# Patient Record
Sex: FEMALE | Race: WHITE | NOT HISPANIC OR LATINO | Employment: OTHER | URBAN - METROPOLITAN AREA
[De-identification: names, ages, dates, MRNs, and addresses within clinical notes are randomized per-mention and may not be internally consistent; named-entity substitution may affect disease eponyms.]

---

## 2017-01-31 ENCOUNTER — OFFICE VISIT (OUTPATIENT)
Dept: PSYCHIATRY | Facility: CLINIC | Age: 62
End: 2017-01-31
Payer: MEDICARE

## 2017-01-31 VITALS
SYSTOLIC BLOOD PRESSURE: 151 MMHG | DIASTOLIC BLOOD PRESSURE: 74 MMHG | BODY MASS INDEX: 22.71 KG/M2 | HEIGHT: 62 IN | WEIGHT: 123.44 LBS | HEART RATE: 80 BPM

## 2017-01-31 DIAGNOSIS — Z79.899 HIGH RISK MEDICATIONS (NOT ANTICOAGULANTS) LONG-TERM USE: ICD-10-CM

## 2017-01-31 DIAGNOSIS — F31.9 BIPOLAR I DISORDER: Primary | ICD-10-CM

## 2017-01-31 PROCEDURE — 99999 PR PBB SHADOW E&M-EST. PATIENT-LVL II: CPT | Mod: PBBFAC,,, | Performed by: PSYCHIATRY & NEUROLOGY

## 2017-01-31 PROCEDURE — 90792 PSYCH DIAG EVAL W/MED SRVCS: CPT | Mod: S$GLB,,, | Performed by: PSYCHIATRY & NEUROLOGY
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
## 2017-04-25 ENCOUNTER — PATIENT OUTREACH (OUTPATIENT)
Dept: ADMINISTRATIVE | Facility: HOSPITAL | Age: 62
End: 2017-04-25

## 2017-04-25 NOTE — PROGRESS NOTES
Overdue pap report, due for an office visit with him, a pap smear, mammogram, colon cancer screening, hepatitis C screening, and possibly some immunizations (flu, shingles, and pneumonia)    last ov Chris 2/2016, Landry 9/2015    2nd attempt

## 2017-04-25 NOTE — LETTER
April 27, 2017    Viridiana Suresh  [address]  [place] [address]             [facility]  [address]  [facility] [address]  Phone: [phone] Dear Mrs. Suresh:    [facility] is committed to your overall health.  To help you get the most out of each of your visits, we will review your information to make sure you are up to date on all of your recommended tests and/or procedures.  We have Dr. Ilan Alatorre listed as your primary care provider.     If Dr. Alatorre is no longer your primary care provider, please contact me so that we may update our records accordingly.      He has found that you may be due for an office visit with him, a pap smear, mammogram, colon cancer screening, hepatitis C screening, and possibly some immunizations (flu, shingles, and pneumonia).     If you have had any of the above done at an outside facility, please let us know so I can update your record.  If you have a copy of these records, please provide a copy for us to scan into your chart.  If not, please provide that provider/facilities contact information so that we may obtain copies from that facility.     Otherwise, please schedule these appointments at your earliest convenience.     If you have any questions or concerns, please don't hesitate to call.    Thank you for letting us care for you,  Malka Boyd LPN Clinical Care Coordinator  [facility] [place]  [phone]

## 2017-05-26 ENCOUNTER — TELEPHONE (OUTPATIENT)
Dept: FAMILY MEDICINE | Facility: CLINIC | Age: 62
End: 2017-05-26

## 2017-05-26 ENCOUNTER — TELEPHONE (OUTPATIENT)
Dept: NEUROSURGERY | Facility: CLINIC | Age: 62
End: 2017-05-26

## 2017-05-26 NOTE — TELEPHONE ENCOUNTER
Pt returned call. Pt states she has had Carpal tunnel release surgery 4 yrs ago with Dr. Garvin. Pt has not had a recent EMG, informed pt she will be scheduled to see DARYL Mcmahon with soonest appt being June 19th. Advised pt to contact her pcp to see if they will order an EMG and then pt can be scheduled with one of the physicians. Pt would like to schedule appt with Aisha Berry and will contact pcp. Appt date, time, and location given. Pt verbalized understanding.

## 2017-05-26 NOTE — TELEPHONE ENCOUNTER
----- Message from Swetha Beyer sent at 5/26/2017 12:49 PM CDT -----  Contact: self  Patient [phone] has an appt for her carpal tunnel with Aisha Berry on 06 19 17 but patient is asking if Dr Alatorre or his nurse would call Ms Berry to check about getting patient in on an earlier date with Ms Jamal as patient is having a lot of carpal tunnel pain/please advise

## 2017-05-26 NOTE — TELEPHONE ENCOUNTER
Called pt to schedule appt. Left voicemail with return number.       ----- Message from Betsy Blue sent at 5/26/2017  7:28 AM CDT -----  Contact: self [phone]  She is requesting an appt with you for severe carpal tunnel pain.  She would like to be seen as soon as possible.  Please call her.  Thank you!

## 2017-05-29 ENCOUNTER — TELEPHONE (OUTPATIENT)
Dept: FAMILY MEDICINE | Facility: CLINIC | Age: 62
End: 2017-05-29

## 2017-05-29 NOTE — TELEPHONE ENCOUNTER
Aisha Berry wants pt to have EMG test prior to her appt on 6/19/17 for her hand pain.  Can you please enter the order so that we can schedule it.

## 2017-05-29 NOTE — TELEPHONE ENCOUNTER
----- Message from Lashawn Ortiz sent at 5/29/2017  1:06 PM CDT -----  Patient spoke with nurse this morning concerning the ordering of pain medication/stated that she decided she would like medication ordered/uses [facility]/please order and call patient back at [phone] to confirm.

## 2017-05-29 NOTE — TELEPHONE ENCOUNTER
Spoke with pt and informed pt that she would have to be seen for pain medication as she has not been seen since 2015. Pt appt r/s and verbalized understanding.

## 2017-06-26 DIAGNOSIS — F31.9 BIPOLAR I DISORDER: ICD-10-CM

## 2017-06-26 RX ORDER — DIVALPROEX SODIUM 250 MG/1
750 TABLET, DELAYED RELEASE ORAL NIGHTLY
Qty: 270 TABLET | Refills: 1 | Status: SHIPPED | OUTPATIENT
Start: 2017-06-26 | End: 2017-09-28 | Stop reason: SDUPTHER

## 2017-07-05 ENCOUNTER — TELEPHONE (OUTPATIENT)
Dept: FAMILY MEDICINE | Facility: CLINIC | Age: 62
End: 2017-07-05

## 2017-07-05 ENCOUNTER — HOSPITAL ENCOUNTER (OUTPATIENT)
Dept: RADIOLOGY | Facility: HOSPITAL | Age: 62
Discharge: HOME | End: 2017-07-05
Attending: FAMILY MEDICINE
Payer: MEDICARE

## 2017-07-05 ENCOUNTER — OFFICE VISIT (OUTPATIENT)
Dept: FAMILY MEDICINE | Facility: CLINIC | Age: 62
End: 2017-07-05
Payer: MEDICARE

## 2017-07-05 VITALS
BODY MASS INDEX: 22.88 KG/M2 | TEMPERATURE: 98 F | HEIGHT: 62 IN | SYSTOLIC BLOOD PRESSURE: 136 MMHG | DIASTOLIC BLOOD PRESSURE: 64 MMHG | WEIGHT: 124.31 LBS

## 2017-07-05 DIAGNOSIS — M19.032 ARTHRITIS OF LEFT WRIST: ICD-10-CM

## 2017-07-05 DIAGNOSIS — M20.10 HALLUX VALGUS, ACQUIRED, UNSPECIFIED LATERALITY: ICD-10-CM

## 2017-07-05 DIAGNOSIS — L84 PRE-ULCERATIVE CORN OR CALLOUS: ICD-10-CM

## 2017-07-05 DIAGNOSIS — M19.032 ARTHRITIS OF LEFT WRIST: Primary | ICD-10-CM

## 2017-07-05 PROCEDURE — 99999 PR PBB SHADOW E&M-EST. PATIENT-LVL III: CPT | Mod: PBBFAC,,, | Performed by: FAMILY MEDICINE

## 2017-07-05 PROCEDURE — 73110 X-RAY EXAM OF WRIST: CPT | Mod: 26,LT,, | Performed by: RADIOLOGY

## 2017-07-05 PROCEDURE — 11055 PARING/CUTG B9 HYPRKER LES 1: CPT | Mod: S$GLB,,, | Performed by: FAMILY MEDICINE

## 2017-07-05 PROCEDURE — 73110 X-RAY EXAM OF WRIST: CPT | Mod: TC,PO,LT

## 2017-07-05 PROCEDURE — 99213 OFFICE O/P EST LOW 20 MIN: CPT | Mod: 25,S$GLB,, | Performed by: FAMILY MEDICINE

## 2017-07-05 NOTE — TELEPHONE ENCOUNTER
She does not have [facility]. Does she want to  a copy of the report? There are signs of the previous fracture and also some arthritic changes.

## 2017-07-05 NOTE — TELEPHONE ENCOUNTER
----- Message from Manan Mcnamara sent at 7/5/2017  1:50 PM CDT -----  Contact: same  Patient called in and wanted to see if Dr. Alatorre had time to review her x-ray that she did this morning in [place] so she can come  a copy?    Patient call back number is [phone].  Patient stated a message can be left on her voice mail.

## 2017-07-05 NOTE — PROGRESS NOTES
"Subjective:       Patient ID: Viridiana Suresh is a 62 y.o. female.    Chief Complaint: Wrist Pain (Bilat wrist pain. L wrist worse.)    She has been having some left wrist pain for the past month and a half.  It is actually a little bit better the past 2 days as the result of prior.  She had a left wrist fracture 14 years ago.  She also has a history of right carpal tunnel syndrome and this feels a bit different.  Tino aCrreon, her chiropractor, referred her to physical therapy.  The physical therapist, Tawanna Griffith, would like to have an x-ray of her wrist.  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  There has been no specific recent injury.      Wrist Pain    Associated symptoms include numbness (she complains of some numbness ofthe toes of both feet.). Pertinent negatives include no fever.     Review of Systems   Constitutional: Negative for fever and unexpected weight change.   Musculoskeletal: Positive for arthralgias (she has bilateral bunions with some pain and she also has a painful callus.).   Neurological: Positive for numbness (she complains of some numbness ofthe toes of both feet.).       Objective:     Blood pressure 136/64, temperature 97.6 °F (36.4 °C), temperature source Oral, height 5' 2" (1.575 m), weight 56.4 kg (124 lb 5.4 oz).      Physical Exam   Musculoskeletal:   Her hands show signs of first [facility] arthritis bilaterally.  Her right wrist has full range of motion.  She has some enlargement of the second MCP and also some DIP nodes.  On the left hand there is a similar examination for osteoarthritis.  Her left wrist shows some increased bony prominence and reduced range of motion.  She does have some dorsal carpal tenderness.  Negative Finkelstein maneuver.  Full range of motion of both elbows.  Bilateral hallux valgus.  Mildly tender.  She has a tender callous on her right first MTP area       Assessment:       1. " Arthritis of left wrist    2. Hallux valgus, acquired, unspecified laterality    3. Pre-ulcerative corn or callous        Plan:       X-ray of the left wrist.  I agree with physical therapy.  Consider orthopedics if not improved.  ID predated the callus on her right foot and we discussed shoewear.   we will do further evaluation if her toe numbness is a worsening problem.

## 2017-07-05 NOTE — TELEPHONE ENCOUNTER
Spoke w/ pt and advised results are not yet in and that Dr Alatorre will let her know once they are in [facility].

## 2017-07-06 NOTE — TELEPHONE ENCOUNTER
Attempted to contact pt to inform her that we will have a copy of xray results up at the  for her to ; no answer; left message.

## 2017-08-18 DIAGNOSIS — Z12.11 COLON CANCER SCREENING: ICD-10-CM

## 2017-09-28 ENCOUNTER — OFFICE VISIT (OUTPATIENT)
Dept: PSYCHIATRY | Facility: CLINIC | Age: 62
End: 2017-09-28
Payer: MEDICARE

## 2017-09-28 VITALS
WEIGHT: 116.88 LBS | BODY MASS INDEX: 21.51 KG/M2 | HEART RATE: 80 BPM | SYSTOLIC BLOOD PRESSURE: 119 MMHG | DIASTOLIC BLOOD PRESSURE: 71 MMHG | HEIGHT: 62 IN

## 2017-09-28 DIAGNOSIS — F31.73 BIPOLAR I DISORDER, MILD, CURRENT OR MOST RECENT EPISODE MANIC, IN PARTIAL REMISSION: Primary | ICD-10-CM

## 2017-09-28 DIAGNOSIS — F31.9 BIPOLAR I DISORDER: ICD-10-CM

## 2017-09-28 DIAGNOSIS — Z79.899 HIGH RISK MEDICATIONS (NOT ANTICOAGULANTS) LONG-TERM USE: ICD-10-CM

## 2017-09-28 DIAGNOSIS — Z72.0 TOBACCO ABUSE: ICD-10-CM

## 2017-09-28 PROCEDURE — 99214 OFFICE O/P EST MOD 30 MIN: CPT | Mod: S$GLB,,, | Performed by: PSYCHIATRY & NEUROLOGY

## 2017-09-28 PROCEDURE — 99999 PR PBB SHADOW E&M-EST. PATIENT-LVL II: CPT | Mod: PBBFAC,,, | Performed by: PSYCHIATRY & NEUROLOGY

## 2017-09-28 PROCEDURE — 3008F BODY MASS INDEX DOCD: CPT | Mod: S$GLB,,, | Performed by: PSYCHIATRY & NEUROLOGY

## 2017-09-28 RX ORDER — DIVALPROEX SODIUM 250 MG/1
750 TABLET, DELAYED RELEASE ORAL NIGHTLY
Qty: 270 TABLET | Refills: 1 | Status: SHIPPED | OUTPATIENT
Start: 2017-09-28 | End: 2018-05-30 | Stop reason: SDUPTHER

## 2017-09-28 RX ORDER — LOXAPINE SUCCINATE 10 MG/1
10 TABLET ORAL NIGHTLY
Qty: 90 CAPSULE | Refills: 1 | Status: SHIPPED | OUTPATIENT
Start: 2017-09-28 | End: 2018-01-09

## 2017-10-24 ENCOUNTER — PATIENT OUTREACH (OUTPATIENT)
Dept: ADMINISTRATIVE | Facility: HOSPITAL | Age: 62
End: 2017-10-24

## 2017-10-24 NOTE — LETTER
October 24, 2017    Viridiana Suresh  [address]  [place] [address]             [facility]  [address]  [place] [address]  Phone: [phone] Dear Mrs. Suresh:    [facility] is committed to your overall health.  To help you get the most out of each of your visits, we will review your information to make sure you are up to date on all of your recommended tests and/or procedures.      Dr. Ilan Alatorre has found that your chart shows you may be due for hepatitis C screening, colon cancer screening, pap smear, mammogram, and possibly some immunizations (pneumonia, shingles, and flu).     Medicare does not cover all immunizations to be given in the clinic.  Check your benefits to ensure that you do not need to receive your immunizations at the pharmacy.    If you have had any of the above done at another facility, please bring the records or information with you so that your record at [facility] will be complete.  If you would like to schedule any of these, please contact me.    If you are currently taking medication, please bring it with you to your appointment for review.    If you have any questions or concerns, please don't hesitate to call.    Thank you for letting us care for you,  Malka Boyd LPN Clinical Care Coordinator  [facility] [place]  [phone]

## 2017-11-02 ENCOUNTER — OFFICE VISIT (OUTPATIENT)
Dept: FAMILY MEDICINE | Facility: CLINIC | Age: 62
End: 2017-11-02
Payer: MEDICARE

## 2017-11-02 VITALS
WEIGHT: 117.63 LBS | BODY MASS INDEX: 21.65 KG/M2 | DIASTOLIC BLOOD PRESSURE: 70 MMHG | SYSTOLIC BLOOD PRESSURE: 112 MMHG | TEMPERATURE: 98 F | HEIGHT: 62 IN

## 2017-11-02 DIAGNOSIS — G60.3 IDIOPATHIC PROGRESSIVE NEUROPATHY: ICD-10-CM

## 2017-11-02 DIAGNOSIS — G62.9 PERIPHERAL POLYNEUROPATHY: ICD-10-CM

## 2017-11-02 DIAGNOSIS — F31.9 BIPOLAR I DISORDER: Primary | ICD-10-CM

## 2017-11-02 DIAGNOSIS — M19.042 ARTHRITIS OF HAND, LEFT: ICD-10-CM

## 2017-11-02 PROCEDURE — 99999 PR PBB SHADOW E&M-EST. PATIENT-LVL II: CPT | Mod: PBBFAC,,, | Performed by: FAMILY MEDICINE

## 2017-11-02 PROCEDURE — 99214 OFFICE O/P EST MOD 30 MIN: CPT | Mod: S$GLB,,, | Performed by: FAMILY MEDICINE

## 2017-11-02 NOTE — PROGRESS NOTES
"Subjective:       Patient ID: Viridiana Suresh is a 62 y.o. female.    Chief Complaint: Numbness (Feet and toes w/ numbness off/on x 1 yr. Constant x couple mo. Seems to poss be due to new PT job.)    HPI     Numbness in toes: on and off for years, has been worsening over time, started around the time when she began working again-on her feet a lot. Mostly on sole of foot, pins and needles, no weakness. Soaks feet in Epson salts and using BenGay to help, minimal help. Had previous tardive dyskinesia, has been weaning off loxapine for this reason, will be off completely in January, foot/leg cramping and involuntary movements have improved. Numbness has not improved with weaning. Tends to wear shoes with minimal support. Family history of DM-grandmother. 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    Weakness in hands. Has been using DMSO on left hand with some improvement. Had x-ray, shows arthritis and osteopenia. Did some physical therapy-organized on her own, didn't help. Has started taking vitamin D. Uses wrist support at work. Wraps hand in heat pad at night to relieve pain.    Review of Systems   Constitutional: Negative for fatigue, fever and unexpected weight change.   HENT: Negative for congestion, hearing loss and rhinorrhea.    Eyes: Negative for photophobia and visual disturbance.   Respiratory: Negative for cough, shortness of breath and wheezing.    Cardiovascular: Negative for chest pain and palpitations.   Gastrointestinal: Negative for abdominal pain, blood in stool, constipation, diarrhea and nausea.   Genitourinary: Negative for difficulty urinating, dysuria, hematuria, urgency, vaginal bleeding and vaginal discharge.   Musculoskeletal: Positive for arthralgias. Negative for joint swelling.   Neurological: Positive for weakness. Negative for numbness and headaches.       Objective:     Blood pressure 112/70, temperature 98.3 °F (36.8 °C), temperature source Oral, height 5' 2" (1.575 m), " weight 53.4 kg (117 lb 10.2 oz).      Physical Exam   Constitutional: She is oriented to person, place, and time. She appears well-developed and well-nourished. No distress.   HENT:   Right Ear: External ear normal.   Left Ear: External ear normal.   Mouth/Throat: No oropharyngeal exudate.   Eyes: Conjunctivae and EOM are normal. Pupils are equal, round, and reactive to light. No scleral icterus.   Neck: Normal range of motion. No thyromegaly present.   Cardiovascular: Normal rate, regular rhythm and normal heart sounds.    No murmur heard.  Pulses:       Dorsalis pedis pulses are 1+ on the right side, and 1+ on the left side.        Posterior tibial pulses are 1+ on the right side, and 1+ on the left side.   Pulmonary/Chest: Effort normal. No respiratory distress. She has no wheezes. She has no rales. She exhibits no tenderness.   Abdominal: Soft. She exhibits no distension and no mass. There is no tenderness.   Musculoskeletal: She exhibits no edema.        Arms:       Right foot: There is no deformity.        Left foot: There is no deformity.        Feet:    Heberdens nodes. Also left 2-3rd MCP hypertrophy but nontender. Tender with crepitance left 1st senior care. Left wrist decreased ROM with some pain. Previous radial fx.    Feet:   Right Foot:   Protective Sensation: 4 sites tested. 4 sites sensed.   Skin Integrity: Negative for ulcer.   Left Foot:   Protective Sensation: 4 sites tested. 4 sites sensed.   Skin Integrity: Negative for ulcer.   Lymphadenopathy:     She has no cervical adenopathy.   Neurological: She is alert and oriented to person, place, and time.   Normal gait  Reduced vibratory sensation in feet   Skin: No rash noted.   Psychiatric: She has a normal mood and affect.       Assessment:       1. Bipolar I disorder    2. Arthritis of hand, left    3. Peripheral polyneuropathy        Plan:       1. Peripheral numbness  -Unlikely related to circulation  -Check Vit B12, folate    2. Arthritis-wrist  -Sapna  gel    Need to address health maintenance at next appointment

## 2018-01-08 ENCOUNTER — TELEPHONE (OUTPATIENT)
Dept: FAMILY MEDICINE | Facility: CLINIC | Age: 63
End: 2018-01-08

## 2018-01-08 NOTE — TELEPHONE ENCOUNTER
----- Message from Concha Miller sent at 1/6/2018  8:04 AM CST -----  Contact: Viridiana  Sick about a week, she has flu like symptoms. She has no appetite, diarrhea, coughing, fever. Please call her to be seen on Monday. Call [phone] (home)   Thanks!

## 2018-01-08 NOTE — TELEPHONE ENCOUNTER
Spoke w/ pt. Informed pt about no openings today with provider requested, pt states she will only see provider Dr. Alatorre in the [place] office only. Advised pt that there is an opening for 1-9-2018 at 10 am. pt verbalized understanding.

## 2018-01-09 ENCOUNTER — OFFICE VISIT (OUTPATIENT)
Dept: FAMILY MEDICINE | Facility: CLINIC | Age: 63
End: 2018-01-09
Payer: MEDICARE

## 2018-01-09 ENCOUNTER — HOSPITAL ENCOUNTER (OUTPATIENT)
Dept: RADIOLOGY | Facility: HOSPITAL | Age: 63
Discharge: HOME | End: 2018-01-09
Attending: FAMILY MEDICINE
Payer: MEDICARE

## 2018-01-09 VITALS
DIASTOLIC BLOOD PRESSURE: 78 MMHG | TEMPERATURE: 98 F | SYSTOLIC BLOOD PRESSURE: 130 MMHG | HEART RATE: 70 BPM | HEIGHT: 62 IN | WEIGHT: 113.13 LBS | BODY MASS INDEX: 20.82 KG/M2

## 2018-01-09 DIAGNOSIS — J06.9 UPPER RESPIRATORY TRACT INFECTION, UNSPECIFIED TYPE: ICD-10-CM

## 2018-01-09 DIAGNOSIS — Z72.0 TOBACCO ABUSE: ICD-10-CM

## 2018-01-09 DIAGNOSIS — J06.9 UPPER RESPIRATORY TRACT INFECTION, UNSPECIFIED TYPE: Primary | ICD-10-CM

## 2018-01-09 PROCEDURE — 99213 OFFICE O/P EST LOW 20 MIN: CPT | Mod: S$GLB,,, | Performed by: FAMILY MEDICINE

## 2018-01-09 PROCEDURE — 71046 X-RAY EXAM CHEST 2 VIEWS: CPT | Mod: 26,,, | Performed by: RADIOLOGY

## 2018-01-09 PROCEDURE — 71046 X-RAY EXAM CHEST 2 VIEWS: CPT | Mod: TC,PN

## 2018-01-09 PROCEDURE — 99999 PR PBB SHADOW E&M-EST. PATIENT-LVL III: CPT | Mod: PBBFAC,,, | Performed by: FAMILY MEDICINE

## 2018-01-09 RX ORDER — CODEINE PHOSPHATE AND GUAIFENESIN 10; 100 MG/5ML; MG/5ML
5 SOLUTION ORAL EVERY 8 HOURS PRN
Qty: 180 ML | Refills: 1 | Status: SHIPPED | OUTPATIENT
Start: 2018-01-09 | End: 2018-01-19

## 2018-01-09 NOTE — PROGRESS NOTES
"Subjective:       Patient ID: Viridiana Suresh is a 62 y.o. female.    Chief Complaint: Cough (cough, "in my chest") and Diarrhea (Diarrhea, achey, fatigue since last week)    She has been ill for approximately one week.  It started with 2 days of diarrhea and she felt feverish.  She has persistent tiredness and decreased appetite.  She has been coughing the past 5 days which is nonproductive.  There is no dyspnea.  She stopped smoking 5 weeks ago.  Her  had some upper respiratory symptoms that are improving.      Cough   Associated symptoms include a fever.   Diarrhea    Associated symptoms include coughing and a fever.     Review of Systems   Constitutional: Positive for appetite change and fever.   HENT: Positive for congestion.    Respiratory: Positive for cough.    Gastrointestinal: Positive for diarrhea.       Objective:     Blood pressure 130/78, pulse 70, temperature 97.8 °F (36.6 °C), temperature source Oral, height 5' 2" (1.575 m), weight 51.3 kg (113 lb 1.5 oz).      Physical Exam   Constitutional: She appears well-developed and well-nourished. No distress.   HENT:   Sinuses nontender.  Mild nasal mucosal edema.  Throat is normal.  TMs are clear.   Neck: No thyromegaly present.   Pulmonary/Chest: Effort normal. She has rales (I heard some crackles in the right posterior lung field).   Abdominal: Soft. Bowel sounds are normal. She exhibits no distension. There is no tenderness.   Musculoskeletal: She exhibits no edema.   Lymphadenopathy:     She has no cervical adenopathy.       Assessment:       1. Upper respiratory tract infection, unspecified type    2. Tobacco abuse        Plan:        symptomatic treatment discussed.  Chest x-ray today.    I reviewed the chest x-ray and it showed no infiltrate.  "

## 2018-01-10 ENCOUNTER — TELEPHONE (OUTPATIENT)
Dept: FAMILY MEDICINE | Facility: CLINIC | Age: 63
End: 2018-01-10

## 2018-01-10 NOTE — TELEPHONE ENCOUNTER
----- Message from Ilan Alatorre MD sent at 1/9/2018 12:31 PM CST -----  Please call her and tell her that the chest xray was clear

## 2018-01-10 NOTE — TELEPHONE ENCOUNTER
Spoke to pt and advised that chest xray was clear per Dr Alatorre and she is to follow up if not improving. Pt verbalized uinderstanding and stated she feels she is improving at this time.

## 2018-01-17 ENCOUNTER — TELEPHONE (OUTPATIENT)
Dept: FAMILY MEDICINE | Facility: CLINIC | Age: 63
End: 2018-01-17

## 2018-01-18 ENCOUNTER — OFFICE VISIT (OUTPATIENT)
Dept: FAMILY MEDICINE | Facility: CLINIC | Age: 63
End: 2018-01-18
Payer: MEDICARE

## 2018-01-18 VITALS
BODY MASS INDEX: 21.25 KG/M2 | HEART RATE: 71 BPM | HEIGHT: 62 IN | DIASTOLIC BLOOD PRESSURE: 80 MMHG | WEIGHT: 115.5 LBS | TEMPERATURE: 98 F | SYSTOLIC BLOOD PRESSURE: 124 MMHG | OXYGEN SATURATION: 95 %

## 2018-01-18 DIAGNOSIS — J32.9 SINUSITIS, UNSPECIFIED CHRONICITY, UNSPECIFIED LOCATION: ICD-10-CM

## 2018-01-18 DIAGNOSIS — H60.90 OTITIS EXTERNA, UNSPECIFIED CHRONICITY, UNSPECIFIED LATERALITY, UNSPECIFIED TYPE: ICD-10-CM

## 2018-01-18 DIAGNOSIS — J20.9 ACUTE BRONCHITIS, UNSPECIFIED ORGANISM: ICD-10-CM

## 2018-01-18 DIAGNOSIS — R19.7 DIARRHEA, UNSPECIFIED TYPE: ICD-10-CM

## 2018-01-18 DIAGNOSIS — J06.9 UPPER RESPIRATORY TRACT INFECTION, UNSPECIFIED TYPE: Primary | ICD-10-CM

## 2018-01-18 DIAGNOSIS — J02.9 PHARYNGITIS, UNSPECIFIED ETIOLOGY: ICD-10-CM

## 2018-01-18 PROCEDURE — 99999 PR PBB SHADOW E&M-EST. PATIENT-LVL III: CPT | Mod: PBBFAC,,, | Performed by: INTERNAL MEDICINE

## 2018-01-18 PROCEDURE — 99213 OFFICE O/P EST LOW 20 MIN: CPT | Mod: S$GLB,,, | Performed by: INTERNAL MEDICINE

## 2018-01-18 RX ORDER — NEOMYCIN SULFATE, POLYMYXIN B SULFATE AND HYDROCORTISONE 10; 3.5; 1 MG/ML; MG/ML; [USP'U]/ML
3 SUSPENSION/ DROPS AURICULAR (OTIC) 3 TIMES DAILY
Qty: 10 ML | Refills: 0 | Status: SHIPPED | OUTPATIENT
Start: 2018-01-18 | End: 2018-01-25

## 2018-01-18 RX ORDER — AZITHROMYCIN 250 MG/1
TABLET, FILM COATED ORAL
Qty: 6 TABLET | Refills: 0 | Status: SHIPPED | OUTPATIENT
Start: 2018-01-18 | End: 2018-01-23

## 2018-01-18 NOTE — PROGRESS NOTES
Subjective:       Patient ID: Viridiana Suresh is a 62 y.o. female.    Chief Complaint: left ear stopped up (x 7 days); Diarrhea (since this morning; no fever noted ); and Cough (x 7 days with clear mucus )    HPI  Seeing pt today for Dr Alaotrre, her PCP.  She was seen earlier by Dr. Alatorre on 1/9/18 for upper respiratory tract infection with cough, diarrhea and fever.  Symptoms at that time included 1 week history of diarrhea/fever w dry cough with flulike symptoms; sinus congestion but no sore throat; she was reportedly given a cough syrup for her cough but no Tamiflu; chest x-ray at that time was reportedly negative.  She reportedly had some fever within the week prior; 100.3, 2 days before the evaluation; she was 97.8 in the office at that time.     Today she presents with complaints of intermittent subjective fever for 1 week; but her  brings up the fact that she sleeps under an electric blanket at night and this may be causing it.  She has no complaints of posterior nasal drip, myalgias, or headaches.  She does give a history of mild sinus congestion for the last week but no sore throat; she is a past smoker of 47 years w averaging greater than equal to 1 pack per day; she has quit now for 5 months.  She does however use Q-tips and complains of the left ear being stopped up for the last week.  She is also had diarrhea this morning.  Temperature in the office today is 97.7°    Review of Systems   Constitutional: Positive for fever. Negative for chills, fatigue and unexpected weight change.   HENT: Positive for rhinorrhea. Negative for congestion, postnasal drip, sinus pressure, sore throat and voice change.    Eyes: Positive for itching. Negative for discharge.   Respiratory: Positive for cough. Negative for chest tightness and wheezing.         Dry.   Cardiovascular: Negative for palpitations and leg swelling.   Gastrointestinal: Positive for diarrhea. Negative for abdominal pain, blood in stool,  "constipation, nausea and vomiting.        5-6 soft BM's a day lately.   Genitourinary: Negative for dysuria and hematuria.   Musculoskeletal: Negative for arthralgias and myalgias.   Skin: Negative for rash.   Neurological: Negative for dizziness and headaches.       Objective:      Vitals:    01/18/18 1423   BP: 124/80   BP Location: Left arm   Patient Position: Sitting   BP Method: Medium (Manual)   Pulse: 71   Temp: 97.7 °F (36.5 °C)   TempSrc: Oral   SpO2: 95%   Weight: 52.4 kg (115 lb 8.3 oz)   Height: 5' 2" (1.575 m)     Body mass index is 21.13 kg/m².    Physical Exam   Constitutional: She is oriented to person, place, and time. She appears well-developed and well-nourished.   HENT:   Head: Normocephalic and atraumatic.   L TM swollen/tender/slight inflamed; min congestion. R TM pink; NM swollen/slightly inflamed; yellowish white mucus; throat and post pharynx inflamed.   Eyes: EOM are normal.   Neck: Normal range of motion. Neck supple. No thyromegaly present.   Cardiovascular: Normal rate, regular rhythm and normal heart sounds.  Exam reveals no gallop.    No murmur heard.  Pulmonary/Chest: Effort normal and breath sounds normal. No respiratory distress. She has no wheezes. She has no rales.   Abdominal: Soft. Bowel sounds are normal. She exhibits no distension. There is no tenderness. There is no rebound and no guarding.   Musculoskeletal: Normal range of motion. She exhibits no edema.   Lymphadenopathy:     She has no cervical adenopathy.   Neurological: She is alert and oriented to person, place, and time.   Moves all 4 extremities fine.   Skin: No rash noted.   Psychiatric: She has a normal mood and affect. Her behavior is normal. Thought content normal.   Vitals reviewed.      Assessment:       1. Upper respiratory tract infection, unspecified type    2. Acute bronchitis, unspecified organism    3. Sinusitis, unspecified chronicity, unspecified location    4. Pharyngitis, unspecified etiology    5. " Otitis externa, unspecified chronicity, unspecified laterality, unspecified type    6. Diarrhea, unspecified type        Plan:       Upper respiratory tract infection, unspecified type  -     azithromycin (Z-MEET) 250 MG tablet; Take 2 tablets by mouth on day 1; Take 1 tablet by mouth on days 2-5  Dispense: 6 tablet; Refill: 0    Acute bronchitis, unspecified organism; use mucinex DM otc for cough.   -     azithromycin (Z-MEET) 250 MG tablet; Take 2 tablets by mouth on day 1; Take 1 tablet by mouth on days 2-5  Dispense: 6 tablet; Refill: 0    Sinusitis, unspecified chronicity, unspecified location; simply saline 1 spray 1-3x a day for congestion. Flonase 1 spray 1-2x a day as needed.  -     azithromycin (Z-MEET) 250 MG tablet; Take 2 tablets by mouth on day 1; Take 1 tablet by mouth on days 2-5  Dispense: 6 tablet; Refill: 0    Pharyngitis, unspecified etiology; chloraseptic for sore throat. Warm salt watyer gargle as well prn. [place] drops prn.-     azithromycin (Z-MEET) 250 MG tablet; Take 2 tablets by mouth on day 1; Take 1 tablet by mouth on days 2-5  Dispense: 6 tablet; Refill: 0    Otitis externa, unspecified chronicity, unspecified laterality, unspecified type; she has been advised to stop using Q-tips in her ear canals.  -     azithromycin (Z-MEET) 250 MG tablet; Take 2 tablets by mouth on day 1; Take 1 tablet by mouth on days 2-5  Dispense: 6 tablet; Refill: 0  -     neomycin-polymyxin-hydrocortisone (CORTISPORIN) 3.5-10,000-1 mg/mL-unit/mL-% otic suspension; Place 3 drops into the left ear 3 (three) times daily.  Dispense: 10 mL; Refill: 0    Diarrhea; likely due to nasal drip; use imodium otc prn diarrhea.

## 2018-01-18 NOTE — PATIENT INSTRUCTIONS
Upper respiratory tract infection, unspecified type  -     azithromycin (Z-MEET) 250 MG tablet; Take 2 tablets by mouth on day 1; Take 1 tablet by mouth on days 2-5  Dispense: 6 tablet; Refill: 0    Acute bronchitis, unspecified organism; use mucinex DM otc for cough.   -     azithromycin (Z-MEET) 250 MG tablet; Take 2 tablets by mouth on day 1; Take 1 tablet by mouth on days 2-5  Dispense: 6 tablet; Refill: 0    Sinusitis, unspecified chronicity, unspecified location; simply saline 1 spray 1-3x a day for congestion. Flonase 1 spray 1-2x a day as needed.  -     azithromycin (Z-MEET) 250 MG tablet; Take 2 tablets by mouth on day 1; Take 1 tablet by mouth on days 2-5  Dispense: 6 tablet; Refill: 0    Pharyngitis, unspecified etiology; chloraseptic for sore throat. Warm salt watyer gargle as well prn.  -     azithromycin (Z-MEET) 250 MG tablet; Take 2 tablets by mouth on day 1; Take 1 tablet by mouth on days 2-5  Dispense: 6 tablet; Refill: 0    Otitis externa, unspecified chronicity, unspecified laterality, unspecified type; stop using Q-tips in her ear canals.  -     azithromycin (Z-MEET) 250 MG tablet; Take 2 tablets by mouth on day 1; Take 1 tablet by mouth on days 2-5  Dispense: 6 tablet; Refill: 0  -     neomycin-polymyxin-hydrocortisone (CORTISPORIN) 3.5-10,000-1 mg/mL-unit/mL-% otic suspension; Place 3 drops into the left ear 3 (three) times daily.  Dispense: 10 mL; Refill: 0

## 2018-02-14 ENCOUNTER — OFFICE VISIT (OUTPATIENT)
Dept: FAMILY MEDICINE | Facility: CLINIC | Age: 63
End: 2018-02-14
Payer: MEDICARE

## 2018-02-14 VITALS
HEIGHT: 62 IN | SYSTOLIC BLOOD PRESSURE: 136 MMHG | OXYGEN SATURATION: 97 % | WEIGHT: 118.63 LBS | TEMPERATURE: 98 F | HEART RATE: 71 BPM | DIASTOLIC BLOOD PRESSURE: 80 MMHG | BODY MASS INDEX: 21.83 KG/M2

## 2018-02-14 DIAGNOSIS — H60.92 OTITIS EXTERNA OF LEFT EAR, UNSPECIFIED CHRONICITY, UNSPECIFIED TYPE: ICD-10-CM

## 2018-02-14 DIAGNOSIS — J32.9 SINUSITIS, UNSPECIFIED CHRONICITY, UNSPECIFIED LOCATION: ICD-10-CM

## 2018-02-14 DIAGNOSIS — J98.01 BRONCHOSPASM: ICD-10-CM

## 2018-02-14 DIAGNOSIS — J40 BRONCHITIS: ICD-10-CM

## 2018-02-14 DIAGNOSIS — J02.9 PHARYNGITIS, UNSPECIFIED ETIOLOGY: ICD-10-CM

## 2018-02-14 DIAGNOSIS — J06.9 UPPER RESPIRATORY TRACT INFECTION, UNSPECIFIED TYPE: Primary | ICD-10-CM

## 2018-02-14 DIAGNOSIS — H69.92 DYSFUNCTION OF LEFT EUSTACHIAN TUBE: ICD-10-CM

## 2018-02-14 DIAGNOSIS — Z87.891 HISTORY OF SMOKING: ICD-10-CM

## 2018-02-14 PROCEDURE — 3008F BODY MASS INDEX DOCD: CPT | Mod: S$GLB,,, | Performed by: INTERNAL MEDICINE

## 2018-02-14 PROCEDURE — 99214 OFFICE O/P EST MOD 30 MIN: CPT | Mod: 25,S$GLB,, | Performed by: INTERNAL MEDICINE

## 2018-02-14 PROCEDURE — 99999 PR PBB SHADOW E&M-EST. PATIENT-LVL III: CPT | Mod: PBBFAC,,, | Performed by: INTERNAL MEDICINE

## 2018-02-14 PROCEDURE — 96372 THER/PROPH/DIAG INJ SC/IM: CPT | Mod: S$GLB,,, | Performed by: INTERNAL MEDICINE

## 2018-02-14 RX ORDER — AMOXICILLIN AND CLAVULANATE POTASSIUM 875; 125 MG/1; MG/1
1 TABLET, FILM COATED ORAL EVERY 12 HOURS
Qty: 20 TABLET | Refills: 0 | Status: SHIPPED | OUTPATIENT
Start: 2018-02-14 | End: 2018-02-24

## 2018-02-14 RX ORDER — BETAMETHASONE SODIUM PHOSPHATE AND BETAMETHASONE ACETATE 3; 3 MG/ML; MG/ML
6 INJECTION, SUSPENSION INTRA-ARTICULAR; INTRALESIONAL; INTRAMUSCULAR; SOFT TISSUE
Status: COMPLETED | OUTPATIENT
Start: 2018-02-14 | End: 2018-02-14

## 2018-02-14 RX ADMIN — BETAMETHASONE SODIUM PHOSPHATE AND BETAMETHASONE ACETATE 6 MG: 3; 3 INJECTION, SUSPENSION INTRA-ARTICULAR; INTRALESIONAL; INTRAMUSCULAR; SOFT TISSUE at 01:02

## 2018-02-14 NOTE — PROGRESS NOTES
Subjective:       Patient ID: Viridiana Suresh is a 62 y.o. female.    Chief Complaint: Sinusitis (clear white mucus x 1 month ) and Sore Throat    HPI   Pt. is being seen today for her PCP Dr. Alatorre..  Patient was recently seen by Dr. Alatorre on 1/9/18 for upper respiratory symptoms.  No antibiotic was felt needed then at that time.  She was off cigarettes for 9- 10 weeks  after 1 pack per day history for around 50 years.  Chest x-ray was ordered 1/9/18 showed no acute abnormalities.  She was subsequently seen by me in the office 1/18/18 for Dr. Alatorre for upper respiratory tract infection with bronchitis, sinusitis, and pharyngitis, and left otitis externa suspected due to Q-tip use.  She was treated with a Z-Brian; recommended warm salt water gargle, and Cortisporin otic HC suspension drops as well as an opiate cough syrup for prn cough spasms       Now, she presents to the office with persistent clear white mucus for one month with sinus drip.  She gives a history of subjective fever with chills for around 2 weeks but she sleeps under an electric blanket and is postmenopausal; she has not checked her temp.; also of note also complaints of some left-sided neck congestion. 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  She doesn't feel cleared up from her azithromycin and not any different after the Z-Brian; however she has a scratchy cough which is better, but it's producing white to green to clear phlegm.  Initially she had a clear to white nasal discharge and today has become green.  She gives no hx of sore throat, headaches, or myalgias, but does have some fatigue and lack of sleep.  Of note her 's on a Z-Brian and received steroids as well along w nasal spray for apparent upper respiratory tract infection.  She's also been under a lot of stress lately which may be contributing towards her fatigue. Influenza A and B nasal swab in the office was negative.  Total time: 4456-8784 hrs; >50% time spent in  "discussion, counseling, and review. Plan of care discussed at length.    Review of Systems   Constitutional: Positive for chills and fatigue. Negative for fever and unexpected weight change.   HENT: Positive for congestion, postnasal drip, rhinorrhea, sinus pressure and sore throat.         Seasonal allergies   Eyes: Positive for itching. Negative for discharge.   Respiratory: Positive for cough. Negative for chest tightness, shortness of breath and wheezing.    Cardiovascular: Negative for chest pain, palpitations and leg swelling.   Gastrointestinal: Negative for abdominal pain, blood in stool, constipation, diarrhea, nausea and vomiting.   Genitourinary: Negative for dysuria, hematuria and urgency.   Musculoskeletal: Negative for arthralgias and myalgias.   Skin: Negative for rash.   Allergic/Immunologic: Negative for environmental allergies and food allergies.       Objective:      Vitals:    02/14/18 1144   BP: 136/80   BP Location: Left arm   Patient Position: Sitting   BP Method: Medium (Manual)   Pulse: 71   Temp: 97.9 °F (36.6 °C)   TempSrc: Oral   SpO2: 97%   Weight: 53.8 kg (118 lb 9.7 oz)   Height: 5' 2" (1.575 m)     Body mass index is 21.69 kg/m².    Physical Exam   Constitutional: She is oriented to person, place, and time. She appears well-developed and well-nourished.   HENT:   Head: Normocephalic and atraumatic.   TM's pink; throat and post pharynx inflamed; NM swollen/ inflamed/clear to yel-green mucus. No sinus tenderness to palp.   Eyes: EOM are normal.   Neck: Normal range of motion. Neck supple. No thyromegaly present.   Cardiovascular: Normal rate, regular rhythm and normal heart sounds.  Exam reveals no gallop.    No murmur heard.  Pulmonary/Chest: Effort normal. No respiratory distress. She has no wheezes. She has no rales.   Min decreased loren BS's w slight post-tussive wheeze noted. Otherwise CTA.   Abdominal: Soft. Bowel sounds are normal. She exhibits no distension. There is no " tenderness. There is no rebound and no guarding.   Musculoskeletal: Normal range of motion. She exhibits no edema.   Lymphadenopathy:     She has no cervical adenopathy.   Neurological: She is alert and oriented to person, place, and time.   Moves all 4 extremities fine.   Skin: No rash noted.   Psychiatric: She has a normal mood and affect. Her behavior is normal. Thought content normal.   Vitals reviewed.      Assessment:       1. Upper respiratory tract infection, unspecified type    2. Sinusitis, unspecified chronicity, unspecified location    3. Bronchitis    4. Bronchospasm    5. Pharyngitis, unspecified etiology    6. Dysfunction of left eustachian tube    7. Otitis externa of left ear, unspecified chronicity, unspecified type    8. History of smoking        Plan:       Upper respiratory tract infection, unspecified type; previously prescribed Z-pack.  -     amoxicillin-clavulanate 875-125mg (AUGMENTIN) 875-125 mg per tablet; Take 1 tablet by mouth every 12 (twelve) hours. Take pc.  Dispense: 20 tablet; Refill: 0  -     betamethasone acetate-betamethasone sodium phosphate injection 6 mg; Inject 1 mL (6 mg total) into the muscle one time.    Sinusitis, unspecified chronicity, unspecified location; use claritin 10 mg a day for congestion. Warm salt water gargle 1-3x a day as needed.       Flonase nasal spray 1-2 sprays a day as needed for congestion.  -     amoxicillin-clavulanate 875-125mg (AUGMENTIN) 875-125 mg per tablet; Take 1 tablet by mouth every 12 (twelve) hours. Take pc.  Dispense: 20 tablet; Refill: 0  -     betamethasone acetate-betamethasone sodium phosphate injection 6 mg; Inject 1 mL (6 mg total) into the muscle one time.    Bronchitis; has cough syrup from Dr Alatorre; use mucinex DM for cough/congestion as well.  -     amoxicillin-clavulanate 875-125mg (AUGMENTIN) 875-125 mg per tablet; Take 1 tablet by mouth every 12 (twelve) hours. Take pc.  Dispense: 20 tablet; Refill: 0  -     betamethasone  acetate-betamethasone sodium phosphate injection 6 mg; Inject 1 mL (6 mg total) into the muscle one time.    Bronchospasm; wants to wait on albuterol inhaler prn use.    Pharyngitis, unspecified etiology; chloraseptic spray prn sore throat. Warm salt water gargle prn sore throat as well.  -     amoxicillin-clavulanate 875-125mg (AUGMENTIN) 875-125 mg per tablet; Take 1 tablet by mouth every 12 (twelve) hours. Take pc.  Dispense: 20 tablet; Refill: 0  -     betamethasone acetate-betamethasone sodium phosphate injection 6 mg; Inject 1 mL (6 mg total) into the muscle one time.    Dysfunction of left eustachian tube; use mucinex DM for congestion/cough.  -     amoxicillin-clavulanate 875-125mg (AUGMENTIN) 875-125 mg per tablet; Take 1 tablet by mouth every 12 (twelve) hours. Take pc.  Dispense: 20 tablet; Refill: 0  -     betamethasone acetate-betamethasone sodium phosphate injection 6 mg; Inject 1 mL (6 mg total) into the muscle one time.    Otitis externa of left ear, unspecified chronicity, unspecified type; can stop her ear drops, as has resolved w tx. Avoid further Q-tip use in her        ear canals.    History of smoking; continue to obstain from smoking.

## 2018-02-14 NOTE — PATIENT INSTRUCTIONS
Upper respiratory tract infection, unspecified type  -     amoxicillin-clavulanate 875-125mg (AUGMENTIN) 875-125 mg per tablet; Take 1 tablet by mouth every 12 (twelve) hours. Take pc.  Dispense: 20 tablet; Refill: 0  -     betamethasone acetate-betamethasone sodium phosphate injection 6 mg; Inject 1 mL (6 mg total) into the muscle one time.    Sinusitis, unspecified chronicity, unspecified location; use claritin 10 mg a day for congestion. Warm salt water gargle 1-3x a day as needed. Flonase nasal spray 1-2 sprays a day as needed for congestion.  -     amoxicillin-clavulanate 875-125mg (AUGMENTIN) 875-125 mg per tablet; Take 1 tablet by mouth every 12 (twelve) hours. Take pc.  Dispense: 20 tablet; Refill: 0  -     betamethasone acetate-betamethasone sodium phosphate injection 6 mg; Inject 1 mL (6 mg total) into the muscle one time.    Bronchitis; has cough syrup from Dr Alatorre; use mucinex DM for cough/congestion.  -     amoxicillin-clavulanate 875-125mg (AUGMENTIN) 875-125 mg per tablet; Take 1 tablet by mouth every 12 (twelve) hours. Take pc.  Dispense: 20 tablet; Refill: 0  -     betamethasone acetate-betamethasone sodium phosphate injection 6 mg; Inject 1 mL (6 mg total) into the muscle one time.    Bronchospasm; wants to wait on albuterol inhaler prn use.    Pharyngitis, unspecified etiology; chloraseptic spray prn sore throat.  -     amoxicillin-clavulanate 875-125mg (AUGMENTIN) 875-125 mg per tablet; Take 1 tablet by mouth every 12 (twelve) hours. Take pc.  Dispense: 20 tablet; Refill: 0  -     betamethasone acetate-betamethasone sodium phosphate injection 6 mg; Inject 1 mL (6 mg total) into the muscle one time.    Dysfunction of left eustachian tube; use mucinex DM for congestion/cough.  -     amoxicillin-clavulanate 875-125mg (AUGMENTIN) 875-125 mg per tablet; Take 1 tablet by mouth every 12 (twelve) hours. Take pc.  Dispense: 20 tablet; Refill: 0  -     betamethasone acetate-betamethasone sodium phosphate  injection 6 mg; Inject 1 mL (6 mg total) into the muscle one time.    Otitis externa of left ear, unspecified chronicity, unspecified type; can stop her ear drops.     History of smoking

## 2018-03-15 ENCOUNTER — OFFICE VISIT (OUTPATIENT)
Dept: FAMILY MEDICINE | Facility: CLINIC | Age: 63
End: 2018-03-15
Payer: MEDICARE

## 2018-03-15 VITALS
DIASTOLIC BLOOD PRESSURE: 68 MMHG | WEIGHT: 123.44 LBS | TEMPERATURE: 98 F | HEIGHT: 62 IN | OXYGEN SATURATION: 98 % | SYSTOLIC BLOOD PRESSURE: 120 MMHG | BODY MASS INDEX: 22.71 KG/M2 | HEART RATE: 78 BPM

## 2018-03-15 DIAGNOSIS — Z72.0 TOBACCO ABUSE: ICD-10-CM

## 2018-03-15 DIAGNOSIS — J31.0 CHRONIC RHINITIS, UNSPECIFIED TYPE: Primary | ICD-10-CM

## 2018-03-15 PROCEDURE — 99999 PR PBB SHADOW E&M-EST. PATIENT-LVL III: CPT | Mod: PBBFAC,,, | Performed by: FAMILY MEDICINE

## 2018-03-15 PROCEDURE — 99213 OFFICE O/P EST LOW 20 MIN: CPT | Mod: S$GLB,,, | Performed by: FAMILY MEDICINE

## 2018-03-15 NOTE — PROGRESS NOTES
"Subjective:       Patient ID: Viridiana Suresh is a 62 y.o. female.    Chief Complaint: Sinus Problem (left ear pain with fluid, possible sinus infection, clear mucus)    She has been having some upper respiratory complaints for 2 months.  I reviewed notes from the past 3 visits.  She had nasal congestion and coughing low-grade fever, ear symptoms.  She was treated first with a Z-Brian and then with Augmentin.  She also received a betamethasone injection.  Her current symptoms are runny nose with clear discharge alternating with crustiness and stuffiness.  Some sneezing.  Some itchy watery eyes.  Her ears feel like there is fluid.  Mild itching.  She says that her hearing is okay.  She stopped smoking on December 10.  She has no history of asthma.      Sinus Problem   Associated symptoms include congestion, ear pain and sinus pressure. Pertinent negatives include no coughing or shortness of breath.     Review of Systems   Constitutional: Negative for appetite change, fever and unexpected weight change.   HENT: Positive for congestion, ear pain, postnasal drip, rhinorrhea and sinus pressure. Negative for ear discharge.    Respiratory: Negative for cough and shortness of breath.        Objective:     Blood pressure 120/68, pulse 78, temperature 98.1 °F (36.7 °C), temperature source Oral, height 5' 2" (1.575 m), weight 56 kg (123 lb 7.3 oz), SpO2 98 %.      Physical Exam   Constitutional: She appears well-developed and well-nourished. No distress.   HENT:   Puffy infraorbital area.  TMs are clear and mobile to insufflation.  Ascencio goes to the left.  1+ nasal mucosal edema with clear to white mucus.  Her throat is normal.   Neck: Normal range of motion. No thyromegaly present.   Cardiovascular: Normal rate and regular rhythm.    Pulmonary/Chest: Effort normal and breath sounds normal.   Lymphadenopathy:     She has no cervical adenopathy.       Assessment:       1. Chronic rhinitis, unspecified type    2. Tobacco abuse     "    Plan:       I think she probably has ALLERGIC rhinitis.  She has also been bothered by a lot of financial stress.  Her  had lost his job and they were in arrears on their mortgage payment.  A [employer] helped them out.  She did not sleep well last night.  Trial of generic Allegra 180 mg daily.  She may want to try an Tessy pot that she has at home.  She could also try Flonase.  Call back if not improving.

## 2018-04-02 ENCOUNTER — TELEPHONE (OUTPATIENT)
Dept: PSYCHIATRY | Facility: CLINIC | Age: 63
End: 2018-04-02

## 2018-04-02 ENCOUNTER — OFFICE VISIT (OUTPATIENT)
Dept: PSYCHIATRY | Facility: CLINIC | Age: 63
End: 2018-04-02
Payer: MEDICARE

## 2018-04-02 VITALS
HEART RATE: 75 BPM | HEIGHT: 62 IN | SYSTOLIC BLOOD PRESSURE: 155 MMHG | WEIGHT: 125.81 LBS | BODY MASS INDEX: 23.15 KG/M2 | DIASTOLIC BLOOD PRESSURE: 86 MMHG

## 2018-04-02 DIAGNOSIS — Z79.899 HIGH RISK MEDICATIONS (NOT ANTICOAGULANTS) LONG-TERM USE: ICD-10-CM

## 2018-04-02 DIAGNOSIS — F31.9 BIPOLAR I DISORDER: Primary | ICD-10-CM

## 2018-04-02 DIAGNOSIS — Z72.0 TOBACCO ABUSE: ICD-10-CM

## 2018-04-02 PROCEDURE — 99214 OFFICE O/P EST MOD 30 MIN: CPT | Mod: S$GLB,,, | Performed by: PSYCHIATRY & NEUROLOGY

## 2018-04-02 PROCEDURE — 99999 PR PBB SHADOW E&M-EST. PATIENT-LVL II: CPT | Mod: PBBFAC,,, | Performed by: PSYCHIATRY & NEUROLOGY
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
## 2018-04-02 NOTE — TELEPHONE ENCOUNTER
Patient requesting labs to check depakote level. Offered follow up appointment today. Patient declines, states unable to come up with co/pay.   Last office visit 9/28/2017.  Debby

## 2018-04-03 ENCOUNTER — LAB VISIT (OUTPATIENT)
Dept: LAB | Facility: HOSPITAL | Age: 63
End: 2018-04-03
Attending: PSYCHIATRY & NEUROLOGY
Payer: MEDICARE

## 2018-04-03 DIAGNOSIS — Z79.899 HIGH RISK MEDICATIONS (NOT ANTICOAGULANTS) LONG-TERM USE: ICD-10-CM

## 2018-04-03 LAB — VALPROATE SERPL-MCNC: 69.5 UG/ML

## 2018-04-03 PROCEDURE — 36415 COLL VENOUS BLD VENIPUNCTURE: CPT | Mod: PN

## 2018-04-03 PROCEDURE — 80164 ASSAY DIPROPYLACETIC ACD TOT: CPT

## 2018-04-27 ENCOUNTER — TELEPHONE (OUTPATIENT)
Dept: FAMILY MEDICINE | Facility: CLINIC | Age: 63
End: 2018-04-27

## 2018-04-27 NOTE — TELEPHONE ENCOUNTER
Type: Diarrhea      When did this start:  4-  How many stools a day?  3  Any fever:  No  Pain? 2/10   Location: Location: in the lower abdomen without radiation  Has the patient started any new medication/food/exposure? No   If so, what? N/A  Any blood in stool? No  Any recent travel?  No   If so, where? N/A  Any medications taken:  no   Medication Allergies? KNDA  Preferred pharmacy?   [facility]  Additional information: pt had called the UC and they stated the co-pay would be 100. Pt states she wants something called in too diarrhea and chaffing on her butt.

## 2018-04-27 NOTE — TELEPHONE ENCOUNTER
Discussed with patient Viridiana Suresh and her .  Patient has been having diarrhea for roughly around last 2 weeks about 3 stools per day which is been soft.  No complaints of blood in the stool fever or dominant pain.  No recent antibiotics or recent travel.  She has been worrying a lot lately she's been having pain in her wrist and she got back to smoking again.  She's been stressed a lot as well.  She's also been drinking a lot of coffee with Diet Coke as well which can be contributing if not causing her diarrhea.  She was advised to eliminate Diet Coke and coffee as well as other forms of caffeine as these can cause diarrhea.  She also was offered Lomotil but wanted to rely on home remedies they feel they have Kaopectate and at home.  She was also recommended to use [employer]t Paste for her chaffing.  Due to the above history she was recommended a scheduled appointment to see Dr. Alatorre with calling on Monday to schedule.  She was advised to call back the on-call service if her condition should change or worsen.or go to the emergency room

## 2018-04-27 NOTE — TELEPHONE ENCOUNTER
Type: Diarrhea     When did this start:  4-  How many stools a day?  3  Any fever:  No  Pain? 2/10   Location: Location: in the lower abdomen without radiation  Has the patient started any new medication/food/exposure? No   If so, what? N/A  Any blood in stool? No  Any recent travel?  No   If so, where? N/A  Any medications taken:  no   Medication Allergies? KNDA  Preferred pharmacy?   [id number] [place] - [address]  [address]  [place] [address]  Phone: [phone] Fax: [phone]  Additional information:  pt advised to go to UC/ED. pt verbalized understanding. pt states that they will go to UC.

## 2018-04-27 NOTE — TELEPHONE ENCOUNTER
----- Message from Bharat Sutton sent at 4/27/2018 12:21 PM CDT -----  Contact: self- [phone]  Type: Needs Medical Advice  Who Called:  Patient   Symptoms (please be specific):  Diarrhea for a while  How long has patient had these symptoms:   Pharmacy name and phone #[facility].  Best Call Back Number: Additional Information: Patient called asking for a rx for diarrhea. Patient states she had the diarrhea for so long, the area is raw,patient can barley walk

## 2018-04-27 NOTE — TELEPHONE ENCOUNTER
----- Message from Viridiana Baird sent at 4/27/2018 12:10 PM CDT -----  Type:  Same Day Appointment Request    Caller is requesting a same day appointment.  Caller declined first available appointment listed below.      Name of Caller:  self  When is the first available appointment?  04/30  Symptoms:  Diarrhea 5-7 days  Best Call Back Number:  [phone] (home)     Additional Information:   Offered [place] location / declined / will see anyone in [facility]

## 2018-05-01 ENCOUNTER — TELEPHONE (OUTPATIENT)
Dept: FAMILY MEDICINE | Facility: CLINIC | Age: 63
End: 2018-05-01

## 2018-05-01 NOTE — TELEPHONE ENCOUNTER
----- Message from Jacqueline Giordano sent at 5/1/2018  8:48 AM CDT -----  Contact: patient   Type:  Sooner Apoointment Request    Caller is requesting a sooner appointment.  Caller declined first available appointment listed below.  Caller will not accept being placed on the waitlist and is requesting a message be sent to doctor.    Name of Caller:  Viridiana Suresh  When is the first available appointment? 5/3/18   Symptoms:  Severe diarrhea  Best Call Back Number:     Additional Information:  She is requesting Wednesday 5/2/18. No available appointments that day. She states that she can't come in today due to waiting for a call about another appointment. She also says she can't come Thursday because she would be unavailable that day. Please advise.

## 2018-05-02 ENCOUNTER — OFFICE VISIT (OUTPATIENT)
Dept: FAMILY MEDICINE | Facility: CLINIC | Age: 63
End: 2018-05-02
Payer: MEDICARE

## 2018-05-02 VITALS
BODY MASS INDEX: 22.41 KG/M2 | OXYGEN SATURATION: 96 % | DIASTOLIC BLOOD PRESSURE: 80 MMHG | HEART RATE: 80 BPM | SYSTOLIC BLOOD PRESSURE: 130 MMHG | WEIGHT: 121.81 LBS | TEMPERATURE: 99 F | HEIGHT: 62 IN

## 2018-05-02 DIAGNOSIS — F41.9 ANXIETY: ICD-10-CM

## 2018-05-02 DIAGNOSIS — R19.7 DIARRHEA, UNSPECIFIED TYPE: Primary | ICD-10-CM

## 2018-05-02 DIAGNOSIS — T88.7XXA SIDE EFFECT OF DRUG: ICD-10-CM

## 2018-05-02 DIAGNOSIS — Z72.0 TOBACCO ABUSE: ICD-10-CM

## 2018-05-02 PROCEDURE — 99999 PR PBB SHADOW E&M-EST. PATIENT-LVL III: CPT | Mod: PBBFAC,,, | Performed by: INTERNAL MEDICINE

## 2018-05-02 PROCEDURE — 99213 OFFICE O/P EST LOW 20 MIN: CPT | Mod: S$GLB,,, | Performed by: INTERNAL MEDICINE

## 2018-05-02 NOTE — PROGRESS NOTES
"Subjective:       Patient ID: Viridiana Suresh is a 62 y.o. female.    Chief Complaint: Diarrhea (x 2 weeks with loose stools )    HPI  Diarrhea for 2 weeks; 1 big diarrhea episode in morning; 2 episodes a day. 2nd episode a lot smaller; feels like she has to go then nothing occurs or flatulence. No melena or BRBPR. Some lower back pain but no abd pain. No n/v, fever or chills. Subjective fever 3-4 nights ago. Some sweats then as well; also hot cold feeling. No myalgias, but HA's yesterday twice; took ibuprofen. Some heartburn needing antacid. Steroid shot last week for L wrist pain/arthritis. Recent depakote levels were fine; off cigarettes for 5 mos; for 4 days smoked 1 PPD off for 5 days now; and off diet sodas/heavy drinker of diet soda, off last 5 days, usually w caffeine. No recent ab's except.Jan tx w z-pack; feb w augmentin for URI..   A lot of stress w her  and at work w new management. Drinks a lot of caffeine as well. No depression.     Review of Systems   Constitutional: Negative for appetite change, chills, fatigue and unexpected weight change.   HENT: Negative for congestion, postnasal drip and sinus pressure.    Respiratory: Negative for cough, shortness of breath and wheezing.    Cardiovascular: Negative for chest pain, palpitations and leg swelling.   Gastrointestinal: Positive for diarrhea. Negative for abdominal pain, blood in stool, nausea and vomiting.   Genitourinary: Negative for dysuria, hematuria and urgency.   Musculoskeletal: Positive for arthralgias.        L wrist w recent steroid injection.       Objective:      Vitals:    05/02/18 1349   BP: 130/80   BP Location: Left arm   Patient Position: Sitting   BP Method: Medium (Manual)   Pulse: 80   Temp: 98.6 °F (37 °C)   TempSrc: Oral   SpO2: 96%   Weight: 55.3 kg (121 lb 12.9 oz)   Height: 5' 2" (1.575 m)     Body mass index is 22.28 kg/m².    Physical Exam   Constitutional: She is oriented to person, place, and time. She appears " well-developed and well-nourished.   HENT:   Head: Normocephalic and atraumatic.   Tongue pink and moist.   Eyes: EOM are normal.   Neck: Normal range of motion. Neck supple. No thyromegaly present.   Cardiovascular: Normal rate, regular rhythm and normal heart sounds.  Exam reveals no gallop.    No murmur heard.  Pulmonary/Chest: Effort normal and breath sounds normal. No respiratory distress. She has no wheezes. She has no rales.   Abdominal: Soft. Bowel sounds are normal. She exhibits no distension. There is no tenderness. There is no rebound and no guarding.   Musculoskeletal: Normal range of motion. She exhibits no edema.   Lymphadenopathy:     She has no cervical adenopathy.   Neurological: She is alert and oriented to person, place, and time.   Moves all 4 extremities fine.   Skin: No rash noted.   Psychiatric: She has a normal mood and affect. Her behavior is normal. Thought content normal.   Vitals reviewed.      Assessment:       1. Diarrhea, unspecified type    2. Anxiety    3. Tobacco abuse    4. Side effect of drug        Plan:       Diarrhea, unspecified type; kaopectate or equivalent for diarrhea; hold all caffeine supplements. Push fluids as well. Sports drinks. Probiotic otc align for support bowels .  -     Clostridium difficile EIA; Future; Expected date: 05/02/2018  -     Stool Exam-Ova,Cysts,Parasites; Future; Expected date: 05/02/2018  -     Stool culture; Future; Expected date: 05/02/2018  -     Giardia / Cryptosporidum, EIA; Future; Expected date: 05/02/2018  -     Basic metabolic panel; Future; Expected date: 05/02/2018  -     CBC auto differential; Future; Expected date: 05/02/2018    Anxiety; seems to be flaring up; exercise w stress reduction; doesn't want medication. Some marital strife as well; lining up a counselor    Tobacco abuse; remain off nicotine; use nicorette gum; using TBX free smoking aid; helping her.     Side effect of drug; nicotine/caffeine; refrain from using both of  these

## 2018-05-02 NOTE — PATIENT INSTRUCTIONS
Diarrhea, unspecified type; kaopectate or equivalent for diarrhea; hold all caffeine supplements. Push fluids as well. Sports drinks. Probiotic otc align for support bowels .  -     Clostridium difficile EIA; Future; Expected date: 05/02/2018  -     Stool Exam-Ova,Cysts,Parasites; Future; Expected date: 05/02/2018  -     Stool culture; Future; Expected date: 05/02/2018  -     Giardia / Cryptosporidum, EIA; Future; Expected date: 05/02/2018  -     Basic metabolic panel; Future; Expected date: 05/02/2018  -     CBC auto differential; Future; Expected date: 05/02/2018    Anxiety; seems to be flaring up; exercise w stress reduction; doesn't want medication.     Tobacco abuse; remain off nicotine; use nicorette gum; using TBX free smoking aid; helping her.     Side effect of drug; nicotine/caffeine; refrain from using both of these

## 2018-05-03 ENCOUNTER — LAB VISIT (OUTPATIENT)
Dept: LAB | Facility: HOSPITAL | Age: 63
End: 2018-05-03
Attending: INTERNAL MEDICINE
Payer: MEDICARE

## 2018-05-03 DIAGNOSIS — R19.7 DIARRHEA, UNSPECIFIED TYPE: ICD-10-CM

## 2018-05-03 LAB
ANION GAP SERPL CALC-SCNC: 7 MMOL/L
BASOPHILS # BLD AUTO: 0.07 K/UL
BASOPHILS NFR BLD: 1.1 %
BUN SERPL-MCNC: 14 MG/DL
CALCIUM SERPL-MCNC: 9.6 MG/DL
CHLORIDE SERPL-SCNC: 108 MMOL/L
CO2 SERPL-SCNC: 28 MMOL/L
CREAT SERPL-MCNC: 1.1 MG/DL
DIFFERENTIAL METHOD: ABNORMAL
EOSINOPHIL # BLD AUTO: 0.2 K/UL
EOSINOPHIL NFR BLD: 3.5 %
ERYTHROCYTE [DISTWIDTH] IN BLOOD BY AUTOMATED COUNT: 11.8 %
EST. GFR  (AFRICAN AMERICAN): >60 ML/MIN/1.73 M^2
EST. GFR  (NON AFRICAN AMERICAN): 54.3 ML/MIN/1.73 M^2
GLUCOSE SERPL-MCNC: 83 MG/DL
HCT VFR BLD AUTO: 43.5 %
HGB BLD-MCNC: 13.6 G/DL
IMM GRANULOCYTES # BLD AUTO: 0.02 K/UL
IMM GRANULOCYTES NFR BLD AUTO: 0.3 %
LYMPHOCYTES # BLD AUTO: 2.1 K/UL
LYMPHOCYTES NFR BLD: 31.7 %
MAGNESIUM SERPL-MCNC: 2.2 MG/DL
MCH RBC QN AUTO: 30.1 PG
MCHC RBC AUTO-ENTMCNC: 31.3 G/DL
MCV RBC AUTO: 96 FL
MONOCYTES # BLD AUTO: 0.6 K/UL
MONOCYTES NFR BLD: 8.4 %
NEUTROPHILS # BLD AUTO: 3.7 K/UL
NEUTROPHILS NFR BLD: 55 %
NRBC BLD-RTO: 0 /100 WBC
PLATELET # BLD AUTO: 234 K/UL
PMV BLD AUTO: 10.7 FL
POTASSIUM SERPL-SCNC: 4.5 MMOL/L
RBC # BLD AUTO: 4.52 M/UL
SODIUM SERPL-SCNC: 143 MMOL/L
WBC # BLD AUTO: 6.65 K/UL

## 2018-05-03 PROCEDURE — 80048 BASIC METABOLIC PNL TOTAL CA: CPT

## 2018-05-03 PROCEDURE — 85025 COMPLETE CBC W/AUTO DIFF WBC: CPT

## 2018-05-03 PROCEDURE — 83735 ASSAY OF MAGNESIUM: CPT

## 2018-05-03 PROCEDURE — 36415 COLL VENOUS BLD VENIPUNCTURE: CPT | Mod: PN

## 2018-05-04 ENCOUNTER — TELEPHONE (OUTPATIENT)
Dept: FAMILY MEDICINE | Facility: CLINIC | Age: 63
End: 2018-05-04

## 2018-05-04 DIAGNOSIS — N18.30 CKD (CHRONIC KIDNEY DISEASE) STAGE 3, GFR 30-59 ML/MIN: Primary | ICD-10-CM

## 2018-05-04 NOTE — TELEPHONE ENCOUNTER
Notify patient that she still has some mild component of renal compromise.  She is to push fluids regularly throughout the day.  Would also recommend that she obtain an ultrasound retroperitoneal to further evaluate her kidneys.  Would also drop a urine specimen as clean catch midstream at the lab for urine analysis.  Would not take any NSAID agents like Aleve/Naprosyn/ibuprofen/Motrin due to potential worsening of her renal function.  Keep her follow-up appointment coming up with Dr. Alatorre in a couple weeks

## 2018-05-07 ENCOUNTER — TELEPHONE (OUTPATIENT)
Dept: FAMILY MEDICINE | Facility: CLINIC | Age: 63
End: 2018-05-07

## 2018-05-07 ENCOUNTER — LAB VISIT (OUTPATIENT)
Dept: LAB | Facility: HOSPITAL | Age: 63
End: 2018-05-07
Attending: INTERNAL MEDICINE
Payer: MEDICARE

## 2018-05-07 DIAGNOSIS — N18.30 CKD (CHRONIC KIDNEY DISEASE) STAGE 3, GFR 30-59 ML/MIN: ICD-10-CM

## 2018-05-07 DIAGNOSIS — N17.9 AKI (ACUTE KIDNEY INJURY): Primary | ICD-10-CM

## 2018-05-07 DIAGNOSIS — K92.1 MELENA: ICD-10-CM

## 2018-05-07 LAB
BACTERIA #/AREA URNS AUTO: NORMAL /HPF
BILIRUB UR QL STRIP: NEGATIVE
CLARITY UR REFRACT.AUTO: CLEAR
COLOR UR AUTO: NORMAL
GLUCOSE UR QL STRIP: NEGATIVE
HGB UR QL STRIP: NEGATIVE
KETONES UR QL STRIP: NEGATIVE
LEUKOCYTE ESTERASE UR QL STRIP: NEGATIVE
MICROSCOPIC COMMENT: NORMAL
NITRITE UR QL STRIP: NEGATIVE
PH UR STRIP: 6 [PH] (ref 5–8)
PROT UR QL STRIP: NEGATIVE
RBC #/AREA URNS AUTO: 1 /HPF (ref 0–4)
SP GR UR STRIP: 1 (ref 1–1.03)
SQUAMOUS #/AREA URNS AUTO: 0 /HPF
URN SPEC COLLECT METH UR: NORMAL
UROBILINOGEN UR STRIP-ACNC: NEGATIVE EU/DL
WBC #/AREA URNS AUTO: 0 /HPF (ref 0–5)

## 2018-05-07 PROCEDURE — 81001 URINALYSIS AUTO W/SCOPE: CPT

## 2018-05-07 NOTE — TELEPHONE ENCOUNTER
Advised pt of results and recommendations. Pt would like to personally speak with Dr. Guy in regards to results.

## 2018-05-07 NOTE — TELEPHONE ENCOUNTER
----- Message from RT Jose G sent at 5/7/2018 12:51 PM CDT -----  Contact: pt    pt , requesting lab test results, thanks.

## 2018-05-08 NOTE — TELEPHONE ENCOUNTER
Case results discussed with the patient and her  on phone w  mild worsening of her renal function appeared to occur with a GFR around 54 slightly worse than her baseline of 57 to 58 noted 5 and 7 years ago.  Advised not to take any NSAID agents and push fluids and sports drinks.  We'll repeat a BMP just prior to her follow-up in 2 weeks.  She will also obtain a retroperitoneal ultrasound to rule out any evidence of obstruction; suspect likely due to her recent diarrhea though which appears to have resolved.

## 2018-05-08 NOTE — TELEPHONE ENCOUNTER
----- Message from Sydnie Jovel sent at 5/8/2018 10:39 AM CDT -----  Contact: Patient  Patient is calling to let the doctor know that there is no Iron in any of the vitamins that she is currently taking or anything that she is drinking.  Call Back#[phone]  Thanks

## 2018-05-09 ENCOUNTER — HOSPITAL ENCOUNTER (OUTPATIENT)
Dept: RADIOLOGY | Facility: HOSPITAL | Age: 63
Discharge: HOME | End: 2018-05-09
Attending: INTERNAL MEDICINE
Payer: MEDICARE

## 2018-05-09 DIAGNOSIS — N18.30 CKD (CHRONIC KIDNEY DISEASE) STAGE 3, GFR 30-59 ML/MIN: ICD-10-CM

## 2018-05-09 PROCEDURE — 76770 US EXAM ABDO BACK WALL COMP: CPT | Mod: 26,,, | Performed by: RADIOLOGY

## 2018-05-09 PROCEDURE — 76770 US EXAM ABDO BACK WALL COMP: CPT | Mod: TC,PO

## 2018-05-09 NOTE — TELEPHONE ENCOUNTER
Pt has noted melanotic colored stool; please have come by the office to  a  iFOBT stool test to be performed; no red blood per rectum was noted. Please keep her follow up w Dr Alatorre. Please let us know if she sees anyred blood noted in her stool

## 2018-05-10 ENCOUNTER — TELEPHONE (OUTPATIENT)
Dept: FAMILY MEDICINE | Facility: CLINIC | Age: 63
End: 2018-05-10

## 2018-05-10 NOTE — TELEPHONE ENCOUNTER
Patient should push her fluids and no NSAID agents like Aleve ibuprofen Advil or Motrin.  As per prior email please have patient schedule appointment to see either me or  to go over her ultrasound abnormalities and discuss plan of care.

## 2018-05-10 NOTE — TELEPHONE ENCOUNTER
Reviewed recommendations w/ pt. Pt was wondering what she should be doing diet and lifestyle wise. Please advise.

## 2018-05-10 NOTE — TELEPHONE ENCOUNTER
----- Message from Bharat Sutton sent at 5/10/2018  8:16 AM CDT -----  Contact: self  [id number]  Type:  Test Results    Who Called:  Patient   Name of Test (Lab/Mammo/Etc):  Ultrasound of kidney  Date of Test:  05/09/2018  Ordering Provider:  Dr Guy  Where the test was performed:  [facility] Call Back Number:  [phone]  Additional Information:  Patient called asking for ultrasound test results.

## 2018-05-18 ENCOUNTER — OFFICE VISIT (OUTPATIENT)
Dept: FAMILY MEDICINE | Facility: CLINIC | Age: 63
End: 2018-05-18
Payer: MEDICARE

## 2018-05-18 VITALS
TEMPERATURE: 97 F | DIASTOLIC BLOOD PRESSURE: 80 MMHG | OXYGEN SATURATION: 98 % | HEIGHT: 62 IN | BODY MASS INDEX: 22.63 KG/M2 | SYSTOLIC BLOOD PRESSURE: 128 MMHG | WEIGHT: 123 LBS | HEART RATE: 84 BPM

## 2018-05-18 DIAGNOSIS — R10.32 BILATERAL LOWER ABDOMINAL CRAMPING: ICD-10-CM

## 2018-05-18 DIAGNOSIS — R10.31 BILATERAL LOWER ABDOMINAL CRAMPING: ICD-10-CM

## 2018-05-18 DIAGNOSIS — R19.7 DIARRHEA, UNSPECIFIED TYPE: Primary | ICD-10-CM

## 2018-05-18 DIAGNOSIS — F41.9 ANXIETY: ICD-10-CM

## 2018-05-18 DIAGNOSIS — R93.429 ABNORMAL ULTRASOUND OF KIDNEY: ICD-10-CM

## 2018-05-18 DIAGNOSIS — Z79.899 HIGH RISK MEDICATIONS (NOT ANTICOAGULANTS) LONG-TERM USE: ICD-10-CM

## 2018-05-18 DIAGNOSIS — N18.30 CKD (CHRONIC KIDNEY DISEASE) STAGE 3, GFR 30-59 ML/MIN: ICD-10-CM

## 2018-05-18 DIAGNOSIS — N32.89 BLADDER WALL THICKENING: ICD-10-CM

## 2018-05-18 DIAGNOSIS — F31.9 BIPOLAR I DISORDER: ICD-10-CM

## 2018-05-18 DIAGNOSIS — N17.9 AKI (ACUTE KIDNEY INJURY): ICD-10-CM

## 2018-05-18 PROCEDURE — 99214 OFFICE O/P EST MOD 30 MIN: CPT | Mod: S$GLB,,, | Performed by: INTERNAL MEDICINE

## 2018-05-18 PROCEDURE — 3008F BODY MASS INDEX DOCD: CPT | Mod: CPTII,S$GLB,, | Performed by: INTERNAL MEDICINE

## 2018-05-18 PROCEDURE — 99999 PR PBB SHADOW E&M-EST. PATIENT-LVL V: CPT | Mod: PBBFAC,,, | Performed by: INTERNAL MEDICINE

## 2018-05-18 RX ORDER — BUSPIRONE HYDROCHLORIDE 15 MG/1
TABLET ORAL
Qty: 30 TABLET | Refills: 1 | Status: SHIPPED | OUTPATIENT
Start: 2018-05-18 | End: 2018-05-24

## 2018-05-18 NOTE — PROGRESS NOTES
Subjective:       Patient ID: Viridiana Suresh is a 63 y.o. female.    Chief Complaint: Results (US ) and wishes to discuss further evaluation of her diarrhea    HPI  retroperitoneal ultrasound was performed to further assess her renal insufficiency.  It showed normal-sized kidney but did show innumerable echogenic foci scattered through the renal cortices and medullary pyramids. 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  But not noted to have the classic medullary nephrocalcinosis appearance.  Noncontrast CT the kidneys was recommended to exclude renal calcifications and confirmed microcyst bilaterally.  Also noted on ultrasound retroperitoneal was bilateral renal cysts and irregular thickening of the left lateral wall.  She reportedly took lithium for greater than 25 years and was placed on Depakote roughly 5 years ago for bipolar disorder.    The diarrhea has been for 3 weeks; is dark green to black; patient is reportedly been under a lot of stress lately and is has been having marital difficulties with her ; they are in counseling. She does have a referral to see Dr Javier BREEN shortly for colonoscopy evaluation.  She reportedly recently stopped smoking in December. There is no family history known of Crohn's or ulcerative colitis.  She has been taking a lot of smoothies with spinach and greens, also a lot of water with organic lemon juice.  She also states that she used to drink 2 L of Coke a day and changed roughly 2 weeks ago; BMs are 6-8 per day and not formed also often strategy.  No bright red blood per rectum is noted or melena..  She has seen some improvement; she's had intermittent diarrhea for a couple of days w 1-2 BMs daily.  She has no fever or chills or cough, headache, myalgia, or fatigue noted.  They don't have a septic tank and she's had no recent travel.  She does get some mild lower abdominal pain averaging every 3 days over the last 2 weeks.  She has no  dysuria or foul odor to her urine, but chronic nocturia is noted.  She sees Dr Alatorre, her PCP, next Wednesday.  Time 3:15 to 4:30 PM case discussed at length with the patient and her  is present at time of evaluation.  Greater than 50% of the time spent on discussion, counseling, and review.  Asked medical history and surgical history were reviewed.  Social medical history and family medical history also noted.  Review systems obtained at length prior to physical exam been performed.  CT of the abdomen and pelvis was ordered for follow-up as well . Consultations were placed to GI, nephrology, and urology as well.  Medications were reviewed as well      Review of Systems   Constitutional: Negative for appetite change, fever and unexpected weight change.   HENT: Negative for congestion, postnasal drip, rhinorrhea and sinus pressure.    Eyes: Negative for discharge and itching.   Respiratory: Negative for cough, chest tightness, shortness of breath and wheezing.    Cardiovascular: Negative for chest pain, palpitations and leg swelling.   Gastrointestinal: Positive for abdominal pain and diarrhea. Negative for abdominal distention, blood in stool, constipation, nausea and vomiting.        Occ Lower abd pain.   Endocrine: Negative for polydipsia, polyphagia and polyuria.   Genitourinary: Negative for dysuria and hematuria.   Musculoskeletal: Negative for arthralgias and myalgias.        She has occasional lower back pain   Skin: Negative for rash.   Allergic/Immunologic: Negative for environmental allergies and food allergies.   Neurological: Negative for tremors, seizures and syncope.   Hematological: Negative for adenopathy. Does not bruise/bleed easily.   Psychiatric/Behavioral:        Some anxiety but no depression.       Objective:      Vitals:    05/18/18 1507   BP: 128/80   BP Location: Right arm   Patient Position: Sitting   BP Method: Medium (Manual)   Pulse: 84   Temp: 97.4 °F (36.3 °C)   TempSrc: Oral  "  SpO2: 98%   Weight: 55.8 kg (123 lb 0.3 oz)   Height: 5' 2" (1.575 m)     Body mass index is 22.5 kg/m².    Physical Exam   Constitutional: She is oriented to person, place, and time. She appears well-developed and well-nourished.   HENT:   Head: Normocephalic and atraumatic.   Suspected R lateral nasal mucosa polyp; pink mucosa; clear mucus. Throat and TM's are pink   Eyes: EOM are normal.   Neck: Normal range of motion. Neck supple. No thyromegaly present.   Cardiovascular: Normal rate, regular rhythm and normal heart sounds.  Exam reveals no gallop.    No murmur heard.  Pulmonary/Chest: Effort normal and breath sounds normal. No respiratory distress. She has no wheezes. She has no rales.   Abdominal: Soft. Bowel sounds are normal. She exhibits no distension. There is no tenderness. There is no rebound and no guarding.   Musculoskeletal: Normal range of motion. She exhibits no edema.   No L-S sp tenderness to palp.   Lymphadenopathy:     She has no cervical adenopathy.   Neurological: She is alert and oriented to person, place, and time.   Moves all 4 extremities fine.   Skin: No rash noted.   Psychiatric: She has a normal mood and affect. Her behavior is normal. Thought content normal.   Vitals reviewed.      Assessment:       1. Diarrhea, unspecified type    2. MIN (acute kidney injury)    3. CKD (chronic kidney disease) stage 3, GFR 30-59 ml/min    4. Bipolar I disorder    5. Anxiety    6. Bilateral lower abdominal cramping    7. High risk medications (not anticoagulants) long-term use    8. Abnormal CT scan, kidney    9. Bladder wall thickening        Plan:       Diarrhea, unspecified type; cont probiotic.;  She has been advised to eliminate all caffeine if at all possible; as well as a veggie smoothie drinks with spinach and greens.  The patient taken be used over-the-counter for diarrhea as needed.  -     Ambulatory consult to Gastroenterology Dr Crowell for TC; IBD vs IBS. Kaopectate as needed; no " caffeine; push fluids. Soft diet. Low roughage.    MIN (acute kidney injury); push fluids, no NSAID agents  -     CT Abdomen Pelvis  Without Contrast; Future; Expected date: 05/18/2018  -     Ambulatory consult to Nephrology Dr. Pinedo  -     Ambulatory consult to Gastroenterology Dr. Franklin    CKD (chronic kidney disease) stage 3, GFR 30-59 ml/min  -     CT Abdomen Pelvis  Without Contrast; Future; Expected date: 05/18/2018  -     Ambulatory consult to Nephrology Dr ALIZE Pinedo  -     Ambulatory consult to Gastroenterology Dr Franklin    Bipolar I disorder; on depkote; recent levels therapeutic.    Anxiety; will try buspar 15 mg 1/3-1/2 po TID for anxiety; going to counseling; stressful life; marital issues being addressed.  -     Ambulatory consult to Gastroenterology     Bilateral lower abdominal cramping; may be a component of IBS; wanted to wait on trying Levsin or Bentyl.  -     Ambulatory consult to Gastroenterology dr franklin.    High risk medications (not anticoagulants) long-term use; on depakote 5 yrs now; previously on lithium for 25+ yrs.    Abnormal CT scan, kidney  -     CT Abdomen Pelvis  Without Contrast; Future; Expected date: 05/18/2018  -     Ambulatory consult to Nephrology Dr. Pinedo    Bladder wall thickening  -     CT Abdomen Pelvis  Without Contrast; Future; Expected date: 05/18/2018  -     Ambulatory consult to Urology Dr Cha; eval for cysto need as well.

## 2018-05-18 NOTE — PATIENT INSTRUCTIONS
Diarrhea, unspecified type; cont probiotic.  -     Ambulatory consult to Gastroenterology Dr Franklin for TC; IBD vs IBS. Kaopectate as needed; no caffeine; push fluids. Soft diet. Low roughage.    MIN (acute kidney injury)  -     CT Abdomen Pelvis  Without Contrast; Future; Expected date: 05/18/2018  -     Ambulatory consult to Nephrology  -     Ambulatory consult to Gastroenterology    CKD (chronic kidney disease) stage 3, GFR 30-59 ml/min  -     CT Abdomen Pelvis  Without Contrast; Future; Expected date: 05/18/2018  -     Ambulatory consult to Nephrology Dr ALIZE Pinedo  -     Ambulatory consult to Gastroenterology    Bipolar I disorder; on depkote; recent levels therapeutic.    Anxiety; will try buspar 15 mg 1/3-1/2 po TID for anxiety; going to counseling; stressful life; marital issues being addressed.  -     Ambulatory consult to Gastroenterology    Bilateral lower abdominal cramping  -     Ambulatory consult to Gastroenterology dr franklin.    High risk medications (not anticoagulants) long-term use; on depakote 5 yrs now; previously on lithium for 25+ yrs.    Abnormal CT scan, kidney  -     CT Abdomen Pelvis  Without Contrast; Future; Expected date: 05/18/2018  -     Ambulatory consult to Nephrology    Bladder wall thickening  -     CT Abdomen Pelvis  Without Contrast; Future; Expected date: 05/18/2018  -     Ambulatory consult to Urology Dr Cha; eval for cysto need as well.

## 2018-05-21 ENCOUNTER — HOSPITAL ENCOUNTER (OUTPATIENT)
Dept: RADIOLOGY | Facility: HOSPITAL | Age: 63
Discharge: HOME | End: 2018-05-21
Attending: INTERNAL MEDICINE
Payer: MEDICARE

## 2018-05-21 DIAGNOSIS — N18.30 CKD (CHRONIC KIDNEY DISEASE) STAGE 3, GFR 30-59 ML/MIN: ICD-10-CM

## 2018-05-21 DIAGNOSIS — R93.429 ABNORMAL ULTRASOUND OF KIDNEY: ICD-10-CM

## 2018-05-21 DIAGNOSIS — N32.89 BLADDER WALL THICKENING: ICD-10-CM

## 2018-05-21 DIAGNOSIS — N17.9 AKI (ACUTE KIDNEY INJURY): ICD-10-CM

## 2018-05-21 PROCEDURE — 74176 CT ABD & PELVIS W/O CONTRAST: CPT | Mod: 26,,, | Performed by: RADIOLOGY

## 2018-05-21 PROCEDURE — 74176 CT ABD & PELVIS W/O CONTRAST: CPT | Mod: TC,PO

## 2018-05-22 ENCOUNTER — TELEPHONE (OUTPATIENT)
Dept: UROLOGY | Facility: CLINIC | Age: 63
End: 2018-05-22

## 2018-05-22 ENCOUNTER — TELEPHONE (OUTPATIENT)
Dept: FAMILY MEDICINE | Facility: CLINIC | Age: 63
End: 2018-05-22

## 2018-05-22 ENCOUNTER — TELEPHONE (OUTPATIENT)
Dept: GASTROENTEROLOGY | Facility: CLINIC | Age: 63
End: 2018-05-22

## 2018-05-22 ENCOUNTER — TELEPHONE (OUTPATIENT)
Dept: NEPHROLOGY | Facility: CLINIC | Age: 63
End: 2018-05-22

## 2018-05-22 NOTE — TELEPHONE ENCOUNTER
----- Message from Everette Boyd sent at 5/22/2018  9:43 AM CDT -----  Contact: patient  Type:  Sooner Apoointment Request    Caller is requesting a sooner appointment.  Caller declined first available appointment listed below.  Caller will not accept being placed on the waitlist and is requesting a message be sent to doctor.    Name of Caller:  Viridiana  When is the first available appointment?  6/28  Symptoms:  See referral  Best Call Back Number:  [phone]  Additional Information:  Needs new patient appointment. Referred from Dr. Guy

## 2018-05-22 NOTE — TELEPHONE ENCOUNTER
----- Message from Everette Boyd sent at 5/22/2018  9:45 AM CDT -----  Contact: patient  Type:  Sooner Apoointment Request    Caller is requesting a sooner appointment.  Caller declined first available appointment listed below.  Caller will not accept being placed on the waitlist and is requesting a message be sent to doctor.    Name of Caller:  Viridiana  When is the first available appointment?  6/28  Symptoms:  See referral  Best Call Back Number:  [phone]  Additional Information:  Needs new patient appointment. Referred from Dr. Guy

## 2018-05-22 NOTE — TELEPHONE ENCOUNTER
----- Message from Everette Boyd sent at 5/22/2018  9:45 AM CDT -----  Contact: Patient  Type:  Sooner Apoointment Request    Caller is requesting a sooner appointment.  Caller declined first available appointment listed below.  Caller will not accept being placed on the waitlist and is requesting a message be sent to doctor.    Name of Caller:  Viridiana  When is the first available appointment?  6/28  Symptoms:  See referral  Best Call Back Number:  [phone]  Additional Information:  Needs new patient appointment. Referred from Dr. Guy

## 2018-05-22 NOTE — TELEPHONE ENCOUNTER
----- Message from Everette Boyd sent at 5/22/2018  9:35 AM CDT -----  Contact: patient  Type:  Test Results    Who Called:  Viridiana  Name of Test (Lab/Mammo/Etc):  CT Scan  Date of Test:  5/21  Ordering Provider:  Dr. Guy  Where the test was performed:  [facility]  Best Call Back Number:  [phone]  Additional Information:  n/a

## 2018-05-23 ENCOUNTER — OFFICE VISIT (OUTPATIENT)
Dept: FAMILY MEDICINE | Facility: CLINIC | Age: 63
End: 2018-05-23
Payer: MEDICARE

## 2018-05-23 VITALS
SYSTOLIC BLOOD PRESSURE: 132 MMHG | WEIGHT: 123.44 LBS | HEART RATE: 74 BPM | BODY MASS INDEX: 22.71 KG/M2 | DIASTOLIC BLOOD PRESSURE: 80 MMHG | HEIGHT: 62 IN | TEMPERATURE: 98 F | OXYGEN SATURATION: 97 %

## 2018-05-23 DIAGNOSIS — R19.7 DIARRHEA, UNSPECIFIED TYPE: Primary | ICD-10-CM

## 2018-05-23 PROCEDURE — 99999 PR PBB SHADOW E&M-EST. PATIENT-LVL III: CPT | Mod: PBBFAC,,, | Performed by: FAMILY MEDICINE

## 2018-05-23 PROCEDURE — 3008F BODY MASS INDEX DOCD: CPT | Mod: CPTII,S$GLB,, | Performed by: FAMILY MEDICINE

## 2018-05-23 PROCEDURE — 99214 OFFICE O/P EST MOD 30 MIN: CPT | Mod: S$GLB,,, | Performed by: FAMILY MEDICINE

## 2018-05-23 NOTE — TELEPHONE ENCOUNTER
Please notify patient we have her results her CT of the abdomen and pelvis.  There is some abnormalities that need to be discussed; please have her keep her follow-up appointment to go over the results; a lot of stool was noted in the colon on the CT scan.  Her diarrhea episodes may her oozing past a partial fecal impaction.  Would recommend she take MiraLAX 17 g orally ×1 dose and one fleets enema rectally to try and achieve relief..  Please let us know the results

## 2018-05-23 NOTE — TELEPHONE ENCOUNTER
Pt has appt today with Dr. Alatorre  Pt instructed to keep appt with Dr. Alatorre to go over results.

## 2018-05-23 NOTE — PROGRESS NOTES
"Subjective:       Patient ID: Viridiana Suresh is a 63 y.o. female.    Chief Complaint: Follow-up    She has been having diarrhea for over 1 month.  6-8 stools per day.  Seems to start in the morning.  The color varies from green to black.  She has had a few episodes of formed stools.  Some abdominal cramping.  Rare bloating.  No weight loss or fever.  No foreign travel.  No exposure to infants.  She does not drink well water.  She does get some improvement with Kaopectate.  She is due for GI evaluation and colonoscopy next month.  Stool tests have been negative.  She is under a lot of stress with marital problems. She is bipolar and tapered off of loxapine in January.  She is not taking BuSpar.  She also has noticed a growth in her nose.  She also has a painful bump on her right 4th toe.  She has noticed a lump in the vaginal area.      Review of Systems   Constitutional: Positive for unexpected weight change (She stops smoking in December and has gained a little bit of weight). Negative for appetite change and fever.        She consumes lots of fruit juice and vegetables.   Respiratory: Negative for shortness of breath and wheezing.    Cardiovascular: Negative for chest pain and palpitations.   Gastrointestinal: Negative for blood in stool.       Objective:     Blood pressure 132/80, pulse 74, temperature 97.8 °F (36.6 °C), temperature source Oral, height 5' 2" (1.575 m), weight 56 kg (123 lb 7.3 oz), SpO2 97 %.      Physical Exam   Constitutional: She appears well-developed and well-nourished. No distress.   HENT:   I did not see any neoplasms in her nose.  She does have a prominence of the right alar cartilage.   Eyes: Conjunctivae are normal.   Neck: No thyromegaly present.   Cardiovascular: Normal rate, regular rhythm and normal heart sounds.    Pulmonary/Chest: Effort normal and breath sounds normal.   Abdominal: Soft. Bowel sounds are normal. She exhibits no distension and no mass. There is no tenderness. " There is no guarding.   Musculoskeletal: She exhibits no edema.   Lymphadenopathy:     She has no cervical adenopathy.   Neurological: She is alert.   Skin:   She has a corn on the lateral aspect of her right 4th toe.  I trimmed down with good relief.   Psychiatric:   She appears to be a bit anxious.       Assessment:       1. Diarrhea, unspecified type        Plan:       I offered her a trial of metronidazole.  She declines.  Imodium or Kaopectate for episodic temporary relief.  Follow up with me for further evaluation.

## 2018-05-24 ENCOUNTER — INITIAL CONSULT (OUTPATIENT)
Dept: UROLOGY | Facility: CLINIC | Age: 63
End: 2018-05-24
Payer: MEDICARE

## 2018-05-24 VITALS — WEIGHT: 124.31 LBS | BODY MASS INDEX: 22.88 KG/M2 | HEIGHT: 62 IN

## 2018-05-24 DIAGNOSIS — R39.15 URINARY URGENCY: ICD-10-CM

## 2018-05-24 DIAGNOSIS — R35.0 INCREASED URINARY FREQUENCY: ICD-10-CM

## 2018-05-24 DIAGNOSIS — N32.89 BLADDER WALL THICKENING: ICD-10-CM

## 2018-05-24 DIAGNOSIS — R31.29 MICROHEMATURIA: Primary | ICD-10-CM

## 2018-05-24 LAB
BILIRUB SERPL-MCNC: NORMAL MG/DL
BLOOD URINE, POC: NORMAL
COLOR, POC UA: YELLOW
GLUCOSE UR QL STRIP: NORMAL
KETONES UR QL STRIP: NORMAL
LEUKOCYTE ESTERASE URINE, POC: NORMAL
NITRITE, POC UA: NORMAL
PH, POC UA: 7.5
PROTEIN, POC: NORMAL
SPECIFIC GRAVITY, POC UA: 1.01
UROBILINOGEN, POC UA: NORMAL

## 2018-05-24 PROCEDURE — 81002 URINALYSIS NONAUTO W/O SCOPE: CPT | Mod: S$GLB,,, | Performed by: UROLOGY

## 2018-05-24 PROCEDURE — 99204 OFFICE O/P NEW MOD 45 MIN: CPT | Mod: 25,S$GLB,, | Performed by: UROLOGY

## 2018-05-24 PROCEDURE — 99999 PR PBB SHADOW E&M-EST. PATIENT-LVL II: CPT | Mod: PBBFAC,,, | Performed by: UROLOGY

## 2018-05-24 PROCEDURE — 3008F BODY MASS INDEX DOCD: CPT | Mod: CPTII,S$GLB,, | Performed by: UROLOGY

## 2018-05-24 NOTE — LETTER
May 24, 2018      Loco Guy MD  [address]  [place] [address]           [facility]  [address] [facility] [address] [place] [address]  Phone: [phone]          Patient: Viridiana Suresh   MR Number: [id number]   YOB: 1955   Date of Visit: 5/24/2018       Dear Dr. Loco Guy:    Thank you for referring Viridiana Suresh to me for evaluation. Attached you will find relevant portions of my assessment and plan of care.    If you have questions, please do not hesitate to call me. I look forward to following Viridiana Suresh along with you.    Sincerely,    ANTWON Cha MD    Enclosure  CC:  No Recipients    If you would like to receive this communication electronically, please contact [email or web address] or [phone] to request more information on [employer] Link access.    For providers and/or their staff who would like to refer a patient to [facility], please contact us through our one-stop-shop provider referral line, [facility] , at [phone].    If you feel you have received this communication in error or would no longer like to receive these types of communications, please e-mail [email or web address]

## 2018-05-24 NOTE — PROGRESS NOTES
Subjective:       Patient ID: Viridiana Suresh is a 63 y.o. female.    Chief Complaint: Advice Only    HPI     63 year old with bladder wall thickening notes on renal US.  She complains of urinary frequency and urgency which has been long term.  She denies gross hematuria and dysuria.  She rarely gets UTIs.  She has problems with intermittent diarrhea and constipation.  Thin caliber stool.  She is scheduled for colonoscopy next month.  No previous colonoscopy.  CT scan reviewed.  Tiny non-obstructing stones otherwise normal study.  Bladder is not distended.  She has a long history of smoking.    Urine dipstick shows negative for nitrites, red blood cells, glucose, protein, positive for 2+leukocytes.    Past Medical History:   Diagnosis Date    Bipolar disorder     CTS (carpal tunnel syndrome)     Presence of dental bridge     UPPER     Past Surgical History:   Procedure Laterality Date    APPENDECTOMY      BREAST SURGERY      Needle and surgical biopsy    NASAL SEPTUM SURGERY      RHINOPHYMA RESECTION      RHINOPLASTY TIP      SOFT TISSUE BIOPSY      throat biopsy    TUBAL LIGATION         Current Outpatient Prescriptions:     divalproex (DEPAKOTE) 250 MG EC tablet, Take 3 tablets (750 mg total) by mouth every evening., Disp: 270 tablet, Rfl: 1    Review of Systems   Constitutional: Negative for chills and fever.   Eyes: Negative for visual disturbance.   Respiratory: Negative for shortness of breath.    Cardiovascular: Negative for chest pain.   Gastrointestinal: Positive for constipation and diarrhea. Negative for nausea.   Genitourinary: Positive for frequency and urgency. Negative for dysuria, flank pain and hematuria.   Musculoskeletal: Negative for gait problem.   Skin: Negative for rash.   Neurological: Negative for seizures.   Psychiatric/Behavioral: Negative for confusion.       Objective:      Physical Exam   Constitutional: She is oriented to person, place, and time. She appears well-developed  and well-nourished.   HENT:   Head: Normocephalic.   Eyes: Conjunctivae and EOM are normal.   Neck: Normal range of motion.   Cardiovascular: Normal rate.    Pulmonary/Chest: Effort normal.   Abdominal: Soft. She exhibits no distension and no mass. There is no tenderness.   Genitourinary:   Genitourinary Comments: Bladder non-tender and nondistended  No CVA tenderness   Musculoskeletal: She exhibits no edema.   Neurological: She is alert and oriented to person, place, and time.   Skin: Skin is warm and dry. No rash noted. No erythema.   Psychiatric: She has a normal mood and affect. Her behavior is normal.   Vitals reviewed.      Assessment:       1. Microhematuria    2. Bladder wall thickening    3. Increased urinary frequency    4. Urinary urgency        Plan:       Microhematuria  -     POCT URINE DIPSTICK WITHOUT MICROSCOPE  -     Cystoscopy; Future    Bladder wall thickening    Increased urinary frequency    Urinary urgency      Keep f/u for colonoscopy.  RTC next month for cystoscopy.

## 2018-05-25 ENCOUNTER — TELEPHONE (OUTPATIENT)
Dept: GASTROENTEROLOGY | Facility: CLINIC | Age: 63
End: 2018-05-25

## 2018-05-25 ENCOUNTER — LAB VISIT (OUTPATIENT)
Dept: LAB | Facility: HOSPITAL | Age: 63
End: 2018-05-25
Attending: INTERNAL MEDICINE
Payer: MEDICARE

## 2018-05-25 ENCOUNTER — OFFICE VISIT (OUTPATIENT)
Dept: NEPHROLOGY | Facility: CLINIC | Age: 63
End: 2018-05-25
Payer: MEDICARE

## 2018-05-25 VITALS
HEART RATE: 69 BPM | BODY MASS INDEX: 22.8 KG/M2 | SYSTOLIC BLOOD PRESSURE: 124 MMHG | OXYGEN SATURATION: 96 % | DIASTOLIC BLOOD PRESSURE: 72 MMHG | WEIGHT: 123.88 LBS | HEIGHT: 62 IN

## 2018-05-25 DIAGNOSIS — E55.9 VITAMIN D DEFICIENCY: ICD-10-CM

## 2018-05-25 DIAGNOSIS — Z87.891 FORMER SMOKER: ICD-10-CM

## 2018-05-25 DIAGNOSIS — N18.30 STAGE 3 CHRONIC KIDNEY DISEASE: ICD-10-CM

## 2018-05-25 DIAGNOSIS — R73.9 HYPERGLYCEMIA: Primary | ICD-10-CM

## 2018-05-25 DIAGNOSIS — F31.9 BIPOLAR I DISORDER: ICD-10-CM

## 2018-05-25 DIAGNOSIS — R73.9 HYPERGLYCEMIA: ICD-10-CM

## 2018-05-25 LAB
ESTIMATED AVG GLUCOSE: 111 MG/DL
HBA1C MFR BLD HPLC: 5.5 %

## 2018-05-25 PROCEDURE — 36415 COLL VENOUS BLD VENIPUNCTURE: CPT | Mod: PO

## 2018-05-25 PROCEDURE — 99999 PR PBB SHADOW E&M-EST. PATIENT-LVL III: CPT | Mod: PBBFAC,,, | Performed by: INTERNAL MEDICINE

## 2018-05-25 PROCEDURE — 99204 OFFICE O/P NEW MOD 45 MIN: CPT | Mod: S$GLB,,, | Performed by: INTERNAL MEDICINE

## 2018-05-25 PROCEDURE — 83036 HEMOGLOBIN GLYCOSYLATED A1C: CPT

## 2018-05-25 PROCEDURE — 3008F BODY MASS INDEX DOCD: CPT | Mod: CPTII,S$GLB,, | Performed by: INTERNAL MEDICINE

## 2018-05-25 NOTE — LETTER
May 28, 2018      ANTWON Cha MD  [address] [facility] [address] [place] [address]           [facility]  [address] [facility] [address] [place] [address]  Phone: [phone]          Patient: Viridiana Suresh   MR Number: [id number]   YOB: 1955   Date of Visit: 5/25/2018       Dear Dr. ANTWON Cha:    Thank you for referring Viridiana Suresh to me for evaluation. Attached you will find relevant portions of my assessment and plan of care.    If you have questions, please do not hesitate to call me. I look forward to following Viridiana Suresh along with you.    Sincerely,        Enclosure  CC:  No Recipients    If you would like to receive this communication electronically, please contact [email or web address] or [phone] to request more information on [facility] Link access.    For providers and/or their staff who would like to refer a patient to [facility], please contact us through our one-stop-shop provider referral line, [facility], at [phone].    If you feel you have received this communication in error or would no longer like to receive these types of communications, please e-mail [email or web address]

## 2018-05-25 NOTE — TELEPHONE ENCOUNTER
----- Message from Edward Joe sent at 5/25/2018 10:11 AM CDT -----  Contact: patient  Type: Needs Medical Advice    Who Called:  patient  Symptoms (please be specific):    How long has patient had these symptoms:    Pharmacy name and phone #:    Best Call Back Number: [phone]  Additional Information: patient called with questions regarding the prep for the colonoscopy? Call back to advise

## 2018-05-26 ENCOUNTER — TELEPHONE (OUTPATIENT)
Dept: NEPHROLOGY | Facility: CLINIC | Age: 63
End: 2018-05-26

## 2018-05-26 NOTE — TELEPHONE ENCOUNTER
Please inform pt that her DM screen was normal, no pre DM either. Please mail her the results. Thank you

## 2018-05-28 NOTE — TELEPHONE ENCOUNTER
Pt informed and Gastro Nurse Bridget contacted to see about getting patient a consult visit prior to colonoscopy.

## 2018-05-30 DIAGNOSIS — F31.9 BIPOLAR I DISORDER: ICD-10-CM

## 2018-05-31 RX ORDER — DIVALPROEX SODIUM 250 MG/1
TABLET, DELAYED RELEASE ORAL
Qty: 270 TABLET | Refills: 1 | Status: SHIPPED | OUTPATIENT
Start: 2018-05-31 | End: 2018-10-17 | Stop reason: SDUPTHER

## 2018-06-03 PROBLEM — N18.9 CKD (CHRONIC KIDNEY DISEASE): Status: ACTIVE | Noted: 2018-06-03

## 2018-06-03 NOTE — PROGRESS NOTES
Subjective:       Patient ID: Viridiana Suresh is a 63 y.o. White female who presents for new evaluation of Chronic Kidney Disease    HPI     She's referred by her PCP for decreased GFR    She has a significant history of Lithium use for 27 years for treating Bipolar. She is currently stable on Depakote. No NSAID use nor herbal medications. She is currently experiencing profuse diarrhea controlled only with Imodium. She had stool tests which were all negative.     She hydrates with diet cola and water. She follows a low sodium diet and avoids all processed sugars. She denies LE edema and no SOB. She quit smoking in Dec 2017, returned to smoking for four days then stopped again. She and her  are in marriage counseling and have financial problems. She works part time at [employer] and is active at work but no routine exercise. No known kidney disease in her family    Review of Systems   Constitutional: Negative for activity change and appetite change.   HENT: Negative for facial swelling.    Eyes: Negative for visual disturbance.   Respiratory: Negative for shortness of breath.    Cardiovascular: Negative for chest pain and leg swelling.   Gastrointestinal: Negative for abdominal distention.   Genitourinary: Negative for difficulty urinating and dysuria.   Musculoskeletal: Negative for arthralgias.   Neurological: Negative for weakness.       Objective:      Physical Exam   Constitutional: She is oriented to person, place, and time. She appears well-nourished. No distress.   HENT:   Mouth/Throat: Oropharynx is clear and moist.   Neck: No JVD present.   Cardiovascular: S1 normal and S2 normal.  Exam reveals no friction rub.    Pulmonary/Chest: Breath sounds normal. She has no wheezes. She has no rales.   Abdominal: Soft.   Musculoskeletal: She exhibits no edema.   Neurological: She is alert and oriented to person, place, and time.   Skin: Skin is warm and dry.   Psychiatric: She has a normal mood and affect.    Vitals reviewed.      Assessment:       1. Hyperglycemia    2. Stage 3 chronic kidney disease    3. Bipolar I disorder    4. Former smoker        Plan:           CKD Stage 2/3 likely from history of Lithium use. She has no other risk factors except a history of smoking.     As DM runs in the family will screen for DM today.     She was advised to significantly decrease her diet cola consumption and increase water. She was advised to continue a low sodium diet, avoid NSAIDs and increase her activity      RTC 6 months        89

## 2018-06-08 ENCOUNTER — INITIAL CONSULT (OUTPATIENT)
Dept: GASTROENTEROLOGY | Facility: CLINIC | Age: 63
End: 2018-06-08
Payer: MEDICARE

## 2018-06-08 VITALS
BODY MASS INDEX: 22.8 KG/M2 | WEIGHT: 123.88 LBS | DIASTOLIC BLOOD PRESSURE: 78 MMHG | HEIGHT: 62 IN | HEART RATE: 89 BPM | SYSTOLIC BLOOD PRESSURE: 141 MMHG

## 2018-06-08 DIAGNOSIS — Z87.19 HISTORY OF DIVERTICULOSIS: ICD-10-CM

## 2018-06-08 DIAGNOSIS — R19.4 CHANGE IN BOWEL HABITS: ICD-10-CM

## 2018-06-08 DIAGNOSIS — R19.7 DIARRHEA, UNSPECIFIED TYPE: Primary | ICD-10-CM

## 2018-06-08 DIAGNOSIS — R10.30 LOWER ABDOMINAL PAIN: ICD-10-CM

## 2018-06-08 PROCEDURE — 99214 OFFICE O/P EST MOD 30 MIN: CPT | Mod: S$GLB,,, | Performed by: NURSE PRACTITIONER

## 2018-06-08 PROCEDURE — 3008F BODY MASS INDEX DOCD: CPT | Mod: CPTII,S$GLB,, | Performed by: NURSE PRACTITIONER

## 2018-06-08 PROCEDURE — 99999 PR PBB SHADOW E&M-EST. PATIENT-LVL III: CPT | Mod: PBBFAC,,, | Performed by: NURSE PRACTITIONER

## 2018-06-08 RX ORDER — MAGNESIUM 200 MG
400 TABLET ORAL ONCE
COMMUNITY
End: 2018-10-10

## 2018-06-08 NOTE — PROGRESS NOTES
Subjective:       Patient ID: Viridiana Suresh is a 63 y.o. female Body mass index is 22.66 kg/m².    Chief Complaint: Diarrhea    This patient is new to me.  Referring Provider:  Dr. Guy for diarrhea.    Diarrhea    This is a new problem. The current episode started more than 1 month ago (started about 4-5 weeks ago). The problem has been rapidly improving (over the past 3 days; bowel movements 4-5 times a day of soft pasty stool). Diarrhea characteristics: dark green to light yellow coloring. The patient states that diarrhea does not awaken her from sleep. Associated symptoms include abdominal pain (lower abdominal pain, denies currently; relieved with heating pad) and increased flatus. Pertinent negatives include no chills, coughing, fever, vomiting or weight loss. Exacerbated by: orange juice. Risk factors include recent antibiotic use (magnesium use since 5/25/18 as per Dr. Lala recommendations; recent antibiotics x2 in 1/2018 for URI; denies recent hospitalization, foreign travel, ill contacts, or suspect food intake). She has tried increased fluids, anti-motility drug and change of diet (probiotic daily; kaopectate prn helped) for the symptoms. The treatment provided significant relief.     Review of Systems   Constitutional: Negative for appetite change, chills, fatigue, fever, unexpected weight change and weight loss.   HENT: Negative for sore throat and trouble swallowing.    Respiratory: Negative for cough, choking and shortness of breath.    Cardiovascular: Negative for chest pain.   Gastrointestinal: Positive for abdominal pain (lower abdominal pain, denies currently; relieved with heating pad), diarrhea (straining with bowel movements) and flatus. Negative for anal bleeding, blood in stool, constipation, nausea, rectal pain and vomiting.   Genitourinary: Negative for difficulty urinating, dysuria and flank pain.   Musculoskeletal: Positive for back pain (history of this).   Neurological:  Negative for weakness.       Past Medical History:   Diagnosis Date    Bipolar disorder     CTS (carpal tunnel syndrome)     Diverticulosis     Presence of dental bridge     UPPER     Past Surgical History:   Procedure Laterality Date    APPENDECTOMY      BREAST SURGERY      Needle and surgical biopsy    NASAL SEPTUM SURGERY      RHINOPHYMA RESECTION      RHINOPLASTY TIP      SOFT TISSUE BIOPSY      throat biopsy    TUBAL LIGATION       Family History   Problem Relation Age of Onset    Cancer Mother 80        breast cancer    Diabetes Mother     Cancer Father 77        pancreatic cancer    Diabetes Maternal Grandmother     Colon cancer Neg Hx     Colon polyps Neg Hx     Crohn's disease Neg Hx     Ulcerative colitis Neg Hx     Celiac disease Neg Hx      Wt Readings from Last 10 Encounters:   06/08/18 56.2 kg (123 lb 14.4 oz)   05/25/18 56.2 kg (123 lb 14.4 oz)   05/24/18 56.4 kg (124 lb 5.4 oz)   05/23/18 56 kg (123 lb 7.3 oz)   05/18/18 55.8 kg (123 lb 0.3 oz)   05/02/18 55.3 kg (121 lb 12.9 oz)   04/23/18 56.7 kg (125 lb)   03/15/18 56 kg (123 lb 7.3 oz)   02/14/18 53.8 kg (118 lb 9.7 oz)   01/18/18 52.4 kg (115 lb 8.3 oz)     Lab Results   Component Value Date    TSH 3.980 03/24/2011     Lab Visit on 05/25/2018   Component Date Value Ref Range Status    Hemoglobin A1C 05/25/2018 5.5  4.0 - 5.6 % Final    Comment: According to ADA guidelines, hemoglobin A1c <7.0% represents  optimal control in non-pregnant diabetic patients. Different  metrics may apply to specific patient populations.   Standards of Medical Care in Diabetes-2016.  For the purpose of screening for the presence of diabetes:  <5.7%     Consistent with the absence of diabetes  5.7-6.4%  Consistent with increasing risk for diabetes   (prediabetes)  >or=6.5%  Consistent with diabetes  Currently, no consensus exists for use of hemoglobin A1c  for diagnosis of diabetes for children.  This Hemoglobin A1c assay has significant  interference with fetal   hemoglobin   (HbF). The results are invalid for patients with abnormal amounts of   HbF,   including those with known Hereditary Persistence   of Fetal Hemoglobin. Heterozygous hemoglobin variants (HbAS, HbAC,   HbAD, HbAE, HbA2) do not significantly interfere with this assay;   however, presence of multiple variants in a sample may impact the %   interference.      Estimated Avg Glucose 05/25/2018 111  68 - 131 mg/dL Final   Initial consult on 05/24/2018   Component Date Value Ref Range Status    Color, UA 05/24/2018 yellow   Final    Spec Grav UA 05/24/2018 1.010   Final    pH, UA 05/24/2018 7.5   Final    WBC, UA 05/24/2018 moderate   Final    Nitrite, UA 05/24/2018 neg   Final    Protein 05/24/2018 neg   Final    Glucose, UA 05/24/2018 neg   Final    Ketones, UA 05/24/2018 neg   Final    Urobilinogen, UA 05/24/2018 neg   Final    Bilirubin 05/24/2018 neg   Final    Blood, UA 05/24/2018 neg   Final   Lab Visit on 05/07/2018   Component Date Value Ref Range Status    Specimen UA 05/07/2018 Urine, Unspecified   Final    Color, UA 05/07/2018 Straw  Yellow, Straw, Sapna Final    Appearance, UA 05/07/2018 Clear  Clear Final    pH, UA 05/07/2018 6.0  5.0 - 8.0 Final    Specific Gravity, UA 05/07/2018 1.005  1.005 - 1.030 Final    Protein, UA 05/07/2018 Negative  Negative Final    Comment: Recommend a 24 hour urine protein or a urine   protein/creatinine ratio if globulin induced proteinuria is  clinically suspected.      Glucose, UA 05/07/2018 Negative  Negative Final    Ketones, UA 05/07/2018 Negative  Negative Final    Bilirubin (UA) 05/07/2018 Negative  Negative Final    Occult Blood UA 05/07/2018 Negative  Negative Final    Nitrite, UA 05/07/2018 Negative  Negative Final    Urobilinogen, UA 05/07/2018 Negative  <2.0 EU/dL Final    Leukocytes, UA 05/07/2018 Negative  Negative Final    RBC, UA 05/07/2018 1  0 - 4 /hpf Final    WBC, UA 05/07/2018 0  0 - 5 /hpf Final     Bacteria, UA 05/07/2018 None  None-Occ /hpf Final    Squam Epithel, UA 05/07/2018 0  /hpf Final    Microscopic Comment 05/07/2018 SEE COMMENT   Final    Comment: Other formed elements not mentioned in the report are not   present in the microscopic examination.      Lab Visit on 05/03/2018   Component Date Value Ref Range Status    C. diff Antigen 05/03/2018 Negative  Negative Final    C difficile Toxins A+B, EIA 05/03/2018 Negative  Negative Final    Testing not recommended for children <24 months old.    Stool Exam-Ova,Cysts,Parasites 05/03/2018 No ova or parasites seen   Final    Stool Culture 05/03/2018 No Salmonella,Shigella,Vibrio,Campylobacter,Yersinia isolated.   Final    Giardia Antigen - EIA 05/03/2018 Negative  Negative Final    Cryptosporidium Antigen 05/03/2018 Negative  Negative Final    Shiga Toxin 1 E.coli 05/03/2018 Negative   Final    Shiga Toxin 2 E.coli 05/03/2018 Negative   Final   Lab Visit on 05/03/2018   Component Date Value Ref Range Status    Sodium 05/03/2018 143  136 - 145 mmol/L Final    Potassium 05/03/2018 4.5  3.5 - 5.1 mmol/L Final    Chloride 05/03/2018 108  95 - 110 mmol/L Final    CO2 05/03/2018 28  23 - 29 mmol/L Final    Glucose 05/03/2018 83  70 - 110 mg/dL Final    BUN, Bld 05/03/2018 14  8 - 23 mg/dL Final    Creatinine 05/03/2018 1.1  0.5 - 1.4 mg/dL Final    Calcium 05/03/2018 9.6  8.7 - 10.5 mg/dL Final    Anion Gap 05/03/2018 7* 8 - 16 mmol/L Final    eGFR if African American 05/03/2018 >60.0  >60 mL/min/1.73 m^2 Final    eGFR if non African American 05/03/2018 54.3* >60 mL/min/1.73 m^2 Final    Comment: Calculation used to obtain the estimated glomerular filtration  rate (eGFR) is the CKD-EPI equation.       WBC 05/03/2018 6.65  3.90 - 12.70 K/uL Final    RBC 05/03/2018 4.52  4.00 - 5.40 M/uL Final    Hemoglobin 05/03/2018 13.6  12.0 - 16.0 g/dL Final    Hematocrit 05/03/2018 43.5  37.0 - 48.5 % Final    MCV 05/03/2018 96  82 - 98 fL Final     MCH 05/03/2018 30.1  27.0 - 31.0 pg Final    MCHC 05/03/2018 31.3* 32.0 - 36.0 g/dL Final    RDW 05/03/2018 11.8  11.5 - 14.5 % Final    Platelets 05/03/2018 234  150 - 350 K/uL Final    MPV 05/03/2018 10.7  9.2 - 12.9 fL Final    Immature Granulocytes 05/03/2018 0.3  0.0 - 0.5 % Final    Gran # (ANC) 05/03/2018 3.7  1.8 - 7.7 K/uL Final    Immature Grans (Abs) 05/03/2018 0.02  0.00 - 0.04 K/uL Final    Comment: Mild elevation in immature granulocytes is non specific and   can be seen in a variety of conditions including stress response,   acute inflammation, trauma and pregnancy. Correlation with other   laboratory and clinical findings is essential.      Lymph # 05/03/2018 2.1  1.0 - 4.8 K/uL Final    Mono # 05/03/2018 0.6  0.3 - 1.0 K/uL Final    Eos # 05/03/2018 0.2  0.0 - 0.5 K/uL Final    Baso # 05/03/2018 0.07  0.00 - 0.20 K/uL Final    nRBC 05/03/2018 0  0 /100 WBC Final    Gran% 05/03/2018 55.0  38.0 - 73.0 % Final    Lymph% 05/03/2018 31.7  18.0 - 48.0 % Final    Mono% 05/03/2018 8.4  4.0 - 15.0 % Final    Eosinophil% 05/03/2018 3.5  0.0 - 8.0 % Final    Basophil% 05/03/2018 1.1  0.0 - 1.9 % Final    Differential Method 05/03/2018 Automated   Final    Magnesium 05/03/2018 2.2  1.6 - 2.6 mg/dL Final     Reviewed prior medical records including radiology report of 5/21/18 ct abdomen pelvis.    Objective:      Physical Exam   Constitutional: She is oriented to person, place, and time. She appears well-developed and well-nourished. No distress.   HENT:   Mouth/Throat: Oropharynx is clear and moist and mucous membranes are normal. No oral lesions. No oropharyngeal exudate.   Eyes: Conjunctivae are normal. Pupils are equal, round, and reactive to light. No scleral icterus.   Cardiovascular: Normal rate.    Pulmonary/Chest: Effort normal and breath sounds normal. No respiratory distress. She has no wheezes.   Abdominal: Soft. Normal appearance and bowel sounds are normal. She exhibits no  distension, no abdominal bruit and no mass. There is no hepatosplenomegaly. There is no tenderness. There is no rigidity, no rebound, no guarding, no tenderness at McBurney's point and negative Rao's sign. No hernia.   Neurological: She is alert and oriented to person, place, and time.   Skin: Skin is warm and dry. No rash noted. She is not diaphoretic. No erythema. No pallor.   Non-jaundiced   Psychiatric: She has a normal mood and affect. Her behavior is normal. Judgment and thought content normal.   Nursing note and vitals reviewed.      Assessment:       1. Diarrhea, unspecified type    2. History of diverticulosis    3. Change in bowel habits    4. Lower abdominal pain        Plan:       Diarrhea, unspecified type, lower abdominal pain & Change in bowel habits  - continue with colonoscopy as scheduled for 6/12/18  - recommended OTC probiotic, such as Align or Culturelle, as directed  - avoid lactose  - recommended increase fiber in diet, especially soluble fiber since this can help bulk up the stool consistency and may help to slow down how fast the stool goes through the colon and can prevent diarrhea  - Possible trial of bentyl pending results of testing and if symptoms persist    History of diverticulosis  - discussed the diagnosis of diverticulosis and diverticulitis, & to prevent diverticulitis, high fiber diet is recommended.  -Recommended daily exercise, adequate water intake (six 8-oz glasses of water daily), and high fiber diet. OTC fiber supplements are recommended if diet does not reach daily fiber goal (25 grams daily), such as Metamucil, Citrucel, or FiberCon (take as directed, separate from other oral medications by >2 hours).  - Advised to avoid/minimize popcorn, corn, seeds, and nuts.    Follow-up in about 1 month (around 7/8/2018), or if symptoms worsen or fail to improve.      If no improvement in symptoms or symptoms worsen, call/follow-up at clinic or go to ER.

## 2018-06-08 NOTE — PATIENT INSTRUCTIONS

## 2018-06-08 NOTE — LETTER
June 14, 2018      Loco Guy MD  [address]  [place] [address]           [facility] [address] [facility] [address] [place] [address]  Phone: [phone]          Patient: Viridiana Suresh   MR Number: [id number]   YOB: 1955   Date of Visit: 6/8/2018       Dear Dr. Loco Guy:    Thank you for referring Viridiana Suresh to me for evaluation. Attached you will find relevant portions of my assessment and plan of care.    If you have questions, please do not hesitate to call me. I look forward to following Viridiana Suresh along with you.    Sincerely,    Penny Odonnell, [facility]    Enclosure  CC:  No Recipients    If you would like to receive this communication electronically, please contact [email or web address] or [phone] to request more information on [employer] access.    For providers and/or their staff who would like to refer a patient to [facility], please contact us through our one-stop-shop provider referral line, [facility], at [phone].    If you feel you have received this communication in error or would no longer like to receive these types of communications, please e-mail [email or web address]

## 2018-06-11 ENCOUNTER — TELEPHONE (OUTPATIENT)
Dept: GASTROENTEROLOGY | Facility: CLINIC | Age: 63
End: 2018-06-11

## 2018-06-11 NOTE — TELEPHONE ENCOUNTER
S/w pt she is aware it was the preop nurse who called. Pt verbalized understanding. She will give them a call back.

## 2018-06-11 NOTE — TELEPHONE ENCOUNTER
----- Message from Bharat Sutton sent at 6/11/2018  9:22 AM CDT -----  Type:  Patient Returning Call    Who Called:  Self Who Left Message for Patient:  Aparna the patient know what this is regarding?:  Patient returning a call   Best Call Back Number:  [phone]  Additional Information:

## 2018-06-12 ENCOUNTER — ANESTHESIA EVENT (OUTPATIENT)
Dept: ENDOSCOPY | Facility: HOSPITAL | Age: 63
End: 2018-06-12
Payer: MEDICARE

## 2018-06-12 ENCOUNTER — HOSPITAL ENCOUNTER (OUTPATIENT)
Facility: HOSPITAL | Age: 63
Discharge: HOME | End: 2018-06-12
Attending: INTERNAL MEDICINE | Admitting: INTERNAL MEDICINE
Payer: MEDICARE

## 2018-06-12 ENCOUNTER — SURGERY (OUTPATIENT)
Age: 63
End: 2018-06-12

## 2018-06-12 ENCOUNTER — ANESTHESIA (OUTPATIENT)
Dept: ENDOSCOPY | Facility: HOSPITAL | Age: 63
End: 2018-06-12
Payer: MEDICARE

## 2018-06-12 ENCOUNTER — TELEPHONE (OUTPATIENT)
Dept: FAMILY MEDICINE | Facility: CLINIC | Age: 63
End: 2018-06-12

## 2018-06-12 VITALS
TEMPERATURE: 98 F | OXYGEN SATURATION: 99 % | RESPIRATION RATE: 18 BRPM | BODY MASS INDEX: 22.82 KG/M2 | WEIGHT: 124 LBS | HEART RATE: 78 BPM | HEIGHT: 62 IN | SYSTOLIC BLOOD PRESSURE: 120 MMHG | DIASTOLIC BLOOD PRESSURE: 68 MMHG

## 2018-06-12 DIAGNOSIS — R19.4 CHANGE IN BOWEL HABITS: ICD-10-CM

## 2018-06-12 PROCEDURE — 88305 TISSUE EXAM BY PATHOLOGIST: CPT | Mod: 26,,, | Performed by: PATHOLOGY

## 2018-06-12 PROCEDURE — D9220A PRA ANESTHESIA: Mod: CRNA,,, | Performed by: NURSE ANESTHETIST, CERTIFIED REGISTERED

## 2018-06-12 PROCEDURE — 37000008 HC ANESTHESIA 1ST 15 MINUTES: Mod: PO | Performed by: INTERNAL MEDICINE

## 2018-06-12 PROCEDURE — 37000009 HC ANESTHESIA EA ADD 15 MINS: Mod: PO | Performed by: INTERNAL MEDICINE

## 2018-06-12 PROCEDURE — 63600175 PHARM REV CODE 636 W HCPCS: Mod: PO | Performed by: NURSE ANESTHETIST, CERTIFIED REGISTERED

## 2018-06-12 PROCEDURE — 45380 COLONOSCOPY AND BIOPSY: CPT | Mod: PO | Performed by: INTERNAL MEDICINE

## 2018-06-12 PROCEDURE — D9220A PRA ANESTHESIA: Mod: ANES,,, | Performed by: ANESTHESIOLOGY

## 2018-06-12 PROCEDURE — 88305 TISSUE EXAM BY PATHOLOGIST: CPT | Performed by: PATHOLOGY

## 2018-06-12 PROCEDURE — 25000003 PHARM REV CODE 250: Mod: PO | Performed by: INTERNAL MEDICINE

## 2018-06-12 PROCEDURE — 45380 COLONOSCOPY AND BIOPSY: CPT | Mod: ,,, | Performed by: INTERNAL MEDICINE

## 2018-06-12 PROCEDURE — 27201012 HC FORCEPS, HOT/COLD, DISP: Mod: PO | Performed by: INTERNAL MEDICINE

## 2018-06-12 RX ORDER — PROPOFOL 10 MG/ML
VIAL (ML) INTRAVENOUS
Status: DISCONTINUED | OUTPATIENT
Start: 2018-06-12 | End: 2018-06-12

## 2018-06-12 RX ORDER — LIDOCAINE HCL/PF 100 MG/5ML
SYRINGE (ML) INTRAVENOUS
Status: DISCONTINUED | OUTPATIENT
Start: 2018-06-12 | End: 2018-06-12

## 2018-06-12 RX ORDER — SODIUM CHLORIDE, SODIUM LACTATE, POTASSIUM CHLORIDE, CALCIUM CHLORIDE 600; 310; 30; 20 MG/100ML; MG/100ML; MG/100ML; MG/100ML
INJECTION, SOLUTION INTRAVENOUS CONTINUOUS
Status: DISCONTINUED | OUTPATIENT
Start: 2018-06-12 | End: 2018-06-12 | Stop reason: HOSPADM

## 2018-06-12 RX ADMIN — PROPOFOL 30 MG: 10 INJECTION, EMULSION INTRAVENOUS at 09:06

## 2018-06-12 RX ADMIN — SODIUM CHLORIDE, SODIUM LACTATE, POTASSIUM CHLORIDE, AND CALCIUM CHLORIDE: .6; .31; .03; .02 INJECTION, SOLUTION INTRAVENOUS at 08:06

## 2018-06-12 RX ADMIN — LIDOCAINE HYDROCHLORIDE 100 MG: 20 INJECTION, SOLUTION INTRAVENOUS at 09:06

## 2018-06-12 NOTE — DISCHARGE INSTRUCTIONS
Recovery After Procedural Sedation (Adult)  You have been given medicine by vein to make you sleep during your surgery. This may have included both a pain medicine and sleeping medicine. Most of the effects have worn off. But you may still have some drowsiness for the next 6 to 8 hours.  Home care  Follow these guidelines when you get home:  · For the next 8 hours, you should be watched by a responsible adult. This person should make sure your condition is not getting worse.  · Don't drink any alcohol for the next 24 hours.  · Don't drive, operate dangerous machinery, or make important business or personal decisions during the next 24 hours.  Note: Your healthcare provider may tell you not to take any medicine by mouth for pain or sleep in the next 4 hours. These medicines may react with the medicines you were given in the hospital. This could cause a much stronger response than usual.  Follow-up care  Follow up with your healthcare provider if you are not alert and back to your usual level of activity within 12 hours.  When to seek medical advice  Call your healthcare provider right away if any of these occur:  · Drowsiness gets worse  · Weakness or dizziness gets worse  · Repeated vomiting  · You can't be awakened   Date Last Reviewed: 10/18/2016  © 3491-3165 [employer]. [address] [place] [address], [place] [address]. All rights reserved. This information is not intended as a substitute for professional medical care. Always follow your healthcare professional's instructions.

## 2018-06-12 NOTE — H&P
History & Physical - Short Stay  Gastroenterology      SUBJECTIVE:     Procedure: Colonoscopy    Chief Complaint/Indication for Procedure: Change in Bowel Habits    PTA Medications   Medication Sig    Lactobac no.41/Bifidobact no.7 (PROBIOTIC-10 ORAL) Take by mouth once daily.    magnesium 200 mg Tab Take 400 mg by mouth once.    divalproex (DEPAKOTE) 250 MG EC tablet TAKE 3 TABLETS EVERY EVENING       Review of patient's allergies indicates:  No Known Allergies     Past Medical History:   Diagnosis Date    Bipolar disorder     CTS (carpal tunnel syndrome)     Diverticulosis     Presence of dental bridge     UPPER     Past Surgical History:   Procedure Laterality Date    APPENDECTOMY      BREAST SURGERY      Needle and surgical biopsy    NASAL SEPTUM SURGERY      RHINOPHYMA RESECTION      RHINOPLASTY TIP      SOFT TISSUE BIOPSY      throat biopsy    TUBAL LIGATION       Family History   Problem Relation Age of Onset    Cancer Mother 80        breast cancer    Diabetes Mother     Cancer Father 77        pancreatic cancer    Diabetes Maternal Grandmother     Colon cancer Neg Hx     Colon polyps Neg Hx     Crohn's disease Neg Hx     Ulcerative colitis Neg Hx     Celiac disease Neg Hx      Social History   Substance Use Topics    Smoking status: Former Smoker     Packs/day: 1.00     Years: 43.00     Types: Cigarettes     Quit date: 5/1/2018    Smokeless tobacco: Never Used    Alcohol use No         OBJECTIVE:     Vital Signs (Most Recent)  Temp: 97.5 °F (36.4 °C) (06/12/18 0819)  Pulse: 77 (06/12/18 0819)  Resp: 18 (06/12/18 0819)  BP: 121/76 (06/12/18 0819)  SpO2: 97 % (06/12/18 0819)    Physical Exam:                                                       GENERAL:  Comfortable, in no acute distress.                                 HEENT EXAM:  Nonicteric.  No adenopathy.  Oropharynx is clear.               NECK:  Supple.                                                               LUNGS:   Clear.                                                               CARDIAC:  Regular rate and rhythm.  S1, S2.  No murmur.                      ABDOMEN:  Soft, positive bowel sounds, nontender.  No hepatosplenomegaly or masses.  No rebound or guarding.                                             EXTREMITIES:  No edema.     MENTAL STATUS:  Normal, alert and oriented.      ASSESSMENT/PLAN:     Assessment: Change in Bowel Habits    Plan: Colonoscopy    Anesthesia Plan: General    ASA Grade: ASA 2 - Patient with mild systemic disease with no functional limitations    MALLAMPATI SCORE:  I (soft palate, uvula, fauces, and tonsillar pillars visible)

## 2018-06-12 NOTE — ANESTHESIA PREPROCEDURE EVALUATION
2018  Viridiana Suresh is a 63 y.o., female.    Anesthesia Evaluation    I have reviewed the Patient Summary Reports.    I have reviewed the Nursing Notes.   I have reviewed the Medications.     Review of Systems  Social:  Former Smoker Smoking Status: Former Smoker - 43 pack years  Quit Smokin18  Smokeless Tobacco Status: Never Used  Alcohol use: No  Drug use: No       Renal/:   Chronic Renal Disease    Neurological:   Neuromuscular Disease,    Psych:   Psychiatric History             Anesthesia Plan  Type of Anesthesia, risks & benefits discussed:  Anesthesia Type:  general  Patient's Preference:   Intra-op Monitoring Plan: standard ASA monitors  Intra-op Monitoring Plan Comments:   Post Op Pain Control Plan:   Post Op Pain Control Plan Comments:   Induction:   IV  Beta Blocker:  Patient is not currently on a Beta-Blocker (No further documentation required).       Informed Consent: Patient understands risks and agrees with Anesthesia plan.  Questions answered. Anesthesia consent signed with patient.  ASA Score: 2     Day of Surgery Review of History & Physical: I have interviewed and examined the patient. I have reviewed the patient's H&P dated:  There are no significant changes.          Ready For Surgery From Anesthesia Perspective.

## 2018-06-12 NOTE — LETTER
June 12, 2018    Viridiana Suresh  [address]  [place] [address]             [facility]  [address]  [place] [address]  Phone: [phone]  Fax: [phone] To Whom it may concern    Ms. Viridiana Suresh has an appointment scheduled at our office with Dr Alatorre on   6/20/18 at 8:00 am.      If you have any questions or concerns, please don't hesitate to call.    Sincerely,        Christine Park MA

## 2018-06-12 NOTE — TELEPHONE ENCOUNTER
----- Message from Sasha Natarajan sent at 6/12/2018  1:17 PM CDT -----  Contact: Patient  Viridiana, patient [phone] calling because she needs a letter stating that she will be seeing Dr. Alatorre on 6/20/18 at 8 am for work. Please advise patient when it is ready for . Please advise. Thanks!

## 2018-06-12 NOTE — DISCHARGE SUMMARY
Discharge Note  Short Stay      SUMMARY     Admit Date: 6/12/2018    Attending Physician: Uche Crowell MD     Discharge Physician: Uche Crowell MD    Discharge Date: 6/12/2018 9:28 AM    Final Diagnosis: Diarrhea, unspecified type [R19.7]  Change in bowel function [R19.8]    Disposition: HOME OR SELF CARE    Patient Instructions:   Current Discharge Medication List      CONTINUE these medications which have NOT CHANGED    Details   Lactobac no.41/Bifidobact no.7 (PROBIOTIC-10 ORAL) Take by mouth once daily.      magnesium 200 mg Tab Take 400 mg by mouth once.      divalproex (DEPAKOTE) 250 MG EC tablet TAKE 3 TABLETS EVERY EVENING  Qty: 270 tablet, Refills: 1    Associated Diagnoses: Bipolar I disorder             Discharge Procedure Orders (must include Diet, Follow-up, Activity)    Follow Up:  Follow up with PCP as previously scheduled  Resume routine diet.  Activity as tolerated.    No driving day of procedure.

## 2018-06-12 NOTE — ANESTHESIA POSTPROCEDURE EVALUATION
"Anesthesia Post Evaluation    Patient: Viridiana Suresh    Procedure(s) Performed: Procedure(s) (LRB):  COLONOSCOPY (N/A)    Final Anesthesia Type: general  Patient location during evaluation: PACU  Patient participation: Yes- Able to Participate  Level of consciousness: awake and alert and oriented  Post-procedure vital signs: reviewed and stable  Pain management: adequate  Airway patency: patent  PONV status at discharge: No PONV  Anesthetic complications: no      Cardiovascular status: blood pressure returned to baseline  Respiratory status: unassisted, spontaneous ventilation and room air  Hydration status: euvolemic  Follow-up not needed.        Visit Vitals  /68 (BP Location: Left arm)   Pulse 78   Temp 36.5 °C (97.7 °F) (Tympanic)   Resp 18   Ht 5' 2" (1.575 m)   Wt 56.2 kg (124 lb)   SpO2 99%   Breastfeeding? No   BMI 22.68 kg/m²       Pain/Gila Score: Pain Assessment Performed: Yes (6/12/2018 10:08 AM)  Presence of Pain: denies (6/12/2018 10:08 AM)  Gila Score: 10 (6/12/2018  9:48 AM)      "

## 2018-06-12 NOTE — PROGRESS NOTES
Patient  refused to let patient be wheeled out by wheIelchair he was going to take her to [employer] and wheel her to car himself. I explained that it was our policy because he she had sedation and for safety reasons to have the patient wheeled out. Patient and  refused, Tasneem WOODSON was a witness to this in pacu.

## 2018-06-12 NOTE — PROVATION PATIENT INSTRUCTIONS
Discharge Summary/Instructions after an Endoscopic Procedure  Patient Name: Viridiana Suresh  Patient MRN: [id number]  Patient YOB: 1955 Tuesday, June 12, 2018  Uche Crowell MD  RESTRICTIONS:  During your procedure today, you received medications for sedation.  These   medications may affect your judgment, balance and coordination.  Therefore,   for 24 hours, you have the following restrictions:   - DO NOT drive a car, operate machinery, make legal/financial decisions,   sign important papers or drink alcohol.    ACTIVITY:  Today: no heavy lifting, straining or running due to procedural   sedation/anesthesia.  The following day: return to full activity including work.  DIET:  Eat and drink normally unless instructed otherwise.     TREATMENT FOR COMMON SIDE EFFECTS:  - Mild abdominal pain, nausea, belching, bloating or excessive gas:  rest,   eat lightly and use a heating pad.  - Sore Throat: treat with throat lozenges and/or gargle with warm salt   water.  - Because air was used during the procedure, expelling large amounts of air   from your rectum or belching is normal.  - If a bowel prep was taken, you may not have a bowel movement for 1-3 days.    This is normal.  SYMPTOMS TO WATCH FOR AND REPORT TO YOUR PHYSICIAN:  1. Abdominal pain or bloating, other than gas cramps.  2. Chest pain.  3. Back pain.  4. Signs of infection such as: chills or fever occurring within 24 hours   after the procedure.  5. Rectal bleeding, which would show as bright red, maroon, or black stools.   (A tablespoon of blood from the rectum is not serious, especially if   hemorrhoids are present.)  6. Vomiting.  7. Weakness or dizziness.  GO DIRECTLY TO THE NEAREST EMERGENCY ROOM IF YOU HAVE ANY OF THE FOLLOWING:      Difficulty breathing              Chills and/or fever over 101 F   Persistent vomiting and/or vomiting blood   Severe abdominal pain   Severe chest pain   Black, tarry stools   Bleeding- more than one  tablespoon   Any other symptom or condition that you feel may need urgent attention  Your doctor recommends these additional instructions:  If any biopsies were taken, your doctors clinic will contact you in 1 to 2   weeks with any results.  - Await pathology results.   - Repeat colonoscopy in 10 years for screening purposes.   - Discharge patient to home (ambulatory).  For questions, problems or results please call your physician - Uche Crowell MD at Work: [phone].  EMERGENCY PHONE NUMBER: [phone], LAB RESULTS: [phone]  IF A COMPLICATION OR EMERGENCY SITUATION ARISES AND YOU ARE UNABLE TO REACH   YOUR PHYSICIAN - GO DIRECTLY TO THE EMERGENCY ROOM.  ___________________________________________  Nurse Signature  ___________________________________________  Patient/Designated Responsible Party Signature  Uche Crowell MD  6/12/2018 9:28:15 AM  This report has been verified and signed electronically.  [employer]

## 2018-06-13 ENCOUNTER — TELEPHONE (OUTPATIENT)
Dept: FAMILY MEDICINE | Facility: CLINIC | Age: 63
End: 2018-06-13

## 2018-06-13 ENCOUNTER — TELEPHONE (OUTPATIENT)
Dept: UROLOGY | Facility: CLINIC | Age: 63
End: 2018-06-13

## 2018-06-13 NOTE — TELEPHONE ENCOUNTER
Spoke to pt and wanted to cancel her cystoscopy apt. PT would not give a reason for cancellation  when asked.

## 2018-06-13 NOTE — TELEPHONE ENCOUNTER
----- Message from RT Jose G sent at 6/13/2018  1:46 PM CDT -----  Contact: Pt    Pt , requesting to Cx her procedure appt of 06/29/2018 does not want to reschedule, thanks.

## 2018-06-13 NOTE — TELEPHONE ENCOUNTER
Pt informed letter can not be emailed but can be mailed or faxed. She stated she will  at the  today before 4:00

## 2018-06-20 ENCOUNTER — OFFICE VISIT (OUTPATIENT)
Dept: FAMILY MEDICINE | Facility: CLINIC | Age: 63
End: 2018-06-20
Payer: MEDICARE

## 2018-06-20 VITALS
SYSTOLIC BLOOD PRESSURE: 132 MMHG | DIASTOLIC BLOOD PRESSURE: 78 MMHG | BODY MASS INDEX: 22.98 KG/M2 | WEIGHT: 125.69 LBS | HEART RATE: 74 BPM | TEMPERATURE: 98 F

## 2018-06-20 DIAGNOSIS — K57.30 DIVERTICULOSIS OF LARGE INTESTINE WITHOUT HEMORRHAGE: ICD-10-CM

## 2018-06-20 DIAGNOSIS — N18.30 STAGE 3 CHRONIC KIDNEY DISEASE: ICD-10-CM

## 2018-06-20 DIAGNOSIS — Z87.891 FORMER SMOKER: ICD-10-CM

## 2018-06-20 DIAGNOSIS — F31.9 BIPOLAR I DISORDER: Primary | ICD-10-CM

## 2018-06-20 PROBLEM — R19.4 CHANGE IN BOWEL HABITS: Status: RESOLVED | Noted: 2018-06-12 | Resolved: 2018-06-20

## 2018-06-20 PROBLEM — K57.90 DIVERTICULOSIS: Status: ACTIVE | Noted: 2018-06-20

## 2018-06-20 PROCEDURE — 99999 PR PBB SHADOW E&M-EST. PATIENT-LVL II: CPT | Mod: PBBFAC,,, | Performed by: FAMILY MEDICINE

## 2018-06-20 PROCEDURE — 99214 OFFICE O/P EST MOD 30 MIN: CPT | Mod: S$GLB,,, | Performed by: FAMILY MEDICINE

## 2018-06-20 PROCEDURE — 3008F BODY MASS INDEX DOCD: CPT | Mod: CPTII,S$GLB,, | Performed by: FAMILY MEDICINE

## 2018-06-20 NOTE — PROGRESS NOTES
Subjective:       Patient ID: Viridiana Suresh is a 63 y.o. female.    Chief Complaint: Fu diarrhea  She had a history of diarrhea.  Her evaluation was unremarkable.  She had a colonoscopy that only showed diverticulosis.  There are some biopsies that are still pending.  After her colonoscopy her bowel movements are little bit more regular.  She will have 3 formed stools per day.  Sometimes a little loose but not the urgency that she had before.  She does not consume many dairy products.  She does have some cheese.  Her mental health is fairly stable.  She had a past history of psychiatric hospitalization.  She is currently maintained on Depakote.  She never took the buspirone that Dr. Guy had prescribed.  She had an ultrasound that showed some nephropathy possibly related to previous lithium.  Also some cysts.  CT scan confirmed.  She has seen Nephrology for mild renal insufficiency.      Review of Systems   Constitutional: Negative for fatigue, fever and unexpected weight change.   Respiratory: Negative for cough and shortness of breath.    Cardiovascular: Negative for chest pain.   Genitourinary:        She has noted some vaginal lumps over the past 2-3 months.  No pain. No period.   Not sexually active.  No vaginal bleeding.       Objective:     Blood pressure 132/78, pulse 74, temperature 97.6 °F (36.4 °C), weight 57 kg (125 lb 10.6 oz).      Physical Exam   Constitutional: She appears well-developed and well-nourished. No distress.   Cardiovascular: Normal rate and regular rhythm.    Pulmonary/Chest: Effort normal and breath sounds normal.   Genitourinary:   Genitourinary Comments: External genitalia normal but she does have some inclusion cysts.  The largest of these is nearly 1 cm and the right upper skin lateral to the vaginal opening.  One small blackhead was evacuated.  Speculum exam was normal.  Uterus is small.  No adnexal masses.   Musculoskeletal: She exhibits no edema.   Neurological: She is  alert.   Psychiatric: She has a normal mood and affect.       Assessment:       1. Bipolar I disorder    2. Diverticulosis of large intestine without hemorrhage    3. Former smoker    4. Stage 3 chronic kidney disease        Plan:       Reassured about the inclusion cyst.  Call us next week and we will check on her colon biopsy.

## 2018-06-27 DIAGNOSIS — Z12.39 BREAST CANCER SCREENING: ICD-10-CM

## 2018-06-28 ENCOUNTER — TELEPHONE (OUTPATIENT)
Dept: GASTROENTEROLOGY | Facility: CLINIC | Age: 63
End: 2018-06-28

## 2018-06-28 NOTE — TELEPHONE ENCOUNTER
----- Message from RT Jose G sent at 6/28/2018  2:25 PM CDT -----  Contact: pt     pt , requesting biopsy test results, thanks.

## 2018-08-15 ENCOUNTER — TELEPHONE (OUTPATIENT)
Dept: FAMILY MEDICINE | Facility: CLINIC | Age: 63
End: 2018-08-15

## 2018-08-15 NOTE — TELEPHONE ENCOUNTER
Last tdap was on 3/24/11. Advised pt of this. She states she stepped on a staple sticking out of some wood but is not sure if it penetrated the skin. She states it is sore and a little red. Advised pt that she may want to go to our UC clinic to have it evaluated and see if they recommend an early tdap booster. Pt would like to just use peroxide and antibiotic ointment for now and just keep an eye on it.

## 2018-08-15 NOTE — TELEPHONE ENCOUNTER
----- Message from Con Webster sent at 8/15/2018 11:53 AM CDT -----  Contact: Pt  Name of Who is Calling: Viridiana      What is the request in detail: Patient is calling states she stepped on a nail, nd is wanting to know when she received her lat tetna shot      Can the clinic reply by [facility]: no      What Number to Call Back if not in [facility]: .[phone] (home)

## 2018-09-10 ENCOUNTER — TELEPHONE (OUTPATIENT)
Dept: PSYCHIATRY | Facility: CLINIC | Age: 63
End: 2018-09-10
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
## 2018-09-10 NOTE — TELEPHONE ENCOUNTER
Spoke with patient who states she has been seeing a Gnosticist counselor- Oumou for the last two months     Started off as marriage couselor and then moved to her personaly couselor since she was having so many issues with self hate and suicidal thoughts- none currently    Not able to see Oumou this week because she has no openings    Having a hard time with self hate lately. Not suicidal. No plan but has thought about it.     We discussed going in-patient but patient states that she has a long history of psych hospitalizations. A lot of trauma and PTSD associated with them and REFUSES to seek help from that. Will fight that tooth and nail    Feels like God and the Adventist has helped heal her but feels like she may need help from medications because she feels depressed again.     Discussed appointment today with Dr. Rust at 330pm.  Patient declined states she has work    Stated we need to weigh options and take out health seriously. See if we can get off work ealry to come in.    Patient states I made her uncomfortable and she feels like I am gonna hospitalize her. Patient immediatly ended call

## 2018-09-10 NOTE — TELEPHONE ENCOUNTER
Called the pt back and got v/m prompt- left a v/m to please call me back at [phone].     Will try again throughout the day although per nursing note the pt has scheduled work-     Of note: the fact that scheduled work kept her from taking an appt shows future orientation.

## 2018-09-11 ENCOUNTER — TELEPHONE (OUTPATIENT)
Dept: PSYCHIATRY | Facility: CLINIC | Age: 63
End: 2018-09-11

## 2018-09-11 NOTE — TELEPHONE ENCOUNTER
"Called pt last night after rearranging some items on schedule and offered her an 8:30am appt today based on her report that she had today off from work.     Pt declines. "i'm fine, but I appreciate your concern. Since we spoke I have been looking at the medications you suggested on the computer and they all have so many side effects which is what i've wanted to avoid. I remember after reading that I had a problem with tardive dyskinesia in my feet on loxapine and that's why I stopped and my feet do feel better. I don't want to make any fast decisions and I also can't afford to come see you before my appointment on Oct 1st. I have to really save for that copay. Thank you but i'll be fine and i'll see you then."     Pt encouraged to call if she has further concerns or sxs worsen. Also instructed to call 911 or go to ER with safety concerns. She expresses understanding. "well, that's what I was telling your nurse. i'm very strong and I won't do that. i've been through and gotten through a lot in my life."     All conversation very future oriented with planning and work and saving for future appt which she plans to attend with cousin.   "

## 2018-09-17 ENCOUNTER — TELEPHONE (OUTPATIENT)
Dept: PSYCHIATRY | Facility: CLINIC | Age: 63
End: 2018-09-17

## 2018-09-17 ENCOUNTER — OFFICE VISIT (OUTPATIENT)
Dept: PSYCHIATRY | Facility: CLINIC | Age: 63
End: 2018-09-17
Payer: MEDICARE

## 2018-09-17 VITALS
SYSTOLIC BLOOD PRESSURE: 135 MMHG | HEART RATE: 86 BPM | DIASTOLIC BLOOD PRESSURE: 95 MMHG | WEIGHT: 120.31 LBS | BODY MASS INDEX: 22.14 KG/M2 | HEIGHT: 62 IN

## 2018-09-17 DIAGNOSIS — Z87.891 FORMER SMOKER: ICD-10-CM

## 2018-09-17 DIAGNOSIS — Z79.899 HIGH RISK MEDICATIONS (NOT ANTICOAGULANTS) LONG-TERM USE: ICD-10-CM

## 2018-09-17 DIAGNOSIS — F31.9 BIPOLAR I DISORDER: Primary | ICD-10-CM

## 2018-09-17 PROCEDURE — 90833 PSYTX W PT W E/M 30 MIN: CPT | Mod: S$PBB,,, | Performed by: PSYCHIATRY & NEUROLOGY

## 2018-09-17 PROCEDURE — 99999 PR PBB SHADOW E&M-EST. PATIENT-LVL III: CPT | Mod: PBBFAC,,, | Performed by: PSYCHIATRY & NEUROLOGY

## 2018-09-17 PROCEDURE — 99214 OFFICE O/P EST MOD 30 MIN: CPT | Mod: S$PBB,,, | Performed by: PSYCHIATRY & NEUROLOGY

## 2018-09-17 PROCEDURE — 3008F BODY MASS INDEX DOCD: CPT | Mod: CPTII,,, | Performed by: PSYCHIATRY & NEUROLOGY

## 2018-09-17 PROCEDURE — 99213 OFFICE O/P EST LOW 20 MIN: CPT | Mod: PBBFAC,PO | Performed by: PSYCHIATRY & NEUROLOGY

## 2018-09-17 PROCEDURE — 90833 PSYTX W PT W E/M 30 MIN: CPT | Mod: PBBFAC,PO | Performed by: PSYCHIATRY & NEUROLOGY

## 2018-09-17 RX ORDER — QUETIAPINE FUMARATE 25 MG/1
25 TABLET, FILM COATED ORAL NIGHTLY
Qty: 30 TABLET | Refills: 0 | Status: SHIPPED | OUTPATIENT
Start: 2018-09-17 | End: 2018-09-27 | Stop reason: SINTOL
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
## 2018-09-17 NOTE — TELEPHONE ENCOUNTER
Spoke with patient who forgot to tell Dr. Rust during her appointment. She stopped smoking cigarrettes for 2 weeks now.    Wanted to add that to her appointment notes  Did not need to speak with provider just mitra DÍAZ

## 2018-09-18 ENCOUNTER — LAB VISIT (OUTPATIENT)
Dept: LAB | Facility: HOSPITAL | Age: 63
End: 2018-09-18
Attending: PSYCHIATRY & NEUROLOGY
Payer: MEDICARE

## 2018-09-18 DIAGNOSIS — Z79.899 HIGH RISK MEDICATIONS (NOT ANTICOAGULANTS) LONG-TERM USE: ICD-10-CM

## 2018-09-18 LAB
ALBUMIN SERPL BCP-MCNC: 3.7 G/DL
ALP SERPL-CCNC: 66 U/L
ALT SERPL W/O P-5'-P-CCNC: 10 U/L
ANION GAP SERPL CALC-SCNC: 6 MMOL/L
AST SERPL-CCNC: 15 U/L
BILIRUB SERPL-MCNC: 0.6 MG/DL
BUN SERPL-MCNC: 17 MG/DL
CALCIUM SERPL-MCNC: 9.8 MG/DL
CHLORIDE SERPL-SCNC: 111 MMOL/L
CHOLEST SERPL-MCNC: 237 MG/DL
CHOLEST/HDLC SERPL: 4.5 {RATIO}
CO2 SERPL-SCNC: 28 MMOL/L
CREAT SERPL-MCNC: 1 MG/DL
EST. GFR  (AFRICAN AMERICAN): >60 ML/MIN/1.73 M^2
EST. GFR  (NON AFRICAN AMERICAN): >60 ML/MIN/1.73 M^2
GLUCOSE SERPL-MCNC: 78 MG/DL
HDLC SERPL-MCNC: 53 MG/DL
HDLC SERPL: 22.4 %
LDLC SERPL CALC-MCNC: 167.6 MG/DL
NONHDLC SERPL-MCNC: 184 MG/DL
POTASSIUM SERPL-SCNC: 4.5 MMOL/L
PROT SERPL-MCNC: 6.6 G/DL
SODIUM SERPL-SCNC: 145 MMOL/L
TRIGL SERPL-MCNC: 82 MG/DL

## 2018-09-18 PROCEDURE — 80053 COMPREHEN METABOLIC PANEL: CPT

## 2018-09-18 PROCEDURE — 80061 LIPID PANEL: CPT

## 2018-09-18 PROCEDURE — 36415 COLL VENOUS BLD VENIPUNCTURE: CPT | Mod: PN

## 2018-09-18 PROCEDURE — 83036 HEMOGLOBIN GLYCOSYLATED A1C: CPT

## 2018-09-19 LAB
ESTIMATED AVG GLUCOSE: 105 MG/DL
HBA1C MFR BLD HPLC: 5.3 %

## 2018-09-21 ENCOUNTER — TELEPHONE (OUTPATIENT)
Dept: PSYCHIATRY | Facility: CLINIC | Age: 63
End: 2018-09-21

## 2018-09-21 NOTE — TELEPHONE ENCOUNTER
"Called the pt to check in regarding new addition of Seroquel 25mg po qhs-    "well, i'm still having a lot of stress related to work and some arthritic pain in my hands and back, but I am sleeping better and I guess I feel a little less anxious."    Denies over sedation the following day. Denies other s/e's.     Denies imminent safety concerns. Will f/u as scheduled.   "

## 2018-09-27 ENCOUNTER — TELEPHONE (OUTPATIENT)
Dept: PSYCHIATRY | Facility: CLINIC | Age: 63
End: 2018-09-27

## 2018-09-27 NOTE — TELEPHONE ENCOUNTER
"Late entry- called the pt back at 11:30 and spoke with her regarding her call.     Pt angry and irritable on the phone, "this is why I didn't want to start any medications. They cause more problems than the mental health issue itself. I could only work 20 minutes of a 6 hour shift! I just want to stop it. I don't have jodie, I don't enjoy anything, i've given up on my [employer] and on my sherry."    Pt denies imminent safety concerns stating, "i've told you and someone else in your office before too, i'm not going to do anything to myself but I am ready and I hope the Good Lord takes me soon, but no, I would never do it to myself."     Pt declines IOP, declines appt for Monday, as well, "no it all costs money and [facility] already charged me like $7000 for a bunch of medical stuff that I didn't I guess need and they never even told me what was wrong. I'm so tired of all this medical stuff."     Again states that she wants to stop the seroquel due to concerns that it is causing stomach upset and we will wait a few days and then reassess sxs and need for psychotropic.     Will call pt on mon, 9/31  "

## 2018-09-27 NOTE — TELEPHONE ENCOUNTER
Patient called to let Dr. Rust know that she has been taking seroquel 25 mg nightly approx 6-7 nights now and is experiencing severe stomach cramping and diarrhea. Voices that this has made her leave work twice this week. It is not tolerable to her. States she would prefer to go back to the loxapine  Please advise

## 2018-10-01 ENCOUNTER — TELEPHONE (OUTPATIENT)
Dept: PSYCHIATRY | Facility: CLINIC | Age: 63
End: 2018-10-01

## 2018-10-01 NOTE — TELEPHONE ENCOUNTER
"Called the pt regarding how she was tolerating the d/c of Seroquel last week-     "the stomach cramps went away all together, so that was a good thing."     Pt continues with multiple physical complaints which she shares- leaky gut sxs along with arthritic pain. Continues with some gi sxs but she clarifies that transitions b/t constipation v diarrhea have gone on for years. "that was part of those tests through [facility] that I got a huge bill for but I got no answers. i'm frustrated and tired of not having some help with how to take better care of myself."     Pt reports she feels diet and stress have affected not only gi but also mood/anxiety.      Stress has mainly been work, "new management is not respecting that I canNOT work more than an exact amount due to disability and last month I earned $200 more than i'm allowed so I will be penalized so I don't know what to do but I cannot work tomorrow because they have me down for 8 hours and i'm still just not able to do that." inquires about a mental health work excuse for tomorrow alone.     Will provide but discuss with her that this will not be something I will do in the future and validate her that she is correct that work needs to respect federally mandated minimums/maximums. She expresses understanding.       "

## 2018-10-01 NOTE — TELEPHONE ENCOUNTER
Patient called because she wanted to ask Dr. Rust to add to her employers' letter that the work load and hours they give her at [employer]  Are causing mental and physical stress. She can not handle this and needs less hours.    Please advise

## 2018-10-02 NOTE — TELEPHONE ENCOUNTER
Called pt regarding her phone call.     I will revise her letter which I wrote yesterday due to a error on the letter, but I will also include a sentence regarding mindfulness of pt's established disability and work hour restrictions.     Pt grateful- PVU.

## 2018-10-03 ENCOUNTER — TELEPHONE (OUTPATIENT)
Dept: GASTROENTEROLOGY | Facility: CLINIC | Age: 63
End: 2018-10-03

## 2018-10-03 NOTE — TELEPHONE ENCOUNTER
----- Message from Viridiana Baird sent at 10/2/2018  8:47 AM CDT -----  Call [phone] / had a colonoscopy / diagnosed with diverticulitis / asking to discuss results

## 2018-10-03 NOTE — TELEPHONE ENCOUNTER
S/w pt she is now having constipation issues since her colon. She goes back in forth with constipation and diarrhea. She expresses that she is bipolar and she has been having issues with that. She is convinced she has diverticulitis. I recommended pt come in for an appt to further discuss with the NP.

## 2018-10-10 ENCOUNTER — LAB VISIT (OUTPATIENT)
Dept: LAB | Facility: HOSPITAL | Age: 63
End: 2018-10-10
Attending: NURSE PRACTITIONER
Payer: MEDICARE

## 2018-10-10 ENCOUNTER — TELEPHONE (OUTPATIENT)
Dept: FAMILY MEDICINE | Facility: CLINIC | Age: 63
End: 2018-10-10

## 2018-10-10 ENCOUNTER — OFFICE VISIT (OUTPATIENT)
Dept: GASTROENTEROLOGY | Facility: CLINIC | Age: 63
End: 2018-10-10
Payer: MEDICARE

## 2018-10-10 VITALS
WEIGHT: 122.13 LBS | BODY MASS INDEX: 22.48 KG/M2 | DIASTOLIC BLOOD PRESSURE: 87 MMHG | HEIGHT: 62 IN | HEART RATE: 74 BPM | SYSTOLIC BLOOD PRESSURE: 126 MMHG | RESPIRATION RATE: 19 BRPM

## 2018-10-10 DIAGNOSIS — R10.9 ABDOMINAL CRAMPING: ICD-10-CM

## 2018-10-10 DIAGNOSIS — K58.0 IRRITABLE BOWEL SYNDROME WITH DIARRHEA: ICD-10-CM

## 2018-10-10 DIAGNOSIS — Z87.19 HISTORY OF DIVERTICULOSIS: ICD-10-CM

## 2018-10-10 DIAGNOSIS — K58.0 IRRITABLE BOWEL SYNDROME WITH DIARRHEA: Primary | ICD-10-CM

## 2018-10-10 LAB — IGA SERPL-MCNC: 109 MG/DL

## 2018-10-10 PROCEDURE — 99999 PR PBB SHADOW E&M-EST. PATIENT-LVL IV: CPT | Mod: PBBFAC,,, | Performed by: NURSE PRACTITIONER

## 2018-10-10 PROCEDURE — 3008F BODY MASS INDEX DOCD: CPT | Mod: CPTII,,, | Performed by: NURSE PRACTITIONER

## 2018-10-10 PROCEDURE — 99214 OFFICE O/P EST MOD 30 MIN: CPT | Mod: PBBFAC,PO | Performed by: NURSE PRACTITIONER

## 2018-10-10 PROCEDURE — 99214 OFFICE O/P EST MOD 30 MIN: CPT | Mod: S$PBB,,, | Performed by: NURSE PRACTITIONER

## 2018-10-10 PROCEDURE — 83516 IMMUNOASSAY NONANTIBODY: CPT

## 2018-10-10 PROCEDURE — 82784 ASSAY IGA/IGD/IGG/IGM EACH: CPT

## 2018-10-10 RX ORDER — DICYCLOMINE HYDROCHLORIDE 10 MG/1
10 CAPSULE ORAL
Qty: 120 CAPSULE | Refills: 1 | Status: SHIPPED | OUTPATIENT
Start: 2018-10-10 | End: 2018-10-12 | Stop reason: SDUPTHER

## 2018-10-10 RX ORDER — CHOLECALCIFEROL (VITAMIN D3) 25 MCG
1000 TABLET ORAL DAILY
COMMUNITY
End: 2019-10-21

## 2018-10-10 RX ORDER — IBUPROFEN 100 MG/5ML
1000 SUSPENSION, ORAL (FINAL DOSE FORM) ORAL 2 TIMES DAILY
COMMUNITY
End: 2019-10-21

## 2018-10-10 NOTE — TELEPHONE ENCOUNTER
----- Message from Diogo Muñoz sent at 10/10/2018  2:16 PM CDT -----  Contact: pt  Type: Needs Medical Advice    Who Called:  pt  Symptoms (please be specific):  Severe arthritic pain in wrist  How long has patient had these symptoms:  Over a week  Pharmacy name and phone #:      Best Call Back Number:    Additional Information: pt is requesting a call back to discuss the options for pain relief

## 2018-10-10 NOTE — TELEPHONE ENCOUNTER
Patient is having arthritis pain in her wrist and would like some recommendations that she can choose from she will like to know the side effects of the recommendations and the cost please advise doesn't want anything called in just asking for recommendations on what she can do?

## 2018-10-10 NOTE — PROGRESS NOTES
Subjective:       Patient ID: Viridiana Suresh is a 63 y.o. female Body mass index is 22.34 kg/m².    Chief Complaint: Diarrhea    This patient is established with Dr. Crowell and myself.    Diarrhea    This is a recurrent (follow-up) problem. The current episode started more than 1 month ago (started ~5/2018, had improved since our last visit). Episode frequency: bowel movements 4-5 times a day currently of soft stool; urgency. The problem has been unchanged. Diarrhea characteristics: dark green to light yellow coloring. The patient states that diarrhea does not awaken her from sleep. Associated symptoms include abdominal pain (lower abdominal pain, denies currently; relieved with heating pad) and increased flatus. Pertinent negatives include no chills, coughing, fever, vomiting or weight loss. Associated symptoms comments: Fecal incontinence of small amount a few weeks ago. The symptoms are aggravated by stress (orange juice). Risk factors: magnesium use stopped; denies recent hospitalization/antibiotics, foreign travel, ill contacts, or suspect food intake. She has tried increased fluids, anti-motility drug and change of diet (probiotic daily; PAST: kaopectate prn helped, magnesium supplement) for the symptoms. There is no history of bowel resection or inflammatory bowel disease.     Review of Systems   Constitutional: Negative for appetite change, chills, fatigue, fever, unexpected weight change and weight loss.   HENT: Negative for sore throat and trouble swallowing.    Respiratory: Negative for cough, choking and shortness of breath.    Cardiovascular: Negative for chest pain.   Gastrointestinal: Positive for abdominal pain (lower abdominal pain, denies currently; relieved with heating pad), diarrhea and flatus. Negative for anal bleeding, blood in stool, constipation, nausea, rectal pain and vomiting.   Genitourinary: Negative for difficulty urinating, dysuria and flank pain.   Musculoskeletal: Positive for  back pain (history of this).   Neurological: Negative for weakness.   Psychiatric/Behavioral:        Reports due to irregular bowels reports having depression; seeing psych for this       Past Medical History:   Diagnosis Date    Bipolar disorder     CTS (carpal tunnel syndrome)     Diverticulosis     Hepatomegaly     Presence of dental bridge     UPPER     Past Surgical History:   Procedure Laterality Date    APPENDECTOMY      BIOPSY, BREAST Right 7/11/2012    [facility]    BREAST SURGERY      Needle and surgical biopsy    COLONOSCOPY N/A 6/12/2018    Procedure: COLONOSCOPY;  Surgeon: Uche Crowell MD;  Location: [facility] ENDO;  Service: Endoscopy;  Laterality: N/A; repeat in 10 years for screening    COLONOSCOPY N/A 6/12/2018    [facility]ell [facility] at [facility]    NASAL SEPTUM SURGERY      NEEDLE LOCALIZATION Right 7/11/2012    [facility] at [facility] OR    RELEASE, CARPAL TUNNEL Right 1/22/2013    [facility] Jr[facility] at [facility]    RHINOPHYMA RESECTION      RHINOPLASTY TIP      SOFT TISSUE BIOPSY      throat biopsy    TUBAL LIGATION       Family History   Problem Relation Age of Onset    Cancer Mother 80        breast cancer    Diabetes Mother     Cancer Father 77        pancreatic cancer    Diabetes Maternal Grandmother     Colon cancer Neg Hx     Colon polyps Neg Hx     Crohn's disease Neg Hx     Ulcerative colitis Neg Hx     Celiac disease Neg Hx      Wt Readings from Last 10 Encounters:   10/10/18 55.4 kg (122 lb 2.2 oz)   06/20/18 57 kg (125 lb 10.6 oz)   06/12/18 56.2 kg (124 lb)   06/08/18 56.2 kg (123 lb 14.4 oz)   05/25/18 56.2 kg (123 lb 14.4 oz)   05/24/18 56.4 kg (124 lb 5.4 oz)   05/23/18 56 kg (123 lb 7.3 oz)   05/18/18 55.8 kg (123 lb 0.3 oz)   05/02/18 55.3 kg (121 lb 12.9 oz)   04/23/18 56.7 kg (125 lb)     Lab Results   Component Value Date    TSH 3.980 03/24/2011     Lab Visit on  09/18/2018   Component Date Value Ref Range Status    Sodium 09/18/2018 145  136 - 145 mmol/L Final    Potassium 09/18/2018 4.5  3.5 - 5.1 mmol/L Final    Chloride 09/18/2018 111* 95 - 110 mmol/L Final    CO2 09/18/2018 28  23 - 29 mmol/L Final    Glucose 09/18/2018 78  70 - 110 mg/dL Final    BUN, Bld 09/18/2018 17  8 - 23 mg/dL Final    Creatinine 09/18/2018 1.0  0.5 - 1.4 mg/dL Final    Calcium 09/18/2018 9.8  8.7 - 10.5 mg/dL Final    Total Protein 09/18/2018 6.6  6.0 - 8.4 g/dL Final    Albumin 09/18/2018 3.7  3.5 - 5.2 g/dL Final    Total Bilirubin 09/18/2018 0.6  0.1 - 1.0 mg/dL Final    Comment: For infants and newborns, interpretation of results should be based  on gestational age, weight and in agreement with clinical  observations.  Premature Infant recommended reference ranges:  Up to 24 hours.............<8.0 mg/dL  Up to 48 hours............<12.0 mg/dL  3-5 days..................<15.0 mg/dL  6-29 days.................<15.0 mg/dL      Alkaline Phosphatase 09/18/2018 66  55 - 135 U/L Final    AST 09/18/2018 15  10 - 40 U/L Final    ALT 09/18/2018 10  10 - 44 U/L Final    Anion Gap 09/18/2018 6* 8 - 16 mmol/L Final    eGFR if African American 09/18/2018 >60.0  >60 mL/min/1.73 m^2 Final    eGFR if non African American 09/18/2018 >60.0  >60 mL/min/1.73 m^2 Final    Comment: Calculation used to obtain the estimated glomerular filtration  rate (eGFR) is the CKD-EPI equation.       Hemoglobin A1C 09/18/2018 5.3  4.0 - 5.6 % Final    Comment: ADA Screening Guidelines:  5.7-6.4%  Consistent with prediabetes  >or=6.5%  Consistent with diabetes  High levels of fetal hemoglobin interfere with the HbA1C  assay. Heterozygous hemoglobin variants (HbS, HgC, etc)do  not significantly interfere with this assay.   However, presence of multiple variants may affect accuracy.      Estimated Avg Glucose 09/18/2018 105  68 - 131 mg/dL Final    Cholesterol 09/18/2018 237* 120 - 199 mg/dL Final    Comment:  "The National Cholesterol Education Program (NCEP) has set the  following guidelines (reference ranges) for Cholesterol:  Optimal.....................<200 mg/dL  Borderline High.............200-239 mg/dL  High........................> or = 240 mg/dL      Triglycerides 09/18/2018 82  30 - 150 mg/dL Final    Comment: The National Cholesterol Education Program (NCEP) has set the  following guidelines (reference values) for triglycerides:  Normal......................<150 mg/dL  Borderline High.............150-199 mg/dL  High........................200-499 mg/dL      HDL 09/18/2018 53  40 - 75 mg/dL Final    Comment: The National Cholesterol Education Program (NCEP) has set the  following guidelines (reference values) for HDL Cholesterol:  Low...............<40 mg/dL  Optimal...........>60 mg/dL      LDL Cholesterol 09/18/2018 167.6* 63.0 - 159.0 mg/dL Final    Comment: The National Cholesterol Education Program (NCEP) has set the  following guidelines (reference values) for LDL Cholesterol:  Optimal.......................<130 mg/dL  Borderline High...............130-159 mg/dL  High..........................160-189 mg/dL  Very High.....................>190 mg/dL      HDL/Chol Ratio 09/18/2018 22.4  20.0 - 50.0 % Final    Total Cholesterol/HDL Ratio 09/18/2018 4.5  2.0 - 5.0 Final    Non-HDL Cholesterol 09/18/2018 184  mg/dL Final    Comment: Risk category and Non-HDL cholesterol goals:  Coronary heart disease (CHD)or equivalent (10-year risk of CHD >20%):  Non-HDL cholesterol goal     <130 mg/dL  Two or more CHD risk factors and 10-year risk of CHD <= 20%:  Non-HDL cholesterol goal     <160 mg/dL  0 to 1 CHD risk factor:  Non-HDL cholesterol goal     <190 mg/dL       Reviewed prior medical records including radiology report of 5/21/18 ct abdomen pelvis; 5/3/18 stool studies (negative) and magnesium level WNL.    6/12/18 Colonoscopy was reviewed and procedure report states:   " Findings:       Multiple small-mouthed " "diverticula were found in the sigmoid colon        and descending colon.       The remainder of colon appeared normal to cecum, including terminal        ileum. Random colon biopsies were taken with a cold forceps for        histology.  Impression:          - Diverticulosis in the sigmoid colon and in the                        descending colon.                       - The entire examined colon is normal. Biopsied.  Recommendation:      - Await pathology results.                       - Repeat colonoscopy in 10 years for screening                        purposes.                       - Discharge patient to home (ambulatory). ".  Biopsy results:   "FINAL PATHOLOGIC DIAGNOSIS  Colon, random, biopsy:  - Colonic mucosa with no significant histologic abnormality."  Objective:      Physical Exam   Constitutional: She is oriented to person, place, and time. She appears well-developed and well-nourished. No distress.   HENT:   Mouth/Throat: Oropharynx is clear and moist and mucous membranes are normal. No oral lesions. No oropharyngeal exudate.   Eyes: Conjunctivae are normal. Pupils are equal, round, and reactive to light. No scleral icterus.   Cardiovascular: Normal rate.   Pulmonary/Chest: Effort normal and breath sounds normal. No respiratory distress. She has no wheezes.   Abdominal: Soft. Normal appearance and bowel sounds are normal. She exhibits no distension, no abdominal bruit and no mass. There is no hepatosplenomegaly. There is no tenderness. There is no rigidity, no rebound, no guarding, no tenderness at McBurney's point and negative Rao's sign. No hernia.   Neurological: She is alert and oriented to person, place, and time.   Skin: Skin is warm and dry. No rash noted. She is not diaphoretic. No erythema. No pallor.   Non-jaundiced   Psychiatric: She has a normal mood and affect. Her behavior is normal. Judgment and thought content normal.   Nursing note and vitals reviewed.      Assessment:       1. " Irritable bowel syndrome with diarrhea    2. History of diverticulosis    3. Abdominal cramping        Plan:       Irritable bowel syndrome with diarrhea  -     IgA; Future; Expected date: 10/10/2018  -     Tissue transglutaminase, IgA; Future; Expected date: 10/10/2018  -     Stool Exam-Ova,Cysts,Parasites; Future; Expected date: 10/10/2018  -     Fecal fat, qualitative; Future; Expected date: 10/10/2018  -     Giardia / Cryptosporidum, EIA; Future; Expected date: 10/10/2018  -     pH, stool; Future; Expected date: 10/10/2018  -     Rotavirus antigen, stool; Future; Expected date: 10/10/2018  -     WBC, Stool; Future; Expected date: 10/10/2018  -     Stool culture; Future; Expected date: 10/10/2018  -     Clostridium difficile EIA; Future; Expected date: 10/10/2018  -     Adenovirus Molecular Detection, PCR, Non-Blood Stool; Future; Expected date: 10/10/2018  -  START   dicyclomine (BENTYL) 10 MG capsule; Take 1 capsule (10 mg total) by mouth 4 (four) times daily before meals and nightly.  Dispense: 120 capsule; Refill: 1  Recommend follow-up with PSYCH for continued evaluation and management.  -CONTINUE OTC probiotic, such as Align or Culturelle, as directed  - avoid lactose  - recommended increase fiber in diet, especially soluble fiber since this can help bulk up the stool consistency and may help to slow down how fast the stool goes through the colon and can prevent diarrhea  - discussed with patient that certain medications, such as magnesium, may be contributing to symptoms. I recommend follow-up with provider who manages medication to discuss about possible alternative therapy, patient verbalized understanding    Abdominal cramping  -  START   dicyclomine (BENTYL) 10 MG capsule; Take 1 capsule (10 mg total) by mouth 4 (four) times daily before meals and nightly.  Dispense: 120 capsule; Refill: 1    History of diverticulosis  - discussed the diagnosis of diverticulosis and diverticulitis, & to prevent  diverticulitis, high fiber diet is recommended.  -Recommended daily exercise, adequate water intake (six 8-oz glasses of water daily), and high fiber diet. OTC fiber supplements are recommended if diet does not reach daily fiber goal (25 grams daily), such as Metamucil, Citrucel, or FiberCon (take as directed, separate from other oral medications by >2 hours).  - Advised to avoid/minimize popcorn, corn, seeds, and nuts.    Follow-up in about 1 month (around 11/10/2018), or if symptoms worsen or fail to improve.      If no improvement in symptoms or symptoms worsen, call/follow-up at clinic or go to ER.

## 2018-10-10 NOTE — PATIENT INSTRUCTIONS
Irritable Bowel Syndrome    Irritable bowel syndrome (IBS) is a disorder of the intestines. It is not a disease, but a group of symptoms caused by changes in the way the intestines work. It is fairly common, but the cause is not well understood.  Symptoms of IBS include:  · Abdominal pain, discomfort, and cramping  · Diarrhea  · Constipation or dry, hard stools  · Mucous stool  · Bloating  · Feeling of incomplete bowel movements  It usually results in one of 3 patterns of symptoms:  · Chronic abdominal pain and constipation  · Recurring episodes of diarrhea, with or without pain  · Alternating diarrhea and constipation  Home care  The goal of treatment is to control and relieve your symptoms, so you can lead a full and active life. There is no cure for IBS. But it can be managed.  Diet  Your diet did not cause your IBS, but it can affect it. No one diet works for everyone. Finding the best foods for you may take trial and error. Keep a food log to help find what foods made your symptoms worse. Below are some tips that may help you.  · Eat more slowly. Eat smaller amounts at a time, but more often. Remember, you can always eat more, but cannot eat less once youve eaten too much.  · High-fiber foods are complicated. While they may help relieve constipation, they can make your bloating, cramping, gas, and diarrhea worse.  · Eat less sugar.  · Try cutting out dairy products. Sometimes this helps.  · Try cutting out foods that are high in fat and fatty meats.  · You can control bloating or passing excess gas. Be careful with gassy vegetables and fruits like beans, cabbage, broccoli, and cauliflower.  · Be careful with carbonated beverages and fruit juices. They can make your bloating and diarrhea worse.  · Caffeine, alcohol, and stimulants can make symptoms worse. These include coffee, tea, sodas, energy drinks, and chocolate.  Lifestyle  · Look for factors that seem to worsen your symptoms. These include stress and  emotions.   · Although stress does not cause IBS, it may trigger flare-ups. Counseling can help you learn to handle stress. So can self-help measures like exercise, yoga, and meditation.  · Depression can occur along with IBS. Your healthcare provider may prescribe antidepressant medicine. This may help with diarrhea, constipation, and cramping, as well as with symptoms of depression.  · Smoking doesn't cause IBS, but can make the symptoms worse.  Medicines  Your healthcare provider may prescribe medicines. Take them as directed. For acute flare-ups of your illness, your provider may give you prescription medicines.  · Check with your healthcare provider before taking any medicines for diarrhea.  · Avoid anti-inflammatory medicines like ibuprofen or naproxen.  · Consider nutritional supplements. This is especially true if your diarrhea is prolonged, or you aren't eating or are losing weight  Follow-up care  Follow up with your healthcare provider, or as advised. If a stool sample was taken or cultures were done, you will be told if your treatment needs to change. You can call as directed for the results.  When to seek medical advice  Call your healthcare provider right away if any of these occur:  · Abdominal pain gets worse  · Constant abdominal pain moves to the right-lower abdomen  · You can't keep liquids down because of vomiting  · You have severe diarrhea  · You have blood (red or black color) or mucus in your stool  · You feel very weak or dizzy, faint, or have extreme thirst  · You have a fever of 100.4ºF (38.0ºC) or higher, or as directed by your healthcare provider  Date Last Reviewed: 8/31/2015  © 3926-0783 [employer]. [address] [place] [address], [place] [address]. All rights reserved. This information is not intended as a substitute for professional medical care. Always follow your healthcare professional's instructions.

## 2018-10-12 ENCOUNTER — TELEPHONE (OUTPATIENT)
Dept: GASTROENTEROLOGY | Facility: CLINIC | Age: 63
End: 2018-10-12

## 2018-10-12 DIAGNOSIS — K58.0 IRRITABLE BOWEL SYNDROME WITH DIARRHEA: ICD-10-CM

## 2018-10-12 DIAGNOSIS — R10.9 ABDOMINAL CRAMPING: ICD-10-CM

## 2018-10-12 LAB — TTG IGA SER IA-ACNC: 3 UNITS

## 2018-10-12 RX ORDER — DICYCLOMINE HYDROCHLORIDE 10 MG/1
10 CAPSULE ORAL
Qty: 120 CAPSULE | Refills: 1 | Status: SHIPPED | OUTPATIENT
Start: 2018-10-12 | End: 2018-12-21 | Stop reason: SDUPTHER

## 2018-10-12 NOTE — TELEPHONE ENCOUNTER
Pt asking for rx for bentyl to be sent to [facility] mail order, local pharmacy says it's on back order.

## 2018-10-12 NOTE — TELEPHONE ENCOUNTER
----- Message from Viridiana Baird sent at 10/12/2018  2:10 PM CDT -----  Call [phone] ... Leave a message when the prescription has been sent to mail order pharmacy

## 2018-10-12 NOTE — TELEPHONE ENCOUNTER
----- Message from Christine Kelley sent at 10/12/2018 10:06 AM CDT -----  Contact: Self  Patient states medication  dicyclomine (BENTYL) 10 MG capsule is on back order at her pharmacy and needs to know if nurse can send this to the       [facility] - [place], [place] - [address]  [address]  [facility] [address]  Phone: [phone] Fax: [phone]    Please call if any questions [phone] (home)

## 2018-10-15 RX ORDER — QUETIAPINE FUMARATE 25 MG/1
TABLET, FILM COATED ORAL
Qty: 30 TABLET | Refills: 0 | OUTPATIENT
Start: 2018-10-15

## 2018-10-17 ENCOUNTER — OFFICE VISIT (OUTPATIENT)
Dept: PSYCHIATRY | Facility: CLINIC | Age: 63
End: 2018-10-17
Payer: MEDICARE

## 2018-10-17 ENCOUNTER — TELEPHONE (OUTPATIENT)
Dept: RHEUMATOLOGY | Facility: CLINIC | Age: 63
End: 2018-10-17

## 2018-10-17 ENCOUNTER — TELEPHONE (OUTPATIENT)
Dept: FAMILY MEDICINE | Facility: CLINIC | Age: 63
End: 2018-10-17

## 2018-10-17 VITALS
BODY MASS INDEX: 21.68 KG/M2 | HEIGHT: 62 IN | WEIGHT: 117.81 LBS | DIASTOLIC BLOOD PRESSURE: 65 MMHG | HEART RATE: 59 BPM | SYSTOLIC BLOOD PRESSURE: 136 MMHG

## 2018-10-17 DIAGNOSIS — M19.032 ARTHRITIS OF WRIST, LEFT: ICD-10-CM

## 2018-10-17 DIAGNOSIS — Z79.899 HIGH RISK MEDICATIONS (NOT ANTICOAGULANTS) LONG-TERM USE: ICD-10-CM

## 2018-10-17 DIAGNOSIS — M19.039 WRIST ARTHRITIS: Primary | ICD-10-CM

## 2018-10-17 DIAGNOSIS — Z87.891 FORMER SMOKER: ICD-10-CM

## 2018-10-17 DIAGNOSIS — F31.9 BIPOLAR I DISORDER: Primary | ICD-10-CM

## 2018-10-17 PROCEDURE — 99214 OFFICE O/P EST MOD 30 MIN: CPT | Mod: S$PBB,,, | Performed by: PSYCHIATRY & NEUROLOGY

## 2018-10-17 PROCEDURE — 99212 OFFICE O/P EST SF 10 MIN: CPT | Mod: PBBFAC,PO | Performed by: PSYCHIATRY & NEUROLOGY

## 2018-10-17 PROCEDURE — 3008F BODY MASS INDEX DOCD: CPT | Mod: CPTII,,, | Performed by: PSYCHIATRY & NEUROLOGY

## 2018-10-17 PROCEDURE — 99999 PR PBB SHADOW E&M-EST. PATIENT-LVL II: CPT | Mod: PBBFAC,,, | Performed by: PSYCHIATRY & NEUROLOGY

## 2018-10-17 RX ORDER — TRAZODONE HYDROCHLORIDE 50 MG/1
50 TABLET ORAL NIGHTLY
Qty: 30 TABLET | Refills: 0 | Status: SHIPPED | OUTPATIENT
Start: 2018-10-17 | End: 2018-11-08 | Stop reason: SDUPTHER

## 2018-10-17 RX ORDER — DIVALPROEX SODIUM 250 MG/1
750 TABLET, DELAYED RELEASE ORAL NIGHTLY
Qty: 270 TABLET | Refills: 1 | Status: SHIPPED | OUTPATIENT
Start: 2018-10-17 | End: 2019-03-09 | Stop reason: SDUPTHER
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
## 2018-10-17 NOTE — TELEPHONE ENCOUNTER
----- Message from Manan Mcnamara sent at 10/17/2018 11:17 AM CDT -----  Contact: same  Patient called in and stated she was referred by Dr. Alatorre to see Dr. Perdue for severe arthritis in her hands and wrist.  Patient call back number is [phone]

## 2018-10-17 NOTE — TELEPHONE ENCOUNTER
----- Message from Shell Davis sent at 10/17/2018  8:17 AM CDT -----  Pt  Is  Calling to  Get a  Ref  For   Dr puentes   , pt  Has  Arthritis// please call [phone]  Please  Leave  message

## 2018-10-17 NOTE — TELEPHONE ENCOUNTER
Spoke with pt, she states she needs to see Dr. 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 Steroid shot in her wrist in May helped but pain has now returned and she has lost  function in her hands. Referral pended.

## 2018-10-17 NOTE — TELEPHONE ENCOUNTER
Tried to reach pt. No answer, left msg to call back.    Contacting to advise that referral has been placed.

## 2018-10-18 NOTE — TELEPHONE ENCOUNTER
----- Message from Deirdre Weir sent at 10/18/2018  8:43 AM CDT -----  Contact: patient  Returning missed call. Please call back [phone]    [facility] she  is booked 4-18-19. Call back number is provided. Patient is on wait list. CG

## 2018-10-25 ENCOUNTER — TELEPHONE (OUTPATIENT)
Dept: RHEUMATOLOGY | Facility: CLINIC | Age: 63
End: 2018-10-25

## 2018-10-25 ENCOUNTER — TELEPHONE (OUTPATIENT)
Dept: FAMILY MEDICINE | Facility: CLINIC | Age: 63
End: 2018-10-25

## 2018-10-25 DIAGNOSIS — R20.2 NUMBNESS AND TINGLING OF FOOT: Primary | ICD-10-CM

## 2018-10-25 DIAGNOSIS — R20.0 NUMBNESS AND TINGLING OF FOOT: Primary | ICD-10-CM

## 2018-10-25 NOTE — TELEPHONE ENCOUNTER
----- Message from Christine Kelley sent at 10/25/2018  8:39 AM CDT -----  Contact: Self   Patient is needing the nurse to recommend a podiatrist  Please call back [phone] (eywz)

## 2018-10-25 NOTE — TELEPHONE ENCOUNTER
----- Message from Christine Kelley sent at 10/25/2018  8:35 AM CDT -----  Contact: Self  Patient is requesting to speak Divine about a Dr she went to see yesterday       Please call back [phone] (home)     M attempting to call patient back. Call back number provided. CG

## 2018-10-25 NOTE — TELEPHONE ENCOUNTER
Patient states that she is having numbness in both feet. Asking to be seen by podiatry. Pending. Advised that Dr Alatorre is out of clinic and will be back next week to address. Patient verbalized understanding.

## 2018-11-08 RX ORDER — TRAZODONE HYDROCHLORIDE 50 MG/1
50 TABLET ORAL NIGHTLY
Qty: 90 TABLET | Refills: 0 | Status: SHIPPED | OUTPATIENT
Start: 2018-11-08 | End: 2018-12-10

## 2018-12-10 ENCOUNTER — OFFICE VISIT (OUTPATIENT)
Dept: FAMILY MEDICINE | Facility: CLINIC | Age: 63
End: 2018-12-10
Payer: MEDICARE

## 2018-12-10 VITALS
OXYGEN SATURATION: 96 % | DIASTOLIC BLOOD PRESSURE: 72 MMHG | HEART RATE: 92 BPM | TEMPERATURE: 98 F | WEIGHT: 125.13 LBS | SYSTOLIC BLOOD PRESSURE: 140 MMHG | BODY MASS INDEX: 23.03 KG/M2 | HEIGHT: 62 IN

## 2018-12-10 DIAGNOSIS — J06.9 UPPER RESPIRATORY TRACT INFECTION, UNSPECIFIED TYPE: Primary | ICD-10-CM

## 2018-12-10 DIAGNOSIS — H60.551 ACUTE REACTIVE OTITIS EXTERNA OF RIGHT EAR: ICD-10-CM

## 2018-12-10 PROCEDURE — 99213 OFFICE O/P EST LOW 20 MIN: CPT | Mod: HCWC,S$GLB,, | Performed by: FAMILY MEDICINE

## 2018-12-10 PROCEDURE — 3008F BODY MASS INDEX DOCD: CPT | Mod: CPTII,HCWC,S$GLB, | Performed by: FAMILY MEDICINE

## 2018-12-10 PROCEDURE — 99999 PR PBB SHADOW E&M-EST. PATIENT-LVL III: CPT | Mod: PBBFAC,HCWC,, | Performed by: FAMILY MEDICINE

## 2018-12-10 RX ORDER — NEOMYCIN SULFATE, POLYMYXIN B SULFATE AND HYDROCORTISONE 10; 3.5; 1 MG/ML; MG/ML; [USP'U]/ML
3 SUSPENSION/ DROPS AURICULAR (OTIC) 4 TIMES DAILY
Qty: 10 ML | Refills: 0 | Status: SHIPPED | OUTPATIENT
Start: 2018-12-10 | End: 2018-12-13

## 2018-12-10 NOTE — PROGRESS NOTES
"Subjective:       Patient ID: Viridiana Suresh is a 63 y.o. female.    Chief Complaint: Sore Throat (right ear ache)    Runny and stuffy nose for few days.  Sore throat started 2 days ago.  Earache started yesterday.  No cough or fever.  She did feel hot and sweaty last night.  She put a Q-tip with peroxide in her right ear last night and it blocked upper hearing.  She does complain of postnasal drip and clears her throat.  No severe coughing.      Review of Systems   Constitutional: Positive for diaphoresis. Negative for chills and fever.   HENT: Positive for congestion, ear pain, postnasal drip and rhinorrhea.    Respiratory: Negative for cough.        Objective:     Blood pressure (!) 140/72, pulse 92, temperature 98.2 °F (36.8 °C), temperature source Oral, height 5' 2" (1.575 m), weight 56.8 kg (125 lb 1.8 oz), SpO2 96 %.      Physical Exam   Constitutional: She appears well-developed and well-nourished. No distress.   HENT:   Sinus is nontender. Mild nasal mucosal edema.  Throat is normal.  Left TM and canal are normal.  Right canal with some edema and exudate.  The TM appears to be normal.   Neck: No thyromegaly present.   Pulmonary/Chest: Effort normal and breath sounds normal. No respiratory distress.   Occasional productive cough   Lymphadenopathy:     She has no cervical adenopathy.       Assessment:       1. Upper respiratory tract infection, unspecified type    2. Acute reactive otitis externa of right ear        Plan:       Allegra or Zyrtec for postnasal drip.  Cortisporin otic suspension for otitis externa.  Call back if not improved.      "

## 2018-12-10 NOTE — PATIENT INSTRUCTIONS
Allegra 180 mg or zyrtec 10 mg for post nasal drip.  3 drops of cortisporin otic suspension right ear three times a day.   Call if you have fever more than 100.4 degrees

## 2018-12-21 DIAGNOSIS — R10.9 ABDOMINAL CRAMPING: ICD-10-CM

## 2018-12-21 DIAGNOSIS — K58.0 IRRITABLE BOWEL SYNDROME WITH DIARRHEA: ICD-10-CM

## 2018-12-26 RX ORDER — DICYCLOMINE HYDROCHLORIDE 10 MG/1
CAPSULE ORAL
Qty: 240 CAPSULE | Refills: 1 | Status: SHIPPED | OUTPATIENT
Start: 2018-12-26 | End: 2019-01-09

## 2019-01-03 ENCOUNTER — TELEPHONE (OUTPATIENT)
Dept: PSYCHIATRY | Facility: CLINIC | Age: 64
End: 2019-01-03

## 2019-01-03 NOTE — TELEPHONE ENCOUNTER
"Per pt's , "She's been organizing the house and it went from putting the pj decorations away to every single  the house."     She said she started taking the lithium again but I don't know anything about that and i'm not even sure that's the right med. she has this diarrhea issue and she's on medicine for this then she had a sinus infection and she took medicine for that."     Pt gets on the phone,  "i'm doing very well. I'm taking melatonin for sleep. The seroquel did not help. About a week ago I started taking loxapine 10mg every night again and I think that's really helping me again and i'm just back to feeling more able to do things in my life."     Pt reports that Gio's concerns are due to marital dynamic rather than her mental health. They argue back and forth on the phone with me listening. Difficult to ascertain what is truly occurring. Pt is c/l/gd and speech is reg rate and vol for this pt- verbose, but this is typical when she feels she is not being understood/heard.     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 Offered mult time and she declines each time due to finances. Gio offers to pay and she continues to decline stating, "dr. Rust this is an issue for my marriage therapist and gio needs to got there. It does not pertain to my medications."     Encouraged both to call 911 or go to ER for eval should sxs worsen, fail to improve or if either of them have safety concerns for the pt or gio. They " express understanding.

## 2019-01-04 NOTE — TELEPHONE ENCOUNTER
"Mr. Olivares requesting a return call from Dr. Rust.    Patient spouse called out of concern.     States "rodger is having a manic episode, "none harmful" she is re-organizing the whole house." it's been going on for two day and just seem to be getting worse."    States don't know if patient has been taking her medication.    Mr. Olivares stated took the day off to be home with patient.  No SI no HI "none hostile manic episode"  Please advice [phone]  Debby         " · S/p NGOZI to [facility] 7/2018  · Continue cardiac meds

## 2019-01-08 ENCOUNTER — TELEPHONE (OUTPATIENT)
Dept: PSYCHIATRY | Facility: CLINIC | Age: 64
End: 2019-01-08

## 2019-01-08 NOTE — TELEPHONE ENCOUNTER
"Called  back.     Explained options in this case. He is frustrated by situation, but expresses understanding.     I will call pt today and offer her another appt as she currently has nothing scheduled and has self adjusted her med by re-starting Loxapine per our phone conversation last week. I express to him that I have to evaluate her in order to be able to be of more help, othewise his options are to call 911, coroners office or take her to a nearby ER for eval. He reports hesitation to involving police "she's been through that before the handcuffing and all that stuff it's just unnecessary. She's just cleaning and not sleeping and rearranging everything. i'm her . I know this is manic. Other people say she seems fine so maybe she's just in a mood to clean, but i've seen this before and I know she's not ok."     Will call the pt to try and get an appt tomorrow am.   He expresses understanding that this will require her to be voluntary and if she declines the appt again (as she did last week) his options are again to call 911, call  or go to ER.   "

## 2019-01-08 NOTE — TELEPHONE ENCOUNTER
"Mr. Shelton called to inform Dr. Rust patient is still in a manic state. Up most of the night cleaning and organizing the house.    Stated he is unable to stay home with her can't miss anymore work.   " if I stay home with her we just get on each others nerves"    "I know to take her to the ER if things get worse, but she's not going to go" and I don't want to call 911 she's had enough of that humbug"     No SI HI per spouse.  Please advice [phone]- Mr. Suresh  Debby  "

## 2019-01-09 ENCOUNTER — OFFICE VISIT (OUTPATIENT)
Dept: PSYCHIATRY | Facility: CLINIC | Age: 64
End: 2019-01-09
Payer: MEDICARE

## 2019-01-09 VITALS
SYSTOLIC BLOOD PRESSURE: 199 MMHG | BODY MASS INDEX: 22.48 KG/M2 | HEIGHT: 62 IN | DIASTOLIC BLOOD PRESSURE: 108 MMHG | HEART RATE: 101 BPM | WEIGHT: 122.13 LBS

## 2019-01-09 DIAGNOSIS — Z79.899 HIGH RISK MEDICATIONS (NOT ANTICOAGULANTS) LONG-TERM USE: ICD-10-CM

## 2019-01-09 DIAGNOSIS — F17.210 CIGARETTE SMOKER: ICD-10-CM

## 2019-01-09 DIAGNOSIS — M19.032 ARTHRITIS OF WRIST, LEFT: ICD-10-CM

## 2019-01-09 DIAGNOSIS — F31.9 BIPOLAR I DISORDER: Primary | ICD-10-CM

## 2019-01-09 PROCEDURE — 99214 OFFICE O/P EST MOD 30 MIN: CPT | Mod: S$GLB,,, | Performed by: PSYCHIATRY & NEUROLOGY

## 2019-01-09 PROCEDURE — 99999 PR PBB SHADOW E&M-EST. PATIENT-LVL III: CPT | Mod: PBBFAC,,, | Performed by: PSYCHIATRY & NEUROLOGY

## 2019-01-09 PROCEDURE — 99999 PR PBB SHADOW E&M-EST. PATIENT-LVL III: ICD-10-PCS | Mod: PBBFAC,,, | Performed by: PSYCHIATRY & NEUROLOGY

## 2019-01-09 PROCEDURE — 99499 UNLISTED E&M SERVICE: CPT | Mod: HCNC,S$GLB,, | Performed by: PSYCHIATRY & NEUROLOGY

## 2019-01-09 PROCEDURE — 3008F BODY MASS INDEX DOCD: CPT | Mod: CPTII,S$GLB,, | Performed by: PSYCHIATRY & NEUROLOGY

## 2019-01-09 PROCEDURE — 99214 PR OFFICE/OUTPT VISIT, EST, LEVL IV, 30-39 MIN: ICD-10-PCS | Mod: S$GLB,,, | Performed by: PSYCHIATRY & NEUROLOGY

## 2019-01-09 PROCEDURE — 99499 RISK ADDL DX/OHS AUDIT: ICD-10-PCS | Mod: HCNC,S$GLB,, | Performed by: PSYCHIATRY & NEUROLOGY

## 2019-01-09 PROCEDURE — 3008F PR BODY MASS INDEX (BMI) DOCUMENTED: ICD-10-PCS | Mod: CPTII,S$GLB,, | Performed by: PSYCHIATRY & NEUROLOGY

## 2019-01-09 RX ORDER — LOXAPINE SUCCINATE 10 MG/1
10 TABLET ORAL DAILY
Qty: 90 CAPSULE | Refills: 0 | Status: SHIPPED | OUTPATIENT
Start: 2019-01-09 | End: 2019-03-09 | Stop reason: SDUPTHER
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
## 2019-01-10 ENCOUNTER — LAB VISIT (OUTPATIENT)
Dept: LAB | Facility: HOSPITAL | Age: 64
End: 2019-01-10
Attending: PSYCHIATRY & NEUROLOGY
Payer: MEDICARE

## 2019-01-10 DIAGNOSIS — Z79.899 HIGH RISK MEDICATIONS (NOT ANTICOAGULANTS) LONG-TERM USE: ICD-10-CM

## 2019-01-10 LAB
ALBUMIN SERPL BCP-MCNC: 3.8 G/DL
ALP SERPL-CCNC: 74 U/L
ALT SERPL W/O P-5'-P-CCNC: 20 U/L
ANION GAP SERPL CALC-SCNC: 11 MMOL/L
AST SERPL-CCNC: 27 U/L
BILIRUB SERPL-MCNC: 0.5 MG/DL
BUN SERPL-MCNC: 15 MG/DL
CALCIUM SERPL-MCNC: 10.1 MG/DL
CHLORIDE SERPL-SCNC: 109 MMOL/L
CHOLEST SERPL-MCNC: 186 MG/DL
CHOLEST/HDLC SERPL: 3 {RATIO}
CO2 SERPL-SCNC: 24 MMOL/L
CREAT SERPL-MCNC: 1.1 MG/DL
EST. GFR  (AFRICAN AMERICAN): >60 ML/MIN/1.73 M^2
EST. GFR  (NON AFRICAN AMERICAN): 53.9 ML/MIN/1.73 M^2
GLUCOSE SERPL-MCNC: 88 MG/DL
HDLC SERPL-MCNC: 62 MG/DL
HDLC SERPL: 33.3 %
LDLC SERPL CALC-MCNC: 112.2 MG/DL
NONHDLC SERPL-MCNC: 124 MG/DL
POTASSIUM SERPL-SCNC: 4.4 MMOL/L
PROT SERPL-MCNC: 7.3 G/DL
SODIUM SERPL-SCNC: 144 MMOL/L
TRIGL SERPL-MCNC: 59 MG/DL
VALPROATE SERPL-MCNC: 37.3 UG/ML

## 2019-01-10 PROCEDURE — 80053 COMPREHEN METABOLIC PANEL: CPT

## 2019-01-10 PROCEDURE — 80164 ASSAY DIPROPYLACETIC ACD TOT: CPT

## 2019-01-10 PROCEDURE — 83036 HEMOGLOBIN GLYCOSYLATED A1C: CPT

## 2019-01-10 PROCEDURE — 80061 LIPID PANEL: CPT

## 2019-01-10 PROCEDURE — 36415 COLL VENOUS BLD VENIPUNCTURE: CPT | Mod: PN

## 2019-01-11 ENCOUNTER — TELEPHONE (OUTPATIENT)
Dept: PSYCHIATRY | Facility: CLINIC | Age: 64
End: 2019-01-11

## 2019-01-11 DIAGNOSIS — F31.9 BIPOLAR I DISORDER: ICD-10-CM

## 2019-01-11 LAB
ESTIMATED AVG GLUCOSE: 103 MG/DL
HBA1C MFR BLD HPLC: 5.2 %

## 2019-01-11 NOTE — TELEPHONE ENCOUNTER
"Called the pt to discuss lab results.     VPA below therapeutic range. 37.3    She agrees to inc from 750mg to 1000mg. Expresses understanding of my concern and the importance of this change.    She is c/l/gd on phone. Verbose but easily interruptable. No Advent preoccupation. Discusses that she believes the marriage may need to now end. "it's just been years and years of this and i'm not sure I can put up with it anymore." pt encouraged to call her counselor, Oumou Carpio, to discuss further. PVU.      has also called today to express concerns and I will call him back, but I did make the pt aware of this interaction on the phone today. "I will not have him make me crazy! You don't understand this, but he literally makes me crazy!"     Pt denies danger to self or others. Agrees to adjust med and f/u as scheduled      "

## 2019-01-11 NOTE — TELEPHONE ENCOUNTER
Patient spouse called stated patient is still in a manic state, cleaning and organizing the house.    Mr. Cuadraton is aware Dr. Rust is out of the clinic today, but will be checking her messages throughout the day.    Informed Halie Kerwin should patient  behavior worsen go to the nearest ER.  Debby

## 2019-01-11 NOTE — TELEPHONE ENCOUNTER
"Called pt's  back.     "Well, she's in this process and I can't stop her from this process. She also isn't getting any sleep so she's better when she sleeps. She's so on edge."    I shared with him my conversation with her earlier. And that she will increase the depakote level.     Also reiterated that if he believes she is a danger to self, others, or becomes gravely disabled by mental health he should take her to er, call 911, or coroners office. He expresses understanding, "I just don't want to do that and I don't want to do that on a Friday because she'll just sit there all weekend before anyone can help her."     reiterate to him that at time on [facility] on 1/9, based on my eval, she did not meet PEC criteria and is not voluntary for any additional treatment.     Understood my concerns at the [facility] and went from [facility] to another facility for labwork and answered her phone today once she heard it was me with results so all of this speaks to her current ability for reasonable decision making as it applies to her medical needs. Regarding homelife, I am less certain, but try to direct  and patient to marriage therapist regarding those concerns.  expresses understanding.       "

## 2019-01-17 ENCOUNTER — TELEPHONE (OUTPATIENT)
Dept: PSYCHIATRY | Facility: CLINIC | Age: 64
End: 2019-01-17

## 2019-01-18 ENCOUNTER — TELEPHONE (OUTPATIENT)
Dept: FAMILY MEDICINE | Facility: CLINIC | Age: 64
End: 2019-01-18

## 2019-01-18 NOTE — TELEPHONE ENCOUNTER
Spoke with patient and she stubbed her left pinky toe and now her top of the foot is swollen. Asking what she can do. States that it is getting worse. Please advise as Dr Alatorre is out. States that she cannot afford to go to .

## 2019-01-18 NOTE — TELEPHONE ENCOUNTER
----- Message from Que Mcclelland sent at 1/18/2019 10:38 AM CST -----  Type: Needs Medical Advice    Who Called:  Patient  Best Call Back Number: [phone] (home)   Additional Information: Patient would like an order for an xray - last Tuesday patient got her baby toe and next toe caught on something and they are causing her a lot of pain. Please call to advise today as she is insisting she is in a lot of pain.

## 2019-01-18 NOTE — TELEPHONE ENCOUNTER
----- Message from Karen Hartmann sent at 1/18/2019 12:22 PM CST -----  Contact: Patient  Type:  Patient Returning Call    Who Called:  Patient  Who Left Message for Patient:  Akash  Does the patient know what this is regarding?:  Appointment for xray on foot  Best Call Back Number:  [phone]  Additional Information:  Please call as soon as possible    Thank you

## 2019-01-18 NOTE — TELEPHONE ENCOUNTER
Spoke with patient and advise per Dr Gallo to RICE her foot. Patient verbalized understanding and states that she will contact Dr Alatorre next week. Advised to go to  if not any better.

## 2019-01-23 ENCOUNTER — PATIENT OUTREACH (OUTPATIENT)
Dept: ADMINISTRATIVE | Facility: HOSPITAL | Age: 64
End: 2019-01-23

## 2019-01-30 ENCOUNTER — OFFICE VISIT (OUTPATIENT)
Dept: FAMILY MEDICINE | Facility: CLINIC | Age: 64
End: 2019-01-30
Payer: MEDICARE

## 2019-01-30 ENCOUNTER — HOSPITAL ENCOUNTER (OUTPATIENT)
Dept: RADIOLOGY | Facility: HOSPITAL | Age: 64
Discharge: HOME | End: 2019-01-30
Attending: NURSE PRACTITIONER
Payer: MEDICARE

## 2019-01-30 VITALS
OXYGEN SATURATION: 98 % | HEIGHT: 62 IN | HEART RATE: 91 BPM | BODY MASS INDEX: 22.66 KG/M2 | TEMPERATURE: 97 F | DIASTOLIC BLOOD PRESSURE: 72 MMHG | SYSTOLIC BLOOD PRESSURE: 126 MMHG | WEIGHT: 123.13 LBS

## 2019-01-30 DIAGNOSIS — N18.30 STAGE 3 CHRONIC KIDNEY DISEASE: ICD-10-CM

## 2019-01-30 DIAGNOSIS — T14.90XA TRAUMA: Primary | ICD-10-CM

## 2019-01-30 DIAGNOSIS — S92.525A CLOSED NONDISPLACED FRACTURE OF MIDDLE PHALANX OF LESSER TOE OF LEFT FOOT, INITIAL ENCOUNTER: ICD-10-CM

## 2019-01-30 DIAGNOSIS — T14.90XA TRAUMA: ICD-10-CM

## 2019-01-30 PROCEDURE — 99999 PR PBB SHADOW E&M-EST. PATIENT-LVL IV: CPT | Mod: PBBFAC,,, | Performed by: NURSE PRACTITIONER

## 2019-01-30 PROCEDURE — 3008F BODY MASS INDEX DOCD: CPT | Mod: CPTII,S$GLB,, | Performed by: NURSE PRACTITIONER

## 2019-01-30 PROCEDURE — 99999 PR PBB SHADOW E&M-EST. PATIENT-LVL IV: ICD-10-PCS | Mod: PBBFAC,,, | Performed by: NURSE PRACTITIONER

## 2019-01-30 PROCEDURE — 3008F PR BODY MASS INDEX (BMI) DOCUMENTED: ICD-10-PCS | Mod: CPTII,S$GLB,, | Performed by: NURSE PRACTITIONER

## 2019-01-30 PROCEDURE — 99214 OFFICE O/P EST MOD 30 MIN: CPT | Mod: S$GLB,,, | Performed by: NURSE PRACTITIONER

## 2019-01-30 PROCEDURE — 73630 XR FOOT COMPLETE 3 VIEW LEFT: ICD-10-PCS | Mod: 26,LT,, | Performed by: RADIOLOGY

## 2019-01-30 PROCEDURE — 73630 X-RAY EXAM OF FOOT: CPT | Mod: TC,PN,LT

## 2019-01-30 PROCEDURE — 73630 X-RAY EXAM OF FOOT: CPT | Mod: 26,LT,, | Performed by: RADIOLOGY

## 2019-01-30 PROCEDURE — 99214 PR OFFICE/OUTPT VISIT, EST, LEVL IV, 30-39 MIN: ICD-10-PCS | Mod: S$GLB,,, | Performed by: NURSE PRACTITIONER

## 2019-01-30 RX ORDER — PREDNISONE 5 MG/1
5 TABLET ORAL DAILY
Qty: 7 TABLET | Refills: 0 | Status: SHIPPED | OUTPATIENT
Start: 2019-01-30 | End: 2019-02-06

## 2019-01-30 NOTE — PROGRESS NOTES
This dictation has been generated using Modal Fluency Dictation some phonetic errors may occur. Please contact author for clarification if needed.     Problem List Items Addressed This Visit     None      Visit Diagnoses     Trauma    -  Primary    Relevant Orders    X-Ray Foot Complete Left          Orders Placed This Encounter    X-Ray Foot Complete Left     Check xray as above. Rule out fracture.  I will review images with the pt today at the visit.   Minimal nondisplaced fracture of the 5th toe.   Patient Instructions   Tylenol 325 mg every 6 hours for pain.   Ice for the toe/foot.    refused tylenol. With psych history jazmyn not sure how much time she will sit still, ice it, and elevate it.   NO DVT    No Follow-up on file.    ________________________________________________________________  ________________________________________________________________      Chief Complaint   Patient presents with    Ankle Injury     left ankle and toe and swollen     History of present illness  This 63 y.o. presents today for complaint of toe denis and trauma.  She is unclear on the date but reviewing notes to primary I gather that the injury happened either 1/15/19 or possibly the Tuesday before.  Regardless it has been beyond 2 weeks at this point and she notes worsening and pain when weight bearing.  Not meds tried. She tried ice for a while.  She is not staying off of it.  This is the first visit. She is very wordy.  No flight of ideas or hyper Mosque statements.  Follows with psych Bipolar I.   Ros   no fever chills   noted left wrist pain and arthralgia.    no numbness    Reviewed histories.     Past Medical History:   Diagnosis Date    Bipolar disorder     CTS (carpal tunnel syndrome)     Diverticulosis     Hepatomegaly     Presence of dental bridge     UPPER       Past Surgical History:   Procedure Laterality Date    APPENDECTOMY      BIOPSY, BREAST Right 7/11/2012    [facility] at  [facility]    BREAST SURGERY      Needle and surgical biopsy    COLONOSCOPY N/A 2018    [facility]ell [facility] at [facility] ENDO    NASAL SEPTUM SURGERY      NEEDLE LOCALIZATION Right 2012    [facility] at [facility] OR    RELEASE, CARPAL TUNNEL Right 2013    [facility]    RHINOPHYMA RESECTION      RHINOPLASTY TIP      SOFT TISSUE BIOPSY      throat biopsy    TUBAL LIGATION         Family History   Problem Relation Age of Onset    Cancer Mother 80        breast cancer    Diabetes Mother     Cancer Father 77        pancreatic cancer    Diabetes Maternal Grandmother     Colon cancer Neg Hx     Colon polyps Neg Hx     Crohn's disease Neg Hx     Ulcerative colitis Neg Hx     Celiac disease Neg Hx        Social History     Socioeconomic History    Marital status:      Spouse name: None    Number of children: None    Years of education: None    Highest education level: None   Social Needs    Financial resource strain: None    Food insecurity - worry: None    Food insecurity - inability: None    Transportation needs - medical: None    Transportation needs - non-medical: None   Occupational History    None   Tobacco Use    Smoking status: Former Smoker     Packs/day: 1.00     Years: 43.00     Pack years: 43.00     Types: Cigarettes     Last attempt to quit: 2018     Years since quittin.7    Smokeless tobacco: Never Used   Substance and Sexual Activity    Alcohol use: No    Drug use: No    Sexual activity: Not Currently   Other Topics Concern    Caffeine Use Not Asked    Financial Status: Employed Not Asked    Home situation: homeless Not Asked    Caffeine Use: Frequent Not Asked    Financial Status: Unemployed Not Asked    Legal: Arrest history Not Asked    Caffeine Use: Moderate Not Asked    Financial Status: Disabled Not Asked    Legal: Involved in civil litigation Not Asked    Caffeine Use:  Substantial Not Asked    Financial Status: Other Not Asked    Legal: Involved in criminal litigation Not Asked    Patient feels they ought to cut down on drinking/drug use Not Asked    Firearms: Does patient have access to a firearm? Not Asked    Legal: Other Not Asked    Patient annoyed by others criticizing their drinking/drug use Not Asked    Home situation: lives with family Not Asked    Leisure: Time with family Not Asked    Patient has felt bad or guilty about drinking/drug use Not Asked    Home situation: lives alone Not Asked    Leisure: Exercise Not Asked    Patient has had a drink/used drugs as an eye opener in the AM Not Asked    Home situation: lives with friends Not Asked    Leisure: Sports Not Asked    Childhood History: Raised by parents Not Asked    Home situation: lives with significant other Not Asked    Leisure: Fishing Not Asked    Childhood History: Uneventful Not Asked    Home situation: lives with spouse Not Asked    Leisure: Hunting Not Asked    Childhood History: Adopted Not Asked    Home situation: lives in group home Not Asked    Leisure: Movie Watching Not Asked    Childhood History: Early trauma Not Asked    Home situation: lives in shelter Not Asked    Leisure: Shopping Not Asked    Childhood History: Other Not Asked    Home situation: lives in nursing home Not Asked     Service Not Asked    Legal consequences of chemical use Not Asked   Social History Narrative    None       Current Outpatient Medications   Medication Sig Dispense Refill    ascorbic acid, vitamin C, (VITAMIN C WITH SUMI HIPS) 1000 MG tablet Take 1,000 mg by mouth 2 (two) times daily.      divalproex (DEPAKOTE) 250 MG EC tablet Take 3 tablets (750 mg total) by mouth every evening. 270 tablet 1    Lactobac no.41/Bifidobact no.7 (PROBIOTIC-10 ORAL) Take by mouth once daily.      loxapine (LOXITANE) 10 MG Cap Take 1 capsule (10 mg total) by mouth once daily. 90 capsule 0     multivitamin with minerals tablet Take 1 tablet by mouth once daily.      turmeric (CURCUMIN MISC) 500 mg by Misc.(Non-Drug; Combo Route) route once daily.      UNABLE TO FIND medication name: Omega XL - contains 30 free fatty acids  Takes 2 daily      vitamin D (VITAMIN D3) 1000 units Tab Take 1,000 Units by mouth once daily.       No current facility-administered medications for this visit.        Review of patient's allergies indicates:  No Known Allergies    Physical examination  Vitals Reviewed  Gen. Well-dressed well-nourished   Skin warm dry and intact.  No rashes noted.  Chest.  Respirations are even unlabored.    Neuro. Awake alert oriented x4.  Normal judgment and cognition noted.  Extremities no clubbing cyanosis or edema noted. Swollen 5th toe left foot.  Redness noted without warmth.  Mild swelling of foot.  No bruising. Rapid cap refill.  Sensation intact. No DVT.     Call or return to clinic prn if these symptoms worsen or fail to improve as anticipated.

## 2019-03-09 DIAGNOSIS — Z79.899 HIGH RISK MEDICATIONS (NOT ANTICOAGULANTS) LONG-TERM USE: ICD-10-CM

## 2019-03-09 DIAGNOSIS — F31.9 BIPOLAR I DISORDER: ICD-10-CM

## 2019-03-11 RX ORDER — DIVALPROEX SODIUM 250 MG/1
750 TABLET, DELAYED RELEASE ORAL NIGHTLY
Qty: 270 TABLET | Refills: 1 | Status: SHIPPED | OUTPATIENT
Start: 2019-03-11 | End: 2019-11-14 | Stop reason: SDUPTHER

## 2019-03-11 RX ORDER — LOXAPINE SUCCINATE 10 MG/1
TABLET ORAL
Qty: 90 CAPSULE | Refills: 0 | Status: SHIPPED | OUTPATIENT
Start: 2019-03-11 | End: 2019-06-13 | Stop reason: SDUPTHER

## 2019-04-01 ENCOUNTER — OFFICE VISIT (OUTPATIENT)
Dept: FAMILY MEDICINE | Facility: CLINIC | Age: 64
End: 2019-04-01
Payer: MEDICARE

## 2019-04-01 ENCOUNTER — HOSPITAL ENCOUNTER (OUTPATIENT)
Dept: RADIOLOGY | Facility: HOSPITAL | Age: 64
Discharge: HOME | End: 2019-04-01
Attending: NURSE PRACTITIONER
Payer: MEDICARE

## 2019-04-01 VITALS
WEIGHT: 121.13 LBS | DIASTOLIC BLOOD PRESSURE: 88 MMHG | HEIGHT: 62 IN | HEART RATE: 78 BPM | SYSTOLIC BLOOD PRESSURE: 128 MMHG | BODY MASS INDEX: 22.29 KG/M2

## 2019-04-01 DIAGNOSIS — M79.675 PAIN OF TOE OF LEFT FOOT: ICD-10-CM

## 2019-04-01 DIAGNOSIS — S99.922D INJURY OF TOE ON LEFT FOOT, SUBSEQUENT ENCOUNTER: ICD-10-CM

## 2019-04-01 DIAGNOSIS — S99.922D INJURY OF TOE ON LEFT FOOT, SUBSEQUENT ENCOUNTER: Primary | ICD-10-CM

## 2019-04-01 PROCEDURE — 3008F BODY MASS INDEX DOCD: CPT | Mod: HCNC,CPTII,S$GLB, | Performed by: NURSE PRACTITIONER

## 2019-04-01 PROCEDURE — 73660 X-RAY EXAM OF TOE(S): CPT | Mod: 26,HCNC,LT, | Performed by: RADIOLOGY

## 2019-04-01 PROCEDURE — 99213 OFFICE O/P EST LOW 20 MIN: CPT | Mod: HCNC,S$GLB,, | Performed by: NURSE PRACTITIONER

## 2019-04-01 PROCEDURE — 73660 X-RAY EXAM OF TOE(S): CPT | Mod: TC,HCNC,PN,LT

## 2019-04-01 PROCEDURE — 99213 PR OFFICE/OUTPT VISIT, EST, LEVL III, 20-29 MIN: ICD-10-PCS | Mod: HCNC,S$GLB,, | Performed by: NURSE PRACTITIONER

## 2019-04-01 PROCEDURE — 99999 PR PBB SHADOW E&M-EST. PATIENT-LVL IV: ICD-10-PCS | Mod: PBBFAC,HCNC,, | Performed by: NURSE PRACTITIONER

## 2019-04-01 PROCEDURE — 99999 PR PBB SHADOW E&M-EST. PATIENT-LVL IV: CPT | Mod: PBBFAC,HCNC,, | Performed by: NURSE PRACTITIONER

## 2019-04-01 PROCEDURE — 3008F PR BODY MASS INDEX (BMI) DOCUMENTED: ICD-10-PCS | Mod: HCNC,CPTII,S$GLB, | Performed by: NURSE PRACTITIONER

## 2019-04-01 PROCEDURE — 73660 XR TOE 2 OR MORE VIEWS LEFT: ICD-10-PCS | Mod: 26,HCNC,LT, | Performed by: RADIOLOGY

## 2019-04-01 RX ORDER — MAGNESIUM 30 MG
30 TABLET ORAL ONCE
COMMUNITY
End: 2019-10-21

## 2019-04-01 RX ORDER — VITAMIN B COMPLEX
1 CAPSULE ORAL DAILY
COMMUNITY
End: 2019-10-21

## 2019-04-01 NOTE — PROGRESS NOTES
This dictation has been generated using Modal Fluency Dictation some phonetic errors may occur. Please contact author for clarification if needed.     Problem List Items Addressed This Visit     None      Visit Diagnoses     Injury of toe on left foot, subsequent encounter    -  Primary    Relevant Orders    X-Ray Toe 2 or More Views Left    Pain of toe of left foot        left 5th toe    Relevant Orders    X-Ray Toe 2 or More Views Left          Orders Placed This Encounter    X-Ray Toe 2 or More Views Left       Left 5th toe pain.  Patient does note another injury.  Update images as above.  Explained to patient that symptoms may persist especially with re-injury.  Needs to be applying heat and ice.  Further advice after x-ray images reviewed.    No follow-ups on file.    ________________________________________________________________  ________________________________________________________________      Chief Complaint   Patient presents with    Follow-up     fractured toe     History of present illness  This 63 y.o. presents today for complaint of left toe pain.  I saw this patient in January for similar problem.  X-ray had noted questionable fracture of the toe.  She notes that when she wears certain shoes symptoms are exacerbated.  She does note some recent minor trauma exacerbating the pain. Patient notes some redness and swelling. She is unsure if it had any localized warmth.  Review of systems  No fever or chills  Denies numbness distal to the injury    We discussed the prior x-ray results.      Past Medical History:   Diagnosis Date    Bipolar disorder     CTS (carpal tunnel syndrome)     Diverticulosis     Hepatomegaly     Presence of dental bridge     UPPER       Past Surgical History:   Procedure Laterality Date    APPENDECTOMY      BIOPSY, BREAST Right 7/11/2012    [facility]    BREAST SURGERY      Needle and surgical biopsy    COLONOSCOPY N/A 6/12/2018     [facility] ENDO    NASAL SEPTUM SURGERY      NEEDLE LOCALIZATION Right 2012    [facility] at [facility] OR    RELEASE, CARPAL TUNNEL Right 2013    [facility] OR    RHINOPHYMA RESECTION      RHINOPLASTY TIP      SOFT TISSUE BIOPSY      throat biopsy    TUBAL LIGATION         Family History   Problem Relation Age of Onset    Cancer Mother 80        breast cancer    Diabetes Mother     Cancer Father 77        pancreatic cancer    Diabetes Maternal Grandmother     Colon cancer Neg Hx     Colon polyps Neg Hx     Crohn's disease Neg Hx     Ulcerative colitis Neg Hx     Celiac disease Neg Hx        Social History     Socioeconomic History    Marital status:      Spouse name: None    Number of children: None    Years of education: None    Highest education level: None   Occupational History    None   Social Needs    Financial resource strain: None    Food insecurity:     Worry: None     Inability: None    Transportation needs:     Medical: None     Non-medical: None   Tobacco Use    Smoking status: Former Smoker     Packs/day: 1.00     Years: 43.00     Pack years: 43.00     Types: Cigarettes     Last attempt to quit: 2018     Years since quittin.9    Smokeless tobacco: Never Used   Substance and Sexual Activity    Alcohol use: No    Drug use: No    Sexual activity: Not Currently   Lifestyle    Physical activity:     Days per week: None     Minutes per session: None    Stress: None   Relationships    Social connections:     Talks on phone: None     Gets together: None     Attends [employer] service: None     Active member of club or organization: None     Attends meetings of clubs or organizations: None     Relationship status: None    Intimate partner violence:     Fear of current or ex partner: None     Emotionally abused: None     Physically abused: None     Forced sexual activity:  None   Other Topics Concern    Caffeine Use Not Asked    Financial Status: Employed Not Asked    Home situation: homeless Not Asked    Caffeine Use: Frequent Not Asked    Financial Status: Unemployed Not Asked    Legal: Arrest history Not Asked    Caffeine Use: Moderate Not Asked    Financial Status: Disabled Not Asked    Legal: Involved in civil litigation Not Asked    Caffeine Use: Substantial Not Asked    Financial Status: Other Not Asked    Legal: Involved in criminal litigation Not Asked    Patient feels they ought to cut down on drinking/drug use Not Asked    Firearms: Does patient have access to a firearm? Not Asked    Legal: Other Not Asked    Patient annoyed by others criticizing their drinking/drug use Not Asked    Home situation: lives with family Not Asked    Leisure: Time with family Not Asked    Patient has felt bad or guilty about drinking/drug use Not Asked    Home situation: lives alone Not Asked    Leisure: Exercise Not Asked    Patient has had a drink/used drugs as an eye opener in the AM Not Asked    Home situation: lives with friends Not Asked    Leisure: Sports Not Asked    Childhood History: Raised by parents Not Asked    Home situation: lives with significant other Not Asked    Leisure: Fishing Not Asked    Childhood History: Uneventful Not Asked    Home situation: lives with spouse Not Asked    Leisure: Hunting Not Asked    Childhood History: Adopted Not Asked    Home situation: lives in group home Not Asked    Leisure: Movie Watching Not Asked    Childhood History: Early trauma Not Asked    Home situation: lives in shelter Not Asked    Leisure: Shopping Not Asked    Childhood History: Other Not Asked    Home situation: lives in nursing home Not Asked     Service Not Asked    Legal consequences of chemical use Not Asked   Social History Narrative    None       Current Outpatient Medications   Medication Sig Dispense Refill    ascorbic acid,  vitamin C, (VITAMIN C WITH SUMI HIPS) 1000 MG tablet Take 1,000 mg by mouth 2 (two) times daily.      b complex vitamins capsule Take 1 capsule by mouth once daily.      Boswellia suha extract (BOSWELLIA SUHA XT, BULK, MISC) 1,000 mg by Misc.(Non-Drug; Combo Route) route.      divalproex (DEPAKOTE) 250 MG EC tablet TAKE 3 TABLETS (750 MG TOTAL) BY MOUTH EVERY EVENING. 270 tablet 1    loxapine (LOXITANE) 10 MG Cap TAKE 1 CAPSULE EVERY DAY 90 capsule 0    magnesium 30 mg Tab Take by mouth once.      multivitamin with minerals tablet Take 1 tablet by mouth once daily.      turmeric (CURCUMIN MISC) 500 mg by Misc.(Non-Drug; Combo Route) route once daily.      UNABLE TO FIND medication name: Omega XL - contains 30 free fatty acids  Takes 2 daily      vitamin D (VITAMIN D3) 1000 units Tab Take 1,000 Units by mouth once daily.       No current facility-administered medications for this visit.        Review of patient's allergies indicates:  No Known Allergies    Physical examination  Vitals Reviewed  Gen. Well-dressed well-nourished   Skin warm dry and intact.  No rashes noted.  Neuro. Awake alert oriented x4.  Normal judgment and cognition noted.  Extremities no clubbing cyanosis or edema noted. Swelling noted of the left 5th toe.  No localized warmth palpable.  There are some erythematous changes.  No bruising noted. Under side of the foot unremarkable.    Call or return to clinic prn if these symptoms worsen or fail to improve as anticipated.

## 2019-04-02 ENCOUNTER — TELEPHONE (OUTPATIENT)
Dept: FAMILY MEDICINE | Facility: CLINIC | Age: 64
End: 2019-04-02

## 2019-04-02 DIAGNOSIS — S92.516A CLOSED NONDISPLACED FRACTURE OF PROXIMAL PHALANX OF LESSER TOE, UNSPECIFIED LATERALITY, INITIAL ENCOUNTER: Primary | ICD-10-CM

## 2019-04-02 NOTE — TELEPHONE ENCOUNTER
----- Message from Sue De Souza sent at 4/2/2019  3:18 PM CDT -----  Contact: self [phone]  Type:  Test Results    Who Called: Viridiana Suresh   Name of Test (Lab/Mammo/Etc): xray  Date of Test: 04/01/19  Ordering Provider: Flaco  Where the test was performed: [facility]  Would the patient rather a call back or a response via [facility]? Call back  Best Call Back Number: [phone]  Additional Information:

## 2019-04-02 NOTE — TELEPHONE ENCOUNTER
It is more like a shoe vs a boot. She said at the visit she didn't want a boot, I remember. The shoe is just a hard sole shoe that straps across the foot.

## 2019-04-02 NOTE — TELEPHONE ENCOUNTER
Patient notified of results. States that she does not want the boot. States that is something that she cannot afford. Advised that she can find out from her insurance what is covered. States that she does not want it and hung up the phone.

## 2019-04-03 ENCOUNTER — TELEPHONE (OUTPATIENT)
Dept: ORTHOPEDICS | Facility: CLINIC | Age: 64
End: 2019-04-03

## 2019-04-03 NOTE — TELEPHONE ENCOUNTER
----- Message from Thalia Muñoz sent at 4/2/2019  4:57 PM CDT -----  Type: Needs Medical Advice    Who Called:  Patient  Symptoms (please be specific):  Fractured small left toe    Best Call Back Number: [phone]    Additional Information: Patient is requesting a call back to discuss her foot issue and see if she need boot for her foot. Please call and advise. Patient is  A new patient.

## 2019-04-03 NOTE — TELEPHONE ENCOUNTER
Spoke with pt, she states she needs order for ortho boot. She spoke with Dr. Medina's office and they stated order needs to be sent to [facility] in ortho fitting.

## 2019-04-03 NOTE — TELEPHONE ENCOUNTER
"Contacted pt. Offered pt an appointment with Dr Medina. Pt denies an appointment at this time. Pt states she was seen by primary care for this injury and was told to follow up with orthopedics due to "poor healing of fracture". Pt states she does not understand why she should have to come to orthopedics for an appointment just to receive a boot for her foot injury. Pt states pain is increased by wearing closed shoes and she has been walking bare foot when possible due to pain. Advised pt contact her primary care provider as they should be able to order a boot for pt since she does not wish to be seen by an orthopedic at this time. Pt verbalized understanding.   "

## 2019-04-03 NOTE — TELEPHONE ENCOUNTER
----- Message from Cris Rosales sent at 4/3/2019  2:28 PM CDT -----  Contact: pt  ..Type: Needs Medical Advice    Who Called:  pt  Best Call Back Number: [phone]  Additional Information: Pt is requesting to speak with S[facility] regarding an appt to get fitted for a boot for her  left toe fracture  Please call to advise  Thanks

## 2019-04-03 NOTE — TELEPHONE ENCOUNTER
----- Message from Cleo Hicks sent at 4/3/2019  8:52 AM CDT -----  Contact: Patient  Type: Needs Medical Advice    Who Called:  Patient  Symptoms (please be specific):  Fractured toe  How long has patient had these symptoms:  tom  Pharmacy name and phone #:  tom  Best Call Back Number: [phone] (home)    Additional Information: Patient states that she has spoken with the orthopedics department and was told that she can have a boot ordered through family medicine that her insurance will cover. Patient requesting a call back to discuss ordering a boot. Thank you!

## 2019-04-03 NOTE — TELEPHONE ENCOUNTER
----- Message from Roxie Lechuga sent at 4/3/2019 10:27 AM CDT -----  Contact: Patient  Type:  Patient Returning Call    Who Called: Patient  Who Left Message for Patient:  Dhara  Does the patient know what this is regarding?: a boot for her toe  Best Call Back Number:   Additional Information:  Call to pod. No answer

## 2019-04-18 ENCOUNTER — INITIAL CONSULT (OUTPATIENT)
Dept: RHEUMATOLOGY | Facility: CLINIC | Age: 64
End: 2019-04-18
Payer: MEDICARE

## 2019-04-18 VITALS
DIASTOLIC BLOOD PRESSURE: 91 MMHG | HEART RATE: 82 BPM | HEIGHT: 62 IN | SYSTOLIC BLOOD PRESSURE: 138 MMHG | WEIGHT: 119.25 LBS | BODY MASS INDEX: 21.94 KG/M2

## 2019-04-18 DIAGNOSIS — M19.90 OSTEOARTHRITIS, UNSPECIFIED OSTEOARTHRITIS TYPE, UNSPECIFIED SITE: ICD-10-CM

## 2019-04-18 DIAGNOSIS — M19.032 ARTHRITIS OF WRIST, LEFT: Primary | ICD-10-CM

## 2019-04-18 PROCEDURE — 99205 OFFICE O/P NEW HI 60 MIN: CPT | Mod: S$GLB,,, | Performed by: INTERNAL MEDICINE

## 2019-04-18 PROCEDURE — 3008F PR BODY MASS INDEX (BMI) DOCUMENTED: ICD-10-PCS | Mod: CPTII,S$GLB,, | Performed by: INTERNAL MEDICINE

## 2019-04-18 PROCEDURE — 3008F BODY MASS INDEX DOCD: CPT | Mod: CPTII,S$GLB,, | Performed by: INTERNAL MEDICINE

## 2019-04-18 PROCEDURE — 99999 PR PBB SHADOW E&M-EST. PATIENT-LVL III: CPT | Mod: PBBFAC,HCNC,, | Performed by: INTERNAL MEDICINE

## 2019-04-18 PROCEDURE — 99999 PR PBB SHADOW E&M-EST. PATIENT-LVL III: ICD-10-PCS | Mod: PBBFAC,HCNC,, | Performed by: INTERNAL MEDICINE

## 2019-04-18 PROCEDURE — 99205 PR OFFICE/OUTPT VISIT, NEW, LEVL V, 60-74 MIN: ICD-10-PCS | Mod: S$GLB,,, | Performed by: INTERNAL MEDICINE

## 2019-04-18 NOTE — PROGRESS NOTES
Subjective:          Chief Complaint: Viridiana Suresh is a 63 y.o. female who had concerns including Disease Management.    HPI:    Patient with hx of traumatic left wrist with deformity. She refuses topical anti-inflammatories. She has seen Dr. Huston. She notes pain with work, pain with painting (artist).   Was given injection which helped some. Has seen Dr. Huston for surgical options and she would prefer not to have the wrist fused.   Left hand dominant      She seems to be doing well with regard to her supplement list:   Typically:    Heat   Topical lidocaine-  Braces -  Omega 3 -  Curcumin -  Vitamin D-    Declines any APAP or NSAID    REVIEW OF SYSTEMS:    Review of Systems   Constitutional: Negative for fever, malaise/fatigue and weight loss.   HENT: Negative for sore throat.    Eyes: Negative for double vision, photophobia and redness.   Respiratory: Negative for cough, shortness of breath and wheezing.    Cardiovascular: Negative for chest pain, palpitations and orthopnea.   Gastrointestinal: Negative for abdominal pain, constipation and diarrhea.   Genitourinary: Negative for dysuria, hematuria and urgency.   Musculoskeletal: Positive for back pain and joint pain. Negative for myalgias.   Skin: Negative for rash.   Neurological: Negative for dizziness, tingling, focal weakness and headaches.   Endo/Heme/Allergies: Does not bruise/bleed easily.   Psychiatric/Behavioral: Negative for depression, hallucinations and suicidal ideas.               Objective:            Past Medical History:   Diagnosis Date    Bipolar disorder     CTS (carpal tunnel syndrome)     Diverticulosis     Hepatomegaly     Presence of dental bridge     UPPER     Family History   Problem Relation Age of Onset    Cancer Mother 80        breast cancer    Diabetes Mother     Cancer Father 77        pancreatic cancer    Diabetes Maternal Grandmother     Colon cancer Neg Hx     Colon polyps Neg Hx     Crohn's disease Neg Hx      Ulcerative colitis Neg Hx     Celiac disease Neg Hx      Social History     Tobacco Use    Smoking status: Former Smoker     Packs/day: 1.00     Years: 43.00     Pack years: 43.00     Types: Cigarettes     Last attempt to quit: 2018     Years since quittin.9    Smokeless tobacco: Never Used   Substance Use Topics    Alcohol use: No    Drug use: No         Current Outpatient Medications on File Prior to Visit   Medication Sig Dispense Refill    ascorbic acid, vitamin C, (VITAMIN C WITH SUMI HIPS) 1000 MG tablet Take 1,000 mg by mouth 2 (two) times daily.      b complex vitamins capsule Take 1 capsule by mouth once daily.      Boswellia suha extract (BOSWELLIA SUHA XT, BULK, MISC) 1,000 mg by Misc.(Non-Drug; Combo Route) route.      divalproex (DEPAKOTE) 250 MG EC tablet TAKE 3 TABLETS (750 MG TOTAL) BY MOUTH EVERY EVENING. 270 tablet 1    loxapine (LOXITANE) 10 MG Cap TAKE 1 CAPSULE EVERY DAY 90 capsule 0    magnesium 30 mg Tab Take by mouth once.      multivitamin with minerals tablet Take 1 tablet by mouth once daily.      turmeric (CURCUMIN MISC) 500 mg by Misc.(Non-Drug; Combo Route) route once daily.      UNABLE TO FIND medication name: Omega XL - contains 30 free fatty acids  Takes 2 daily      vitamin D (VITAMIN D3) 1000 units Tab Take 1,000 Units by mouth once daily.       No current facility-administered medications on file prior to visit.        Vitals:    19 0932   BP: (!) 138/91   Pulse: 82       Physical Exam:    Physical Exam   Constitutional: She is oriented to person, place, and time. She appears well-developed and well-nourished.   HENT:   Head: Normocephalic and atraumatic.   Mouth/Throat: Oropharynx is clear and moist.   Eyes: Pupils are equal, round, and reactive to light. EOM are normal.   Neck: Normal range of motion.   Cardiovascular: Normal rate, regular rhythm and normal heart sounds.   Pulmonary/Chest: Effort normal and breath sounds normal.    Musculoskeletal:        Right shoulder: She exhibits normal range of motion, no tenderness and no swelling.        Left shoulder: She exhibits normal range of motion, no tenderness and no swelling.        Right elbow: She exhibits normal range of motion and no swelling. No tenderness found.        Left elbow: She exhibits normal range of motion and no swelling. No tenderness found.        Right wrist: She exhibits normal range of motion, no tenderness and no swelling.        Left wrist: She exhibits decreased range of motion, tenderness and deformity. She exhibits no swelling, no effusion and no crepitus.        Right knee: She exhibits normal range of motion and no swelling. No tenderness found.        Left knee: She exhibits normal range of motion and no swelling. No tenderness found.        Right hand: She exhibits normal range of motion, no tenderness and no swelling.        Left hand: She exhibits normal range of motion, no tenderness and no swelling.        Right foot: There is normal range of motion, no tenderness and no swelling.        Left foot: There is normal range of motion, no tenderness and no swelling.   Squaring of the CMC bilaterally.   No joint swelling or tenderness of PIP, MCP, wrist, elbow, shoulder, or knee joints     Neurological: She is alert and oriented to person, place, and time.   Skin: Skin is warm and dry.   Psychiatric: She has a normal mood and affect. Her behavior is normal.             Assessment:       Encounter Diagnoses   Name Primary?    Arthritis of wrist, left Yes    Osteoarthritis, unspecified osteoarthritis type, unspecified site           Plan:        Arthritis of wrist, left    Osteoarthritis, unspecified osteoarthritis type, unspecified site    Pateint here to review non surgical options for post traumatic left wrist and early OA various joints  She declined NSAIDS but did write down for her that of severe days she would be reasonably safe trying 200-400mg Ibuprofen  BID-TID in addition to various supplements  She is using Curcumin, Boswelia and Omega-3 FA all of which can be helpful  OK for topical analgesics    She was taking too much OTC vitamin D rec not to exceed 3,000IU  She was likely taking too much Mg. rec that on her supplement at 30mg one tab is adequate for Mg      Follow up if symptoms worsen or fail to improve.      60min consultation with greater than 50% spent in counseling, chart review and coordination of care. All questions answered.    Thank you for allowing me to participate in the care of this very pleasant patient.

## 2019-04-18 NOTE — LETTER
April 18, 2019      Ilan Alatorre MD  [address]  [place] [address]           [place] [facility] [address] [facility] [address] [place] [address]  Phone: [phone]  Fax: [phone]          Patient: Viridiana Suresh   MR Number: [id number]   YOB: 1955   Date of Visit: 4/18/2019       Dear Dr. Ilan Alatorre:    Thank you for referring Viridiana Suresh to me for evaluation. Attached you will find relevant portions of my assessment and plan of care.    If you have questions, please do not hesitate to call me. I look forward to following Viridiana Suresh along with you.    Sincerely,    Ariane Perdue, DO    Enclosure  CC:  No Recipients    If you would like to receive this communication electronically, please contact [email or web address] or [phone] to request more information on [employer] Link access.    For providers and/or their staff who would like to refer a patient to [facility], please contact us through our one-stop-shop provider referral line, [facility], at [phone].    If you feel you have received this communication in error or would no longer like to receive these types of communications, please e-mail [email or web address]

## 2019-05-13 ENCOUNTER — TELEPHONE (OUTPATIENT)
Dept: FAMILY MEDICINE | Facility: CLINIC | Age: 64
End: 2019-05-13

## 2019-05-13 NOTE — TELEPHONE ENCOUNTER
Spoke to patient and scheduled for 5/15 at 10 am per patient request. I advised that [facility]

## 2019-05-13 NOTE — TELEPHONE ENCOUNTER
----- Message from Tung Gaviria sent at 5/13/2019 11:22 AM CDT -----  Type:  Patient Call Back    Who Called:  pt  Does the patient know what this is regarding?:needs toe re xrayed; needs appointment on 05/15/19  Best Call Back Number: [phone]  Additional Information:  Please call pt and leave a detailed message if there is no answer.

## 2019-05-15 ENCOUNTER — OFFICE VISIT (OUTPATIENT)
Dept: FAMILY MEDICINE | Facility: CLINIC | Age: 64
End: 2019-05-15
Payer: MEDICARE

## 2019-05-15 ENCOUNTER — HOSPITAL ENCOUNTER (OUTPATIENT)
Dept: RADIOLOGY | Facility: HOSPITAL | Age: 64
Discharge: HOME | End: 2019-05-15
Attending: NURSE PRACTITIONER
Payer: MEDICARE

## 2019-05-15 VITALS
HEART RATE: 76 BPM | HEIGHT: 62 IN | DIASTOLIC BLOOD PRESSURE: 90 MMHG | WEIGHT: 128.75 LBS | OXYGEN SATURATION: 97 % | BODY MASS INDEX: 23.69 KG/M2 | SYSTOLIC BLOOD PRESSURE: 156 MMHG

## 2019-05-15 DIAGNOSIS — M79.676 PAIN OF TOE, UNSPECIFIED LATERALITY: ICD-10-CM

## 2019-05-15 DIAGNOSIS — M79.676 PAIN OF TOE, UNSPECIFIED LATERALITY: Primary | ICD-10-CM

## 2019-05-15 DIAGNOSIS — S92.535A CLOSED NONDISPLACED FRACTURE OF DISTAL PHALANX OF LESSER TOE OF LEFT FOOT, INITIAL ENCOUNTER: ICD-10-CM

## 2019-05-15 PROCEDURE — 99214 OFFICE O/P EST MOD 30 MIN: CPT | Mod: HCNC,S$GLB,, | Performed by: NURSE PRACTITIONER

## 2019-05-15 PROCEDURE — 99999 PR PBB SHADOW E&M-EST. PATIENT-LVL V: ICD-10-PCS | Mod: PBBFAC,HCNC,, | Performed by: NURSE PRACTITIONER

## 2019-05-15 PROCEDURE — 3008F PR BODY MASS INDEX (BMI) DOCUMENTED: ICD-10-PCS | Mod: HCNC,CPTII,S$GLB, | Performed by: NURSE PRACTITIONER

## 2019-05-15 PROCEDURE — 73660 X-RAY EXAM OF TOE(S): CPT | Mod: TC,HCNC,PN,LT

## 2019-05-15 PROCEDURE — 99999 PR PBB SHADOW E&M-EST. PATIENT-LVL V: CPT | Mod: PBBFAC,HCNC,, | Performed by: NURSE PRACTITIONER

## 2019-05-15 PROCEDURE — 73660 XR TOE 2 VIEW: ICD-10-PCS | Mod: 26,HCNC,LT, | Performed by: RADIOLOGY

## 2019-05-15 PROCEDURE — 73660 X-RAY EXAM OF TOE(S): CPT | Mod: 26,HCNC,LT, | Performed by: RADIOLOGY

## 2019-05-15 PROCEDURE — 99214 PR OFFICE/OUTPT VISIT, EST, LEVL IV, 30-39 MIN: ICD-10-PCS | Mod: HCNC,S$GLB,, | Performed by: NURSE PRACTITIONER

## 2019-05-15 PROCEDURE — 3008F BODY MASS INDEX DOCD: CPT | Mod: HCNC,CPTII,S$GLB, | Performed by: NURSE PRACTITIONER

## 2019-05-15 NOTE — PROGRESS NOTES
This dictation has been generated using Modal Fluency Dictation some phonetic errors may occur. Please contact author for clarification if needed.     Problem List Items Addressed This Visit     None      Visit Diagnoses     Pain of toe, unspecified laterality    -  Primary    Relevant Orders    X-Ray Toe 2 View (Completed)    Closed nondisplaced fracture of distal phalanx of lesser toe of left foot, initial encounter        Relevant Orders    X-Ray Toe 2 View (Completed)        Orders Placed This Encounter    X-Ray Toe 2 View     Pain and toe.  She has been wearing a boot as directed.  X-ray notes widening, continue wearing boot, and refer to Podiatry. DW PCP Dr. Alatorre.   Elevated blood pressure reading at visit.  Reviewed record prior blood pressure readings normal.  Patient notes increased stress due to conflict with her .  She also notes increased caffeine and diet coke consumption.  We discussed moderate restriction of caffeine.  Further restrictions might be necessary.     No follow-ups on file.  ________________________________________________________________  ________________________________________________________________    Chief Complaint   Patient presents with    Follow-up     broken toe slight improvement   History of present illness  This 64 y.o. presents today for complaint of following up toe pain.  I saw this patient twice previously for the toe pain. She had another injury at last visit in x-ray was repeated.  Checking again today and note there is some widening in the gap.  She notes pain and swelling.  Overall has improved while wearing the boot however still with pain and swelling. She wants to know about a bone stimulator that someone had told her about.  Elevated blood pressure reading at visit.  Reviewed record prior blood pressure readings normal.  Patient notes increased stress due to conflict with her .  She also notes increased caffeine and diet coke consumption.  We  discussed moderate restriction of caffeine.  Further restrictions might be necessary.  LOV 4/1/19 left toe pain.  I saw this patient in January for similar problem.  X-ray had noted questionable fracture of the toe.  She notes that when she wears certain shoes symptoms are exacerbated.  She does note some recent minor trauma exacerbating the pain. Patient notes some redness and swelling. She is unsure if it had any localized warmth.  Review of systems  No fever or chills  Denies numbness distal to the injury    We discussed the prior x-ray results.      Past Medical History:   Diagnosis Date    Bipolar disorder     CTS (carpal tunnel syndrome)     Diverticulosis     Hepatomegaly     Presence of dental bridge     UPPER       Past Surgical History:   Procedure Laterality Date    APPENDECTOMY      BIOPSY, BREAST Right 7/11/2012    [facility]    BREAST SURGERY      Needle and surgical biopsy    COLONOSCOPY N/A 6/12/2018    [facility]ell [facility] at [facility] ENDO    NASAL SEPTUM SURGERY      NEEDLE LOCALIZATION Right 7/11/2012    [facility] OR    RELEASE, CARPAL TUNNEL Right 1/22/2013    [facility]    RHINOPHYMA RESECTION      RHINOPLASTY TIP      SOFT TISSUE BIOPSY      throat biopsy    TUBAL LIGATION         Family History   Problem Relation Age of Onset    Cancer Mother 80        breast cancer    Diabetes Mother     Cancer Father 77        pancreatic cancer    Diabetes Maternal Grandmother     Colon cancer Neg Hx     Colon polyps Neg Hx     Crohn's disease Neg Hx     Ulcerative colitis Neg Hx     Celiac disease Neg Hx        Social History     Socioeconomic History    Marital status:      Spouse name: Not on file    Number of children: Not on file    Years of education: Not on file    Highest education level: Not on file   Occupational History    Not on file   Social Needs     Financial resource strain: Not on file    Food insecurity:     Worry: Not on file     Inability: Not on file    Transportation needs:     Medical: Not on file     Non-medical: Not on file   Tobacco Use    Smoking status: Current Every Day Smoker     Packs/day: 1.00     Years: 43.00     Pack years: 43.00     Types: Cigarettes     Last attempt to quit: 2018     Years since quittin.0    Smokeless tobacco: Never Used   Substance and Sexual Activity    Alcohol use: No    Drug use: No    Sexual activity: Not Currently   Lifestyle    Physical activity:     Days per week: Not on file     Minutes per session: Not on file    Stress: Not on file   Relationships    Social connections:     Talks on phone: Not on file     Gets together: Not on file     Attends Gnosticist service: Not on file     Active member of club or organization: Not on file     Attends meetings of clubs or organizations: Not on file     Relationship status: Not on file   Other Topics Concern    Caffeine Use Not Asked    Financial Status: Employed Not Asked    Home situation: homeless Not Asked    Caffeine Use: Frequent Not Asked    Financial Status: Unemployed Not Asked    Legal: Arrest history Not Asked    Caffeine Use: Moderate Not Asked    Financial Status: Disabled Not Asked    Legal: Involved in civil litigation Not Asked    Caffeine Use: Substantial Not Asked    Financial Status: Other Not Asked    Legal: Involved in criminal litigation Not Asked    Patient feels they ought to cut down on drinking/drug use Not Asked    Firearms: Does patient have access to a firearm? Not Asked    Legal: Other Not Asked    Patient annoyed by others criticizing their drinking/drug use Not Asked    Home situation: lives with family Not Asked    Leisure: Time with family Not Asked    Patient has felt bad or guilty about drinking/drug use Not Asked    Home situation: lives alone Not Asked    Leisure: Exercise Not Asked    Patient  has had a drink/used drugs as an eye opener in the AM Not Asked    Home situation: lives with friends Not Asked    Leisure: Sports Not Asked    Childhood History: Raised by parents Not Asked    Home situation: lives with significant other Not Asked    Leisure: Fishing Not Asked    Childhood History: Uneventful Not Asked    Home situation: lives with spouse Not Asked    Leisure: Hunting Not Asked    Childhood History: Adopted Not Asked    Home situation: lives in group home Not Asked    Leisure: Movie Watching Not Asked    Childhood History: Early trauma Not Asked    Home situation: lives in shelter Not Asked    Leisure: Shopping Not Asked    Childhood History: Other Not Asked    Home situation: lives in nursing home Not Asked     Service Not Asked    Legal consequences of chemical use Not Asked   Social History Narrative    Not on file       Current Outpatient Medications   Medication Sig Dispense Refill    ascorbic acid, vitamin C, (VITAMIN C WITH SUMI HIPS) 1000 MG tablet Take 1,000 mg by mouth 2 (two) times daily.      b complex vitamins capsule Take 1 capsule by mouth once daily.      Boswellia suha extract (BOSWELLIA SUHA XT, BULK, MISC) 1,000 mg by Misc.(Non-Drug; Combo Route) route.      divalproex (DEPAKOTE) 250 MG EC tablet TAKE 3 TABLETS (750 MG TOTAL) BY MOUTH EVERY EVENING. 270 tablet 1    loxapine (LOXITANE) 10 MG Cap TAKE 1 CAPSULE EVERY DAY 90 capsule 0    magnesium 30 mg Tab Take 30 mg by mouth once.       multivitamin with minerals tablet Take 1 tablet by mouth once daily.      turmeric (CURCUMIN MISC) 500 mg by Misc.(Non-Drug; Combo Route) route once daily.      UNABLE TO FIND medication name: Omega XL - contains 30 free fatty acids  Takes 2 daily      vitamin D (VITAMIN D3) 1000 units Tab Take 1,000 Units by mouth once daily.       No current facility-administered medications for this visit.        Review of patient's allergies indicates:  No Known  Allergies    Physical examination  Vitals Reviewed  Gen. Well-dressed well-nourished   Skin warm dry and intact.  No rashes noted.  Neuro. Awake alert oriented x4.  Normal judgment and cognition noted.  Extremities no clubbing cyanosis or edema noted.     Call or return to clinic prn if these symptoms worsen or fail to improve as anticipated.

## 2019-05-16 ENCOUNTER — TELEPHONE (OUTPATIENT)
Dept: FAMILY MEDICINE | Facility: CLINIC | Age: 64
End: 2019-05-16

## 2019-05-16 NOTE — TELEPHONE ENCOUNTER
It hasn't healed for months so she needs to see the specialists.thanks   What was the other issue she needs help with?

## 2019-05-16 NOTE — TELEPHONE ENCOUNTER
Tried to reach pt. No answer, will try again later and I left a message on answering machine.    Contacting to follow up w/ pt.

## 2019-05-16 NOTE — TELEPHONE ENCOUNTER
Patient states she did not want to spend the money on her copay for podiatry, and wants to know if it will heal on its own. Patient also requests to speak directly to  Flaco directly in regards to something that she discussed with him before. Patient states she will be available until around 4 pm. Please advise

## 2019-05-16 NOTE — TELEPHONE ENCOUNTER
----- Message from Tung Gaviria sent at 5/16/2019 12:06 PM CDT -----  Type:  Patient Call Back    Who Called:  pt  Who Left Message for Patient: nurse  Does the patient know what this is regarding?: returning the phone call  Best Call Back Number:  [phone]  Additional Information:  Please call pt and leave a detailed message if there is no answer.

## 2019-05-17 NOTE — TELEPHONE ENCOUNTER
Pt wished to only speak w/ CANDIS Sam. Pt does not wish to speak w/ staff and was upset that a staff member was calling her when she has request to speak w/ only Mr. Flaco NP.

## 2019-05-17 NOTE — TELEPHONE ENCOUNTER
Spoke with pt and reviewed with her that there is limited time to call during the day and discuss nonemergency results. She needs to see podiatry due to delayed healing of the toe.

## 2019-05-31 ENCOUNTER — OFFICE VISIT (OUTPATIENT)
Dept: PODIATRY | Facility: CLINIC | Age: 64
End: 2019-05-31
Payer: MEDICARE

## 2019-05-31 VITALS — BODY MASS INDEX: 22.5 KG/M2 | WEIGHT: 122.25 LBS | HEIGHT: 62 IN | RESPIRATION RATE: 20 BRPM

## 2019-05-31 DIAGNOSIS — G60.9 IDIOPATHIC PERIPHERAL NEUROPATHY: ICD-10-CM

## 2019-05-31 DIAGNOSIS — S92.502G: Primary | ICD-10-CM

## 2019-05-31 DIAGNOSIS — F17.210 CIGARETTE SMOKER: ICD-10-CM

## 2019-05-31 PROCEDURE — 3008F PR BODY MASS INDEX (BMI) DOCUMENTED: ICD-10-PCS | Mod: HCNC,CPTII,S$GLB, | Performed by: PODIATRIST

## 2019-05-31 PROCEDURE — 99203 OFFICE O/P NEW LOW 30 MIN: CPT | Mod: HCNC,S$GLB,, | Performed by: PODIATRIST

## 2019-05-31 PROCEDURE — 99203 PR OFFICE/OUTPT VISIT, NEW, LEVL III, 30-44 MIN: ICD-10-PCS | Mod: HCNC,S$GLB,, | Performed by: PODIATRIST

## 2019-05-31 PROCEDURE — 3008F BODY MASS INDEX DOCD: CPT | Mod: HCNC,CPTII,S$GLB, | Performed by: PODIATRIST

## 2019-05-31 PROCEDURE — 99999 PR PBB SHADOW E&M-EST. PATIENT-LVL III: CPT | Mod: PBBFAC,HCNC,, | Performed by: PODIATRIST

## 2019-05-31 PROCEDURE — 99999 PR PBB SHADOW E&M-EST. PATIENT-LVL III: ICD-10-PCS | Mod: PBBFAC,HCNC,, | Performed by: PODIATRIST

## 2019-05-31 NOTE — PROGRESS NOTES
Subjective:      Patient ID: Viridiana Suresh is a 64 y.o. female.    Chief Complaint: Toe Injury (left foot patient thinks fx in Jan 2019 (several xray done)) and Other Misc (PCP: Landry/// seen CANDIS Sam on 5/15/19 with xray)    Viridiana is a 64 y.o. female who presents to the podiatry clinic  with complaint of  left foot pain although states minor pain she is more concerned about the swelling to the toe. Onset of the symptoms was in January. Precipitating event: trauma to the toe in January with an x-ray confirming middle phalanx fracture. Current symptoms include: stiffness and swelling. Aggravating factors: any weight bearing. Symptoms have gradually improved.  Evaluation to date: plain films: most recent is questionable for a continued fracture with non union at the DIPJ. Treatment to date: offloading for several weeks in a darco shoe. Patients rates pain 5/10 on pain scale at its worst.    CC#2: patient also relates numbness and tingling to the feet that is fairly constant and has been going on for several months, no known trauma or inciting event. No treatment for this to date.    Review of Systems   Constitution: Negative for chills and fever.   Cardiovascular: Negative for claudication and leg swelling.   Respiratory: Negative for shortness of breath.    Skin: Negative for itching, nail changes and rash.   Musculoskeletal: Negative for muscle cramps, muscle weakness and myalgias.        Toe pain   Gastrointestinal: Negative for nausea and vomiting.   Neurological: Positive for numbness and paresthesias. Negative for focal weakness and loss of balance.           Objective:      Physical Exam   Constitutional: She is oriented to person, place, and time. She appears well-developed and well-nourished. No distress.   Cardiovascular:   Pulses:       Dorsalis pedis pulses are 2+ on the right side, and 2+ on the left side.        Posterior tibial pulses are 2+ on the right side, and 2+ on the left side.   < 3 sec  capillary refill time to toes 1-5 bilateral. Toes and feet are warm to touch proximally with normal distal cooling b/l. There is some hair growth on the feet and toes b/l. There is no edema b/l. No spider veins or varicosities present b/l.      Musculoskeletal:   Left fifth toe localized edema, no pain with palpation or joint ROM.     Equinus noted b/l ankles with < 10 deg DF noted. MMT 5/5 in DF/PF/Inv/Ev resistance with no reproduction of pain in any direction. Passive range of motion of ankle and pedal joints is painless b/l.     Neurological: She is alert and oriented to person, place, and time. She has normal strength. She displays no atrophy and no tremor. A sensory deficit is present. She exhibits normal muscle tone.   Negative tinel sign bilateral. Diminished vibratory and protective sensation bilateral.   Skin: Skin is warm, dry and intact. No abrasion, no bruising, no burn, no ecchymosis, no laceration, no lesion, no petechiae and no rash noted. She is not diaphoretic. No cyanosis or erythema. No pallor. Nails show no clubbing.   Skin temperature, texture and turgor within normal limits.   Psychiatric: She has a normal mood and affect. Her behavior is normal.             Assessment:       Encounter Diagnoses   Name Primary?    Closed fracture of phalanx of left fifth toe with delayed healing, subsequent encounter Yes    Idiopathic peripheral neuropathy     Cigarette smoker          Plan:       Viridiana was seen today for toe injury and other misc.    Diagnoses and all orders for this visit:    Closed fracture of phalanx of left fifth toe with delayed healing, subsequent encounter    Idiopathic peripheral neuropathy    Cigarette smoker      I counseled the patient on her conditions, their implications and medical management.    Discussed in depth the fracture possible non union. She has been in the shoe for 7 weeks without much improvement. She relates the pain is minimal. I related she can return to the  shoe and weight bear to toleration as offloading is not seeming to be beneficial, with time the edema may go down. Discussed surgical options of removing the affected portion of bone from the toe as an outpatient procedure, she related that she does not want surgery, will consider it if it gets worse.    As to the neuropathy, discussed possible causes, possible treatments with gabapentin if bad enough, for now I recommend starting on 600 mg alphal lipoic acid with a b complex vitamin daily    Smoking is detrimental to bone healing in the foot, discussed that cessation would be recommended.     Return PRN    Cali Elaine DPM

## 2019-05-31 NOTE — LETTER
May 31, 2019      Adolfo Sam, NP  [address]  [facility] [address]           [facility] [address] [facility] [address] [place] [address]  Phone: [phone]          Patient: Viridiana Suresh   MR Number: [id number]   YOB: 1955   Date of Visit: 5/31/2019       Dear Adolfo Sam:    Thank you for referring Viridiana Suresh to me for evaluation. Attached you will find relevant portions of my assessment and plan of care.    If you have questions, please do not hesitate to call me. I look forward to following Viridiana Suresh along with you.    Sincerely,    Cali Elaine, DPNAGI    Enclosure  CC:  No Recipients    If you would like to receive this communication electronically, please contact [email or web address] or [phone] to request more information on [employer] Link access.    For providers and/or their staff who would like to refer a patient to [facility], please contact us through our one-stop-shop provider referral line, [facility], at [phone].    If you feel you have received this communication in error or would no longer like to receive these types of communications, please e-mail [email or web address]

## 2019-06-05 DIAGNOSIS — Z12.31 BREAST CANCER SCREENING BY MAMMOGRAM: Primary | ICD-10-CM

## 2019-06-13 DIAGNOSIS — Z79.899 HIGH RISK MEDICATIONS (NOT ANTICOAGULANTS) LONG-TERM USE: ICD-10-CM

## 2019-06-13 RX ORDER — LOXAPINE SUCCINATE 10 MG/1
TABLET ORAL
Qty: 30 CAPSULE | Refills: 0 | Status: SHIPPED | OUTPATIENT
Start: 2019-06-13 | End: 2019-11-14 | Stop reason: SDUPTHER

## 2019-08-02 ENCOUNTER — HOSPITAL ENCOUNTER (OUTPATIENT)
Dept: RADIOLOGY | Facility: HOSPITAL | Age: 64
Discharge: HOME | End: 2019-08-02
Attending: NURSE PRACTITIONER
Payer: MEDICARE

## 2019-08-02 ENCOUNTER — TELEPHONE (OUTPATIENT)
Dept: FAMILY MEDICINE | Facility: CLINIC | Age: 64
End: 2019-08-02

## 2019-08-02 ENCOUNTER — OFFICE VISIT (OUTPATIENT)
Dept: FAMILY MEDICINE | Facility: CLINIC | Age: 64
End: 2019-08-02
Payer: MEDICARE

## 2019-08-02 VITALS
BODY MASS INDEX: 22.31 KG/M2 | HEART RATE: 79 BPM | WEIGHT: 121.25 LBS | TEMPERATURE: 98 F | HEIGHT: 62 IN | SYSTOLIC BLOOD PRESSURE: 130 MMHG | DIASTOLIC BLOOD PRESSURE: 86 MMHG | OXYGEN SATURATION: 96 %

## 2019-08-02 DIAGNOSIS — M54.2 NECK PAIN: ICD-10-CM

## 2019-08-02 DIAGNOSIS — M19.90 ARTHRITIS: ICD-10-CM

## 2019-08-02 DIAGNOSIS — M19.90 ARTHRITIS: Primary | ICD-10-CM

## 2019-08-02 DIAGNOSIS — M54.50 ACUTE RIGHT-SIDED LOW BACK PAIN WITHOUT SCIATICA: ICD-10-CM

## 2019-08-02 PROCEDURE — 99214 OFFICE O/P EST MOD 30 MIN: CPT | Mod: HCNC,S$GLB,, | Performed by: NURSE PRACTITIONER

## 2019-08-02 PROCEDURE — 3008F PR BODY MASS INDEX (BMI) DOCUMENTED: ICD-10-PCS | Mod: HCNC,CPTII,S$GLB, | Performed by: NURSE PRACTITIONER

## 2019-08-02 PROCEDURE — 72040 X-RAY EXAM NECK SPINE 2-3 VW: CPT | Mod: 26,HCNC,, | Performed by: RADIOLOGY

## 2019-08-02 PROCEDURE — 99999 PR PBB SHADOW E&M-EST. PATIENT-LVL V: CPT | Mod: PBBFAC,HCNC,, | Performed by: NURSE PRACTITIONER

## 2019-08-02 PROCEDURE — 3008F BODY MASS INDEX DOCD: CPT | Mod: HCNC,CPTII,S$GLB, | Performed by: NURSE PRACTITIONER

## 2019-08-02 PROCEDURE — 99214 PR OFFICE/OUTPT VISIT, EST, LEVL IV, 30-39 MIN: ICD-10-PCS | Mod: HCNC,S$GLB,, | Performed by: NURSE PRACTITIONER

## 2019-08-02 PROCEDURE — 72040 XR CERVICAL SPINE AP LATERAL: ICD-10-PCS | Mod: 26,HCNC,, | Performed by: RADIOLOGY

## 2019-08-02 PROCEDURE — 72040 X-RAY EXAM NECK SPINE 2-3 VW: CPT | Mod: TC,HCNC,PN

## 2019-08-02 PROCEDURE — 99999 PR PBB SHADOW E&M-EST. PATIENT-LVL V: ICD-10-PCS | Mod: PBBFAC,HCNC,, | Performed by: NURSE PRACTITIONER

## 2019-08-02 RX ORDER — CELECOXIB 100 MG/1
100 CAPSULE ORAL 2 TIMES DAILY
Qty: 60 CAPSULE | Refills: 0 | Status: SHIPPED | OUTPATIENT
Start: 2019-08-02 | End: 2019-08-16 | Stop reason: SDUPTHER

## 2019-08-02 NOTE — TELEPHONE ENCOUNTER
----- Message from Sanam Joshua sent at 8/2/2019  4:14 PM CDT -----  Contact: self   Patient want to speak with a nurse regarding test results from this morning please call back at [phone] (home)

## 2019-08-02 NOTE — PATIENT INSTRUCTIONS
Back Exercises: Abdominal Lift Brace with Marching    The abdominal lift brace with march strengthens your lower abdominal muscles, helping you keep your pelvis and back stable:  · Lie on the floor with both knees bent. Put your feet flat on the floor and your arms by your sides. Tighten your abdominal muscles. Be sure to continue to breathe.  · Lift one bent knee about 2 inches then return it to the floor and lift the other about 2 inches. Keep your abdominal muscles tight and continue to breathe. These motions should be slow and controlled without your pelvis rocking side to side.  · Repeat 10 times.  Date Last Reviewed: 8/16/2015  © 7586-9840 [employer]. [address] [place] [address]. All rights reserved. This information is not intended as a substitute for professional medical care. Always follow your healthcare professional's instructions.        Back Exercises: Back Extension with Elbow Press    To start, lie face down on your stomach, feet slightly apart, forehead on the floor. Breathe deeply. You should feel comfortable and relaxed in this position.  · Press up on your forearms. Keep your stomach and hips on the floor. Stay within your painfree range.  · Hold for 20 seconds. Lower slowly.  · Repeat 2 times.  · Return to starting position.  Date Last Reviewed: 10/13/2015  © [id number] [employer]. [address] [place] [address]. All rights reserved. This information is not intended as a substitute for professional medical care. Always follow your healthcare professional's instructions.        Back Exercises: Hip Lift    To start, lie on your back with your knees bent and feet flat on the floor. Dont press your neck or lower back to the floor. Breathe deeply. You should feel comfortable and relaxed in this position:  · Tighten your abdomen and buttocks.  · Slowly raise your hips upward. Be careful not to arch your back.  · Hold for 5 seconds. Lower your  hips to the floor.  · Repeat 10 times.  For your safety, check with your healthcare provider before starting an exercise program.   Date Last Reviewed: 8/16/2015  © 2248-9468 [employer]. [address] [place] [address]. All rights reserved. This information is not intended as a substitute for professional medical care. Always follow your healthcare professional's instructions.        Back Exercises: Leg Pull    To start, lie on your back with your knees bent and feet flat on the floor. Dont press your neck or lower back to the floor. Breathe deeply. You should feel comfortable and relaxed in this position.  · Pull one knee to your chest.  · Hold for 30 to 60 seconds. Return to starting position.  · Repeat 2 times.  · Switch legs.  · For a double leg pull, pull both legs to your chest at the same time. Repeat 2 times.  For your safety, check with your healthcare provider before starting an exercise program.   Date Last Reviewed: 8/16/2015  © 5090-0143 [employer]. [address] [place] [address]. All rights reserved. This information is not intended as a substitute for professional medical care. Always follow your healthcare professional's instructions.        Back Exercises: Pelvic Tilt    To start, lie on your back with your knees bent and feet flat on the floor. Dont press your neck or lower back to the floor. Breathe deeply. You should feel comfortable and relaxed in this position:  · Tighten your stomach and buttocks, and press your lower back toward the floor. This should be a small, subtle movement. This should not increase your pain.  · Hold for 5 to 15 seconds. Release.  · Repeat 2 to 5 times.  Date Last Reviewed: 10/11/2015  © 9315-9819 [employer]. [address] [place] [address]. All rights reserved. This information is not intended as a substitute for professional medical care. Always follow your healthcare professional's  instructions.        Lumbar Rotation    1. Lie on your back on the floor, with your knees bent and your feet flat on the floor. Dont press your neck or lower back to the floor.  2. Lean both of your knees to one side. Turn your head in the opposite direction. Keep your shoulders flat on the floor. Be gentle and dont push through pain.  3. Hold for 20 seconds. Then slowly move your knees and head in the other direction.  4. Repeat 2 to 5 times, or as instructed.   Date Last Reviewed: 3/10/2016  © 1626-3475 [employer]. [address] [place] [address]. All rights reserved. This information is not intended as a substitute for professional medical care. Always follow your healthcare professional's instructions.

## 2019-08-02 NOTE — PROGRESS NOTES
This dictation has been generated using [employer] Dictation some phonetic errors may occur. Please contact author for clarification if needed.     Problem List Items Addressed This Visit     None      Visit Diagnoses     Arthritis    -  Primary    Relevant Orders    X-Ray Cervical Spine AP And Lateral    Neck pain        Relevant Orders    X-Ray Cervical Spine AP And Lateral    Acute right-sided low back pain without sciatica            Orders Placed This Encounter    X-Ray Cervical Spine AP And Lateral    celecoxib (CELEBREX) 100 MG capsule     Arthritis check neck x-ray as above.  Recommended Celebrex use.  Previously offered anti-inflammatory medicine for wrist by Orthopedics however patient refused due to perceived GI issue.  Discussed with patient 2-6% chance of side effects with celebrex.  Left wrist pain.  Interferes with hobby/job of painting.  Trial of Celebrex.  Recommended heat and ice.  Patient asked about medical marijuana however in the absence of research I do not endorse that.   Low back pain palpable spasms paraspinal muscles right lumbar spine recommended exercises for 2 weeks and follow up for evaluation.    Follow up in about 2 weeks (around 8/16/2019).    ________________________________________________________________  ________________________________________________________________      Chief Complaint   Patient presents with    Neck Pain     pain upon turning neck     History of present illness  This 64 y.o. presents today for complaint of neck pain. Also wants to discuss left wrist pain and loss of function.  Also wants to discuss her low back pain.  Patient notes neck pain. She has remote history of injuries as a kid.  No new injuries.  No motor vehicle accident recently.  She does note popping in her neck.  Range of motion exacerbates the pain.  Patient notes that when she drives and turns her head she experiences sharp pains.  She also notes popping in the neck at that time.  She  had been doing some chiropractics but does not find that helpful.  She is concerned about a tumor or mass in the neck.  Patient notes left wrist pain.  This has been an ongoing issue.  She has an old fracture and arthritic changes.  She did see Orthopedics who had offered her some anti-inflammatory medicines however she was concerned about taking ibuprofen due to prior complaints of GI issues.  Colonoscopy is up-to-date.  Patient states she improved her GI issues by drinking celery juice.  She tried extensive vitamins including turmeric without improvement of her symptoms.  She notes cost to 50 a month and decided to discontinue after 3+ months.   Patient notes low back pain. Denies rash.  No history of injury.  Pain is worse in the morning.  She notes that when turning over.  She indicates the right side.  No radiculopathy.  Review of systems  No fever or chills  No urinary urgency frequency dysuria reported  Patient denies rash  No change in bowel or bladder habits    Past medical and social history reviewed.    Past Medical History:   Diagnosis Date    Bipolar disorder     CTS (carpal tunnel syndrome)     Diverticulosis     Hepatomegaly     Presence of dental bridge     UPPER       Past Surgical History:   Procedure Laterality Date    APPENDECTOMY      BIOPSY, BREAST Right 7/11/2012    [facility]    BREAST SURGERY      Needle and surgical biopsy    COLONOSCOPY N/A 6/12/2018    [facility]ell [facility] at [facility] ENDO    NASAL SEPTUM SURGERY      NEEDLE LOCALIZATION Right 7/11/2012    [facility]    RELEASE, CARPAL TUNNEL Right 1/22/2013    [facility]    RHINOPHYMA RESECTION      RHINOPLASTY TIP      SOFT TISSUE BIOPSY      throat biopsy    TUBAL LIGATION         Family History   Problem Relation Age of Onset    Cancer Mother 80        breast cancer    Diabetes Mother     Cancer Father 77         pancreatic cancer    Diabetes Maternal Grandmother     Colon cancer Neg Hx     Colon polyps Neg Hx     Crohn's disease Neg Hx     Ulcerative colitis Neg Hx     Celiac disease Neg Hx        Social History     Socioeconomic History    Marital status:      Spouse name: Not on file    Number of children: Not on file    Years of education: Not on file    Highest education level: Not on file   Occupational History    Not on file   Social Needs    Financial resource strain: Not on file    Food insecurity:     Worry: Not on file     Inability: Not on file    Transportation needs:     Medical: Not on file     Non-medical: Not on file   Tobacco Use    Smoking status: Former Smoker     Packs/day: 1.00     Years: 43.00     Pack years: 43.00     Types: Cigarettes     Last attempt to quit: 2018     Years since quittin.2    Smokeless tobacco: Never Used    Tobacco comment: quit May 2019   Substance and Sexual Activity    Alcohol use: No    Drug use: No    Sexual activity: Not Currently   Lifestyle    Physical activity:     Days per week: Not on file     Minutes per session: Not on file    Stress: Not on file   Relationships    Social connections:     Talks on phone: Not on file     Gets together: Not on file     Attends [employer] service: Not on file     Active member of club or organization: Not on file     Attends meetings of clubs or organizations: Not on file     Relationship status: Not on file   Other Topics Concern    Caffeine Use Not Asked    Financial Status: Employed Not Asked    Home situation: homeless Not Asked    Caffeine Use: Frequent Not Asked    Financial Status: Unemployed Not Asked    Legal: Arrest history Not Asked    Caffeine Use: Moderate Not Asked    Financial Status: Disabled Not Asked    Legal: Involved in civil litigation Not Asked    Caffeine Use: Substantial Not Asked    Financial Status: Other Not Asked    Legal: Involved in criminal litigation  Not Asked    Patient feels they ought to cut down on drinking/drug use Not Asked    Firearms: Does patient have access to a firearm? Not Asked    Legal: Other Not Asked    Patient annoyed by others criticizing their drinking/drug use Not Asked    Home situation: lives with family Not Asked    Leisure: Time with family Not Asked    Patient has felt bad or guilty about drinking/drug use Not Asked    Home situation: lives alone Not Asked    Leisure: Exercise Not Asked    Patient has had a drink/used drugs as an eye opener in the AM Not Asked    Home situation: lives with friends Not Asked    Leisure: Sports Not Asked    Childhood History: Raised by parents Not Asked    Home situation: lives with significant other Not Asked    Leisure: Fishing Not Asked    Childhood History: Uneventful Not Asked    Home situation: lives with spouse Not Asked    Leisure: Hunting Not Asked    Childhood History: Adopted Not Asked    Home situation: lives in group home Not Asked    Leisure: Movie Watching Not Asked    Childhood History: Early trauma Not Asked    Home situation: lives in shelter Not Asked    Leisure: Shopping Not Asked    Childhood History: Other Not Asked    Home situation: lives in nursing home Not Asked     Service Not Asked    Legal consequences of chemical use Not Asked   Social History Narrative    Not on file       Current Outpatient Medications   Medication Sig Dispense Refill    divalproex (DEPAKOTE) 250 MG EC tablet TAKE 3 TABLETS (750 MG TOTAL) BY MOUTH EVERY EVENING. 270 tablet 1    loxapine (LOXITANE) 10 MG Cap TAKE 1 CAPSULE EVERY DAY 30 capsule 0    ascorbic acid, vitamin C, (VITAMIN C WITH SUMI HIPS) 1000 MG tablet Take 1,000 mg by mouth 2 (two) times daily.      b complex vitamins capsule Take 1 capsule by mouth once daily.      Boswellia suha extract (BOSWELLIA SUHA XT, BULK, MISC) 1,000 mg by Misc.(Non-Drug; Combo Route) route.      celecoxib (CELEBREX) 100  MG capsule Take 1 capsule (100 mg total) by mouth 2 (two) times daily. 60 capsule 0    magnesium 30 mg Tab Take 30 mg by mouth once.       multivitamin with minerals tablet Take 1 tablet by mouth once daily.      turmeric (CURCUMIN MISC) 500 mg by Misc.(Non-Drug; Combo Route) route once daily.      UNABLE TO FIND medication name: Omega XL - contains 30 free fatty acids  Takes 2 daily      vitamin D (VITAMIN D3) 1000 units Tab Take 1,000 Units by mouth once daily.       No current facility-administered medications for this visit.        Review of patient's allergies indicates:  No Known Allergies    Physical examination  Vitals Reviewed  Gen. Well-dressed well-nourished   Skin warm dry and intact.  No rashes noted.  Neck is supple without adenopathy  Chest.  Respirations are even unlabored.    Neuro. Awake alert oriented x4.  Normal judgment and cognition noted.  Extremities no clubbing cyanosis or edema noted. Arthritic changes noted to the left wrist.  Dirkuns negative.  Decreased range of motion rotation extension and flexion of the neck due to stated pain.  Crepitus noted.  Palpable spasms of the paraspinal muscles noted bilateral.  Some straightening of lower cervical spine lordosis.  No impingement elicited.    Call or return to clinic prn if these symptoms worsen or fail to improve as anticipated.

## 2019-08-16 ENCOUNTER — OFFICE VISIT (OUTPATIENT)
Dept: FAMILY MEDICINE | Facility: CLINIC | Age: 64
End: 2019-08-16
Payer: MEDICARE

## 2019-08-16 VITALS
WEIGHT: 121.5 LBS | SYSTOLIC BLOOD PRESSURE: 116 MMHG | HEIGHT: 62 IN | OXYGEN SATURATION: 99 % | DIASTOLIC BLOOD PRESSURE: 72 MMHG | HEART RATE: 66 BPM | BODY MASS INDEX: 22.36 KG/M2

## 2019-08-16 DIAGNOSIS — M54.2 NECK PAIN: ICD-10-CM

## 2019-08-16 DIAGNOSIS — M19.032 ARTHRITIS OF WRIST, LEFT: Primary | ICD-10-CM

## 2019-08-16 PROCEDURE — 3008F PR BODY MASS INDEX (BMI) DOCUMENTED: ICD-10-PCS | Mod: HCNC,CPTII,S$GLB, | Performed by: NURSE PRACTITIONER

## 2019-08-16 PROCEDURE — 99999 PR PBB SHADOW E&M-EST. PATIENT-LVL IV: CPT | Mod: PBBFAC,HCNC,, | Performed by: NURSE PRACTITIONER

## 2019-08-16 PROCEDURE — 99999 PR PBB SHADOW E&M-EST. PATIENT-LVL IV: ICD-10-PCS | Mod: PBBFAC,HCNC,, | Performed by: NURSE PRACTITIONER

## 2019-08-16 PROCEDURE — 99213 PR OFFICE/OUTPT VISIT, EST, LEVL III, 20-29 MIN: ICD-10-PCS | Mod: HCNC,S$GLB,, | Performed by: NURSE PRACTITIONER

## 2019-08-16 PROCEDURE — 99213 OFFICE O/P EST LOW 20 MIN: CPT | Mod: HCNC,S$GLB,, | Performed by: NURSE PRACTITIONER

## 2019-08-16 PROCEDURE — 3008F BODY MASS INDEX DOCD: CPT | Mod: HCNC,CPTII,S$GLB, | Performed by: NURSE PRACTITIONER

## 2019-08-16 RX ORDER — CELECOXIB 100 MG/1
100 CAPSULE ORAL 2 TIMES DAILY
Qty: 60 CAPSULE | Refills: 3 | Status: SHIPPED | OUTPATIENT
Start: 2019-08-16 | End: 2019-11-06 | Stop reason: SDUPTHER

## 2019-08-16 NOTE — PATIENT INSTRUCTIONS
continue celebrex. alternate heat and ice.   Chin Tuck (Posture and Strength)    1. Sit in a chair with your feet flat on the floor, or stand up. Relax your shoulders.  2. Look straight ahead. Gently glide your chin straight back. Its a small movement. Dont tilt your head up or down, or bend your neck forward.  3. Hold for 5 seconds. Then relax.  4. Repeat 5 times, or as instructed.     Tip: Dont arch your back or hunch your shoulders.   Date Last Reviewed: 5/1/2016  © 7619-5844 [employer]. [address] [place] [address]. All rights reserved. This information is not intended as a substitute for professional medical care. Always follow your healthcare professional's instructions.        Head Tilt / Upper Trapezius Stretch (Flexibility)    5. Sit up straight in a chair with your head and neck in a neutral position, ears in line with shoulders. Hold the edge of your chair seat with your right hand. Tuck your chin in slightly.  6. Tilt your head to the left, while looking straight ahead.  7. Put your left hand on the right side of your head. Gently pull your head to the left. Hold for 30 to 60 seconds. Use gentle pressure to increase the stretch. Dont force your head into position.  8. Return your head and neck to the neutral position.  9. Repeat this exercise 2 times, or as instructed.  10. Switch sides and repeat 2 times, or as instructed.  Challenge yourself  Tuck one end of a towel under your left arm. Then bring the other end over your right shoulder. Pull the towel down on your right shoulder with both hands as you side-bend your head to the left. Repeat with the other side.   Date Last Reviewed: 3/10/2016  © 8844-9151 [employer]. [address] [place] [address]. All rights reserved. This information is not intended as a substitute for professional medical care. Always follow your healthcare professional's instructions.

## 2019-08-16 NOTE — PROGRESS NOTES
This dictation has been generated using Modal Fluency Dictation some phonetic errors may occur. Please contact author for clarification if needed.     Problem List Items Addressed This Visit     Arthritis of wrist, left - Primary      Other Visit Diagnoses     Neck pain            Orders Placed This Encounter    celecoxib (CELEBREX) 100 MG capsule     Arthritis check neck x-ray as above.  Recommended Celebrex use.  Previously offered anti-inflammatory medicine for wrist by Orthopedics however patient refused due to perceived GI issue.  Discussed with patient 2-6% chance of side effects with celebrex.  Left wrist pain.  Interferes with hobby/job of painting.  Trial of Celebrex.  Recommended heat and ice.  Patient asked about medical marijuana however in the absence of research I do not endorse that.   Low back pain palpable spasms paraspinal muscles right lumbar spine recommended exercises for 2 weeks and follow up for evaluation.    Follow up in about 3 months (around 11/16/2019), or if symptoms worsen or fail to improve.    ________________________________________________________________  ________________________________________________________________      Chief Complaint   Patient presents with    Wrist Pain     left wrist and rt ear pain     History of present illness  This 64 y.o. presents today for complaint of follow up neck pain. Some improvement in the wrist overall however recent flare due to increase in activity.  She would like to continue the Celebrex twice a day for now.  We discussed decreasing after acute flare.  She is also interested in the CBD oil, coconut oil, DMSO.  I again discussed with her that I cannot advised her on those therapies due to lack of research.  Neck pain is improved.  Celebrex has been helpful.  Denies any radiculopathy.  Lower back pain improved.  She is doing exercises.  LOV 8/2/2019 neck pain and Also wants to discuss left wrist pain and loss of function.  Also wants to  discuss her low back pain.  Patient notes neck pain. She has remote history of injuries as a kid.  No new injuries.  No motor vehicle accident recently.  She does note popping in her neck.  Range of motion exacerbates the pain.  Patient notes that when she drives and turns her head she experiences sharp pains.  She also notes popping in the neck at that time.  She had been doing some chiropractics but does not find that helpful.  She is concerned about a tumor or mass in the neck.  Patient notes left wrist pain.  This has been an ongoing issue.  She has an old fracture and arthritic changes.  She did see Orthopedics who had offered her some anti-inflammatory medicines however she was concerned about taking ibuprofen due to prior complaints of GI issues.  Colonoscopy is up-to-date.  Patient states she improved her GI issues by drinking celery juice.  She tried extensive vitamins including turmeric without improvement of her symptoms.  She notes cost to 50 a month and decided to discontinue after 3+ months.   Patient notes low back pain. Denies rash.  No history of injury.  Pain is worse in the morning.  She notes that when turning over.  She indicates the right side.  No radiculopathy.  Review of systems  No fever or chills  No urinary urgency frequency dysuria reported  Patient denies rash  No change in bowel or bladder habits    Past medical and social history reviewed.    Past Medical History:   Diagnosis Date    Bipolar disorder     CTS (carpal tunnel syndrome)     Diverticulosis     Hepatomegaly     Presence of dental bridge     UPPER       Past Surgical History:   Procedure Laterality Date    APPENDECTOMY      BIOPSY, BREAST Right 7/11/2012    [facility]    BREAST SURGERY      Needle and surgical biopsy    COLONOSCOPY N/A 6/12/2018    [facility] ENDO    NASAL SEPTUM SURGERY      NEEDLE LOCALIZATION Right 7/11/2012    [facility]    RELEASE, CARPAL TUNNEL Right 2013    [facility]    RHINOPHYMA RESECTION      RHINOPLASTY TIP      SOFT TISSUE BIOPSY      throat biopsy    TUBAL LIGATION         Family History   Problem Relation Age of Onset    Cancer Mother 80        breast cancer    Diabetes Mother     Cancer Father 77        pancreatic cancer    Diabetes Maternal Grandmother     Colon cancer Neg Hx     Colon polyps Neg Hx     Crohn's disease Neg Hx     Ulcerative colitis Neg Hx     Celiac disease Neg Hx        Social History     Socioeconomic History    Marital status:      Spouse name: Not on file    Number of children: Not on file    Years of education: Not on file    Highest education level: Not on file   Occupational History    Not on file   Social Needs    Financial resource strain: Not on file    Food insecurity:     Worry: Not on file     Inability: Not on file    Transportation needs:     Medical: Not on file     Non-medical: Not on file   Tobacco Use    Smoking status: Former Smoker     Packs/day: 1.00     Years: 43.00     Pack years: 43.00     Types: Cigarettes     Last attempt to quit: 2018     Years since quittin.2    Smokeless tobacco: Never Used    Tobacco comment: quit May 2019   Substance and Sexual Activity    Alcohol use: No    Drug use: No    Sexual activity: Not Currently   Lifestyle    Physical activity:     Days per week: Not on file     Minutes per session: Not on file    Stress: Not on file   Relationships    Social connections:     Talks on phone: Not on file     Gets together: Not on file     Attends Zoroastrian service: Not on file     Active member of club or organization: Not on file     Attends meetings of clubs or organizations: Not on file     Relationship status: Not on file   Other Topics Concern    Caffeine Use Not Asked    Financial Status: Employed Not Asked    Home situation: homeless Not  Asked    Caffeine Use: Frequent Not Asked    Financial Status: Unemployed Not Asked    Legal: Arrest history Not Asked    Caffeine Use: Moderate Not Asked    Financial Status: Disabled Not Asked    Legal: Involved in civil litigation Not Asked    Caffeine Use: Substantial Not Asked    Financial Status: Other Not Asked    Legal: Involved in criminal litigation Not Asked    Patient feels they ought to cut down on drinking/drug use Not Asked    Firearms: Does patient have access to a firearm? Not Asked    Legal: Other Not Asked    Patient annoyed by others criticizing their drinking/drug use Not Asked    Home situation: lives with family Not Asked    Leisure: Time with family Not Asked    Patient has felt bad or guilty about drinking/drug use Not Asked    Home situation: lives alone Not Asked    Leisure: Exercise Not Asked    Patient has had a drink/used drugs as an eye opener in the AM Not Asked    Home situation: lives with friends Not Asked    Leisure: Sports Not Asked    Childhood History: Raised by parents Not Asked    Home situation: lives with significant other Not Asked    Leisure: Fishing Not Asked    Childhood History: Uneventful Not Asked    Home situation: lives with spouse Not Asked    Leisure: Hunting Not Asked    Childhood History: Adopted Not Asked    Home situation: lives in group home Not Asked    Leisure: Movie Watching Not Asked    Childhood History: Early trauma Not Asked    Home situation: lives in shelter Not Asked    Leisure: Shopping Not Asked    Childhood History: Other Not Asked    Home situation: lives in nursing home Not Asked     Service Not Asked    Legal consequences of chemical use Not Asked   Social History Narrative    Not on file       Current Outpatient Medications   Medication Sig Dispense Refill    celecoxib (CELEBREX) 100 MG capsule Take 1 capsule (100 mg total) by mouth 2 (two) times daily. 60 capsule 3    divalproex (DEPAKOTE) 250  MG EC tablet TAKE 3 TABLETS (750 MG TOTAL) BY MOUTH EVERY EVENING. 270 tablet 1    loxapine (LOXITANE) 10 MG Cap TAKE 1 CAPSULE EVERY DAY 30 capsule 0    ascorbic acid, vitamin C, (VITAMIN C WITH SUMI HIPS) 1000 MG tablet Take 1,000 mg by mouth 2 (two) times daily.      b complex vitamins capsule Take 1 capsule by mouth once daily.      Boswellia suha extract (BOSWELLIA SUHA XT, BULK, MISC) 1,000 mg by Misc.(Non-Drug; Combo Route) route.      magnesium 30 mg Tab Take 30 mg by mouth once.       multivitamin with minerals tablet Take 1 tablet by mouth once daily.      turmeric (CURCUMIN MISC) 500 mg by Misc.(Non-Drug; Combo Route) route once daily.      UNABLE TO FIND medication name: Omega XL - contains 30 free fatty acids  Takes 2 daily      vitamin D (VITAMIN D3) 1000 units Tab Take 1,000 Units by mouth once daily.       No current facility-administered medications for this visit.        Review of patient's allergies indicates:  No Known Allergies    Physical examination  Vitals Reviewed  Gen. Well-dressed well-nourished   Skin warm dry and intact.  No rashes noted.  Neck is supple without adenopathy  Chest.  Respirations are even unlabored.    Neuro. Awake alert oriented x4.  Normal judgment and cognition noted.  Extremities no clubbing cyanosis or edema noted. Arthritic changes noted to the left wrist.  Dirkuns negative.  Decreased range of motion rotation extension and flexion of the neck due to stated pain.  Crepitus noted.  Palpable spasms of the paraspinal muscles noted bilateral.  Some straightening of lower cervical spine lordosis.  No impingement elicited.    Call or return to clinic prn if these symptoms worsen or fail to improve as anticipated.

## 2019-10-17 ENCOUNTER — TELEPHONE (OUTPATIENT)
Dept: FAMILY MEDICINE | Facility: CLINIC | Age: 64
End: 2019-10-17

## 2019-10-17 NOTE — TELEPHONE ENCOUNTER
Pt refused to speak with me. I offered to see if Mr. Sam was still in the office and have him give her a call, she hung up.

## 2019-10-17 NOTE — TELEPHONE ENCOUNTER
----- Message from Mere Agustin sent at 10/17/2019  4:42 PM CDT -----  Contact: Viridiana curtis  Type: Needs Medical Advice    Who Called:  Viridiana Roy Call Back Number: [phone]  Additional Information: Pls call pt back regarding making an appt for him to see Wilver Sam. She does not want to speak to anyone but Wilver Sam. Refused an appt to be made

## 2019-10-17 NOTE — TELEPHONE ENCOUNTER
----- Message from Emilee Rogers sent at 10/17/2019  8:09 AM CDT -----  Contact: patient   Patient want to speak with Mr Wilver Sam she has some questions regarding her , please call back at [phone] (home) she is requesting a call back before 9.

## 2019-10-18 ENCOUNTER — TELEPHONE (OUTPATIENT)
Dept: FAMILY MEDICINE | Facility: CLINIC | Age: 64
End: 2019-10-18

## 2019-10-18 NOTE — TELEPHONE ENCOUNTER
----- Message from Alka Laws sent at 10/18/2019 11:18 AM CDT -----  Contact: Patient  Type:  Patient Returning Call    Who Called:  Patient  Who Left Message for Patient:  Dhara  Does the patient know what this is regarding?:  appt  Best Call Back Number: [phone]

## 2019-10-18 NOTE — TELEPHONE ENCOUNTER
Thanks  I would be glad to see her or address any questions that she is willing to pose to the staff.

## 2019-10-18 NOTE — TELEPHONE ENCOUNTER
Advised pt Mr. Sam would like her to schedule an appt to discuss her concerns. She will call back once shehas her work schedule.

## 2019-10-21 ENCOUNTER — OFFICE VISIT (OUTPATIENT)
Dept: FAMILY MEDICINE | Facility: CLINIC | Age: 64
End: 2019-10-21
Payer: MEDICARE

## 2019-10-21 VITALS
HEART RATE: 80 BPM | HEIGHT: 62 IN | DIASTOLIC BLOOD PRESSURE: 82 MMHG | TEMPERATURE: 98 F | BODY MASS INDEX: 22.37 KG/M2 | WEIGHT: 121.56 LBS | SYSTOLIC BLOOD PRESSURE: 118 MMHG

## 2019-10-21 DIAGNOSIS — M25.532 LEFT WRIST PAIN: ICD-10-CM

## 2019-10-21 DIAGNOSIS — M19.90 ARTHRITIS: Primary | ICD-10-CM

## 2019-10-21 DIAGNOSIS — K21.9 GASTROESOPHAGEAL REFLUX DISEASE, ESOPHAGITIS PRESENCE NOT SPECIFIED: ICD-10-CM

## 2019-10-21 PROCEDURE — 99999 PR PBB SHADOW E&M-EST. PATIENT-LVL III: CPT | Mod: PBBFAC,HCNC,, | Performed by: NURSE PRACTITIONER

## 2019-10-21 PROCEDURE — 99213 PR OFFICE/OUTPT VISIT, EST, LEVL III, 20-29 MIN: ICD-10-PCS | Mod: HCNC,S$GLB,, | Performed by: NURSE PRACTITIONER

## 2019-10-21 PROCEDURE — 99213 OFFICE O/P EST LOW 20 MIN: CPT | Mod: HCNC,S$GLB,, | Performed by: NURSE PRACTITIONER

## 2019-10-21 PROCEDURE — 99999 PR PBB SHADOW E&M-EST. PATIENT-LVL III: ICD-10-PCS | Mod: PBBFAC,HCNC,, | Performed by: NURSE PRACTITIONER

## 2019-10-21 PROCEDURE — 3008F BODY MASS INDEX DOCD: CPT | Mod: HCNC,CPTII,S$GLB, | Performed by: NURSE PRACTITIONER

## 2019-10-21 PROCEDURE — 3008F PR BODY MASS INDEX (BMI) DOCUMENTED: ICD-10-PCS | Mod: HCNC,CPTII,S$GLB, | Performed by: NURSE PRACTITIONER

## 2019-10-21 RX ORDER — OMEPRAZOLE 40 MG/1
40 CAPSULE, DELAYED RELEASE ORAL DAILY
Qty: 30 CAPSULE | Refills: 0 | Status: SHIPPED | OUTPATIENT
Start: 2019-10-21 | End: 2019-11-16 | Stop reason: SDUPTHER

## 2019-10-28 DIAGNOSIS — Z79.899 HIGH RISK MEDICATIONS (NOT ANTICOAGULANTS) LONG-TERM USE: ICD-10-CM

## 2019-10-28 DIAGNOSIS — F31.9 BIPOLAR I DISORDER: ICD-10-CM

## 2019-10-28 RX ORDER — LOXAPINE SUCCINATE 10 MG/1
TABLET ORAL
Qty: 30 CAPSULE | Refills: 0 | OUTPATIENT
Start: 2019-10-28

## 2019-10-28 RX ORDER — DIVALPROEX SODIUM 250 MG/1
750 TABLET, DELAYED RELEASE ORAL NIGHTLY
Qty: 270 TABLET | Refills: 1 | OUTPATIENT
Start: 2019-10-28

## 2019-11-06 ENCOUNTER — OFFICE VISIT (OUTPATIENT)
Dept: FAMILY MEDICINE | Facility: CLINIC | Age: 64
End: 2019-11-06
Payer: MEDICARE

## 2019-11-06 VITALS
SYSTOLIC BLOOD PRESSURE: 138 MMHG | HEART RATE: 85 BPM | HEIGHT: 62 IN | DIASTOLIC BLOOD PRESSURE: 72 MMHG | WEIGHT: 122.13 LBS | BODY MASS INDEX: 22.48 KG/M2 | TEMPERATURE: 97 F

## 2019-11-06 DIAGNOSIS — R09.81 SINUS CONGESTION: Primary | ICD-10-CM

## 2019-11-06 DIAGNOSIS — M19.032 ARTHRITIS OF WRIST, LEFT: ICD-10-CM

## 2019-11-06 DIAGNOSIS — K21.9 GASTROESOPHAGEAL REFLUX DISEASE, ESOPHAGITIS PRESENCE NOT SPECIFIED: ICD-10-CM

## 2019-11-06 DIAGNOSIS — R21 RASH: ICD-10-CM

## 2019-11-06 PROCEDURE — 3008F PR BODY MASS INDEX (BMI) DOCUMENTED: ICD-10-PCS | Mod: HCNC,CPTII,S$GLB, | Performed by: NURSE PRACTITIONER

## 2019-11-06 PROCEDURE — 99214 OFFICE O/P EST MOD 30 MIN: CPT | Mod: HCNC,S$GLB,, | Performed by: NURSE PRACTITIONER

## 2019-11-06 PROCEDURE — 99999 PR PBB SHADOW E&M-EST. PATIENT-LVL III: ICD-10-PCS | Mod: PBBFAC,HCNC,, | Performed by: NURSE PRACTITIONER

## 2019-11-06 PROCEDURE — 99999 PR PBB SHADOW E&M-EST. PATIENT-LVL III: CPT | Mod: PBBFAC,HCNC,, | Performed by: NURSE PRACTITIONER

## 2019-11-06 PROCEDURE — 3008F BODY MASS INDEX DOCD: CPT | Mod: HCNC,CPTII,S$GLB, | Performed by: NURSE PRACTITIONER

## 2019-11-06 PROCEDURE — 99214 PR OFFICE/OUTPT VISIT, EST, LEVL IV, 30-39 MIN: ICD-10-PCS | Mod: HCNC,S$GLB,, | Performed by: NURSE PRACTITIONER

## 2019-11-06 RX ORDER — LORATADINE 10 MG/1
10 TABLET ORAL DAILY
Qty: 14 TABLET | Refills: 0 | Status: SHIPPED | OUTPATIENT
Start: 2019-11-06 | End: 2020-01-17

## 2019-11-06 RX ORDER — HYDROCORTISONE 25 MG/G
CREAM TOPICAL 2 TIMES DAILY
Qty: 20 G | Refills: 0 | Status: SHIPPED | OUTPATIENT
Start: 2019-11-06 | End: 2020-06-29

## 2019-11-06 RX ORDER — CELECOXIB 100 MG/1
100 CAPSULE ORAL 2 TIMES DAILY
Qty: 60 CAPSULE | Refills: 3 | Status: SHIPPED | OUTPATIENT
Start: 2019-11-06 | End: 2020-06-29

## 2019-11-06 NOTE — PROGRESS NOTES
This dictation has been generated using Modal Fluency Dictation some phonetic errors may occur. Please contact author for clarification if needed.     Problem List Items Addressed This Visit     Arthritis of wrist, left      Other Visit Diagnoses     Sinus congestion    -  Primary    Rash        Gastroesophageal reflux disease, esophagitis presence not specified              Orders Placed This Encounter    hydrocortisone 2.5 % cream    loratadine (CLARITIN) 10 mg tablet    celecoxib (CELEBREX) 100 MG capsule     Patient Instructions   Stop all Vicks to nose and lip. OK chest.     No nasal spray for 3-5 days. Considered adding flonase.     Follow up if symptoms worsen or fail to improve.    ________________________________________________________________  ________________________________________________________________      Chief Complaint   Patient presents with    Sinus Problem     nasal congestion,runny nose     History of present illness  This 64 y.o. presents today for complaint of nasal congestion. Patient has had runny nose.  She notes symptoms started since the beginning of the month.  She did quit smoking 5 days ago.  Patient notes history of deviated septum.  She notes having a lot of runny nose symptoms.  She did use a Neti pot.  She also used a Vicks nasal spray and vapor rub.  No other med use.  Her  currently has a cold.  Wrist pain seems to be better controlled with use of Celebrex.  She does ask about a refill.  She takes 2 doses on days with increased activity.  She has started back on passive exercises of the wrist.  She has been holding off on using the DMSO.  She states her friend is going to bring her the book about DMSO.   GERD continues to take omeprazole.  Symptoms controlled  Review of systems  No fever chills  She notes sneezing and runny nose.  She has a cough which is nonproductive.  No sinus pain.    Past medical and social history reviewed    Past Medical History:   Diagnosis  Date    Bipolar disorder     CTS (carpal tunnel syndrome)     Diverticulosis     Hepatomegaly     Presence of dental bridge     UPPER       Past Surgical History:   Procedure Laterality Date    APPENDECTOMY      BREAST SURGERY      Needle and surgical biopsy    COLONOSCOPY N/A 2018    Procedure: COLONOSCOPY;  Surgeon: Uche Crowell MD;  [facility];  Service: Endoscopy;  Laterality: N/A; repeat in 10 years for screening    NASAL SEPTUM SURGERY      RHINOPHYMA RESECTION      RHINOPLASTY TIP      SOFT TISSUE BIOPSY      throat biopsy    TUBAL LIGATION         Family History   Problem Relation Age of Onset    Cancer Mother 80        breast cancer    Diabetes Mother     Cancer Father 77        pancreatic cancer    Diabetes Maternal Grandmother     Colon cancer Neg Hx     Colon polyps Neg Hx     Crohn's disease Neg Hx     Ulcerative colitis Neg Hx     Celiac disease Neg Hx        Social History     Socioeconomic History    Marital status:      Spouse name: Not on file    Number of children: Not on file    Years of education: Not on file    Highest education level: Not on file   Occupational History    Not on file   Social Needs    Financial resource strain: Not on file    Food insecurity:     Worry: Not on file     Inability: Not on file    Transportation needs:     Medical: Not on file     Non-medical: Not on file   Tobacco Use    Smoking status: Former Smoker     Packs/day: 1.00     Years: 43.00     Pack years: 43.00     Types: Cigarettes     Last attempt to quit: 2018     Years since quittin.5    Smokeless tobacco: Never Used    Tobacco comment: quit May 2019   Substance and Sexual Activity    Alcohol use: No    Drug use: No    Sexual activity: Not Currently   Lifestyle    Physical activity:     Days per week: Not on file     Minutes per session: Not on file    Stress: Not on file   Relationships    Social connections:     Talks on phone: Not on  file     Gets together: Not on file     Attends Baptism service: Not on file     Active member of club or organization: Not on file     Attends meetings of clubs or organizations: Not on file     Relationship status: Not on file   Other Topics Concern    Caffeine Use Not Asked    Financial Status: Employed Not Asked    Home situation: homeless Not Asked    Caffeine Use: Frequent Not Asked    Financial Status: Unemployed Not Asked    Legal: Arrest history Not Asked    Caffeine Use: Moderate Not Asked    Financial Status: Disabled Not Asked    Legal: Involved in civil litigation Not Asked    Caffeine Use: Substantial Not Asked    Financial Status: Other Not Asked    Legal: Involved in criminal litigation Not Asked    Patient feels they ought to cut down on drinking/drug use Not Asked    Firearms: Does patient have access to a firearm? Not Asked    Legal: Other Not Asked    Patient annoyed by others criticizing their drinking/drug use Not Asked    Home situation: lives with family Not Asked    Leisure: Time with family Not Asked    Patient has felt bad or guilty about drinking/drug use Not Asked    Home situation: lives alone Not Asked    Leisure: Exercise Not Asked    Patient has had a drink/used drugs as an eye opener in the AM Not Asked    Home situation: lives with friends Not Asked    Leisure: Sports Not Asked    Childhood History: Raised by parents Not Asked    Home situation: lives with significant other Not Asked    Leisure: Fishing Not Asked    Childhood History: Uneventful Not Asked    Home situation: lives with spouse Not Asked    Leisure: Hunting Not Asked    Childhood History: Adopted Not Asked    Home situation: lives in group home Not Asked    Leisure: Movie Watching Not Asked    Childhood History: Early trauma Not Asked    Home situation: lives in shelter Not Asked    Leisure: Shopping Not Asked    Childhood History: Other Not Asked    Home situation: lives in  nursing home Not Asked     Service Not Asked    Legal consequences of chemical use Not Asked   Social History Narrative    Not on file       Current Outpatient Medications   Medication Sig Dispense Refill    celecoxib (CELEBREX) 100 MG capsule Take 1 capsule (100 mg total) by mouth 2 (two) times daily. 60 capsule 3    divalproex (DEPAKOTE) 250 MG EC tablet TAKE 3 TABLETS (750 MG TOTAL) BY MOUTH EVERY EVENING. 270 tablet 1    loxapine (LOXITANE) 10 MG Cap TAKE 1 CAPSULE EVERY DAY 30 capsule 0    omeprazole (PRILOSEC) 40 MG capsule Take 1 capsule (40 mg total) by mouth once daily. 30 capsule 0    hydrocortisone 2.5 % cream Apply topically 2 (two) times daily. Apply sparingly to upper lip once a day for 5 days. 20 g 0    loratadine (CLARITIN) 10 mg tablet Take 1 tablet (10 mg total) by mouth once daily. Take in the pm. for 14 days 14 tablet 0     No current facility-administered medications for this visit.        Review of patient's allergies indicates:  No Known Allergies    Physical examination  Vitals Reviewed  Gen. Well-dressed well-nourished   Skin warm dry and intact.  No rashes noted.  Rash noted below the nose trace erythematous changes without blistering.  HEENT.  TM intact bilateral with normal light reflex.  No mastoid tenderness during percussion.  Nares patent bilateral.  Erythematous nasal mucosa.  Pharynx is unremarkable except postnasal drip.  No maxillary or frontal sinus tenderness when percussed.    Neck is supple without adenopathy  Chest.  Respirations are even unlabored.  Lungs are clear to auscultation.  Cardiac regular rate and rhythm.  No chest wall adenopathy noted.  Neuro. Awake alert oriented x4.  Normal judgment and cognition noted.  Extremities no clubbing cyanosis or edema noted.     Call or return to clinic prn if these symptoms worsen or fail to improve as anticipated.

## 2019-11-12 DIAGNOSIS — F31.9 BIPOLAR I DISORDER: ICD-10-CM

## 2019-11-12 RX ORDER — DIVALPROEX SODIUM 250 MG/1
750 TABLET, DELAYED RELEASE ORAL NIGHTLY
Qty: 270 TABLET | Refills: 1 | OUTPATIENT
Start: 2019-11-12

## 2019-11-14 ENCOUNTER — OFFICE VISIT (OUTPATIENT)
Dept: PSYCHIATRY | Facility: CLINIC | Age: 64
End: 2019-11-14
Payer: MEDICARE

## 2019-11-14 VITALS
BODY MASS INDEX: 22.52 KG/M2 | DIASTOLIC BLOOD PRESSURE: 73 MMHG | WEIGHT: 122.38 LBS | HEIGHT: 62 IN | HEART RATE: 79 BPM | SYSTOLIC BLOOD PRESSURE: 115 MMHG

## 2019-11-14 DIAGNOSIS — F31.9 BIPOLAR I DISORDER: Primary | ICD-10-CM

## 2019-11-14 DIAGNOSIS — Z79.899 HIGH RISK MEDICATIONS (NOT ANTICOAGULANTS) LONG-TERM USE: ICD-10-CM

## 2019-11-14 DIAGNOSIS — M19.032 ARTHRITIS OF WRIST, LEFT: ICD-10-CM

## 2019-11-14 PROCEDURE — 99499 RISK ADDL DX/OHS AUDIT: ICD-10-PCS | Mod: HCNC,S$GLB,, | Performed by: PSYCHIATRY & NEUROLOGY

## 2019-11-14 PROCEDURE — 99214 PR OFFICE/OUTPT VISIT, EST, LEVL IV, 30-39 MIN: ICD-10-PCS | Mod: HCNC,S$GLB,, | Performed by: PSYCHIATRY & NEUROLOGY

## 2019-11-14 PROCEDURE — 99214 OFFICE O/P EST MOD 30 MIN: CPT | Mod: HCNC,S$GLB,, | Performed by: PSYCHIATRY & NEUROLOGY

## 2019-11-14 PROCEDURE — 3008F PR BODY MASS INDEX (BMI) DOCUMENTED: ICD-10-PCS | Mod: HCNC,CPTII,S$GLB, | Performed by: PSYCHIATRY & NEUROLOGY

## 2019-11-14 PROCEDURE — 99999 PR PBB SHADOW E&M-EST. PATIENT-LVL III: ICD-10-PCS | Mod: PBBFAC,HCNC,, | Performed by: PSYCHIATRY & NEUROLOGY

## 2019-11-14 PROCEDURE — 3008F BODY MASS INDEX DOCD: CPT | Mod: HCNC,CPTII,S$GLB, | Performed by: PSYCHIATRY & NEUROLOGY

## 2019-11-14 PROCEDURE — 99999 PR PBB SHADOW E&M-EST. PATIENT-LVL III: CPT | Mod: PBBFAC,HCNC,, | Performed by: PSYCHIATRY & NEUROLOGY

## 2019-11-14 PROCEDURE — 99499 UNLISTED E&M SERVICE: CPT | Mod: HCNC,S$GLB,, | Performed by: PSYCHIATRY & NEUROLOGY

## 2019-11-14 RX ORDER — DIVALPROEX SODIUM 250 MG/1
750 TABLET, DELAYED RELEASE ORAL NIGHTLY
Qty: 270 TABLET | Refills: 0 | Status: SHIPPED | OUTPATIENT
Start: 2019-11-14 | End: 2019-11-19

## 2019-11-14 RX ORDER — LOXAPINE SUCCINATE 10 MG/1
10 TABLET ORAL DAILY
Qty: 90 CAPSULE | Refills: 0 | Status: SHIPPED | OUTPATIENT
Start: 2019-11-14 | End: 2020-01-16
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
## 2019-11-15 ENCOUNTER — LAB VISIT (OUTPATIENT)
Dept: LAB | Facility: HOSPITAL | Age: 64
End: 2019-11-15
Attending: PSYCHIATRY & NEUROLOGY
Payer: MEDICARE

## 2019-11-15 DIAGNOSIS — Z79.899 HIGH RISK MEDICATIONS (NOT ANTICOAGULANTS) LONG-TERM USE: ICD-10-CM

## 2019-11-15 PROCEDURE — 80053 COMPREHEN METABOLIC PANEL: CPT | Mod: HCNC

## 2019-11-15 PROCEDURE — 80061 LIPID PANEL: CPT | Mod: HCNC

## 2019-11-15 PROCEDURE — 83036 HEMOGLOBIN GLYCOSYLATED A1C: CPT | Mod: HCNC

## 2019-11-15 PROCEDURE — 80164 ASSAY DIPROPYLACETIC ACD TOT: CPT | Mod: HCNC

## 2019-11-15 PROCEDURE — 36415 COLL VENOUS BLD VENIPUNCTURE: CPT | Mod: HCNC,PN

## 2019-11-16 LAB
ALBUMIN SERPL BCP-MCNC: 3.8 G/DL (ref 3.5–5.2)
ALP SERPL-CCNC: 77 U/L (ref 55–135)
ALT SERPL W/O P-5'-P-CCNC: 10 U/L (ref 10–44)
ANION GAP SERPL CALC-SCNC: 13 MMOL/L (ref 8–16)
AST SERPL-CCNC: 18 U/L (ref 10–40)
BILIRUB SERPL-MCNC: 0.4 MG/DL (ref 0.1–1)
BUN SERPL-MCNC: 19 MG/DL (ref 8–23)
CALCIUM SERPL-MCNC: 9.6 MG/DL (ref 8.7–10.5)
CHLORIDE SERPL-SCNC: 110 MMOL/L (ref 95–110)
CHOLEST SERPL-MCNC: 225 MG/DL (ref 120–199)
CHOLEST/HDLC SERPL: 3.8 {RATIO} (ref 2–5)
CO2 SERPL-SCNC: 22 MMOL/L (ref 23–29)
CREAT SERPL-MCNC: 1.2 MG/DL (ref 0.5–1.4)
EST. GFR  (AFRICAN AMERICAN): 55.6 ML/MIN/1.73 M^2
EST. GFR  (NON AFRICAN AMERICAN): 48.2 ML/MIN/1.73 M^2
ESTIMATED AVG GLUCOSE: 108 MG/DL (ref 68–131)
GLUCOSE SERPL-MCNC: 65 MG/DL (ref 70–110)
HBA1C MFR BLD HPLC: 5.4 % (ref 4–5.6)
HDLC SERPL-MCNC: 59 MG/DL (ref 40–75)
HDLC SERPL: 26.2 % (ref 20–50)
LDLC SERPL CALC-MCNC: 155.4 MG/DL (ref 63–159)
NONHDLC SERPL-MCNC: 166 MG/DL
POTASSIUM SERPL-SCNC: 4.9 MMOL/L (ref 3.5–5.1)
PROT SERPL-MCNC: 7 G/DL (ref 6–8.4)
SODIUM SERPL-SCNC: 145 MMOL/L (ref 136–145)
TRIGL SERPL-MCNC: 53 MG/DL (ref 30–150)
VALPROATE SERPL-MCNC: 65.2 UG/ML (ref 50–100)

## 2019-11-16 RX ORDER — OMEPRAZOLE 40 MG/1
CAPSULE, DELAYED RELEASE ORAL
Qty: 30 CAPSULE | Refills: 0 | Status: SHIPPED | OUTPATIENT
Start: 2019-11-16 | End: 2019-11-27 | Stop reason: SDUPTHER

## 2019-11-19 DIAGNOSIS — F31.9 BIPOLAR I DISORDER: ICD-10-CM

## 2019-11-19 RX ORDER — DIVALPROEX SODIUM 250 MG/1
750 TABLET, DELAYED RELEASE ORAL NIGHTLY
Qty: 9 TABLET | Refills: 0 | Status: SHIPPED | OUTPATIENT
Start: 2019-11-19 | End: 2020-01-16

## 2019-11-19 NOTE — PROGRESS NOTES
Spoke to the pt. Has not yet received meds from [facility]- has been without depakote x 2days-   I ordered #9 tabs for the next 3 nights for her at a local pharmacy. Pt not certain she can afford but agrees to go ask so that she is not without meds for the remainder of this week. Likely [facility] meds will arrive end of week.     Review labwork with her.     Pt with some billing questions- encouraged her to call billing dept as I am unaware.

## 2019-11-27 RX ORDER — OMEPRAZOLE 40 MG/1
40 CAPSULE, DELAYED RELEASE ORAL DAILY
Qty: 90 CAPSULE | Refills: 1 | Status: CANCELLED | OUTPATIENT
Start: 2019-11-27

## 2019-11-28 RX ORDER — OMEPRAZOLE 40 MG/1
40 CAPSULE, DELAYED RELEASE ORAL DAILY
Qty: 90 CAPSULE | Refills: 0 | Status: SHIPPED | OUTPATIENT
Start: 2019-11-28 | End: 2020-02-26

## 2019-12-02 ENCOUNTER — TELEPHONE (OUTPATIENT)
Dept: FAMILY MEDICINE | Facility: CLINIC | Age: 64
End: 2019-12-02

## 2019-12-02 NOTE — TELEPHONE ENCOUNTER
----- Message from Lashawn Ramon sent at 12/2/2019  3:42 PM CST -----  Type:  Patient Returning Call    Who Called:  Patient   Who Left Message for Patient:  Dhara Gonsales  Does the patient know what this is regarding?:    Best Call Back Number: [phone]  Additional Information:  Patient upset she has been trying all day to reach nurse,

## 2019-12-02 NOTE — TELEPHONE ENCOUNTER
----- Message from Edward Joe sent at 12/2/2019 10:53 AM CST -----  Contact: patient  Type: Needs Medical Advice    Who Called:  patient  Symptoms (please be specific):    How long has patient had these symptoms:    Pharmacy name and phone #:    Best Call Back Number: [phone]  Additional Information: patient stated still having issues wanted to know if she should go  medication omeprazole (PRILOSEC) 40 MG capsule

## 2019-12-02 NOTE — TELEPHONE ENCOUNTER
Pt states she was given prilosec Rx and was told to only take for 30 days. Her symptoms subsided when she was taking it, they returned as soon as she stopped taking it. She wants to know if she should continue it. She knows a new Rx was sen tin but is confused bc Mr. Sam told her to not take it for more than 3 days. Please advise.

## 2019-12-02 NOTE — TELEPHONE ENCOUNTER
----- Message from Lashawn Ortiz sent at 12/2/2019  1:49 PM CST -----  Type:  Patient Returning Call    Who Called: Patient  Who Left Message for Patient:  Dhara  Does the patient know what this is regarding?:  Message left earlier  Best Call Back Number:  [phone]  Additional Information:

## 2019-12-09 ENCOUNTER — TELEPHONE (OUTPATIENT)
Dept: FAMILY MEDICINE | Facility: CLINIC | Age: 64
End: 2019-12-09

## 2019-12-09 DIAGNOSIS — M19.039 ARTHRITIS, WRIST: Primary | ICD-10-CM

## 2019-12-09 DIAGNOSIS — M25.639 DECREASED RANGE OF MOTION OF WRIST, UNSPECIFIED LATERALITY: ICD-10-CM

## 2019-12-09 NOTE — TELEPHONE ENCOUNTER
----- Message from Paula Pan sent at 12/9/2019 10:54 AM CST -----  Type: Needs Medical Advice    Who Called:  Viridiana - patient   Best Call Back Number: [phone] Land line please leave VM   Additional Information: pt needs an order to be sent to a specialist for Rx for wrist faxed to them fax # [phone] please contact her directly regarding this request [facility] Attn : Jana

## 2019-12-10 ENCOUNTER — TELEPHONE (OUTPATIENT)
Dept: FAMILY MEDICINE | Facility: CLINIC | Age: 64
End: 2019-12-10

## 2019-12-10 NOTE — TELEPHONE ENCOUNTER
----- Message from Manan Mcnamraa sent at 12/10/2019  8:30 AM CST -----  Contact: same  Patient called in and wanted to check the status of a referral that was supposed to be sent over to [facility] at fax number [phone].    Patient call back number is [phone]

## 2019-12-10 NOTE — TELEPHONE ENCOUNTER
Fax # [phone] please contact her directly regarding this request [facility] Attn : Jana

## 2019-12-10 NOTE — TELEPHONE ENCOUNTER
----- Message from Mara Jarquin sent at 12/10/2019  2:08 PM CST -----  Contact: self  Type:  Patient Returning Call    Who Called:  patient  Who Left Message for Patient:  Dhara   Does the patient know what this is regarding?:  Needs orders put in system for PT  Best Call Back Number:  [phone] (home)   Additional Information:  [facility] fax [phone] Attn: Jana  Please call the patient back today! She has requested this for 3 days now please help get this done.  Thanks

## 2020-01-07 ENCOUNTER — TELEPHONE (OUTPATIENT)
Dept: FAMILY MEDICINE | Facility: CLINIC | Age: 65
End: 2020-01-07

## 2020-01-07 NOTE — TELEPHONE ENCOUNTER
----- Message from Ronak Live sent at 1/7/2020  9:37 AM CST -----  Contact: Ptnt    Type: Needs Medical Advice    Who Called:  Ptnt     Symptoms (please be specific):  Bad cough, diarrhea, heavy mucus.    How long has patient had these symptoms: The past week.    Pharmacy name and phone #:      [id number] - [place] - [address]  [address]  [place] [address]  Phone: [phone] Fax: [phone]    Best Call Back Number: [phone]    Additional Information: Advised needs to speak with the nurse regarding her condition. Would like something called in to pharmacy as soon as possible for her condition. Please call.

## 2020-01-07 NOTE — TELEPHONE ENCOUNTER
----- Message from Pat Ha sent at 1/7/2020 12:31 PM CST -----  Contact: Pt  Type:  Patient Returning Call    Who Called: Pt  Who Left Message for Patient:  Dhara  Does the patient know what this is regarding?: Yes  Best Call Back Number:  [phone]  Additional Information: Pt states Nurse Dhara know what she's calling for. Please call pt and advise.

## 2020-01-07 NOTE — TELEPHONE ENCOUNTER
Pt states she is feeling better but still has a cough and is requesting a cough syrup be called in that she had been prescribed years ago. She doesn't remember the name.     guaifenesin-codeine 100-10 mg/5 ml  Was the last cough syrup in her chart

## 2020-01-07 NOTE — TELEPHONE ENCOUNTER
----- Message from Champ Gatica sent at 1/7/2020  2:54 PM CST -----  Contact: pt  Type:  Patient Returning Call    Who Called:  pt  Who Left Message for Patient:  Dhara LAZAR  Does the patient know what this is regarding?:  yes  Best Call Back Number:  [phone]  Additional Information:

## 2020-01-08 RX ORDER — CODEINE PHOSPHATE AND GUAIFENESIN 10; 100 MG/5ML; MG/5ML
5 SOLUTION ORAL EVERY 8 HOURS PRN
Qty: 180 ML | Refills: 1 | Status: SHIPPED | OUTPATIENT
Start: 2020-01-08 | End: 2020-01-18

## 2020-01-15 DIAGNOSIS — Z79.899 HIGH RISK MEDICATIONS (NOT ANTICOAGULANTS) LONG-TERM USE: ICD-10-CM

## 2020-01-15 DIAGNOSIS — F31.9 BIPOLAR I DISORDER: ICD-10-CM

## 2020-01-16 RX ORDER — DIVALPROEX SODIUM 250 MG/1
TABLET, DELAYED RELEASE ORAL
Qty: 270 TABLET | Refills: 0 | Status: SHIPPED | OUTPATIENT
Start: 2020-01-16 | End: 2020-03-26

## 2020-01-16 RX ORDER — LOXAPINE SUCCINATE 10 MG/1
TABLET ORAL
Qty: 90 CAPSULE | Refills: 0 | Status: SHIPPED | OUTPATIENT
Start: 2020-01-16 | End: 2020-03-26

## 2020-01-17 ENCOUNTER — HOSPITAL ENCOUNTER (OUTPATIENT)
Dept: RADIOLOGY | Facility: HOSPITAL | Age: 65
Discharge: HOME | End: 2020-01-17
Attending: NURSE PRACTITIONER
Payer: MEDICARE

## 2020-01-17 ENCOUNTER — OFFICE VISIT (OUTPATIENT)
Dept: FAMILY MEDICINE | Facility: CLINIC | Age: 65
End: 2020-01-17
Payer: MEDICARE

## 2020-01-17 VITALS
WEIGHT: 115.88 LBS | SYSTOLIC BLOOD PRESSURE: 132 MMHG | OXYGEN SATURATION: 98 % | BODY MASS INDEX: 21.32 KG/M2 | HEART RATE: 72 BPM | DIASTOLIC BLOOD PRESSURE: 82 MMHG | TEMPERATURE: 98 F | HEIGHT: 62 IN

## 2020-01-17 DIAGNOSIS — R05.9 COUGH: Primary | ICD-10-CM

## 2020-01-17 DIAGNOSIS — R05.9 COUGH: ICD-10-CM

## 2020-01-17 PROCEDURE — 99213 OFFICE O/P EST LOW 20 MIN: CPT | Mod: HCNC,S$GLB,, | Performed by: NURSE PRACTITIONER

## 2020-01-17 PROCEDURE — 3008F BODY MASS INDEX DOCD: CPT | Mod: HCNC,CPTII,S$GLB, | Performed by: NURSE PRACTITIONER

## 2020-01-17 PROCEDURE — 99213 PR OFFICE/OUTPT VISIT, EST, LEVL III, 20-29 MIN: ICD-10-PCS | Mod: HCNC,S$GLB,, | Performed by: NURSE PRACTITIONER

## 2020-01-17 PROCEDURE — 71046 X-RAY EXAM CHEST 2 VIEWS: CPT | Mod: 26,HCNC,, | Performed by: RADIOLOGY

## 2020-01-17 PROCEDURE — 99999 PR PBB SHADOW E&M-EST. PATIENT-LVL IV: CPT | Mod: PBBFAC,HCNC,, | Performed by: NURSE PRACTITIONER

## 2020-01-17 PROCEDURE — 3008F PR BODY MASS INDEX (BMI) DOCUMENTED: ICD-10-PCS | Mod: HCNC,CPTII,S$GLB, | Performed by: NURSE PRACTITIONER

## 2020-01-17 PROCEDURE — 99999 PR PBB SHADOW E&M-EST. PATIENT-LVL IV: ICD-10-PCS | Mod: PBBFAC,HCNC,, | Performed by: NURSE PRACTITIONER

## 2020-01-17 PROCEDURE — 71046 X-RAY EXAM CHEST 2 VIEWS: CPT | Mod: TC,HCNC,PN

## 2020-01-17 PROCEDURE — 71046 XR CHEST PA AND LATERAL: ICD-10-PCS | Mod: 26,HCNC,, | Performed by: RADIOLOGY

## 2020-01-17 NOTE — PROGRESS NOTES
This dictation has been generated using Modal Fluency Dictation some phonetic errors may occur. Please contact author for clarification if needed.     Problem List Items Addressed This Visit     None      Visit Diagnoses     Cough    -  Primary    3+ weeks.     Relevant Orders    X-Ray Chest PA And Lateral    BORDETELLA PERTUSSIS IGG ABS    CBC auto differential        Orders Placed This Encounter    X-Ray Chest PA And Lateral    BORDETELLA PERTUSSIS IGG ABS    CBC auto differential     Cough for 3+ weeks approaching a month.  Check chest x-ray Pretus a CBC.  I will review images and address accordingly.      Follow up if symptoms worsen or fail to improve.    ________________________________________________________________  ________________________________________________________________      Chief Complaint   Patient presents with    Cough    Nasal Congestion    Pain     lungs      History of present illness  This 64 y.o. presents today for complaint of cough and cold symptoms.  Cough symptoms started around Ellie almost a month ago.  She had called on the 7th for refill of cough medicine.  Notes some chest pain with coughing now.  Does note shortness of breath but is a smoker and shortness of breath is at baseline.  No nausea vomiting diarrhea reflux.  Patient denies rash.  Chest pain is central.  Hurts when she coughs or takes deep breath.  Review of systems  No fever chills malaise body aches    Reviewed history    Past Medical History:   Diagnosis Date    Bipolar disorder     CTS (carpal tunnel syndrome)     Diverticulosis     Hepatomegaly     Presence of dental bridge     UPPER       Past Surgical History:   Procedure Laterality Date    APPENDECTOMY      BREAST SURGERY      Needle and surgical biopsy    COLONOSCOPY N/A 6/12/2018    Procedure: COLONOSCOPY;  Surgeon: Uche Crowell MD;  [facility];  Service: Endoscopy;  Laterality: N/A; repeat in 10 years for screening    NASAL  SEPTUM SURGERY      RHINOPHYMA RESECTION      RHINOPLASTY TIP      SOFT TISSUE BIOPSY      throat biopsy    TUBAL LIGATION         Family History   Problem Relation Age of Onset    Cancer Mother 80        breast cancer    Diabetes Mother     Cancer Father 77        pancreatic cancer    Diabetes Maternal Grandmother     Colon cancer Neg Hx     Colon polyps Neg Hx     Crohn's disease Neg Hx     Ulcerative colitis Neg Hx     Celiac disease Neg Hx        Social History     Socioeconomic History    Marital status:      Spouse name: Not on file    Number of children: Not on file    Years of education: Not on file    Highest education level: Not on file   Occupational History    Not on file   Social Needs    Financial resource strain: Not on file    Food insecurity:     Worry: Not on file     Inability: Not on file    Transportation needs:     Medical: Not on file     Non-medical: Not on file   Tobacco Use    Smoking status: Current Every Day Smoker     Packs/day: 1.00     Years: 43.00     Pack years: 43.00     Types: Cigarettes     Last attempt to quit: 2018     Years since quittin.7    Smokeless tobacco: Never Used   Substance and Sexual Activity    Alcohol use: No    Drug use: No    Sexual activity: Not Currently   Lifestyle    Physical activity:     Days per week: Not on file     Minutes per session: Not on file    Stress: Not on file   Relationships    Social connections:     Talks on phone: Not on file     Gets together: Not on file     Attends Baptism service: Not on file     Active member of club or organization: Not on file     Attends meetings of clubs or organizations: Not on file     Relationship status: Not on file   Other Topics Concern    Caffeine Use Not Asked    Financial Status: Employed Not Asked    Home situation: homeless Not Asked    Caffeine Use: Frequent Not Asked    Financial Status: Unemployed Not Asked    Legal: Arrest history Not Asked     Caffeine Use: Moderate Not Asked    Financial Status: Disabled Not Asked    Legal: Involved in civil litigation Not Asked    Caffeine Use: Substantial Not Asked    Financial Status: Other Not Asked    Legal: Involved in criminal litigation Not Asked    Patient feels they ought to cut down on drinking/drug use Not Asked    Firearms: Does patient have access to a firearm? Not Asked    Legal: Other Not Asked    Patient annoyed by others criticizing their drinking/drug use Not Asked    Home situation: lives with family Not Asked    Leisure: Time with family Not Asked    Patient has felt bad or guilty about drinking/drug use Not Asked    Home situation: lives alone Not Asked    Leisure: Exercise Not Asked    Patient has had a drink/used drugs as an eye opener in the AM Not Asked    Home situation: lives with friends Not Asked    Leisure: Sports Not Asked    Childhood History: Raised by parents Not Asked    Home situation: lives with significant other Not Asked    Leisure: Fishing Not Asked    Childhood History: Uneventful Not Asked    Home situation: lives with spouse Not Asked    Leisure: Hunting Not Asked    Childhood History: Adopted Not Asked    Home situation: lives in group home Not Asked    Leisure: Movie Watching Not Asked    Childhood History: Early trauma Not Asked    Home situation: lives in shelter Not Asked    Leisure: Shopping Not Asked    Childhood History: Other Not Asked    Home situation: lives in nursing home Not Asked     Service Not Asked    Legal consequences of chemical use Not Asked   Social History Narrative    Not on file       Current Outpatient Medications   Medication Sig Dispense Refill    celecoxib (CELEBREX) 100 MG capsule Take 1 capsule (100 mg total) by mouth 2 (two) times daily. 60 capsule 3    divalproex (DEPAKOTE) 250 MG EC tablet TAKE 3 TABLETS EVERY EVENING 270 tablet 0    guaifenesin-codeine 100-10 mg/5 ml (TUSSI-ORGANIDIN NR)   mg/5 mL syrup Take 5 mLs by mouth every 8 (eight) hours as needed for Cough. 180 mL 1    hydrocortisone 2.5 % cream Apply topically 2 (two) times daily. Apply sparingly to upper lip once a day for 5 days. 20 g 0    loxapine (LOXITANE) 10 MG Cap TAKE 1 CAPSULE EVERY DAY 90 capsule 0    omeprazole (PRILOSEC) 40 MG capsule Take 1 capsule (40 mg total) by mouth once daily. 90 capsule 0     No current facility-administered medications for this visit.        Review of patient's allergies indicates:  No Known Allergies    Physical examination  Vitals Reviewed  Gen. Well-dressed well-nourished   Skin warm dry and intact.  No rashes noted.  HEENT.  TM intact bilateral with normal light reflex.  No mastoid tenderness during percussion.  Nares patent bilateral.  Pharynx is unremarkable.  No maxillary or frontal sinus tenderness when percussed.    Neck is supple without adenopathy  Chest.  Respirations are even unlabored.  Scattered rhonchi and wheeze.  Cardiac regular rate and rhythm.  No chest wall adenopathy noted.  Neuro. Awake alert oriented x4.  Normal judgment and cognition noted.  Extremities no clubbing cyanosis or edema noted.     Call or return to clinic prn if these symptoms worsen or fail to improve as anticipated.

## 2020-01-19 ENCOUNTER — TELEPHONE (OUTPATIENT)
Dept: FAMILY MEDICINE | Facility: CLINIC | Age: 65
End: 2020-01-19

## 2020-01-19 DIAGNOSIS — J18.9 PNEUMONIA OF RIGHT MIDDLE LOBE DUE TO INFECTIOUS ORGANISM: Primary | ICD-10-CM

## 2020-01-19 RX ORDER — AZITHROMYCIN 250 MG/1
TABLET, FILM COATED ORAL
Qty: 6 TABLET | Refills: 0 | Status: SHIPPED | OUTPATIENT
Start: 2020-01-19 | End: 2020-03-11

## 2020-01-19 RX ORDER — ALBUTEROL SULFATE 90 UG/1
2 AEROSOL, METERED RESPIRATORY (INHALATION) EVERY 6 HOURS PRN
Qty: 18 G | Refills: 0 | Status: SHIPPED | OUTPATIENT
Start: 2020-01-19 | End: 2020-03-11

## 2020-01-20 ENCOUNTER — TELEPHONE (OUTPATIENT)
Dept: FAMILY MEDICINE | Facility: CLINIC | Age: 65
End: 2020-01-20

## 2020-01-20 NOTE — TELEPHONE ENCOUNTER
"Reviewed with pt. She is upset that she told Mr. Sam she does not like antibiotics and he told her "too bad." she did not like his attitude, she told her pharmacy to cancel the antibiotic and the inhaler and she will not have pharmaceuticals with terrible side effects pushed on her and she will not be coming in for follow up. She then hung up.   "

## 2020-01-20 NOTE — TELEPHONE ENCOUNTER
Pt called back to apologize for being rude earlier. She does not want to take antibiotics but will increase her vitamin C and let us know if her symptoms do not improve.

## 2020-01-20 NOTE — TELEPHONE ENCOUNTER
Questionable airway disease vs pneumonia. abx and inhaler added. Sent to [facility].. Complete abx and follow up 2 weeks.

## 2020-01-20 NOTE — TELEPHONE ENCOUNTER
----- Message from Edward Joe sent at 1/20/2020 12:00 PM CST -----  Contact: patient  Type: Needs Medical Advice    Who Called:  patient  Symptoms (please be specific):    How long has patient had these symptoms:    Pharmacy name and phone #:    Best Call Back Number: [phone]  Additional Information: requesting a call back from Dahra,patient didn't want to disclose reason for the call

## 2020-01-20 NOTE — TELEPHONE ENCOUNTER
----- Message from Mara Jarquin sent at 1/20/2020  9:59 AM CST -----  Contact: self  Type:  Test Results    Who Called:  patient  Name of Test (Lab/Mammo/Etc):  Labs and xray  Date of Test:  1/17  Ordering Provider:  Adolfo Sam  Where the test was performed:  [facility]  Best Call Back Number:  [phone] (home)   Additional Information:  na

## 2020-01-21 NOTE — TELEPHONE ENCOUNTER
Thank you. I note her history of Bipolar and don't wish to offend. We will have her do a cooling off period.

## 2020-01-23 NOTE — TELEPHONE ENCOUNTER
Discussed need to speak to pt with male who answered the phone. He states she has a thing with antibiotics because it kills the good stuff in her stomach.   Call again tomorrow.

## 2020-01-24 ENCOUNTER — TELEPHONE (OUTPATIENT)
Dept: FAMILY MEDICINE | Facility: CLINIC | Age: 65
End: 2020-01-24

## 2020-01-24 NOTE — TELEPHONE ENCOUNTER
----- Message from Valencia Dyer sent at 1/24/2020  2:58 PM CST -----  Contact: pt  Pt states that NP Flaco was trying to reach her on 1/23 her  told her. Pt can be reached at.[phone] (home)

## 2020-02-03 NOTE — TELEPHONE ENCOUNTER
I tried reaching her Friday.   Reach out to her again and tell her the that cxr did show pneumonia.   How is she doing and feeling now?

## 2020-02-04 NOTE — TELEPHONE ENCOUNTER
Attempted to reach pt,  pts family answered, states pt was not home, will have pt call back once she returns

## 2020-02-04 NOTE — TELEPHONE ENCOUNTER
"Spoke with patient and she states that she does not want to have another chest xray. States that it was " 4 weeks" before she got results from the first xray she had. Advised that we have been trying to contact her. States that she is not taking abx and not coming back for xray.   "

## 2020-02-26 ENCOUNTER — HOSPITAL ENCOUNTER (OUTPATIENT)
Dept: RADIOLOGY | Facility: HOSPITAL | Age: 65
Discharge: HOME | End: 2020-02-26
Attending: NURSE PRACTITIONER
Payer: MEDICARE

## 2020-02-26 DIAGNOSIS — J18.9 PNEUMONIA OF RIGHT MIDDLE LOBE DUE TO INFECTIOUS ORGANISM: ICD-10-CM

## 2020-02-26 PROCEDURE — 71046 XR CHEST PA AND LATERAL: ICD-10-PCS | Mod: 26,HCNC,, | Performed by: RADIOLOGY

## 2020-02-26 PROCEDURE — 71046 X-RAY EXAM CHEST 2 VIEWS: CPT | Mod: 26,HCNC,, | Performed by: RADIOLOGY

## 2020-02-26 PROCEDURE — 71046 X-RAY EXAM CHEST 2 VIEWS: CPT | Mod: TC,HCNC,PN

## 2020-02-26 RX ORDER — OMEPRAZOLE 40 MG/1
CAPSULE, DELAYED RELEASE ORAL
Qty: 90 CAPSULE | Refills: 0 | Status: SHIPPED | OUTPATIENT
Start: 2020-02-26 | End: 2020-06-29

## 2020-02-27 ENCOUNTER — TELEPHONE (OUTPATIENT)
Dept: FAMILY MEDICINE | Facility: CLINIC | Age: 65
End: 2020-02-27

## 2020-02-27 NOTE — TELEPHONE ENCOUNTER
"The patient is requesting you (Mr. Sam) call her and discuss what she should do next and states she does not want another "third party call"  "

## 2020-02-27 NOTE — TELEPHONE ENCOUNTER
"Patient informed xray shows "slight interval improvement and probable resolving of the problem" patient requests as soon as Mr. Sam has reviewed and made a recommendation to give her a call.  "

## 2020-02-27 NOTE — TELEPHONE ENCOUNTER
----- Message from Naomi Rm sent at 2/27/2020 11:14 AM CST -----  Contact: Self  Viridiana Shelton calling regarding her X ray Results that she needs back immediately     Pt needs a call back from nurse please and can be contact at [phone]

## 2020-02-27 NOTE — TELEPHONE ENCOUNTER
I had previously recommended antibiotics however she has refused.  She can and update us on her request

## 2020-02-28 NOTE — TELEPHONE ENCOUNTER
Attempted to call Patient.  answered and advised that the patient is not available. further states that the patient is requesting to speak to the provider only.  I advised that Mr Sam is in clinic with patients and he has requested that his nursing staff speak to patient regarding her results. Requested patient return my call.  was advised that without a signed involvement in care I could not discuss her medical information.    stated he would have the patient call me back .

## 2020-02-28 NOTE — TELEPHONE ENCOUNTER
There is no times had a side during clinic hours for patient calls.  That is why I the nurses call

## 2020-03-11 ENCOUNTER — OFFICE VISIT (OUTPATIENT)
Dept: FAMILY MEDICINE | Facility: CLINIC | Age: 65
End: 2020-03-11
Payer: MEDICARE

## 2020-03-11 VITALS
WEIGHT: 119.06 LBS | TEMPERATURE: 97 F | DIASTOLIC BLOOD PRESSURE: 80 MMHG | OXYGEN SATURATION: 97 % | BODY MASS INDEX: 21.91 KG/M2 | SYSTOLIC BLOOD PRESSURE: 118 MMHG | HEIGHT: 62 IN | HEART RATE: 101 BPM

## 2020-03-11 DIAGNOSIS — S61.412D LACERATION OF LEFT HAND WITHOUT FOREIGN BODY, SUBSEQUENT ENCOUNTER: ICD-10-CM

## 2020-03-11 DIAGNOSIS — J06.9 UPPER RESPIRATORY TRACT INFECTION, UNSPECIFIED TYPE: ICD-10-CM

## 2020-03-11 DIAGNOSIS — M19.032 ARTHRITIS OF WRIST, LEFT: Primary | ICD-10-CM

## 2020-03-11 DIAGNOSIS — J98.4 PNEUMONITIS: ICD-10-CM

## 2020-03-11 DIAGNOSIS — F17.210 CIGARETTE SMOKER: ICD-10-CM

## 2020-03-11 PROCEDURE — 99999 PR PBB SHADOW E&M-EST. PATIENT-LVL III: CPT | Mod: PBBFAC,HCNC,, | Performed by: FAMILY MEDICINE

## 2020-03-11 PROCEDURE — 3008F PR BODY MASS INDEX (BMI) DOCUMENTED: ICD-10-PCS | Mod: HCNC,CPTII,S$GLB, | Performed by: FAMILY MEDICINE

## 2020-03-11 PROCEDURE — 99999 PR PBB SHADOW E&M-EST. PATIENT-LVL III: ICD-10-PCS | Mod: PBBFAC,HCNC,, | Performed by: FAMILY MEDICINE

## 2020-03-11 PROCEDURE — 99214 OFFICE O/P EST MOD 30 MIN: CPT | Mod: HCNC,S$GLB,, | Performed by: FAMILY MEDICINE

## 2020-03-11 PROCEDURE — 3008F BODY MASS INDEX DOCD: CPT | Mod: HCNC,CPTII,S$GLB, | Performed by: FAMILY MEDICINE

## 2020-03-11 PROCEDURE — 99214 PR OFFICE/OUTPT VISIT, EST, LEVL IV, 30-39 MIN: ICD-10-PCS | Mod: HCNC,S$GLB,, | Performed by: FAMILY MEDICINE

## 2020-03-11 NOTE — PATIENT INSTRUCTIONS
Reduce carbonated drinks. Try mylicon for gas.   Try allegra 180 mg daily to see if it helps with runny nose, itchy eyes and cough.

## 2020-03-11 NOTE — PROGRESS NOTES
Subjective:       Patient ID: Viridiana Suresh is a 64 y.o. female.    Chief Complaint: Follow-up (follow up from upper respiratory infection and pnuemonia) and Hand Injury (left hand (palm injury) )    Several issues.  She was seen in January and had a follow-up chest x-ray in February.  She presented with a cough and there was a suspicion of pneumonia.  There was the possibility of right middle lobe infiltrate on the chest x-ray and that seemed to have improved on a follow-up.  I reviewed the images and those findings were fairly subtle.  She did manage to stop smoking several weeks ago and her cough is improved.  She did have is relapse over the past week or 2.  No fever.  Some malaise.  Some of the malaise may be emotional.  She does have clear rhinorrhea.  Itchy watery eyes.  No past history of allergy.  She has been taken some homeopathic allergy medicine for the past 10 days.  The cough is not disrupting her sleep.  She does not complain of shortness of breath.  She had a hand laceration about 2 weeks ago.  The stitches were removed this week.  She has returned to work but was unable to do prolonged hand writing and not able to open boxes because of the palmar laceration on her dominant left hand.  She does have a history of left wrist arthritis.  She had been doing therapy for it and had regained some movement but the laceration of the hand caused her to immobilize it and it stiffened up.  She also complains of regurgitation over the past few months.  She admits to consuming at least 6 diet Cokes per day.  Large meals will also lead to regurgitation.  She does not complain of heartburn.  She was prescribed omeprazole but it has not helped very much.  No history of peptic ulcer disease.  No history of EGD.  She had a normal colonoscopy in 2018.    Review of Systems   Constitutional: Positive for fatigue. Negative for fever and unexpected weight change.        She has an aversion to prescription drugs,  "especially antibiotics.   HENT: Positive for congestion and rhinorrhea.    Respiratory: Positive for cough. Negative for shortness of breath.        Objective:     Blood pressure 118/80, pulse 101, temperature 97.4 °F (36.3 °C), temperature source Oral, height 5' 2" (1.575 m), weight 54 kg (119 lb 0.8 oz), SpO2 97 %.      Physical Exam   Constitutional: She appears well-developed and well-nourished. No distress.   HENT:   Sinuses nontender.  Nasal passages fairly clear.  The nasal mucosa is slightly pale.  No drainage seen today.  Throat is normal.  TMs are clear.   Eyes:   Puffy eyes.  Conjunctiva slightly injected.   Neck: No thyromegaly present.   Cardiovascular: Normal rate, regular rhythm and normal heart sounds.   No murmur heard.  Pulmonary/Chest: Effort normal and breath sounds normal.   Abdominal: Soft. Bowel sounds are normal. She exhibits no distension and no mass. There is no tenderness.   Musculoskeletal: She exhibits no edema.   Left wrist with reduced range of motion.  Left palm with healing laceration.  Mildly tender.  No sign of infection.   Lymphadenopathy:     She has no cervical adenopathy.   Neurological: She is alert.       Assessment:       1. Arthritis of wrist, left    2. Cigarette smoker    3. Upper respiratory tract infection, unspecified type    4. Pneumonitis    5. Laceration of left hand without foreign body, subsequent encounter        Plan:       I gave her a note for gradual return to work with restricted use of her left hand.  She only needs to wear wrist splint if she is doing heavy lifting later on.  Cut back on carbonated beverages.  Try Mylicon.  Try Allegra for allergic rhinitis and allergic conjunctivitis.  Follow-up with me in 4 weeks.  I do not think we need a follow-up chest CT at this point.  We will discuss that later.      "

## 2020-03-17 ENCOUNTER — TELEPHONE (OUTPATIENT)
Dept: FAMILY MEDICINE | Facility: CLINIC | Age: 65
End: 2020-03-17

## 2020-03-17 NOTE — TELEPHONE ENCOUNTER
----- Message from Kaylin S Yosvany sent at 3/16/2020  4:51 PM CDT -----  Contact: pATIENT  Type:  Needs Medical Advice    Symptoms: Coughing, low body temperature  How long has patient had these symptoms: Since January  Pharmacy name and phone #:  See Chart  Would the patient rather a call back or a response via [facility]? Call Back  Best Call Back Number: [phone]  Additional Information: Pt is requesting a call back from this provider to discuss current respiratory issues and to see if testing for coronavirus is recommended. . Pt is already established. Last seen: a week ago. Next appt is 4/8/2020. Please contact pt to advise.

## 2020-03-23 ENCOUNTER — HOSPITAL ENCOUNTER (OUTPATIENT)
Dept: RADIOLOGY | Facility: HOSPITAL | Age: 65
Discharge: HOME | End: 2020-03-23
Attending: FAMILY MEDICINE
Payer: MEDICARE

## 2020-03-23 ENCOUNTER — OFFICE VISIT (OUTPATIENT)
Dept: FAMILY MEDICINE | Facility: CLINIC | Age: 65
End: 2020-03-23
Payer: MEDICARE

## 2020-03-23 VITALS
WEIGHT: 115.06 LBS | OXYGEN SATURATION: 97 % | TEMPERATURE: 98 F | HEART RATE: 77 BPM | BODY MASS INDEX: 21.17 KG/M2 | SYSTOLIC BLOOD PRESSURE: 110 MMHG | DIASTOLIC BLOOD PRESSURE: 62 MMHG | HEIGHT: 62 IN

## 2020-03-23 DIAGNOSIS — J98.4 PNEUMONITIS: ICD-10-CM

## 2020-03-23 DIAGNOSIS — J98.4 PNEUMONITIS: Primary | ICD-10-CM

## 2020-03-23 PROCEDURE — 71046 X-RAY EXAM CHEST 2 VIEWS: CPT | Mod: 26,HCNC,, | Performed by: RADIOLOGY

## 2020-03-23 PROCEDURE — 99214 OFFICE O/P EST MOD 30 MIN: CPT | Mod: HCNC,S$GLB,, | Performed by: FAMILY MEDICINE

## 2020-03-23 PROCEDURE — 71046 X-RAY EXAM CHEST 2 VIEWS: CPT | Mod: TC,HCNC,PN

## 2020-03-23 PROCEDURE — 99999 PR PBB SHADOW E&M-EST. PATIENT-LVL IV: ICD-10-PCS | Mod: PBBFAC,HCNC,, | Performed by: FAMILY MEDICINE

## 2020-03-23 PROCEDURE — 3008F BODY MASS INDEX DOCD: CPT | Mod: HCNC,CPTII,S$GLB, | Performed by: FAMILY MEDICINE

## 2020-03-23 PROCEDURE — 99999 PR PBB SHADOW E&M-EST. PATIENT-LVL IV: CPT | Mod: PBBFAC,HCNC,, | Performed by: FAMILY MEDICINE

## 2020-03-23 PROCEDURE — 3008F PR BODY MASS INDEX (BMI) DOCUMENTED: ICD-10-PCS | Mod: HCNC,CPTII,S$GLB, | Performed by: FAMILY MEDICINE

## 2020-03-23 PROCEDURE — 99214 PR OFFICE/OUTPT VISIT, EST, LEVL IV, 30-39 MIN: ICD-10-PCS | Mod: HCNC,S$GLB,, | Performed by: FAMILY MEDICINE

## 2020-03-23 PROCEDURE — 71046 XR CHEST PA AND LATERAL: ICD-10-PCS | Mod: 26,HCNC,, | Performed by: RADIOLOGY

## 2020-03-23 RX ORDER — LEVOFLOXACIN 750 MG/1
750 TABLET ORAL DAILY
Qty: 10 TABLET | Refills: 0 | Status: SHIPPED | OUTPATIENT
Start: 2020-03-23 | End: 2020-06-29

## 2020-03-23 RX ORDER — MINERAL OIL
180 ENEMA (ML) RECTAL DAILY
COMMUNITY
End: 2021-01-28

## 2020-03-23 NOTE — PROGRESS NOTES
"Subjective:       Patient ID: Viridiana Suresh is a 64 y.o. female.    Chief Complaint: Cough (Prod cough w/ R side CP) and Sinus Problem (Nose alters from runny to stuffy)    She has had a persistent cough.  She was seen in January and had an abnormal chest x-ray.  It had improved on February 26th.  Her cough continues and she has had malaise and loss of appetite and some sweats at night.  She feels that she is able to bring up more sputum than before.  She thinks she measured a temperature at night of 100° a few days ago.  Some nasal congestion.  She stop smoking 2 months ago.  She did not take antibiotics for the 1st suspected pneumonitis because of her inclination against antibiotics and the uncertainty of her diagnosis.    Review of Systems   HENT: Positive for congestion.    Eyes: Positive for discharge and itching.   Respiratory: Positive for cough. Negative for shortness of breath and wheezing.    Cardiovascular: Positive for chest pain (Last night she developed right-sided sharp chest pain worse with coughing.).       Objective:     Blood pressure 110/62, pulse 77, temperature 98.2 °F (36.8 °C), temperature source Oral, height 5' 2" (1.575 m), weight 52.2 kg (115 lb 1.3 oz), SpO2 97 %.      Physical Exam   Constitutional: She appears well-developed and well-nourished. She appears distressed.   Cardiovascular: Normal rate and regular rhythm.   Pulmonary/Chest: Effort normal.   Some coarse breath sounds bilaterally.  Good air movement.  No respiratory distress.  No dullness to percussion.  She does have right lateral chest wall tenderness.   Musculoskeletal: She exhibits no edema.   Neurological: She is alert.       Assessment:       1. Pneumonitis        Plan:       I reviewed her chest x-ray as well as the radiology report.  She now has bilateral infiltrates.  They seem to be mostly in the right middle lobe and the lingula.  Perhaps left lower lobe as well.  Start on Levaquin 750 mg daily.  Call me back this " week with status report.  Precautions given.  Lab work drawn today.

## 2020-03-24 ENCOUNTER — TELEPHONE (OUTPATIENT)
Dept: FAMILY MEDICINE | Facility: CLINIC | Age: 65
End: 2020-03-24

## 2020-03-24 NOTE — TELEPHONE ENCOUNTER
I spoke with her and her . Feeling a bit better after two doses of Levaquin. Has questions about arthritis and neuropathy and tendinitis. She has a hx of RA. ESR elevated but acute infection. Other lab ok other than elevated wbc and alb 3.0. I will call her later this week for status report. Will need followup imaging and maybe pulmonary consult

## 2020-03-26 DIAGNOSIS — Z79.899 HIGH RISK MEDICATIONS (NOT ANTICOAGULANTS) LONG-TERM USE: ICD-10-CM

## 2020-03-26 DIAGNOSIS — F31.9 BIPOLAR I DISORDER: ICD-10-CM

## 2020-03-26 RX ORDER — LOXAPINE SUCCINATE 10 MG/1
TABLET ORAL
Qty: 90 CAPSULE | Refills: 0 | Status: SHIPPED | OUTPATIENT
Start: 2020-03-26 | End: 2020-05-14 | Stop reason: SDUPTHER

## 2020-03-26 RX ORDER — DIVALPROEX SODIUM 250 MG/1
TABLET, DELAYED RELEASE ORAL
Qty: 270 TABLET | Refills: 0 | Status: SHIPPED | OUTPATIENT
Start: 2020-03-26 | End: 2020-05-14 | Stop reason: SDUPTHER

## 2020-03-27 ENCOUNTER — TELEPHONE (OUTPATIENT)
Dept: FAMILY MEDICINE | Facility: CLINIC | Age: 65
End: 2020-03-27

## 2020-03-27 NOTE — TELEPHONE ENCOUNTER
Telephone. She has improved with no fever and improved energy. Tolerating levaquin. Will get repeat cxr 3-4 weeks. [employer] helped her

## 2020-04-20 ENCOUNTER — TELEPHONE (OUTPATIENT)
Dept: FAMILY MEDICINE | Facility: CLINIC | Age: 65
End: 2020-04-20

## 2020-04-20 NOTE — TELEPHONE ENCOUNTER
----- Message from Lashawn Ortiz sent at 4/20/2020  2:31 PM CDT -----  Type:  Patient Returning Call    Who Called: Dhara Gonsales  Does the patient know what this is regarding?:  Follow up appointment  Best Call Back Number:  [phone]  Additional Information:

## 2020-04-20 NOTE — TELEPHONE ENCOUNTER
----- Message from Jaya Griffith sent at 4/18/2020  2:01 PM CDT -----  Contact: Pt  Pt would like to be called back asap regarding scheduling a f/u appt.    Pt can be reached at [phone].    Thanks

## 2020-04-20 NOTE — TELEPHONE ENCOUNTER
----- Message from Princess KWAKU Lombardi sent at 4/20/2020 12:05 PM CDT -----  Contact: pt  Type:  Patient Returning Call    Who Called:  Patient  Who Left Message for Patient:  Nurse Louise  Does the patient know what this is regarding?:  yes  Best Call Back Number:    Additional Information:  Returning a call back.

## 2020-04-21 ENCOUNTER — TELEPHONE (OUTPATIENT)
Dept: FAMILY MEDICINE | Facility: CLINIC | Age: 65
End: 2020-04-21

## 2020-04-21 NOTE — TELEPHONE ENCOUNTER
----- Message from Susie Haynes sent at 4/21/2020  9:06 AM CDT -----  Type: Needs Medical Advice  Who Called:  Patient    Best Call Back Number: [phone]  Additional Information: Needs to finish the conversation on what she needs with Dhara. Please call to advise.

## 2020-04-22 ENCOUNTER — OFFICE VISIT (OUTPATIENT)
Dept: FAMILY MEDICINE | Facility: CLINIC | Age: 65
End: 2020-04-22
Payer: MEDICARE

## 2020-04-22 ENCOUNTER — CLINICAL SUPPORT (OUTPATIENT)
Dept: URGENT CARE | Facility: CLINIC | Age: 65
End: 2020-04-22
Payer: MEDICARE

## 2020-04-22 DIAGNOSIS — F17.210 CIGARETTE SMOKER: ICD-10-CM

## 2020-04-22 DIAGNOSIS — J98.4 PNEUMONITIS: Primary | ICD-10-CM

## 2020-04-22 DIAGNOSIS — J18.9 PNEUMONIA OF RIGHT MIDDLE LOBE DUE TO INFECTIOUS ORGANISM: ICD-10-CM

## 2020-04-22 DIAGNOSIS — R05.9 COUGH: Primary | ICD-10-CM

## 2020-04-22 PROCEDURE — 99441 PR PHYSICIAN TELEPHONE EVALUATION 5-10 MIN: CPT | Mod: HCNC,95,, | Performed by: FAMILY MEDICINE

## 2020-04-22 PROCEDURE — 99441 PR PHYSICIAN TELEPHONE EVALUATION 5-10 MIN: ICD-10-PCS | Mod: HCNC,95,, | Performed by: FAMILY MEDICINE

## 2020-04-22 PROCEDURE — U0002 COVID-19 LAB TEST NON-CDC: HCPCS | Mod: HCNC

## 2020-04-22 NOTE — PROGRESS NOTES
Subjective:       Patient ID: Viridiana Suresh is a 64 y.o. female.    Chief Complaint: Followup pneumonia  Followup pneumonia. Onset January. Most recent tx was levaquin in late March. No more fever. Minimal cough. No dyspnea. She has not smoked in 2-3 months. Intested in being checked for Corona virus. She and her  have been observing some precautions and they sleep in separate bedrooms.   I reviewed previous cxr reports :    Abnormal chest radiograph with patchy segmental airspace opacity/consolidative change noted in the left and right lower lung zone, more pronounced in the right middle lobe when compared with the previous study.  These findings likely relate to multifocal pneumonia or aspiration.  Please correlate clinically as COVID-19 pneumonitis cannot be excluded given bilateral lower lung zone predominance.    Review of Systems   Constitutional: Negative for fever.   Respiratory: Positive for cough. Negative for shortness of breath.    Gastrointestinal: Negative for diarrhea.        She has a chronic regurgitation sx. We may need to do further evaluation in the future.        Objective:      Physical Exam    Assessment:       1. Pneumonitis    2. Cigarette smoker    3. Pneumonia of right middle lobe due to infectious organism        Plan:       Covid nasal swab. Repeat cxr in late May and perhaps Covid serology then if her nasal swab reads negative.    Established Patient - Audio Only Telehealth Visit     The patient location is: Home  The chief complaint leading to consultation is: Pneumonia  Visit type: Virtual visit with audio only (telephone)   Time 10 minutes  The reason for the audio only service rather than synchronous audio and video virtual visit was related to technical difficulties or patient preference/necessity.     Each patient to whom I provide medical services by telemedicine is:  (1) informed of the relationship between the physician and patient and the respective role of any other  health care provider with respect to management of the patient; and (2) notified that they may decline to receive medical services by telemedicine and may withdraw from such care at any time. Patient verbally consented to receive this service via voice-only telephone clarice                             This service was not originating from a related E/M service provided within the previous 7 days nor will  to an E/M service or procedure within the next 24 hours or my soonest available appointment.  Prevailing standard of care was able to be met in this audio-only visit.

## 2020-04-23 ENCOUNTER — TELEPHONE (OUTPATIENT)
Dept: URGENT CARE | Facility: CLINIC | Age: 65
End: 2020-04-23

## 2020-04-23 ENCOUNTER — TELEPHONE (OUTPATIENT)
Dept: FAMILY MEDICINE | Facility: CLINIC | Age: 65
End: 2020-04-23

## 2020-04-23 LAB — SARS-COV-2 RNA RESP QL NAA+PROBE: NOT DETECTED

## 2020-04-23 NOTE — TELEPHONE ENCOUNTER
I spoke with her. Covid19 negative. We may consider an antibody test in late May if it is available then. She is feeling well at this point

## 2020-04-23 NOTE — TELEPHONE ENCOUNTER
Called patient in regards to her NEGATIVE COVID-19 (coronavirus) results, but patient reports she was already notified by her PCP. Thanks.

## 2020-04-28 ENCOUNTER — TELEPHONE (OUTPATIENT)
Dept: FAMILY MEDICINE | Facility: CLINIC | Age: 65
End: 2020-04-28

## 2020-04-28 DIAGNOSIS — M19.039 WRIST ARTHRITIS: Primary | ICD-10-CM

## 2020-04-28 NOTE — TELEPHONE ENCOUNTER
Spoke w/ pt. She would like PT at [facility] for her L wrist. She has not been since Jan and would like a new order(pended). If Ok, please complete and sign and we will fax.

## 2020-04-28 NOTE — TELEPHONE ENCOUNTER
----- Message from Valencia Dyer sent at 4/28/2020  1:18 PM CDT -----  Contact: pt  Pt calling states needs a prescription/orders sent again to [email or web address] last saw in Jan ,please. The phone number is [phone] to the [employer]....[phone] (home)

## 2020-05-14 ENCOUNTER — TELEPHONE (OUTPATIENT)
Dept: FAMILY MEDICINE | Facility: CLINIC | Age: 65
End: 2020-05-14

## 2020-05-14 ENCOUNTER — OFFICE VISIT (OUTPATIENT)
Dept: PSYCHIATRY | Facility: CLINIC | Age: 65
End: 2020-05-14
Payer: MEDICARE

## 2020-05-14 VITALS
HEART RATE: 77 BPM | WEIGHT: 125.56 LBS | BODY MASS INDEX: 23.1 KG/M2 | DIASTOLIC BLOOD PRESSURE: 84 MMHG | SYSTOLIC BLOOD PRESSURE: 150 MMHG | HEIGHT: 62 IN | TEMPERATURE: 98 F

## 2020-05-14 DIAGNOSIS — Z79.899 HIGH RISK MEDICATIONS (NOT ANTICOAGULANTS) LONG-TERM USE: ICD-10-CM

## 2020-05-14 DIAGNOSIS — F31.9 BIPOLAR I DISORDER: Primary | ICD-10-CM

## 2020-05-14 PROCEDURE — 3008F BODY MASS INDEX DOCD: CPT | Mod: HCNC,CPTII,S$GLB, | Performed by: PSYCHIATRY & NEUROLOGY

## 2020-05-14 PROCEDURE — 99499 RISK ADDL DX/OHS AUDIT: ICD-10-PCS | Mod: HCNC,S$GLB,, | Performed by: PSYCHIATRY & NEUROLOGY

## 2020-05-14 PROCEDURE — 99999 PR PBB SHADOW E&M-EST. PATIENT-LVL III: ICD-10-PCS | Mod: PBBFAC,HCNC,, | Performed by: PSYCHIATRY & NEUROLOGY

## 2020-05-14 PROCEDURE — 99214 PR OFFICE/OUTPT VISIT, EST, LEVL IV, 30-39 MIN: ICD-10-PCS | Mod: HCNC,S$GLB,, | Performed by: PSYCHIATRY & NEUROLOGY

## 2020-05-14 PROCEDURE — 3008F PR BODY MASS INDEX (BMI) DOCUMENTED: ICD-10-PCS | Mod: HCNC,CPTII,S$GLB, | Performed by: PSYCHIATRY & NEUROLOGY

## 2020-05-14 PROCEDURE — 99499 UNLISTED E&M SERVICE: CPT | Mod: HCNC,S$GLB,, | Performed by: PSYCHIATRY & NEUROLOGY

## 2020-05-14 PROCEDURE — 99214 OFFICE O/P EST MOD 30 MIN: CPT | Mod: HCNC,S$GLB,, | Performed by: PSYCHIATRY & NEUROLOGY

## 2020-05-14 PROCEDURE — 99999 PR PBB SHADOW E&M-EST. PATIENT-LVL III: CPT | Mod: PBBFAC,HCNC,, | Performed by: PSYCHIATRY & NEUROLOGY

## 2020-05-14 RX ORDER — DIVALPROEX SODIUM 250 MG/1
750 TABLET, DELAYED RELEASE ORAL NIGHTLY
Qty: 270 TABLET | Refills: 0 | Status: SHIPPED | OUTPATIENT
Start: 2020-05-14 | End: 2020-11-13 | Stop reason: SDUPTHER

## 2020-05-14 RX ORDER — LOXAPINE SUCCINATE 10 MG/1
10 TABLET ORAL DAILY
Qty: 90 CAPSULE | Refills: 0 | Status: SHIPPED | OUTPATIENT
Start: 2020-05-14 | End: 2020-12-08 | Stop reason: SDUPTHER

## 2020-05-14 NOTE — TELEPHONE ENCOUNTER
----- Message from Carol Carreon sent at 5/14/2020  1:06 PM CDT -----  Type: Needs Medical Advice  Who Called:  patient  Best Call Back Number: [phone]  Additional Information: patient states that PT has not received orders. Please give call back
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
## 2020-05-15 ENCOUNTER — LAB VISIT (OUTPATIENT)
Dept: LAB | Facility: HOSPITAL | Age: 65
End: 2020-05-15
Attending: PSYCHIATRY & NEUROLOGY
Payer: MEDICARE

## 2020-05-15 DIAGNOSIS — Z79.899 HIGH RISK MEDICATIONS (NOT ANTICOAGULANTS) LONG-TERM USE: ICD-10-CM

## 2020-05-15 LAB
CHOLEST SERPL-MCNC: 204 MG/DL (ref 120–199)
CHOLEST/HDLC SERPL: 3.5 {RATIO} (ref 2–5)
ESTIMATED AVG GLUCOSE: 100 MG/DL (ref 68–131)
HBA1C MFR BLD HPLC: 5.1 % (ref 4–5.6)
HDLC SERPL-MCNC: 59 MG/DL (ref 40–75)
HDLC SERPL: 28.9 % (ref 20–50)
LDLC SERPL CALC-MCNC: 128.8 MG/DL (ref 63–159)
NONHDLC SERPL-MCNC: 145 MG/DL
TRIGL SERPL-MCNC: 81 MG/DL (ref 30–150)
VALPROATE SERPL-MCNC: 63 UG/ML (ref 50–100)

## 2020-05-15 PROCEDURE — 80164 ASSAY DIPROPYLACETIC ACD TOT: CPT | Mod: HCNC

## 2020-05-15 PROCEDURE — 36415 COLL VENOUS BLD VENIPUNCTURE: CPT | Mod: HCNC,PN

## 2020-05-15 PROCEDURE — 80061 LIPID PANEL: CPT | Mod: HCNC

## 2020-05-15 PROCEDURE — 83036 HEMOGLOBIN GLYCOSYLATED A1C: CPT | Mod: HCNC

## 2020-06-29 ENCOUNTER — OFFICE VISIT (OUTPATIENT)
Dept: FAMILY MEDICINE | Facility: CLINIC | Age: 65
End: 2020-06-29
Payer: MEDICARE

## 2020-06-29 ENCOUNTER — HOSPITAL ENCOUNTER (OUTPATIENT)
Dept: RADIOLOGY | Facility: HOSPITAL | Age: 65
Discharge: HOME | End: 2020-06-29
Attending: FAMILY MEDICINE
Payer: MEDICARE

## 2020-06-29 VITALS
WEIGHT: 125.75 LBS | BODY MASS INDEX: 23.14 KG/M2 | HEIGHT: 62 IN | SYSTOLIC BLOOD PRESSURE: 124 MMHG | DIASTOLIC BLOOD PRESSURE: 72 MMHG | HEART RATE: 76 BPM | TEMPERATURE: 98 F | RESPIRATION RATE: 16 BRPM

## 2020-06-29 DIAGNOSIS — M19.049 HAND ARTHRITIS: ICD-10-CM

## 2020-06-29 DIAGNOSIS — M19.049 HAND ARTHRITIS: Primary | ICD-10-CM

## 2020-06-29 PROCEDURE — 3008F PR BODY MASS INDEX (BMI) DOCUMENTED: ICD-10-PCS | Mod: HCNC,CPTII,S$GLB, | Performed by: FAMILY MEDICINE

## 2020-06-29 PROCEDURE — 73130 X-RAY EXAM OF HAND: CPT | Mod: TC,HCNC,PN,LT

## 2020-06-29 PROCEDURE — 99999 PR PBB SHADOW E&M-EST. PATIENT-LVL V: CPT | Mod: PBBFAC,HCNC,, | Performed by: FAMILY MEDICINE

## 2020-06-29 PROCEDURE — 3008F BODY MASS INDEX DOCD: CPT | Mod: HCNC,CPTII,S$GLB, | Performed by: FAMILY MEDICINE

## 2020-06-29 PROCEDURE — 99214 OFFICE O/P EST MOD 30 MIN: CPT | Mod: HCNC,S$GLB,, | Performed by: FAMILY MEDICINE

## 2020-06-29 PROCEDURE — 73130 X-RAY EXAM OF HAND: CPT | Mod: 26,HCNC,LT, | Performed by: RADIOLOGY

## 2020-06-29 PROCEDURE — 73110 XR WRIST COMPLETE 3 VIEWS LEFT: ICD-10-PCS | Mod: 26,HCNC,LT, | Performed by: RADIOLOGY

## 2020-06-29 PROCEDURE — 73110 X-RAY EXAM OF WRIST: CPT | Mod: TC,HCNC,PN,LT

## 2020-06-29 PROCEDURE — 73130 XR HAND COMPLETE 3 VIEW LEFT: ICD-10-PCS | Mod: 26,HCNC,LT, | Performed by: RADIOLOGY

## 2020-06-29 PROCEDURE — 99999 PR PBB SHADOW E&M-EST. PATIENT-LVL V: ICD-10-PCS | Mod: PBBFAC,HCNC,, | Performed by: FAMILY MEDICINE

## 2020-06-29 PROCEDURE — 73110 X-RAY EXAM OF WRIST: CPT | Mod: 26,HCNC,LT, | Performed by: RADIOLOGY

## 2020-06-29 PROCEDURE — 99214 PR OFFICE/OUTPT VISIT, EST, LEVL IV, 30-39 MIN: ICD-10-PCS | Mod: HCNC,S$GLB,, | Performed by: FAMILY MEDICINE

## 2020-06-29 RX ORDER — IBUPROFEN 400 MG/1
400 TABLET ORAL EVERY 6 HOURS PRN
COMMUNITY
End: 2020-09-28 | Stop reason: ALTCHOICE
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
## 2020-07-06 ENCOUNTER — OFFICE VISIT (OUTPATIENT)
Dept: FAMILY MEDICINE | Facility: CLINIC | Age: 65
End: 2020-07-06
Payer: MEDICARE

## 2020-07-06 VITALS
WEIGHT: 125.13 LBS | BODY MASS INDEX: 23.03 KG/M2 | HEART RATE: 94 BPM | TEMPERATURE: 98 F | RESPIRATION RATE: 17 BRPM | SYSTOLIC BLOOD PRESSURE: 112 MMHG | DIASTOLIC BLOOD PRESSURE: 76 MMHG | HEIGHT: 62 IN

## 2020-07-06 DIAGNOSIS — S81.812A LACERATION OF LEFT LOWER LEG, INITIAL ENCOUNTER: Primary | ICD-10-CM

## 2020-07-06 PROCEDURE — 3008F BODY MASS INDEX DOCD: CPT | Mod: HCNC,CPTII,S$GLB, | Performed by: FAMILY MEDICINE

## 2020-07-06 PROCEDURE — 3008F PR BODY MASS INDEX (BMI) DOCUMENTED: ICD-10-PCS | Mod: HCNC,CPTII,S$GLB, | Performed by: FAMILY MEDICINE

## 2020-07-06 PROCEDURE — 99213 OFFICE O/P EST LOW 20 MIN: CPT | Mod: HCNC,S$GLB,, | Performed by: FAMILY MEDICINE

## 2020-07-06 PROCEDURE — 99999 PR PBB SHADOW E&M-EST. PATIENT-LVL III: CPT | Mod: PBBFAC,HCNC,, | Performed by: FAMILY MEDICINE

## 2020-07-06 PROCEDURE — 99999 PR PBB SHADOW E&M-EST. PATIENT-LVL III: ICD-10-PCS | Mod: PBBFAC,HCNC,, | Performed by: FAMILY MEDICINE

## 2020-07-06 PROCEDURE — 99213 PR OFFICE/OUTPT VISIT, EST, LEVL III, 20-29 MIN: ICD-10-PCS | Mod: HCNC,S$GLB,, | Performed by: FAMILY MEDICINE

## 2020-07-06 RX ORDER — MUPIROCIN 20 MG/G
OINTMENT TOPICAL 2 TIMES DAILY
Qty: 30 G | Refills: 1 | Status: SHIPPED | OUTPATIENT
Start: 2020-07-06 | End: 2020-07-06

## 2020-07-06 RX ORDER — MUPIROCIN 20 MG/G
OINTMENT TOPICAL 2 TIMES DAILY
Qty: 30 G | Refills: 1 | Status: SHIPPED | OUTPATIENT
Start: 2020-07-06 | End: 2020-07-29 | Stop reason: SDUPTHER

## 2020-07-06 NOTE — PROGRESS NOTES
Cleaned wound lac to LLE with wound cleanser. Applied non-adhesive dressing with bandage conform. Pt tolerated well. Instructed and educated pt on proper wound care while at home. Pt verbalized understanding.

## 2020-07-06 NOTE — PATIENT INSTRUCTIONS
Wash wound twice a day with soap and water.  Apply bactroban with fresh gauze twice daily.    Ice ankle for 20mins, 2x/day.

## 2020-07-06 NOTE — PROGRESS NOTES
"Subjective:       Patient ID: Viridiana Suresh is a 65 y.o. female.    Chief Complaint: Laceration (left leg, back of leg near ankle X4 days. Pt has been using neosporin and keeping lac clean and bandaged. Denies any signs of infection.) and Ankle Pain    HPI  Patient with c/o laceraation to the back of her L leg 4 days ago. Using peroxide and neosporin. Able to bear weight without limping, but with pain after prolonged activity. No active drainage, some occ serous fluid on dressing change. No redness.     Review of Systems:  Review of Systems   Constitutional: Negative for chills and fever.   Musculoskeletal: Positive for myalgias. Negative for gait problem.   Skin: Positive for wound.       Objective:     Vitals:    07/06/20 0846   BP: 112/76   BP Location: Left arm   Patient Position: Sitting   Pulse: 94   Resp: 17   Temp: 98.2 °F (36.8 °C)   TempSrc: Skin   Weight: 56.8 kg (125 lb 1.8 oz)   Height: 5' 2" (1.575 m)          Physical Exam  Vitals signs and nursing note reviewed.   Constitutional:       General: She is not in acute distress.     Appearance: She is well-developed.   HENT:      Head: Normocephalic and atraumatic.   Eyes:      General: No scleral icterus.        Right eye: No discharge.         Left eye: No discharge.      Conjunctiva/sclera: Conjunctivae normal.   Neck:      Musculoskeletal: Normal range of motion and neck supple.   Cardiovascular:      Rate and Rhythm: Normal rate.   Pulmonary:      Effort: Pulmonary effort is normal. No respiratory distress.   Abdominal:      General: There is no distension.      Palpations: Abdomen is soft.   Musculoskeletal: Normal range of motion.   Skin:     General: Skin is warm and dry.      Findings: No rash.          Neurological:      Mental Status: She is alert and oriented to person, place, and time.   Psychiatric:         Behavior: Behavior normal.           Assessment & Plan:  Laceration of left lower leg, initial encounter  Comments:  wound care " reviewed (see pt instr), confirmed tdap this year  Expected course and sx tx incl otc med use reviewed. Notify MD if sx persist or worsen.   Other orders  -     Discontinue: mupirocin (BACTROBAN) 2 % ointment; Apply topically 2 (two) times daily.  Dispense: 30 g; Refill: 1  -     mupirocin (BACTROBAN) 2 % ointment; Apply topically 2 (two) times daily.  Dispense: 30 g; Refill: 1

## 2020-07-16 ENCOUNTER — OFFICE VISIT (OUTPATIENT)
Dept: FAMILY MEDICINE | Facility: CLINIC | Age: 65
End: 2020-07-16
Payer: MEDICARE

## 2020-07-16 VITALS
RESPIRATION RATE: 17 BRPM | TEMPERATURE: 97 F | HEIGHT: 62 IN | DIASTOLIC BLOOD PRESSURE: 82 MMHG | BODY MASS INDEX: 23.77 KG/M2 | HEART RATE: 90 BPM | WEIGHT: 129.19 LBS | SYSTOLIC BLOOD PRESSURE: 130 MMHG

## 2020-07-16 DIAGNOSIS — R63.5 WEIGHT INCREASING: ICD-10-CM

## 2020-07-16 DIAGNOSIS — S01.319S: Primary | ICD-10-CM

## 2020-07-16 DIAGNOSIS — S81.812A LACERATION OF LEFT LOWER LEG, INITIAL ENCOUNTER: ICD-10-CM

## 2020-07-16 DIAGNOSIS — K21.9 GASTROESOPHAGEAL REFLUX DISEASE, ESOPHAGITIS PRESENCE NOT SPECIFIED: ICD-10-CM

## 2020-07-16 PROCEDURE — 3008F PR BODY MASS INDEX (BMI) DOCUMENTED: ICD-10-PCS | Mod: HCNC,CPTII,S$GLB, | Performed by: FAMILY MEDICINE

## 2020-07-16 PROCEDURE — 99214 OFFICE O/P EST MOD 30 MIN: CPT | Mod: HCNC,S$GLB,, | Performed by: FAMILY MEDICINE

## 2020-07-16 PROCEDURE — 3008F BODY MASS INDEX DOCD: CPT | Mod: HCNC,CPTII,S$GLB, | Performed by: FAMILY MEDICINE

## 2020-07-16 PROCEDURE — 99999 PR PBB SHADOW E&M-EST. PATIENT-LVL IV: CPT | Mod: PBBFAC,HCNC,, | Performed by: FAMILY MEDICINE

## 2020-07-16 PROCEDURE — 99214 PR OFFICE/OUTPT VISIT, EST, LEVL IV, 30-39 MIN: ICD-10-PCS | Mod: HCNC,S$GLB,, | Performed by: FAMILY MEDICINE

## 2020-07-16 PROCEDURE — 99999 PR PBB SHADOW E&M-EST. PATIENT-LVL IV: ICD-10-PCS | Mod: PBBFAC,HCNC,, | Performed by: FAMILY MEDICINE

## 2020-07-16 RX ORDER — DOXYCYCLINE 100 MG/1
100 CAPSULE ORAL 2 TIMES DAILY
Qty: 14 CAPSULE | Refills: 0 | Status: SHIPPED | OUTPATIENT
Start: 2020-07-16 | End: 2020-07-17

## 2020-07-16 NOTE — PROGRESS NOTES
"Subjective:       Patient ID: Viridiana Suresh is a 65 y.o. female.    Chief Complaint: Wound Check (Dressing change on L shin) and Weight Check (Pt would like to discuss diets to help lose weight)    HPI  Patient in clinic for review.  1. Wound on leg is slightly improved, but still painful. She feels there's been a slight increase in surrounding redness.   2. Earlobes - interested in seeing plastics re: repair of previous tear to R.  3. Weight, would like to see weight go back down approx 10lbs to previous.    +water. No meat. Tends to snack during the day on salad, fruit, cheese/crackers.   4. GERD having issues with reflux, taking tums. Has tried gas-x. Recalls she has tried a new otc from local pharmacy which may be helping. We discussed review with gi if persistent.    Review of Systems:  Review of Systems   Constitutional: Negative for chills, fever and unexpected weight change.   Gastrointestinal: Positive for diarrhea. Negative for constipation.   Musculoskeletal: Positive for myalgias. Negative for gait problem.   Skin: Positive for wound. Negative for rash.       Objective:     Vitals:    07/16/20 0946   BP: 130/82   BP Location: Right arm   Patient Position: Sitting   Pulse: 90   Resp: 17   Temp: 97.2 °F (36.2 °C)   TempSrc: Skin   Weight: 58.6 kg (129 lb 3 oz)   Height: 5' 2" (1.575 m)          Physical Exam  Vitals signs and nursing note reviewed.   Constitutional:       General: She is not in acute distress.     Appearance: She is well-developed.      Comments: Strong smell of tobacco   HENT:      Head: Normocephalic and atraumatic.   Eyes:      General: No scleral icterus.        Right eye: No discharge.         Left eye: No discharge.      Conjunctiva/sclera: Conjunctivae normal.   Neck:      Musculoskeletal: Normal range of motion and neck supple.   Cardiovascular:      Rate and Rhythm: Normal rate.   Pulmonary:      Effort: Pulmonary effort is normal. No respiratory distress.   Abdominal:      " General: There is no distension.      Palpations: Abdomen is soft.   Musculoskeletal: Normal range of motion.   Skin:     General: Skin is warm and dry.      Findings: No rash.          Neurological:      General: No focal deficit present.      Mental Status: She is alert and oriented to person, place, and time.   Psychiatric:         Behavior: Behavior normal.           Assessment & Plan:  Torn earlobe, unspecified laterality, sequela  Comments:  plastic surgery consult  Orders:  -     Ambulatory referral/consult to Plastic Surgery; Future; Expected date: 07/23/2020    Weight increasing  Comments:  reviewed diet, increase protein, regular exercise    Laceration of left lower leg, initial encounter  Comments:  add doxycycline twice daily, continue bactroban  Orders:  -     doxycycline (VIBRAMYCIN) 100 MG Cap; Take 1 capsule (100 mg total) by mouth 2 (two) times daily.  Dispense: 14 capsule; Refill: 0    Gastroesophageal reflux disease, esophagitis presence not specified

## 2020-07-16 NOTE — PATIENT INSTRUCTIONS
Premier protein shakes, 1/day.   Try putting cold cuts on your salads.   Try including eggs in your diet.

## 2020-07-22 ENCOUNTER — PATIENT OUTREACH (OUTPATIENT)
Dept: ADMINISTRATIVE | Facility: OTHER | Age: 65
End: 2020-07-22

## 2020-07-22 ENCOUNTER — OFFICE VISIT (OUTPATIENT)
Dept: ORTHOPEDICS | Facility: CLINIC | Age: 65
End: 2020-07-22
Payer: MEDICARE

## 2020-07-22 VITALS
BODY MASS INDEX: 23.77 KG/M2 | WEIGHT: 129.19 LBS | HEIGHT: 62 IN | DIASTOLIC BLOOD PRESSURE: 76 MMHG | HEART RATE: 105 BPM | SYSTOLIC BLOOD PRESSURE: 128 MMHG

## 2020-07-22 DIAGNOSIS — M19.049 HAND ARTHRITIS: ICD-10-CM

## 2020-07-22 DIAGNOSIS — M19.032 ARTHRITIS OF WRIST, LEFT: Primary | ICD-10-CM

## 2020-07-22 PROCEDURE — 99203 OFFICE O/P NEW LOW 30 MIN: CPT | Mod: HCNC,25,S$GLB, | Performed by: ORTHOPAEDIC SURGERY

## 2020-07-22 PROCEDURE — 20605 DRAIN/INJ JOINT/BURSA W/O US: CPT | Mod: HCNC,LT,S$GLB, | Performed by: ORTHOPAEDIC SURGERY

## 2020-07-22 PROCEDURE — 3008F BODY MASS INDEX DOCD: CPT | Mod: HCNC,CPTII,S$GLB, | Performed by: ORTHOPAEDIC SURGERY

## 2020-07-22 PROCEDURE — 99203 PR OFFICE/OUTPT VISIT, NEW, LEVL III, 30-44 MIN: ICD-10-PCS | Mod: HCNC,25,S$GLB, | Performed by: ORTHOPAEDIC SURGERY

## 2020-07-22 PROCEDURE — 3008F PR BODY MASS INDEX (BMI) DOCUMENTED: ICD-10-PCS | Mod: HCNC,CPTII,S$GLB, | Performed by: ORTHOPAEDIC SURGERY

## 2020-07-22 PROCEDURE — 99999 PR PBB SHADOW E&M-EST. PATIENT-LVL III: ICD-10-PCS | Mod: PBBFAC,HCNC,, | Performed by: ORTHOPAEDIC SURGERY

## 2020-07-22 PROCEDURE — 99999 PR PBB SHADOW E&M-EST. PATIENT-LVL III: CPT | Mod: PBBFAC,HCNC,, | Performed by: ORTHOPAEDIC SURGERY

## 2020-07-22 PROCEDURE — 20605 INTERMEDIATE JOINT ASPIRATION/INJECTION: L RADIOCARPAL: ICD-10-PCS | Mod: HCNC,LT,S$GLB, | Performed by: ORTHOPAEDIC SURGERY

## 2020-07-22 RX ADMIN — TRIAMCINOLONE ACETONIDE 40 MG: 40 INJECTION, SUSPENSION INTRA-ARTICULAR; INTRAMUSCULAR at 01:07

## 2020-07-22 NOTE — LETTER
July 29, 2020      Ilan Alatorre MD  [address]  [facility] [address]           [facility]  [address] [place] [address]  Phone: [phone]          Patient: Viridiana Suresh   MR Number: [id number]   YOB: 1955   Date of Visit: 7/22/2020       Dear Dr. Ilan Alatorre:    Thank you for referring Viridiana Suresh to me for evaluation. Attached you will find relevant portions of my assessment and plan of care.    If you have questions, please do not hesitate to call me. I look forward to following Viridiana Suresh along with you.    Sincerely,    Gabriel Harman MD    Enclosure  CC:  No Recipients    If you would like to receive this communication electronically, please contact [email or web address] or [phone] to request more information on [employer] Link access.    For providers and/or their staff who would like to refer a patient to [facility], please contact us through our one-stop-shop provider referral line, [facility], at [phone].    If you feel you have received this communication in error or would no longer like to receive these types of communications, please e-mail [email or web address]

## 2020-07-22 NOTE — PROGRESS NOTES
2020    Chief Complaint:  Chief Complaint   Patient presents with    Hand Pain     NP, c/o left hand pain       HPI:  Viridiana Suresh is a 65 y.o. female, who presents to clinic today she states that she had an injury to her left wrist 14-15 years ago.  She states that she started developing arthritis approximately 5-6 years ago.  She states that she is now having significant pain about her left wrist.  She has seen another hand surgeon who suggested wrist fusion.  She is here today for discussion of treatment options and further evaluation.  She states that she has been using multiple limits including CBD oil, 3 old goats, and DMSO.  PMHX:  Past Medical History:   Diagnosis Date    Bipolar disorder     CTS (carpal tunnel syndrome)     Diverticulosis     Hepatomegaly     Presence of dental bridge     UPPER       PSHX:  Past Surgical History:   Procedure Laterality Date    APPENDECTOMY      BREAST SURGERY      Needle and surgical biopsy    COLONOSCOPY N/A 2018    Procedure: COLONOSCOPY;  Surgeon: Uche Crowell MD;  Location: [facility];  Service: Endoscopy;  Laterality: N/A; repeat in 10 years for screening    NASAL SEPTUM SURGERY      RHINOPHYMA RESECTION      RHINOPLASTY TIP      SOFT TISSUE BIOPSY      throat biopsy    TUBAL LIGATION         FMHX:  Family History   Problem Relation Age of Onset    Cancer Mother 80        breast cancer    Diabetes Mother     Cancer Father 77        pancreatic cancer    Diabetes Maternal Grandmother     Colon cancer Neg Hx     Colon polyps Neg Hx     Crohn's disease Neg Hx     Ulcerative colitis Neg Hx     Celiac disease Neg Hx        SOCHX:  Social History     Tobacco Use    Smoking status: Current Every Day Smoker     Packs/day: 2.00     Years: 43.00     Pack years: 86.00     Types: Cigarettes     Last attempt to quit: 2018     Years since quittin.2    Smokeless tobacco: Never Used   Substance Use Topics    Alcohol use: No  "      ALLERGIES:  Patient has no known allergies.    CURRENT MEDICATIONS:  Current Outpatient Medications on File Prior to Visit   Medication Sig Dispense Refill    divalproex (DEPAKOTE) 250 MG EC tablet Take 3 tablets (750 mg total) by mouth every evening. 270 tablet 0    doxycycline (VIBRAMYCIN) 100 MG Cap TAKE 1 CAPSULE BY MOUTH TWICE A DAY 14 capsule 0    fexofenadine (ALLEGRA) 180 MG tablet Take 180 mg by mouth once daily.      ibuprofen (ADVIL,MOTRIN) 400 MG tablet Take 400 mg by mouth every 6 (six) hours as needed for Other.      loxapine (LOXITANE) 10 MG Cap Take 1 capsule (10 mg total) by mouth once daily. 90 capsule 0    mupirocin (BACTROBAN) 2 % ointment Apply topically 2 (two) times daily. 30 g 1     No current facility-administered medications on file prior to visit.        REVIEW OF SYSTEMS:  Review of Systems   Constitutional: Positive for malaise/fatigue. Negative for chills, diaphoresis, fever and weight loss.   HENT: Negative.    Eyes: Negative.    Respiratory: Negative.    Cardiovascular: Negative.    Gastrointestinal: Negative.    Genitourinary: Negative.    Musculoskeletal: Positive for back pain, joint pain and myalgias. Negative for falls and neck pain.   Skin: Negative.    Neurological: Negative.    Endo/Heme/Allergies: Negative.    Psychiatric/Behavioral: Negative.        GENERAL PHYSICAL EXAM:   Ht 5' 2" (1.575 m)   Wt 58.6 kg (129 lb 3 oz)   BMI 23.63 kg/m²    GEN: well developed, well nourished, no acute distress   HENT: Normocephalic, atraumatic   EYES: No discharge, conjunctiva normal   NECK: Supple, non-tender   PULM: No wheezing, no respiratory distress   CV: RRR   ABD: Soft, non-tender    ORTHO EXAM:   Examination of the left wrist reveals that there are no major skin changes.  She does have edema overlying the region of the radial styloid and snuffbox.  Palpation about the wrist does produce tenderness over that area.  She has no ulnar-sided tenderness.  She does have " sensation intact in the median radial ulnar distribution.  Capillary refill is less than 2 sec    RADIOLOGY:   X-rays of the left wrist have been reviewed today.  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    ASSESSMENT:   Left wrist arthritis    PLAN:  1.  I have discussed treatment options with the patient.  I have discussed conservative treatments including steroid injections.  I have also discussed the possibility of wrist arthrodesis.  Patient states that she does not want to undergo surgery.    2.  After informed consent was obtained and injection was placed to the left wrist.  Patient tolerated that well    3.  Follow up with me on a p.r.n. basis

## 2020-07-27 ENCOUNTER — TELEPHONE (OUTPATIENT)
Dept: FAMILY MEDICINE | Facility: CLINIC | Age: 65
End: 2020-07-27

## 2020-07-27 RX ORDER — MUPIROCIN 20 MG/G
OINTMENT TOPICAL 2 TIMES DAILY
Qty: 30 G | Refills: 1 | Status: CANCELLED | OUTPATIENT
Start: 2020-07-27

## 2020-07-27 NOTE — TELEPHONE ENCOUNTER
----- Message from Betsy Blue sent at 7/27/2020  9:51 AM CDT -----  Type:  RX Refill Request    Who Called:  Viridiana  Refill or New Rx:  refill  RX Name and Strength:  mupirocin (BACTROBAN) 2 % ointment  How is the patient currently taking it? (ex. 1XDay):  2 times daily  Is this a 30 day or 90 day RX:    Preferred Pharmacy with phone number:    [id number] [place] - [address]  [address]  [place] [address]  Phone: [phone] Fax: [phone]      Local or Mail Order:  local  Ordering Provider:   Dr Sabino Roy Call Back Number:  [phone]  Additional Information:  Please call her when it has been ordered. Thank you!

## 2020-07-29 RX ORDER — MUPIROCIN 20 MG/G
OINTMENT TOPICAL 2 TIMES DAILY
Qty: 30 G | Refills: 1 | Status: SHIPPED | OUTPATIENT
Start: 2020-07-29 | End: 2020-09-28

## 2020-07-29 RX ORDER — TRIAMCINOLONE ACETONIDE 40 MG/ML
40 INJECTION, SUSPENSION INTRA-ARTICULAR; INTRAMUSCULAR
Status: DISCONTINUED | OUTPATIENT
Start: 2020-07-22 | End: 2020-07-29 | Stop reason: HOSPADM

## 2020-07-29 NOTE — TELEPHONE ENCOUNTER
----- Message from Jaleesa Cam sent at 7/29/2020 11:21 AM CDT -----  Regarding: Patient Rx Not at Pharmacy  Contact: [phone]  Type: Needs Medical Advice  Who Called:  Pt  Best Call Back Number: [phone]  Additional Information: Patient stated the Rx for meds  mupirocin (BACTROBAN) 2 % ointment is not at the Pharmacy ... Please call back and advise. Thank you

## 2020-07-29 NOTE — PROCEDURES
Intermediate Joint Aspiration/Injection: L radiocarpal    Date/Time: 7/22/2020 1:40 PM  Performed by: Gabriel Harman MD  Authorized by: Gabriel Harman MD     Consent Done?:  Yes (Verbal)  Indications:  Pain  Site marked: The procedure site was marked    Timeout: Prior to procedure the correct patient, procedure, and site was verified      Location:  Wrist  Site:  L radiocarpal  Prep: Patient was prepped and draped in usual sterile fashion    Medications:  40 mg triamcinolone acetonide 40 mg/mL

## 2020-08-04 ENCOUNTER — OFFICE VISIT (OUTPATIENT)
Dept: PSYCHIATRY | Facility: CLINIC | Age: 65
End: 2020-08-04
Payer: MEDICARE

## 2020-08-04 VITALS
WEIGHT: 121.94 LBS | HEART RATE: 98 BPM | SYSTOLIC BLOOD PRESSURE: 128 MMHG | HEIGHT: 62 IN | DIASTOLIC BLOOD PRESSURE: 85 MMHG | BODY MASS INDEX: 22.44 KG/M2

## 2020-08-04 DIAGNOSIS — F17.210 CIGARETTE SMOKER: ICD-10-CM

## 2020-08-04 DIAGNOSIS — Z79.899 HIGH RISK MEDICATIONS (NOT ANTICOAGULANTS) LONG-TERM USE: ICD-10-CM

## 2020-08-04 DIAGNOSIS — F31.9 BIPOLAR AFFECTIVE DISORDER, REMISSION STATUS UNSPECIFIED: Primary | ICD-10-CM

## 2020-08-04 PROCEDURE — 99214 PR OFFICE/OUTPT VISIT, EST, LEVL IV, 30-39 MIN: ICD-10-PCS | Mod: HCNC,S$GLB,, | Performed by: PSYCHIATRY & NEUROLOGY

## 2020-08-04 PROCEDURE — 99999 PR PBB SHADOW E&M-EST. PATIENT-LVL III: ICD-10-PCS | Mod: PBBFAC,HCNC,, | Performed by: PSYCHIATRY & NEUROLOGY

## 2020-08-04 PROCEDURE — 99999 PR PBB SHADOW E&M-EST. PATIENT-LVL III: CPT | Mod: PBBFAC,HCNC,, | Performed by: PSYCHIATRY & NEUROLOGY

## 2020-08-04 PROCEDURE — 99499 UNLISTED E&M SERVICE: CPT | Mod: S$GLB,,, | Performed by: PSYCHIATRY & NEUROLOGY

## 2020-08-04 PROCEDURE — 3008F PR BODY MASS INDEX (BMI) DOCUMENTED: ICD-10-PCS | Mod: HCNC,CPTII,S$GLB, | Performed by: PSYCHIATRY & NEUROLOGY

## 2020-08-04 PROCEDURE — 99214 OFFICE O/P EST MOD 30 MIN: CPT | Mod: HCNC,S$GLB,, | Performed by: PSYCHIATRY & NEUROLOGY

## 2020-08-04 PROCEDURE — 99499 RISK ADDL DX/OHS AUDIT: ICD-10-PCS | Mod: S$GLB,,, | Performed by: PSYCHIATRY & NEUROLOGY

## 2020-08-04 PROCEDURE — 3008F BODY MASS INDEX DOCD: CPT | Mod: HCNC,CPTII,S$GLB, | Performed by: PSYCHIATRY & NEUROLOGY
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
## 2020-08-20 ENCOUNTER — TELEPHONE (OUTPATIENT)
Dept: FAMILY MEDICINE | Facility: CLINIC | Age: 65
End: 2020-08-20

## 2020-08-20 NOTE — TELEPHONE ENCOUNTER
----- Message from Lashawn Ortiz sent at 8/20/2020 11:42 AM CDT -----  Type:  RX Refill Request    Who Called:  Patient  RX Name and Strength: mupirocin (BACTROBAN) 2 % ointment  Preferred Pharmacy with phone number:  [facility]  Best Call Back Number:  [phone]  Additional Information:

## 2020-09-21 ENCOUNTER — TELEPHONE (OUTPATIENT)
Dept: ORTHOPEDICS | Facility: CLINIC | Age: 65
End: 2020-09-21

## 2020-09-21 NOTE — TELEPHONE ENCOUNTER
----- Message from Edward Joe sent at 9/21/2020  3:50 PM CDT -----  Contact: patient  Type: Needs Medical Advice  Who Called: patient  Symptoms (please be specific):    How long has patient had these symptoms:    Pharmacy name and phone #:    Best Call Back Number: [phone]  Additional Information: requesting a call back to schedule injection

## 2020-09-28 ENCOUNTER — OFFICE VISIT (OUTPATIENT)
Dept: FAMILY MEDICINE | Facility: CLINIC | Age: 65
End: 2020-09-28
Payer: MEDICARE

## 2020-09-28 ENCOUNTER — TELEPHONE (OUTPATIENT)
Dept: PSYCHIATRY | Facility: CLINIC | Age: 65
End: 2020-09-28

## 2020-09-28 VITALS
BODY MASS INDEX: 20.83 KG/M2 | HEART RATE: 72 BPM | SYSTOLIC BLOOD PRESSURE: 134 MMHG | HEIGHT: 62 IN | TEMPERATURE: 97 F | DIASTOLIC BLOOD PRESSURE: 70 MMHG | WEIGHT: 113.19 LBS

## 2020-09-28 DIAGNOSIS — S83.412A SPRAIN OF MEDIAL COLLATERAL LIGAMENT OF LEFT KNEE, INITIAL ENCOUNTER: Primary | ICD-10-CM

## 2020-09-28 PROCEDURE — 3008F PR BODY MASS INDEX (BMI) DOCUMENTED: ICD-10-PCS | Mod: HCNC,CPTII,S$GLB, | Performed by: FAMILY MEDICINE

## 2020-09-28 PROCEDURE — 99999 PR PBB SHADOW E&M-EST. PATIENT-LVL III: ICD-10-PCS | Mod: PBBFAC,HCNC,, | Performed by: FAMILY MEDICINE

## 2020-09-28 PROCEDURE — 99213 OFFICE O/P EST LOW 20 MIN: CPT | Mod: HCNC,S$GLB,, | Performed by: FAMILY MEDICINE

## 2020-09-28 PROCEDURE — 99213 PR OFFICE/OUTPT VISIT, EST, LEVL III, 20-29 MIN: ICD-10-PCS | Mod: HCNC,S$GLB,, | Performed by: FAMILY MEDICINE

## 2020-09-28 PROCEDURE — 3008F BODY MASS INDEX DOCD: CPT | Mod: HCNC,CPTII,S$GLB, | Performed by: FAMILY MEDICINE

## 2020-09-28 PROCEDURE — 99999 PR PBB SHADOW E&M-EST. PATIENT-LVL III: CPT | Mod: PBBFAC,HCNC,, | Performed by: FAMILY MEDICINE

## 2020-09-28 RX ORDER — IBUPROFEN 200 MG
400 TABLET ORAL 3 TIMES DAILY
COMMUNITY
End: 2020-11-19 | Stop reason: ALTCHOICE

## 2020-09-28 NOTE — PROGRESS NOTES
"Subjective:       Patient ID: Viridiana Suresh is a 65 y.o. female.    Chief Complaint: Knee pain, left    MsHalie Suresh is 64 yo female with hx of left wrist arthritis presenting for left knee pain. Yesterday afternoon, while standing over the bed, she reached over to the right with her left hand to shoo away cat and then felt a twisting sensation in left knee. She immediately put ice, icy hot, and bandage wrap, and took ibuprofen, which offered some pain relief. Pain exacerbated by weight bearing and walking up the stairs, but can walk and move knee internally and externally. Feels worse today. Denies swelling, discoloration, or previous history of left knee trauma.        Review of Systems   Musculoskeletal: Positive for gait problem. Negative for joint swelling and joint deformity.        Left knee pain   Neurological: Negative for weakness and numbness.         Objective:     Blood pressure 134/70, pulse 72, temperature 97.3 °F (36.3 °C), temperature source Temporal, height 5' 2" (1.575 m), weight 51.4 kg (113 lb 3.3 oz).      Physical Exam  Vitals signs reviewed.   Constitutional:       Appearance: Normal appearance.   Cardiovascular:      Pulses:           Popliteal pulses are 2+ on the right side and 2+ on the left side.        Posterior tibial pulses are 2+ on the right side and 2+ on the left side.   Musculoskeletal:      Left hip: She exhibits tenderness.      Left knee: She exhibits swelling. She exhibits normal range of motion, no effusion, no ecchymosis, no deformity, no laceration, no erythema, no LCL laxity, normal patellar mobility, no bony tenderness, normal meniscus and no MCL laxity. Tenderness found. Medial joint line tenderness noted. No lateral joint line, no MCL, no LCL and no patellar tendon tenderness noted.      Left upper leg: She exhibits tenderness.      Right lower leg: No edema.      Left lower leg: No edema.      Comments: Left knee showed subtle swelling on medial side with tenderness " on firm palpation. Left popliteal fossa and left hip joint tender to palpation. Negative anterior drawer, poster drawer, varus stress, and valgus stress tests. Full of ROM intact.   Skin:     Findings: No bruising or erythema.   Neurological:      Mental Status: She is alert.      Sensory: No sensory deficit.      Motor: No weakness.      Gait: Gait abnormal.      Deep Tendon Reflexes:      Reflex Scores:       Patellar reflexes are 2+ on the right side and 2+ on the left side.     Comments: Slight limp on left side when walking down hallway. Sensation intact on left lower extremity.         Assessment:       1. Sprain of medial collateral ligament of left knee, initial encounter        Plan:       Advised patient that left knee pain likely due to sprain, no X-ray needed, and symptoms should resolve with continued rest, ice, and ibuprofen as needed. Encouraged movement as tolerated. If symptoms show no improvement in a few days, she will call us for possible physical therapy.
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
## 2020-09-28 NOTE — TELEPHONE ENCOUNTER
"Called and spoke with the patient about ongoing circumstances-     Offer #s for safe harbor outreach, [employer] ([facility] [facility] [employer]) also offer # for [facility]. Also provide her with the [facility]  number- #[phone]. Also provide # for [employer] who may be able to better guide her.     Pt says, "i've already called a lot of those and I can't get any information through all of them." when told that the [facility] is free and offers lots of activities and opportunities, "I can do all that alone. I don't really need to go out."     I offer that I have limitations and those are a reflection of the resources available in the [facility], but also that if she's not able to engage with those i'm not sure how to help. Ask her "how can I help you? What did you think I might be able to offer today?"     Pt responds: "I was hoping that gio could get some help, have an evaluation, get on some medication and be easier to be with in the house."     Share with her that mental health txmt is voluntary unless there is an imminent danger to self or others. Ask her if this is the case. Pt then shares that he needs cataract surgery and won't go.... lists her concerns, but none are c/w imminent danger.     Pt denies imminent danger for herself- I inquire 2x and she denies- "I wouldn't do that to myself. I still have some hope."     Is on the waiting list at [facility] and also then reports that some of the places she has called are planning to call her back in next 2 days, including a cousin who is going to offer some legal guidance.     Pt cancels appt in oct. "I just can't afford it." we discuss but it seems she has made up her mind to not be able to come- I will refill meds.     No further questions or concerns.   " Subjective   Terry Darwin Dupree is a 7 y.o. male.   Sleep disordered breathing  History of Present Illness   Patient has a history of apnea witnessed proven by sleep study snoring sometimes mouth breathing sleep apnea runs in the family several family members had her tonsils and adenoids out no history of bleeding or anesthesia problems otherwise healthy except for some swallowing problems no ear nose complaints no sore throat currently      The following portions of the patient's history were reviewed and updated as appropriate: allergies, current medications, past family history, past medical history, past social history, past surgical history and problem list.      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 No pertinent past medical history.    History reviewed. No pertinent surgical history.    Review of Systems   Constitutional: Positive for activity change.   HENT: Positive for congestion and trouble swallowing. Negative for sore throat.    Respiratory: Positive for apnea.    Genitourinary: Positive for enuresis.   Hematological: Negative for adenopathy.   All other systems reviewed and are negative.          Objective   Physical Exam   Constitutional: He appears well-developed and well-nourished. He is active.   HENT:   Head: Atraumatic.   Right Ear: Tympanic membrane normal.   Left Ear: Tympanic membrane normal.   Nose: Nose normal.   Mouth/Throat: Mucous membranes are moist. Dentition is normal. Tonsils are 4+ on the right. Tonsils are 4+ on the left. No tonsillar exudate. Oropharynx is clear.   Eyes: Conjunctivae are normal.   Neck: Normal range of  motion. Neck supple.   Cardiovascular: Normal rate, regular rhythm and S1 normal.   Pulmonary/Chest: Effort normal and breath sounds normal.   Abdominal: Soft.   Musculoskeletal: Normal range of motion.   Neurological: He is alert.   Skin: Skin is warm.   Nursing note and vitals reviewed.      Sleep study report reviewed showing mild sleep apnea its in the media per Dr. Higginbotham      Assessment/Plan   Terry was seen today for sleep apnea.    Diagnoses and all orders for this visit:    Chronic tonsillitis    Adenotonsillar hypertrophy    Sleep-disordered breathing    Dysphasia    Sleep apnea, obstructive    Offered to perform tonsillectomy with adenoidectomy if significant adenoidal hypertrophy is present. Explained the nature of the procedure to the parent- mother} in laymans terms including the need for general anesthetic and risks of bleeding, voice change and difficulty swallowing including spillage of food or fluid into the nose on swallowing. Explained that the bleeding could be severe, life threatening, or require transfusion or return to the operating room. Proposed benefits include improved breathing at night, avoidance of the complications of sleep apnea, decreased frequency of throat infections, avoidance of the complications of streptococcal infection. Alternative would be observation. Patient/parent/guardian voices understanding and wishes to proceed. Surgery is scheduled.  I given additional information discussed the risk complications recovery the surgery discussed expectations and the difficulty of recovery.  Reviewed that there is no guarantee there is a stable sleep apnea and that could consider postop sleep study if there is any question discussed will involve time off from school and other activities limitations in their agreement and all questions were answered and surgery scheduled in the near future

## 2020-10-23 ENCOUNTER — LAB VISIT (OUTPATIENT)
Dept: LAB | Facility: OTHER | Age: 65
End: 2020-10-23
Payer: MEDICARE

## 2020-10-23 DIAGNOSIS — Z03.818 ENCOUNTER FOR OBSERVATION FOR SUSPECTED EXPOSURE TO OTHER BIOLOGICAL AGENTS RULED OUT: ICD-10-CM

## 2020-10-23 PROCEDURE — U0003 INFECTIOUS AGENT DETECTION BY NUCLEIC ACID (DNA OR RNA); SEVERE ACUTE RESPIRATORY SYNDROME CORONAVIRUS 2 (SARS-COV-2) (CORONAVIRUS DISEASE [COVID-19]), AMPLIFIED PROBE TECHNIQUE, MAKING USE OF HIGH THROUGHPUT TECHNOLOGIES AS DESCRIBED BY CMS-2020-01-R: HCPCS | Mod: HCNC

## 2020-10-25 LAB — SARS-COV-2 RNA RESP QL NAA+PROBE: NOT DETECTED

## 2020-11-13 ENCOUNTER — TELEPHONE (OUTPATIENT)
Dept: PSYCHIATRY | Facility: CLINIC | Age: 65
End: 2020-11-13

## 2020-11-13 DIAGNOSIS — F31.9 BIPOLAR I DISORDER: ICD-10-CM

## 2020-11-13 RX ORDER — DIVALPROEX SODIUM 250 MG/1
750 TABLET, DELAYED RELEASE ORAL NIGHTLY
Qty: 270 TABLET | Refills: 0 | Status: SHIPPED | OUTPATIENT
Start: 2020-11-13 | End: 2021-02-02 | Stop reason: SDUPTHER

## 2020-11-13 NOTE — TELEPHONE ENCOUNTER
"FYI-    Patient came in today for her appointment and realized it was rescheduled for December 8th.    Patient requested refill on prescription Depakote.  States, " let Dr. Rust know that I've left that monster."     New address was updated in patient chart.  Debby"

## 2020-11-13 NOTE — TELEPHONE ENCOUNTER
Confirmed with previous pharmacy thatpt was taking delayed release.  Contacted [facility] with current pharmacy to advise.

## 2020-11-13 NOTE — TELEPHONE ENCOUNTER
"----- Message from Mary Rust MD sent at 11/13/2020  2:35 PM CST -----  Regarding: RE: [facility]  This is the exact medication which was last filled. I would prefer the pt continue getting what she has been getting through mail order in the past which is "EC"? I don't actually have a preference other than the pt is currently stable and this is what she's been taking.   ----- Message -----  From: Nikia Arrieta LPN  Sent: 11/13/2020   2:11 PM CST  To: Mary Rust MD  Subject: FW: [facility]                                    Please see message below.      Nikia  ----- Message -----  From: Champ Gatica  Sent: 11/13/2020  11:12 AM CST  To: [facility] Staff  Subject: [facility]                                        Type:  Pharmacy Calling to Clarify an RX    Name of Caller:  Nida  Pharmacy Name:    [facility] #[id number] [address] [address]  Phone: [phone] Fax: [phone]      Prescription Name:  divalproex (DEPAKOTE) 250 MG EC tablet 270 tablet 0 11/13/2020Sig - Route: Take 3 tablets (750 mg total) by mouth every evening. - Oral     What do they need to clarify?:  need to know if delayed release or extended release  Best Call Back Number:  [phone]  Additional Information:            "

## 2020-11-19 ENCOUNTER — HOSPITAL ENCOUNTER (OUTPATIENT)
Dept: RADIOLOGY | Facility: HOSPITAL | Age: 65
Discharge: HOME | End: 2020-11-19
Attending: FAMILY MEDICINE
Payer: MEDICARE

## 2020-11-19 ENCOUNTER — OFFICE VISIT (OUTPATIENT)
Dept: FAMILY MEDICINE | Facility: CLINIC | Age: 65
End: 2020-11-19
Payer: MEDICARE

## 2020-11-19 VITALS
WEIGHT: 108.25 LBS | HEART RATE: 80 BPM | DIASTOLIC BLOOD PRESSURE: 76 MMHG | HEIGHT: 62 IN | BODY MASS INDEX: 19.92 KG/M2 | TEMPERATURE: 97 F | SYSTOLIC BLOOD PRESSURE: 138 MMHG

## 2020-11-19 DIAGNOSIS — M54.9 DORSALGIA, UNSPECIFIED: ICD-10-CM

## 2020-11-19 DIAGNOSIS — M54.31 BILATERAL SCIATICA: Primary | ICD-10-CM

## 2020-11-19 DIAGNOSIS — M54.32 BILATERAL SCIATICA: Primary | ICD-10-CM

## 2020-11-19 PROCEDURE — 72110 XR LUMBAR SPINE COMPLETE 5 VIEW: ICD-10-PCS | Mod: 26,HCNC,, | Performed by: RADIOLOGY

## 2020-11-19 PROCEDURE — 99214 OFFICE O/P EST MOD 30 MIN: CPT | Mod: S$GLB,,, | Performed by: FAMILY MEDICINE

## 2020-11-19 PROCEDURE — 99214 PR OFFICE/OUTPT VISIT, EST, LEVL IV, 30-39 MIN: ICD-10-PCS | Mod: S$GLB,,, | Performed by: FAMILY MEDICINE

## 2020-11-19 PROCEDURE — 3008F BODY MASS INDEX DOCD: CPT | Mod: CPTII,S$GLB,, | Performed by: FAMILY MEDICINE

## 2020-11-19 PROCEDURE — 72110 X-RAY EXAM L-2 SPINE 4/>VWS: CPT | Mod: 26,HCNC,, | Performed by: RADIOLOGY

## 2020-11-19 PROCEDURE — 1125F AMNT PAIN NOTED PAIN PRSNT: CPT | Mod: S$GLB,,, | Performed by: FAMILY MEDICINE

## 2020-11-19 PROCEDURE — 72110 X-RAY EXAM L-2 SPINE 4/>VWS: CPT | Mod: TC,HCNC,PN

## 2020-11-19 PROCEDURE — 1125F PR PAIN SEVERITY QUANTIFIED, PAIN PRESENT: ICD-10-PCS | Mod: S$GLB,,, | Performed by: FAMILY MEDICINE

## 2020-11-19 PROCEDURE — 3008F PR BODY MASS INDEX (BMI) DOCUMENTED: ICD-10-PCS | Mod: CPTII,S$GLB,, | Performed by: FAMILY MEDICINE

## 2020-11-19 RX ORDER — INDOMETHACIN 25 MG/1
25 CAPSULE ORAL
Qty: 60 CAPSULE | Refills: 2 | Status: SHIPPED | OUTPATIENT
Start: 2020-11-19 | End: 2020-11-24 | Stop reason: SDUPTHER

## 2020-11-19 RX ORDER — CHLORZOXAZONE 500 MG/1
500 TABLET ORAL NIGHTLY
Qty: 30 TABLET | Refills: 2 | Status: SHIPPED | OUTPATIENT
Start: 2020-11-19 | End: 2020-11-24 | Stop reason: SDUPTHER

## 2020-11-19 NOTE — PATIENT INSTRUCTIONS
Possible Causes of Low Back or Leg Pain    The symptoms in your back or leg may be due to pressure on a nerve. This pressure may be caused by a damaged disk or by abnormal bone growth. Either way, you may feel pain, burning, tingling, or numbness. If you have pressure on a nerve that connects to the sciatic nerve, pain may shoot down your leg.    Pressure from the disk  Constant wear and tear can weaken a disk over time and cause back pain. The disk can then be damaged by a sudden movement or injury. If its soft center begins to bulge, the disk may press on a nerve. Or the outside of the disk may tear, and the soft center may squeeze through and pinch a nerve.    Pressure from bone  As a disk wears out, the vertebrae right above and below the disk begin to touch. This can put pressure on a nerve. Often, abnormal bone (called bone spurs) grows where the vertebrae rub against each other. This can cause the foramen or the spinal canal to narrow (called stenosis) and press against a nerve.  Date Last Reviewed: 10/4/2015  © 4777-2127 [email or web address]. [address] [place] [address]. All rights reserved. This information is not intended as a substitute for professional medical care. Always follow your healthcare professional's instructions.

## 2020-11-19 NOTE — PROGRESS NOTES
"Subjective:       Patient ID: Viridiana Suresh is a 65 y.o. female.    Chief Complaint: Back Pain (shooting pain from back down legs)    She has been having moderate to severe low back pain for the past 6 weeks.  She says she has a past history of low back pain in any way but has not had an evaluation.  Her back pain goes down into both buttocks and to her ankles.  Right is worse than the left.  She has been taking over-the-counter ibuprofen as well as a supplement of tumor it.  The back pain does not interfere with her sleep because she is on medication for bipolar illness.  She was able to stop smoking for 6 months but has resumed during the past 2 months.  She is currently smoking 2 packs per day.  Her back pain was worse with the bowel movement and worse with coughing.  Also worse with walking.  She has had trouble walking at times especially putting weight on to the right leg.  She does complain of some right leg numbness.    Review of Systems   Constitutional: Negative for fever and unexpected weight change.   Respiratory: Positive for cough. Negative for shortness of breath.    Cardiovascular: Negative for chest pain.         Objective:     Blood pressure 138/76, pulse 80, temperature 96.8 °F (36 °C), temperature source Skin, height 5' 2" (1.575 m), weight 49.1 kg (108 lb 3.9 oz).      Physical Exam  Constitutional:       General: She is in acute distress (She seemed in acute distress at the beginning of the visit but walked more comfortably at the end of the visit.).   Cardiovascular:      Rate and Rhythm: Normal rate and regular rhythm.      Heart sounds: No murmur.   Pulmonary:      Effort: Pulmonary effort is normal. No respiratory distress.   Musculoskeletal:      Comments: Painful to stand up from the chair.  She could only flex approximately 10°.  Side bending was mildly uncomfortable.  Hyper extension was painful.  Spinous processes were nontender.  No sacroiliac tenderness.  Mild palpable muscle " spasm.  Straight leg raise was equivocal.   Neurological:      Mental Status: She is alert.      Comments: Patellar reflexes 3+ and Achilles reflexes 2+.  She may have some mild ankle dorsiflexion weakness on the right.  Sensation to light touch was intact.   Psychiatric:      Comments: Tearful initially.  Better later in the visit.         Assessment:       1. Bilateral sciatica    2. Dorsalgia, unspecified        Plan:       Back x-ray reviewed.  She has some scoliosis and degenerative disc findings.    indomethacin 25 mg t.i.d..  Parafon Forte at night.  Consider physical therapy if not improving.  Follow-up in 2-3 weeks.

## 2020-11-23 ENCOUNTER — TELEPHONE (OUTPATIENT)
Dept: FAMILY MEDICINE | Facility: CLINIC | Age: 65
End: 2020-11-23

## 2020-11-23 NOTE — TELEPHONE ENCOUNTER
----- Message from Mary Carlson sent at 11/23/2020 11:44 AM CST -----  Type:  Test Results    Who Called: Patient  Name of Test (Lab/Mammo/Etc): xray  Date of Test: 11/19  Ordering Provider: above provider  Where the test was performed: [facility]  Would the patient rather a call back or a response via [facility]? callback  Best Call Back Number: [phone]  Additional Information:     Would like to speak with a nurse regarding medications a well

## 2020-11-23 NOTE — TELEPHONE ENCOUNTER
Called pt, the number on file was her friends number. The friend stated pt does not have a phone and was using hers. She will relay a message to pt to have her call us back.

## 2020-11-24 NOTE — TELEPHONE ENCOUNTER
----- Message from Franki Aguirre sent at 11/24/2020  2:42 PM CST -----  Type: Needs Medical Advice  Who Called:  Michelle/ Richard Vidal    Pharmacy name and phone #:    [id number] - [place], [place] - [address] [place] [address] AT SEC OF [facility] [address] & [facility]  [address] [place] [address]  Phone: [phone] Fax: [phone]      Best Call Back Number: [phone]--Caller  Additional Information: Caller states that the patient's refills went to the wrong pharmacy.  [facility]  indomethacin (INDOCIN) 25 MG capsule  chlorzoxazone (PARAFON FORTE) 500 mg Tab    Please call to advise

## 2020-11-25 RX ORDER — CHLORZOXAZONE 500 MG/1
500 TABLET ORAL NIGHTLY
Qty: 30 TABLET | Refills: 2 | Status: SHIPPED | OUTPATIENT
Start: 2020-11-25 | End: 2020-12-05

## 2020-11-25 RX ORDER — INDOMETHACIN 25 MG/1
25 CAPSULE ORAL
Qty: 60 CAPSULE | Refills: 2 | Status: SHIPPED | OUTPATIENT
Start: 2020-11-25 | End: 2021-01-28

## 2020-11-30 ENCOUNTER — TELEPHONE (OUTPATIENT)
Dept: FAMILY MEDICINE | Facility: CLINIC | Age: 65
End: 2020-11-30

## 2020-11-30 NOTE — TELEPHONE ENCOUNTER
----- Message from Wendie Cobos sent at 11/30/2020  4:16 PM CST -----  Regarding: results  Contact: patient  Call placed to pod    Type:  Test Results    Who Called:  pt  Name of Test (Lab/Mammo/Etc):  x ray report  Date of Test:  n/a  Ordering Provider:  Ilan Alatorre  Where the test was performed[facility]  Best Call Back Number:  [phone]    Additional Information:   please leave message with Liliya(owner of phone)

## 2020-12-04 ENCOUNTER — TELEPHONE (OUTPATIENT)
Dept: FAMILY MEDICINE | Facility: CLINIC | Age: 65
End: 2020-12-04

## 2020-12-04 NOTE — TELEPHONE ENCOUNTER
----- Message from Malissa Noel sent at 12/4/2020  3:00 PM CST -----  Regarding: results  Contact: patient  Type:  Test Results    Who Called:  patient  Name of Test (Lab/Mammo/Etc):  Xray back  Date of Test:  11/19/20  Ordering Provider:  Dr Alatorre  Where the test was performed:  [facility]  Best Call Back Number: [phone] (home)   Additional Information:  Please call patient with results. Thanks!

## 2020-12-07 ENCOUNTER — TELEPHONE (OUTPATIENT)
Dept: FAMILY MEDICINE | Facility: CLINIC | Age: 65
End: 2020-12-07

## 2020-12-07 NOTE — TELEPHONE ENCOUNTER
----- Message from Champ Gatica sent at 12/7/2020  9:35 AM CST -----  Regarding: pt  Type:  Patient Requesting Referral    Who Called:  pt  Does the patient already have the specialty appointment scheduled?:  no  If yes, what is the date of that appointment?:  n/a  Referral to What Specialty:  physical therapy  Reason for Referral:  report from [facility] states needs  Does the patient want the referral with a specific physician?:  no  [facility] the [facility]  Patient Requesting a Call Back?:  yes  Best Call Back Number:  [phone]  Additional Information:   pt has questions if can get chriopractic referral instead of PT and if needs PT at all. Please call to discuss.

## 2020-12-07 NOTE — TELEPHONE ENCOUNTER
----- Message from Lyndsey Hamlin sent at 12/7/2020  1:03 PM CST -----  Contact: Pt  Return Call  Who Called: Pt  Who Left Message: Dhara  Does the patient know what this is regarding?: Information about physical therapy  Best Call Back #: [phone]  Additional Info:

## 2020-12-08 ENCOUNTER — OFFICE VISIT (OUTPATIENT)
Dept: PSYCHIATRY | Facility: CLINIC | Age: 65
End: 2020-12-08
Payer: MEDICARE

## 2020-12-08 VITALS
DIASTOLIC BLOOD PRESSURE: 82 MMHG | SYSTOLIC BLOOD PRESSURE: 145 MMHG | BODY MASS INDEX: 20.08 KG/M2 | WEIGHT: 109.13 LBS | HEIGHT: 62 IN | HEART RATE: 81 BPM

## 2020-12-08 DIAGNOSIS — F31.9 BIPOLAR AFFECTIVE DISORDER, REMISSION STATUS UNSPECIFIED: Primary | ICD-10-CM

## 2020-12-08 DIAGNOSIS — F17.210 CIGARETTE SMOKER: ICD-10-CM

## 2020-12-08 DIAGNOSIS — Z79.899 HIGH RISK MEDICATIONS (NOT ANTICOAGULANTS) LONG-TERM USE: ICD-10-CM

## 2020-12-08 PROCEDURE — 99214 PR OFFICE/OUTPT VISIT, EST, LEVL IV, 30-39 MIN: ICD-10-PCS | Mod: HCNC,S$GLB,, | Performed by: PSYCHIATRY & NEUROLOGY

## 2020-12-08 PROCEDURE — 99999 PR PBB SHADOW E&M-EST. PATIENT-LVL III: ICD-10-PCS | Mod: PBBFAC,HCNC,, | Performed by: PSYCHIATRY & NEUROLOGY

## 2020-12-08 PROCEDURE — 3008F BODY MASS INDEX DOCD: CPT | Mod: HCNC,CPTII,S$GLB, | Performed by: PSYCHIATRY & NEUROLOGY

## 2020-12-08 PROCEDURE — 3008F PR BODY MASS INDEX (BMI) DOCUMENTED: ICD-10-PCS | Mod: HCNC,CPTII,S$GLB, | Performed by: PSYCHIATRY & NEUROLOGY

## 2020-12-08 PROCEDURE — 99214 OFFICE O/P EST MOD 30 MIN: CPT | Mod: HCNC,S$GLB,, | Performed by: PSYCHIATRY & NEUROLOGY

## 2020-12-08 PROCEDURE — 1125F PR PAIN SEVERITY QUANTIFIED, PAIN PRESENT: ICD-10-PCS | Mod: HCNC,S$GLB,, | Performed by: PSYCHIATRY & NEUROLOGY

## 2020-12-08 PROCEDURE — 1125F AMNT PAIN NOTED PAIN PRSNT: CPT | Mod: HCNC,S$GLB,, | Performed by: PSYCHIATRY & NEUROLOGY

## 2020-12-08 PROCEDURE — 99999 PR PBB SHADOW E&M-EST. PATIENT-LVL III: CPT | Mod: PBBFAC,HCNC,, | Performed by: PSYCHIATRY & NEUROLOGY

## 2020-12-08 RX ORDER — LOXAPINE SUCCINATE 10 MG/1
10 TABLET ORAL DAILY
Qty: 90 CAPSULE | Refills: 0 | Status: SHIPPED | OUTPATIENT
Start: 2020-12-08 | End: 2021-05-11 | Stop reason: SDUPTHER

## 2020-12-08 RX ORDER — CHLORZOXAZONE 250 MG/1
25 TABLET ORAL 3 TIMES DAILY PRN
COMMUNITY
End: 2021-01-28
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
## 2020-12-09 ENCOUNTER — LAB VISIT (OUTPATIENT)
Dept: LAB | Facility: HOSPITAL | Age: 65
End: 2020-12-09
Attending: PSYCHIATRY & NEUROLOGY
Payer: MEDICARE

## 2020-12-09 DIAGNOSIS — Z79.899 HIGH RISK MEDICATIONS (NOT ANTICOAGULANTS) LONG-TERM USE: ICD-10-CM

## 2020-12-09 PROCEDURE — 36415 COLL VENOUS BLD VENIPUNCTURE: CPT | Mod: HCNC,PN

## 2020-12-09 PROCEDURE — 83036 HEMOGLOBIN GLYCOSYLATED A1C: CPT | Mod: HCNC

## 2020-12-10 LAB
ESTIMATED AVG GLUCOSE: 100 MG/DL (ref 68–131)
HBA1C MFR BLD HPLC: 5.1 % (ref 4–5.6)

## 2020-12-14 ENCOUNTER — TELEPHONE (OUTPATIENT)
Dept: FAMILY MEDICINE | Facility: CLINIC | Age: 65
End: 2020-12-14

## 2020-12-14 DIAGNOSIS — M54.32 BILATERAL SCIATICA: Primary | ICD-10-CM

## 2020-12-14 DIAGNOSIS — M54.31 BILATERAL SCIATICA: Primary | ICD-10-CM

## 2020-12-14 NOTE — TELEPHONE ENCOUNTER
----- Message from Lashawn Ramon sent at 12/14/2020  8:10 AM CST -----  Contact: self  Type: Needs Medical Advice  Who Called:  patient   Symptoms (please be specific):  pain in back and leg    Pharmacy name and phone #:   [id number] - [place] [address] [address] [address]  Phone: [phone] Fax: [phone]  Best Call Back Number: [phone]   Additional Information: states medication given is not helping pain, please contact to advise

## 2020-12-14 NOTE — TELEPHONE ENCOUNTER
Spoke with pt, she states at [facility] she was seen for back pain and given parafon nightly and indomethacin TID prn. She is taking as directed  with no relief, painful to walk. asking what next step is.

## 2020-12-14 NOTE — TELEPHONE ENCOUNTER
----- Message from Nida White sent at 12/14/2020 12:05 PM CST -----  Regarding: Medical Advice  Contact: Patient  Patient stated she can not get out of bed and needs pain relief   Patient stated she needs medication to assist  with pain   Please Assist     Patient can be reached at [phone]

## 2020-12-16 DIAGNOSIS — Z79.899 HIGH RISK MEDICATIONS (NOT ANTICOAGULANTS) LONG-TERM USE: Primary | ICD-10-CM

## 2020-12-17 ENCOUNTER — TELEPHONE (OUTPATIENT)
Dept: FAMILY MEDICINE | Facility: CLINIC | Age: 65
End: 2020-12-17

## 2020-12-17 DIAGNOSIS — M54.30 SCIATICA, UNSPECIFIED LATERALITY: Primary | ICD-10-CM

## 2020-12-17 NOTE — TELEPHONE ENCOUNTER
----- Message from Mara Jarquin sent at 12/17/2020  3:13 PM CST -----  Contact: self  Patient is bel Jules please call back at [phone] (home).  Would you see if the doctor will refer her to a doctor that can help her with her back and the pains going down her leg.  Call her back with this information.

## 2020-12-17 NOTE — TELEPHONE ENCOUNTER
----- Message from Odilia Nogueira sent at 12/17/2020  7:56 AM CST -----  Regarding: Pt Advice  Contact: Patient  Type: Needs Medical Advice  Who Called:  Patient  Symptoms (please be specific):  Severe Lower back pain down both legs  Pharmacy name and phone #:    [id number] - [place] [address] AT [address] [address]  Phone: [phone] Fax: [phone]  Best Call Back Number: [phone]  Additional Information: Patient called stating she is still in severe pain cant walk sit and hurts to lay down. She would like to have something to help with  her pain.

## 2020-12-17 NOTE — TELEPHONE ENCOUNTER
----- Message from Maya Cano MA sent at 12/17/2020 10:39 AM CST -----  Regarding: returning call  Contact: linn  Type:  Patient Returning Call    Who Called:  patient   Who Left Message for Patient:  Louise   Does the patient know what this is regarding?:    Best Call Back Number:  [phone] (home)     Additional Information:        
LM for pt to call back  
Alert and oriented to person, place and time

## 2020-12-18 ENCOUNTER — LAB VISIT (OUTPATIENT)
Dept: LAB | Facility: HOSPITAL | Age: 65
End: 2020-12-18
Attending: PSYCHIATRY & NEUROLOGY
Payer: MEDICARE

## 2020-12-18 ENCOUNTER — TELEPHONE (OUTPATIENT)
Dept: FAMILY MEDICINE | Facility: CLINIC | Age: 65
End: 2020-12-18

## 2020-12-18 DIAGNOSIS — Z79.899 HIGH RISK MEDICATIONS (NOT ANTICOAGULANTS) LONG-TERM USE: ICD-10-CM

## 2020-12-18 LAB
ALBUMIN SERPL BCP-MCNC: 3.8 G/DL (ref 3.5–5.2)
ALP SERPL-CCNC: 71 U/L (ref 55–135)
ALT SERPL W/O P-5'-P-CCNC: 11 U/L (ref 10–44)
ANION GAP SERPL CALC-SCNC: 8 MMOL/L (ref 8–16)
AST SERPL-CCNC: 15 U/L (ref 10–40)
BILIRUB SERPL-MCNC: 0.5 MG/DL (ref 0.1–1)
BUN SERPL-MCNC: 31 MG/DL (ref 8–23)
CALCIUM SERPL-MCNC: 9.3 MG/DL (ref 8.7–10.5)
CHLORIDE SERPL-SCNC: 105 MMOL/L (ref 95–110)
CHOLEST SERPL-MCNC: 227 MG/DL (ref 120–199)
CHOLEST/HDLC SERPL: 3.9 {RATIO} (ref 2–5)
CO2 SERPL-SCNC: 26 MMOL/L (ref 23–29)
CREAT SERPL-MCNC: 1.3 MG/DL (ref 0.5–1.4)
EST. GFR  (AFRICAN AMERICAN): 50.1 ML/MIN/1.73 M^2
EST. GFR  (NON AFRICAN AMERICAN): 43.5 ML/MIN/1.73 M^2
GLUCOSE SERPL-MCNC: 100 MG/DL (ref 70–110)
HDLC SERPL-MCNC: 58 MG/DL (ref 40–75)
HDLC SERPL: 25.6 % (ref 20–50)
LDLC SERPL CALC-MCNC: 151.6 MG/DL (ref 63–159)
NONHDLC SERPL-MCNC: 169 MG/DL
POTASSIUM SERPL-SCNC: 5.1 MMOL/L (ref 3.5–5.1)
PROT SERPL-MCNC: 6.8 G/DL (ref 6–8.4)
SODIUM SERPL-SCNC: 139 MMOL/L (ref 136–145)
TRIGL SERPL-MCNC: 87 MG/DL (ref 30–150)
VALPROATE SERPL-MCNC: 54.7 UG/ML (ref 50–100)

## 2020-12-18 PROCEDURE — 36415 COLL VENOUS BLD VENIPUNCTURE: CPT | Mod: PN

## 2020-12-18 PROCEDURE — 80061 LIPID PANEL: CPT

## 2020-12-18 PROCEDURE — 80053 COMPREHEN METABOLIC PANEL: CPT

## 2020-12-18 PROCEDURE — 80164 ASSAY DIPROPYLACETIC ACD TOT: CPT

## 2020-12-18 NOTE — TELEPHONE ENCOUNTER
----- Message from Keyonna Mantilla sent at 12/18/2020 10:10 AM CST -----  Contact: patient  Type: Needs Medical Advice  Who Called:  patient  Symptoms (please be specific):  na  How long has patient had these symptoms:  tom  Pharmacy name and phone #:  [facility]  Best Call Back Number: [phone]  Additional Information: patient states that a referral was supposed to be put in for her to see back and spine but she has not heard from the office.  Please call to advise.  Thanks

## 2020-12-18 NOTE — TELEPHONE ENCOUNTER
First apt is 1/6 in [facility], pt is requesting to be seen sooner. Please advise if pt can be accomodated

## 2020-12-21 NOTE — TELEPHONE ENCOUNTER
I spoke with the patient and she said she has decided to do physical therapy and does not need an appointment with Aisha Berry at this time.

## 2020-12-28 ENCOUNTER — CLINICAL SUPPORT (OUTPATIENT)
Dept: REHABILITATION | Facility: HOSPITAL | Age: 65
End: 2020-12-28
Attending: FAMILY MEDICINE
Payer: MEDICARE

## 2020-12-28 DIAGNOSIS — G89.29 CHRONIC RIGHT-SIDED LOW BACK PAIN WITH RIGHT-SIDED SCIATICA: ICD-10-CM

## 2020-12-28 DIAGNOSIS — M54.41 CHRONIC RIGHT-SIDED LOW BACK PAIN WITH RIGHT-SIDED SCIATICA: ICD-10-CM

## 2020-12-28 DIAGNOSIS — M54.31 BILATERAL SCIATICA: ICD-10-CM

## 2020-12-28 DIAGNOSIS — M54.32 BILATERAL SCIATICA: ICD-10-CM

## 2020-12-28 PROBLEM — M54.40 CHRONIC RIGHT-SIDED LOW BACK PAIN WITH SCIATICA: Status: ACTIVE | Noted: 2020-12-28

## 2020-12-28 PROCEDURE — 97110 THERAPEUTIC EXERCISES: CPT | Mod: PN

## 2020-12-28 PROCEDURE — 97161 PT EVAL LOW COMPLEX 20 MIN: CPT | Mod: PN

## 2020-12-31 ENCOUNTER — TELEPHONE (OUTPATIENT)
Dept: FAMILY MEDICINE | Facility: CLINIC | Age: 65
End: 2020-12-31

## 2020-12-31 NOTE — PLAN OF CARE
[facility]  Physical Therapy Initial Evaluation    Date: 12/28/2020   Name: Viridiana Suresh  Clinic Number: [id number]    Therapy Diagnosis:   Encounter Diagnoses   Name Primary?    Bilateral sciatica     Chronic right-sided low back pain with right-sided sciatica      Physician: Ilan Alatorre MD    Physician Orders: PT Eval and Treat   Medical Diagnosis from Referral: M54.30 (ICD-10-CM) - Sciatica, unspecified laterality  Evaluation Date: 12/28/2020  Authorization Period Expiration: 12/18/2021  Plan of Care Expiration: 2/8/2021  Visit # / Visits authorized: 1/ 1    Time In: 11:40  Time Out: 12:35  Total Appointment Time (timed & untimed codes): 55 minutes    Precautions: Standard    Subjective   Date of onset: two months ago  History of current condition - Viridiana reports: R low back and hip pain which radiates from her buttocks and down into her R leg with pins and needles. She reports problem with ankles and feet cramping up and herman horses. He brother was trying to help her stretch out and it seemed to help, but it got to a point where she could not get off of the ground. She tries to walk her dog, but it is hard. She is in the middle of moving and a divorce. She reports pain will go along her lower back, too. Rx meds don't seem to be helping. Muscle relaxer don't seem to be helping.      Medical History:   Past Medical History:   Diagnosis Date    Bipolar disorder     CTS (carpal tunnel syndrome)     Diverticulosis     Hepatomegaly     Presence of dental bridge     UPPER     Surgical History:   Viridiana Suresh  has a past surgical history that includes Tubal ligation; Appendectomy; Rhinoplasty tip; Breast surgery; Rhinophyma resection; Nasal septum surgery; Soft Tissue Biopsy; and Colonoscopy (N/A, 6/12/2018).    Medications:   Viridiana has a current medication list which includes the following prescription(s): chlorzoxazone, divalproex, fexofenadine, indomethacin, and  "loxapine.    Allergies:   Review of patient's allergies indicates:  No Known Allergies     Imaging, x-ray:   FINDINGS:  Moderate lumbar dextrocurvature is present.  There is exaggeration of normal lumbar lordosis.  There is no fracture.  Mild loss of disc height is present from L3 through S1.  Multilevel facet arthropathy is present range from mild to moderate.  Moderate calcification of the aorta is present.     Impression:     Moderate lumbar dextroscoliosis and moderate multilevel degenerative change.      Electronically signed by: Ronak Adams MD  Date:                                            11/19/2020  Time:                                           10:21    Prior Therapy: not for this condition  Social History: lives in [facility]  Occupation: retired - social security disability  Prior Level of Function: no issues  Current Level of Function: difficulty with all tasks    Pain:  Current "20"/10, worst "20"/10, best 5 or 7/10   Location: right buttock, low back and into her right LE into her foot and calf at times  Description: Aching, burning, pain that won't quit  Aggravating Factors: pain all the time, sitting, getting out of bed  Easing Factors: nothing, weeks ago hot pack helped and her brother helping to stretch her    Patients goals: get out of pain to live life    Objective     SI joint: Standing R iliac crest and PSIS lower than L. Supine R ASIS higher than L. Indication of R posterior innominate rotation    Lumbar  ROM Increased Pain?   Flexion 70% Pulling to HS   Extension 75% Pulling to back of knees   Side Bending Right 50% Pain to R hip/buttocks   Side Bending Left 40% Pain to R hip/buttocks     Lumbar  Strength Increased Pain?   Flexion 5/5 No    Extension 5/5 No    Side Bending Right 5/5 No    Side Bending Left 5/5 No    *Tested with patient seated    Hip Strength Left Right   Flexion 5/5 4+/5   Extension 5/5 4/5   Abduction 4+/5 4/5       Limitation/Restriction for FOTO Lumbar " Spine Survey    Therapist reviewed FOTO scores for Viridiana Suresh on 12/28/2020.   FOTO documents entered into [employer] /l?k â€¢ [employer]/ - see Media section.    Limitation Score: 61%         TREATMENT   Treatment Time In: 12:15  Treatment Time Out: 12:35  Total Treatment time (time-based codes) separate from Evaluation: 20 minutes    Viridiana received therapeutic exercises to develop flexibility and pelvic alignment for 20 minutes including:  Push pull modified for L posterior rotation  Supine active HS stretch  Figure four supine stretch on R only    Next visit: PPT, glut sets, supine hook lying hip abd    Home Exercises and Patient Education Provided    Education provided:   - Anatomy of SI joint   - tightness in muscles  - log roll for transition to sit to stand  - regular performance of push pull    Written Home Exercises Provided: yes.  Exercises were reviewed and Viridiana was able to demonstrate them prior to the end of the session.  Viridiana demonstrated good  understanding of the education provided.     See EMR under Patient Instructions for exercises provided 12/28/2020.    Assessment   Viridiana is a 65 y.o. female referred to outpatient Physical Therapy with a medical diagnosis of Sciatica, unspecified laterality. Patient presents with R posterior rotation of innominate affecting function of musculature along R hip including piriformis and hamstrings. She presents with decreased strength to R hip, decreased HS length, and decreased lumbar AROM.     Patient prognosis is Good.   Patientt will benefit from skilled outpatient Physical Therapy to address the deficits stated above and in the chart below, provide patient /family education, and to maximize patientt's level of independence.     Plan of care discussed with patient: Yes  Patient's spiritual, cultural and educational needs considered and patient is agreeable to the plan of care and goals as stated below:     Anticipated Barriers for therapy: none    Medical Necessity is demonstrated  by the following  History  Co-morbidities and personal factors that may impact the plan of care Co-morbidities:   none    Personal Factors:   no deficits     low   Examination  Body Structures and Functions, activity limitations and participation restrictions that may impact the plan of care Body Regions:   back  lower extremities    Body Systems:    ROM  strength    Participation Restrictions:   none    Activity limitations:   Learning and applying knowledge  no deficits    General Tasks and Commands  no deficits    Communication  no deficits    Mobility  lifting and carrying objects  walking  sitting, sit to stand    Self care  dressing    Domestic Life  doing house work (cleaning house, washing dishes, laundry)    Interactions/Relationships  no deficits    Life Areas  no deficits    Community and Social Life  recreation and leisure         high   Clinical Presentation stable and uncomplicated low   Decision Making/ Complexity Score: low     Goals:  Short Term Goals: 4 weeks   1. Pt will present with increased lumbar AROM by 10% for improved ability to perform ADLs.   2. Pt will report decreased pain to 8/10 for improved ability to perform ADLs.  3. Pt will present with increased hip strength into ABD on L by one half grade for increased support to L hip for improved ability to perform ADLs.  4. Pt will present with increased HS length by 5 degrees for decreased stress to lumbar spine and increased ease with ADLs.     Long Term Goals: 6 weeks   1. Pt will present with increased lumbar AROM by 20% for improved ability to perform ADLs.   2. Pt will report decreased pain to 6/10 for improved ability to perform ADLs.  3. Pt will present with increased hip strength into ABD on L by one full grade for increased support to L hip for improved ability to perform ADLs.  4. Pt will present with increased HS length by 10 degrees for decreased stress to lumbar spine and increased ease with ADLs.   5. Pt will be independent  with discharge HEP in order to manage condition.      Plan   Plan of care Certification: 12/28/2020 to 2/8/2021.    Outpatient Physical Therapy 2 times weekly for 6 weeks to include the following interventions: Manual Therapy, Moist Heat/ Ice, Patient Education, Therapeutic Exercise and Ultrasound.     Jayme Cruz, PT

## 2020-12-31 NOTE — TELEPHONE ENCOUNTER
Spoke with pt, she states she went to PT and she did not care for. She will contact [facility] to find out if they cover chiropractic care and she can schedule with a clinic she chooses, no referral needed form us. She expressed disbelief that we do not recommend or refer to chiropractors and ended call

## 2020-12-31 NOTE — TELEPHONE ENCOUNTER
----- Message from Lashawn Ramon sent at 12/31/2020 10:06 AM CST -----  Contact: self  Type: Needs Medical Advice  Who Called:  patient    Best Call Back Number: [phone] (home)     Additional Information: patient seen at physical therapy, she states this is not what she wants she want chiropractic care, please contact to advise.

## 2021-01-05 ENCOUNTER — TELEPHONE (OUTPATIENT)
Dept: FAMILY MEDICINE | Facility: CLINIC | Age: 66
End: 2021-01-05

## 2021-01-08 ENCOUNTER — CLINICAL SUPPORT (OUTPATIENT)
Dept: REHABILITATION | Facility: HOSPITAL | Age: 66
End: 2021-01-08
Payer: MEDICARE

## 2021-01-08 DIAGNOSIS — M54.41 CHRONIC RIGHT-SIDED LOW BACK PAIN WITH RIGHT-SIDED SCIATICA: Primary | ICD-10-CM

## 2021-01-08 DIAGNOSIS — G89.29 CHRONIC RIGHT-SIDED LOW BACK PAIN WITH RIGHT-SIDED SCIATICA: Primary | ICD-10-CM

## 2021-01-08 PROCEDURE — 97140 MANUAL THERAPY 1/> REGIONS: CPT | Mod: PN

## 2021-01-08 PROCEDURE — 97014 ELECTRIC STIMULATION THERAPY: CPT | Mod: PN

## 2021-01-12 ENCOUNTER — NURSE TRIAGE (OUTPATIENT)
Dept: ADMINISTRATIVE | Facility: CLINIC | Age: 66
End: 2021-01-12

## 2021-01-12 ENCOUNTER — TELEPHONE (OUTPATIENT)
Dept: ORTHOPEDICS | Facility: CLINIC | Age: 66
End: 2021-01-12

## 2021-01-13 ENCOUNTER — CLINICAL SUPPORT (OUTPATIENT)
Dept: REHABILITATION | Facility: HOSPITAL | Age: 66
End: 2021-01-13
Payer: MEDICARE

## 2021-01-13 DIAGNOSIS — M54.41 CHRONIC RIGHT-SIDED LOW BACK PAIN WITH RIGHT-SIDED SCIATICA: Primary | ICD-10-CM

## 2021-01-13 DIAGNOSIS — G89.29 CHRONIC RIGHT-SIDED LOW BACK PAIN WITH RIGHT-SIDED SCIATICA: Primary | ICD-10-CM

## 2021-01-13 PROCEDURE — 97035 APP MDLTY 1+ULTRASOUND EA 15: CPT | Mod: PN

## 2021-01-13 PROCEDURE — 97014 ELECTRIC STIMULATION THERAPY: CPT | Mod: PN

## 2021-01-13 PROCEDURE — 97110 THERAPEUTIC EXERCISES: CPT | Mod: PN

## 2021-01-15 ENCOUNTER — CLINICAL SUPPORT (OUTPATIENT)
Dept: REHABILITATION | Facility: HOSPITAL | Age: 66
End: 2021-01-15
Payer: MEDICARE

## 2021-01-15 DIAGNOSIS — G89.29 CHRONIC RIGHT-SIDED LOW BACK PAIN WITH RIGHT-SIDED SCIATICA: ICD-10-CM

## 2021-01-15 DIAGNOSIS — M54.41 CHRONIC RIGHT-SIDED LOW BACK PAIN WITH RIGHT-SIDED SCIATICA: ICD-10-CM

## 2021-01-15 PROCEDURE — 97035 APP MDLTY 1+ULTRASOUND EA 15: CPT | Mod: PN,CQ

## 2021-01-15 PROCEDURE — 97110 THERAPEUTIC EXERCISES: CPT | Mod: PN,CQ

## 2021-01-20 ENCOUNTER — CLINICAL SUPPORT (OUTPATIENT)
Dept: REHABILITATION | Facility: HOSPITAL | Age: 66
End: 2021-01-20
Payer: MEDICARE

## 2021-01-20 DIAGNOSIS — M54.41 CHRONIC RIGHT-SIDED LOW BACK PAIN WITH RIGHT-SIDED SCIATICA: Primary | ICD-10-CM

## 2021-01-20 DIAGNOSIS — G89.29 CHRONIC RIGHT-SIDED LOW BACK PAIN WITH RIGHT-SIDED SCIATICA: Primary | ICD-10-CM

## 2021-01-20 PROCEDURE — 97110 THERAPEUTIC EXERCISES: CPT | Mod: PN

## 2021-01-21 ENCOUNTER — PATIENT OUTREACH (OUTPATIENT)
Dept: ADMINISTRATIVE | Facility: OTHER | Age: 66
End: 2021-01-21

## 2021-01-22 ENCOUNTER — TELEPHONE (OUTPATIENT)
Dept: PAIN MEDICINE | Facility: CLINIC | Age: 66
End: 2021-01-22

## 2021-01-22 ENCOUNTER — HOSPITAL ENCOUNTER (OUTPATIENT)
Dept: RADIOLOGY | Facility: HOSPITAL | Age: 66
Discharge: HOME | End: 2021-01-22
Attending: PHYSICIAN ASSISTANT
Payer: MEDICARE

## 2021-01-22 ENCOUNTER — OFFICE VISIT (OUTPATIENT)
Dept: SPINE | Facility: CLINIC | Age: 66
End: 2021-01-22
Payer: MEDICARE

## 2021-01-22 ENCOUNTER — TELEPHONE (OUTPATIENT)
Dept: PAIN MEDICINE | Facility: HOSPITAL | Age: 66
End: 2021-01-22

## 2021-01-22 ENCOUNTER — TELEPHONE (OUTPATIENT)
Dept: SPINE | Facility: CLINIC | Age: 66
End: 2021-01-22

## 2021-01-22 VITALS
SYSTOLIC BLOOD PRESSURE: 142 MMHG | HEIGHT: 62 IN | DIASTOLIC BLOOD PRESSURE: 81 MMHG | BODY MASS INDEX: 20.71 KG/M2 | WEIGHT: 112.56 LBS | HEART RATE: 75 BPM

## 2021-01-22 DIAGNOSIS — M41.86 OTHER FORM OF SCOLIOSIS OF LUMBAR SPINE: Primary | ICD-10-CM

## 2021-01-22 DIAGNOSIS — M54.30 SCIATICA, UNSPECIFIED LATERALITY: ICD-10-CM

## 2021-01-22 DIAGNOSIS — M54.41 ACUTE RIGHT-SIDED LOW BACK PAIN WITH RIGHT-SIDED SCIATICA: ICD-10-CM

## 2021-01-22 DIAGNOSIS — M54.9 DORSALGIA, UNSPECIFIED: ICD-10-CM

## 2021-01-22 DIAGNOSIS — M41.86 OTHER FORM OF SCOLIOSIS OF LUMBAR SPINE: ICD-10-CM

## 2021-01-22 PROCEDURE — 72148 MRI LUMBAR SPINE W/O DYE: CPT | Mod: TC,PO

## 2021-01-22 PROCEDURE — 99203 OFFICE O/P NEW LOW 30 MIN: CPT | Mod: S$GLB,,, | Performed by: PHYSICIAN ASSISTANT

## 2021-01-22 PROCEDURE — 72148 MRI LUMBAR SPINE WITHOUT CONTRAST: ICD-10-PCS | Mod: 26,,, | Performed by: RADIOLOGY

## 2021-01-22 PROCEDURE — 1125F PR PAIN SEVERITY QUANTIFIED, PAIN PRESENT: ICD-10-PCS | Mod: S$GLB,,, | Performed by: PHYSICIAN ASSISTANT

## 2021-01-22 PROCEDURE — 72148 MRI LUMBAR SPINE W/O DYE: CPT | Mod: 26,,, | Performed by: RADIOLOGY

## 2021-01-22 PROCEDURE — 99999 PR PBB SHADOW E&M-EST. PATIENT-LVL IV: CPT | Mod: PBBFAC,,, | Performed by: PHYSICIAN ASSISTANT

## 2021-01-22 PROCEDURE — 99203 PR OFFICE/OUTPT VISIT, NEW, LEVL III, 30-44 MIN: ICD-10-PCS | Mod: S$GLB,,, | Performed by: PHYSICIAN ASSISTANT

## 2021-01-22 PROCEDURE — 99999 PR PBB SHADOW E&M-EST. PATIENT-LVL IV: ICD-10-PCS | Mod: PBBFAC,,, | Performed by: PHYSICIAN ASSISTANT

## 2021-01-22 PROCEDURE — 3008F BODY MASS INDEX DOCD: CPT | Mod: CPTII,S$GLB,, | Performed by: PHYSICIAN ASSISTANT

## 2021-01-22 PROCEDURE — 1125F AMNT PAIN NOTED PAIN PRSNT: CPT | Mod: S$GLB,,, | Performed by: PHYSICIAN ASSISTANT

## 2021-01-22 PROCEDURE — 3008F PR BODY MASS INDEX (BMI) DOCUMENTED: ICD-10-PCS | Mod: CPTII,S$GLB,, | Performed by: PHYSICIAN ASSISTANT

## 2021-01-22 RX ORDER — HYDROCODONE BITARTRATE AND ACETAMINOPHEN 5; 325 MG/1; MG/1
1 TABLET ORAL EVERY 8 HOURS PRN
Qty: 20 TABLET | Refills: 0 | Status: SHIPPED | OUTPATIENT
Start: 2021-01-22 | End: 2021-02-01 | Stop reason: SDUPTHER

## 2021-01-22 RX ORDER — METHYLPREDNISOLONE 4 MG/1
TABLET ORAL
Qty: 1 PACKAGE | Refills: 0 | Status: SHIPPED | OUTPATIENT
Start: 2021-01-22 | End: 2021-02-23

## 2021-01-25 ENCOUNTER — HOSPITAL ENCOUNTER (OUTPATIENT)
Dept: RADIOLOGY | Facility: HOSPITAL | Age: 66
Discharge: HOME | End: 2021-01-25
Attending: PHYSICIAN ASSISTANT
Payer: MEDICARE

## 2021-01-25 ENCOUNTER — OFFICE VISIT (OUTPATIENT)
Dept: SPINE | Facility: CLINIC | Age: 66
End: 2021-01-25
Payer: MEDICARE

## 2021-01-25 VITALS
DIASTOLIC BLOOD PRESSURE: 70 MMHG | HEART RATE: 79 BPM | WEIGHT: 112.63 LBS | HEIGHT: 62 IN | SYSTOLIC BLOOD PRESSURE: 132 MMHG | BODY MASS INDEX: 20.73 KG/M2

## 2021-01-25 DIAGNOSIS — M89.8X8 MASS OF SPINE: ICD-10-CM

## 2021-01-25 DIAGNOSIS — M54.16 LUMBAR RADICULOPATHY: ICD-10-CM

## 2021-01-25 DIAGNOSIS — M54.30 SCIATICA, UNSPECIFIED LATERALITY: Primary | ICD-10-CM

## 2021-01-25 DIAGNOSIS — M54.30 SCIATICA, UNSPECIFIED LATERALITY: ICD-10-CM

## 2021-01-25 DIAGNOSIS — M54.9 DORSALGIA, UNSPECIFIED: ICD-10-CM

## 2021-01-25 DIAGNOSIS — M54.41 ACUTE RIGHT-SIDED LOW BACK PAIN WITH RIGHT-SIDED SCIATICA: ICD-10-CM

## 2021-01-25 PROCEDURE — 99214 PR OFFICE/OUTPT VISIT, EST, LEVL IV, 30-39 MIN: ICD-10-PCS | Mod: S$GLB,,, | Performed by: PHYSICIAN ASSISTANT

## 2021-01-25 PROCEDURE — 3008F PR BODY MASS INDEX (BMI) DOCUMENTED: ICD-10-PCS | Mod: CPTII,S$GLB,, | Performed by: PHYSICIAN ASSISTANT

## 2021-01-25 PROCEDURE — 72149 MRI LUMBAR SPINE W/DYE: CPT | Mod: TC,PO

## 2021-01-25 PROCEDURE — 1125F PR PAIN SEVERITY QUANTIFIED, PAIN PRESENT: ICD-10-PCS | Mod: S$GLB,,, | Performed by: PHYSICIAN ASSISTANT

## 2021-01-25 PROCEDURE — A9585 GADOBUTROL INJECTION: HCPCS | Mod: PO | Performed by: PHYSICIAN ASSISTANT

## 2021-01-25 PROCEDURE — 99999 PR PBB SHADOW E&M-EST. PATIENT-LVL IV: CPT | Mod: PBBFAC,,, | Performed by: PHYSICIAN ASSISTANT

## 2021-01-25 PROCEDURE — 72149 MRI LUMBAR SPINE WITH CONTRAST: ICD-10-PCS | Mod: 26,,, | Performed by: RADIOLOGY

## 2021-01-25 PROCEDURE — 72120 X-RAY BEND ONLY L-S SPINE: CPT | Mod: 26,,, | Performed by: RADIOLOGY

## 2021-01-25 PROCEDURE — 99214 OFFICE O/P EST MOD 30 MIN: CPT | Mod: S$GLB,,, | Performed by: PHYSICIAN ASSISTANT

## 2021-01-25 PROCEDURE — 99999 PR PBB SHADOW E&M-EST. PATIENT-LVL IV: ICD-10-PCS | Mod: PBBFAC,,, | Performed by: PHYSICIAN ASSISTANT

## 2021-01-25 PROCEDURE — 3008F BODY MASS INDEX DOCD: CPT | Mod: CPTII,S$GLB,, | Performed by: PHYSICIAN ASSISTANT

## 2021-01-25 PROCEDURE — 72120 X-RAY BEND ONLY L-S SPINE: CPT | Mod: TC,PO

## 2021-01-25 PROCEDURE — 72120 XR LUMBAR SPINE FLEXION AND EXTENSION ONLY: ICD-10-PCS | Mod: 26,,, | Performed by: RADIOLOGY

## 2021-01-25 PROCEDURE — 72149 MRI LUMBAR SPINE W/DYE: CPT | Mod: 26,,, | Performed by: RADIOLOGY

## 2021-01-25 PROCEDURE — 25500020 PHARM REV CODE 255: Mod: PO | Performed by: PHYSICIAN ASSISTANT

## 2021-01-25 PROCEDURE — 1125F AMNT PAIN NOTED PAIN PRSNT: CPT | Mod: S$GLB,,, | Performed by: PHYSICIAN ASSISTANT

## 2021-01-25 RX ORDER — GADOBUTROL 604.72 MG/ML
5 INJECTION INTRAVENOUS
Status: COMPLETED | OUTPATIENT
Start: 2021-01-25 | End: 2021-01-25

## 2021-01-25 RX ADMIN — GADOBUTROL 5 ML: 604.72 INJECTION INTRAVENOUS at 04:01

## 2021-01-26 ENCOUNTER — OFFICE VISIT (OUTPATIENT)
Dept: NEUROSURGERY | Facility: CLINIC | Age: 66
End: 2021-01-26
Payer: MEDICARE

## 2021-01-26 VITALS
DIASTOLIC BLOOD PRESSURE: 85 MMHG | BODY MASS INDEX: 20.6 KG/M2 | HEART RATE: 78 BPM | RESPIRATION RATE: 16 BRPM | SYSTOLIC BLOOD PRESSURE: 142 MMHG | WEIGHT: 112.63 LBS

## 2021-01-26 DIAGNOSIS — M89.8X8 MASS OF SPINE: ICD-10-CM

## 2021-01-26 DIAGNOSIS — M54.16 LUMBAR RADICULOPATHY: ICD-10-CM

## 2021-01-26 DIAGNOSIS — M54.30 SCIATICA, UNSPECIFIED LATERALITY: ICD-10-CM

## 2021-01-26 PROCEDURE — 99215 PR OFFICE/OUTPT VISIT, EST, LEVL V, 40-54 MIN: ICD-10-PCS | Mod: S$GLB,,, | Performed by: NEUROLOGICAL SURGERY

## 2021-01-26 PROCEDURE — 99215 OFFICE O/P EST HI 40 MIN: CPT | Mod: S$GLB,,, | Performed by: NEUROLOGICAL SURGERY

## 2021-01-26 PROCEDURE — 99999 PR PBB SHADOW E&M-EST. PATIENT-LVL III: CPT | Mod: PBBFAC,,, | Performed by: NEUROLOGICAL SURGERY

## 2021-01-26 PROCEDURE — 1125F PR PAIN SEVERITY QUANTIFIED, PAIN PRESENT: ICD-10-PCS | Mod: S$GLB,,, | Performed by: NEUROLOGICAL SURGERY

## 2021-01-26 PROCEDURE — 99999 PR PBB SHADOW E&M-EST. PATIENT-LVL III: ICD-10-PCS | Mod: PBBFAC,,, | Performed by: NEUROLOGICAL SURGERY

## 2021-01-26 PROCEDURE — 3008F PR BODY MASS INDEX (BMI) DOCUMENTED: ICD-10-PCS | Mod: CPTII,S$GLB,, | Performed by: NEUROLOGICAL SURGERY

## 2021-01-26 PROCEDURE — 1125F AMNT PAIN NOTED PAIN PRSNT: CPT | Mod: S$GLB,,, | Performed by: NEUROLOGICAL SURGERY

## 2021-01-26 PROCEDURE — 3008F BODY MASS INDEX DOCD: CPT | Mod: CPTII,S$GLB,, | Performed by: NEUROLOGICAL SURGERY

## 2021-01-27 ENCOUNTER — TELEPHONE (OUTPATIENT)
Dept: FAMILY MEDICINE | Facility: CLINIC | Age: 66
End: 2021-01-27

## 2021-01-27 ENCOUNTER — PATIENT MESSAGE (OUTPATIENT)
Dept: FAMILY MEDICINE | Facility: CLINIC | Age: 66
End: 2021-01-27

## 2021-01-27 ENCOUNTER — TELEPHONE (OUTPATIENT)
Dept: NEUROSURGERY | Facility: CLINIC | Age: 66
End: 2021-01-27

## 2021-01-27 DIAGNOSIS — M54.16 LUMBAR RADICULOPATHY: Primary | ICD-10-CM

## 2021-01-27 DIAGNOSIS — M89.8X8 MASS OF SPINE: ICD-10-CM

## 2021-01-27 DIAGNOSIS — R79.1 ABNORMAL COAGULATION PROFILE: ICD-10-CM

## 2021-01-27 RX ORDER — LIDOCAINE HYDROCHLORIDE 10 MG/ML
1 INJECTION, SOLUTION EPIDURAL; INFILTRATION; INTRACAUDAL; PERINEURAL ONCE
Status: CANCELLED | OUTPATIENT
Start: 2021-01-27 | End: 2021-01-27

## 2021-01-28 ENCOUNTER — TELEPHONE (OUTPATIENT)
Dept: NEUROSURGERY | Facility: CLINIC | Age: 66
End: 2021-01-28

## 2021-01-29 ENCOUNTER — OFFICE VISIT (OUTPATIENT)
Dept: FAMILY MEDICINE | Facility: CLINIC | Age: 66
End: 2021-01-29
Payer: MEDICARE

## 2021-01-29 ENCOUNTER — OFFICE VISIT (OUTPATIENT)
Dept: CARDIOLOGY | Facility: CLINIC | Age: 66
End: 2021-01-29
Payer: MEDICARE

## 2021-01-29 ENCOUNTER — TELEPHONE (OUTPATIENT)
Dept: NEUROSURGERY | Facility: CLINIC | Age: 66
End: 2021-01-29

## 2021-01-29 ENCOUNTER — TELEPHONE (OUTPATIENT)
Dept: SPINE | Facility: CLINIC | Age: 66
End: 2021-01-29

## 2021-01-29 VITALS
BODY MASS INDEX: 18.58 KG/M2 | HEIGHT: 62 IN | HEART RATE: 77 BPM | DIASTOLIC BLOOD PRESSURE: 79 MMHG | SYSTOLIC BLOOD PRESSURE: 136 MMHG | WEIGHT: 101 LBS

## 2021-01-29 VITALS
HEART RATE: 80 BPM | WEIGHT: 101.63 LBS | BODY MASS INDEX: 18.7 KG/M2 | HEIGHT: 62 IN | DIASTOLIC BLOOD PRESSURE: 78 MMHG | SYSTOLIC BLOOD PRESSURE: 128 MMHG

## 2021-01-29 DIAGNOSIS — R07.9 CHEST PAIN, UNSPECIFIED TYPE: ICD-10-CM

## 2021-01-29 DIAGNOSIS — Z01.818 PREOP EXAMINATION: ICD-10-CM

## 2021-01-29 DIAGNOSIS — G89.29 CHRONIC RIGHT-SIDED LOW BACK PAIN WITH RIGHT-SIDED SCIATICA: ICD-10-CM

## 2021-01-29 DIAGNOSIS — M54.41 CHRONIC RIGHT-SIDED LOW BACK PAIN WITH RIGHT-SIDED SCIATICA: ICD-10-CM

## 2021-01-29 DIAGNOSIS — J44.9 CHRONIC OBSTRUCTIVE PULMONARY DISEASE, UNSPECIFIED COPD TYPE: ICD-10-CM

## 2021-01-29 DIAGNOSIS — I45.10 RBBB (RIGHT BUNDLE BRANCH BLOCK): ICD-10-CM

## 2021-01-29 DIAGNOSIS — I44.4 LAFB (LEFT ANTERIOR FASCICULAR BLOCK): ICD-10-CM

## 2021-01-29 DIAGNOSIS — Z01.818 PREOP EXAMINATION: Primary | ICD-10-CM

## 2021-01-29 DIAGNOSIS — R06.00 DYSPNEA, UNSPECIFIED TYPE: ICD-10-CM

## 2021-01-29 DIAGNOSIS — I20.9 ANGINA PECTORIS: Primary | ICD-10-CM

## 2021-01-29 DIAGNOSIS — F17.210 CIGARETTE SMOKER: ICD-10-CM

## 2021-01-29 DIAGNOSIS — E78.5 DYSLIPIDEMIA: ICD-10-CM

## 2021-01-29 PROCEDURE — 1125F AMNT PAIN NOTED PAIN PRSNT: CPT | Mod: S$GLB,,, | Performed by: FAMILY MEDICINE

## 2021-01-29 PROCEDURE — 99214 PR OFFICE/OUTPT VISIT, EST, LEVL IV, 30-39 MIN: ICD-10-PCS | Mod: S$GLB,,, | Performed by: FAMILY MEDICINE

## 2021-01-29 PROCEDURE — 1126F PR PAIN SEVERITY QUANTIFIED, NO PAIN PRESENT: ICD-10-PCS | Mod: S$GLB,,, | Performed by: INTERNAL MEDICINE

## 2021-01-29 PROCEDURE — 1126F AMNT PAIN NOTED NONE PRSNT: CPT | Mod: S$GLB,,, | Performed by: INTERNAL MEDICINE

## 2021-01-29 PROCEDURE — 3008F BODY MASS INDEX DOCD: CPT | Mod: CPTII,S$GLB,, | Performed by: INTERNAL MEDICINE

## 2021-01-29 PROCEDURE — 99999 PR PBB SHADOW E&M-EST. PATIENT-LVL IV: ICD-10-PCS | Mod: PBBFAC,,, | Performed by: FAMILY MEDICINE

## 2021-01-29 PROCEDURE — 99204 PR OFFICE/OUTPT VISIT, NEW, LEVL IV, 45-59 MIN: ICD-10-PCS | Mod: S$GLB,,, | Performed by: INTERNAL MEDICINE

## 2021-01-29 PROCEDURE — 99999 PR PBB SHADOW E&M-EST. PATIENT-LVL IV: CPT | Mod: PBBFAC,,, | Performed by: INTERNAL MEDICINE

## 2021-01-29 PROCEDURE — 99999 PR PBB SHADOW E&M-EST. PATIENT-LVL IV: CPT | Mod: PBBFAC,,, | Performed by: FAMILY MEDICINE

## 2021-01-29 PROCEDURE — 3008F PR BODY MASS INDEX (BMI) DOCUMENTED: ICD-10-PCS | Mod: CPTII,S$GLB,, | Performed by: FAMILY MEDICINE

## 2021-01-29 PROCEDURE — 3008F PR BODY MASS INDEX (BMI) DOCUMENTED: ICD-10-PCS | Mod: CPTII,S$GLB,, | Performed by: INTERNAL MEDICINE

## 2021-01-29 PROCEDURE — 3008F BODY MASS INDEX DOCD: CPT | Mod: CPTII,S$GLB,, | Performed by: FAMILY MEDICINE

## 2021-01-29 PROCEDURE — 1125F PR PAIN SEVERITY QUANTIFIED, PAIN PRESENT: ICD-10-PCS | Mod: S$GLB,,, | Performed by: FAMILY MEDICINE

## 2021-01-29 PROCEDURE — 99999 PR PBB SHADOW E&M-EST. PATIENT-LVL IV: ICD-10-PCS | Mod: PBBFAC,,, | Performed by: INTERNAL MEDICINE

## 2021-01-29 PROCEDURE — 99204 OFFICE O/P NEW MOD 45 MIN: CPT | Mod: S$GLB,,, | Performed by: INTERNAL MEDICINE

## 2021-01-29 PROCEDURE — 99214 OFFICE O/P EST MOD 30 MIN: CPT | Mod: S$GLB,,, | Performed by: FAMILY MEDICINE

## 2021-02-01 ENCOUNTER — TELEPHONE (OUTPATIENT)
Dept: SPINE | Facility: CLINIC | Age: 66
End: 2021-02-01

## 2021-02-01 RX ORDER — HYDROCODONE BITARTRATE AND ACETAMINOPHEN 5; 325 MG/1; MG/1
1 TABLET ORAL EVERY 8 HOURS PRN
Qty: 20 TABLET | Refills: 0 | Status: SHIPPED | OUTPATIENT
Start: 2021-02-01 | End: 2021-02-11 | Stop reason: SDUPTHER

## 2021-02-02 DIAGNOSIS — F31.9 BIPOLAR I DISORDER: ICD-10-CM

## 2021-02-02 RX ORDER — DIVALPROEX SODIUM 250 MG/1
750 TABLET, DELAYED RELEASE ORAL NIGHTLY
Qty: 270 TABLET | Refills: 0 | Status: SHIPPED | OUTPATIENT
Start: 2021-02-02 | End: 2021-05-11 | Stop reason: SDUPTHER

## 2021-02-09 ENCOUNTER — TELEPHONE (OUTPATIENT)
Dept: FAMILY MEDICINE | Facility: CLINIC | Age: 66
End: 2021-02-09

## 2021-02-10 ENCOUNTER — TELEPHONE (OUTPATIENT)
Dept: SPINE | Facility: CLINIC | Age: 66
End: 2021-02-10

## 2021-02-10 ENCOUNTER — CLINICAL SUPPORT (OUTPATIENT)
Dept: CARDIOLOGY | Facility: CLINIC | Age: 66
End: 2021-02-10
Attending: INTERNAL MEDICINE
Payer: MEDICARE

## 2021-02-10 ENCOUNTER — HOSPITAL ENCOUNTER (OUTPATIENT)
Dept: RADIOLOGY | Facility: HOSPITAL | Age: 66
Discharge: HOME | End: 2021-02-10
Attending: INTERNAL MEDICINE
Payer: MEDICARE

## 2021-02-10 ENCOUNTER — TELEPHONE (OUTPATIENT)
Dept: PAIN MEDICINE | Facility: CLINIC | Age: 66
End: 2021-02-10

## 2021-02-10 VITALS — WEIGHT: 101 LBS | BODY MASS INDEX: 18.58 KG/M2 | HEIGHT: 62 IN

## 2021-02-10 VITALS — HEIGHT: 62 IN | BODY MASS INDEX: 18.58 KG/M2 | WEIGHT: 101 LBS

## 2021-02-10 DIAGNOSIS — I20.9 ANGINA PECTORIS: ICD-10-CM

## 2021-02-10 DIAGNOSIS — G89.29 CHRONIC RIGHT-SIDED LOW BACK PAIN WITH RIGHT-SIDED SCIATICA: ICD-10-CM

## 2021-02-10 DIAGNOSIS — Z01.818 PREOP EXAMINATION: ICD-10-CM

## 2021-02-10 DIAGNOSIS — F17.210 CIGARETTE SMOKER: ICD-10-CM

## 2021-02-10 DIAGNOSIS — I44.4 LAFB (LEFT ANTERIOR FASCICULAR BLOCK): ICD-10-CM

## 2021-02-10 DIAGNOSIS — J44.9 CHRONIC OBSTRUCTIVE PULMONARY DISEASE, UNSPECIFIED COPD TYPE: ICD-10-CM

## 2021-02-10 DIAGNOSIS — M54.41 CHRONIC RIGHT-SIDED LOW BACK PAIN WITH RIGHT-SIDED SCIATICA: ICD-10-CM

## 2021-02-10 DIAGNOSIS — I45.10 RBBB (RIGHT BUNDLE BRANCH BLOCK): ICD-10-CM

## 2021-02-10 LAB
CV STRESS BASE HR: 75 BPM
DIASTOLIC BLOOD PRESSURE: 89 MMHG
OHS CV CPX 1 MINUTE RECOVERY HEART RATE: 87 BPM
OHS CV CPX 85 PERCENT MAX PREDICTED HEART RATE MALE: 127
OHS CV CPX MAX PREDICTED HEART RATE: 150
OHS CV CPX PATIENT IS FEMALE: 1
OHS CV CPX PATIENT IS MALE: 0
OHS CV CPX PEAK DIASTOLIC BLOOD PRESSURE: 90 MMHG
OHS CV CPX PEAK HEAR RATE: 99 BPM
OHS CV CPX PEAK RATE PRESSURE PRODUCT: NORMAL
OHS CV CPX PEAK SYSTOLIC BLOOD PRESSURE: 165 MMHG
OHS CV CPX PERCENT MAX PREDICTED HEART RATE ACHIEVED: 66
OHS CV CPX RATE PRESSURE PRODUCT PRESENTING: NORMAL
SYSTOLIC BLOOD PRESSURE: 134 MMHG

## 2021-02-10 PROCEDURE — 93306 TTE W/DOPPLER COMPLETE: CPT | Mod: S$GLB,,, | Performed by: INTERNAL MEDICINE

## 2021-02-10 PROCEDURE — 93016 CV STRESS TEST SUPVJ ONLY: CPT | Mod: ,,, | Performed by: INTERNAL MEDICINE

## 2021-02-10 PROCEDURE — 93018 CV STRESS TEST I&R ONLY: CPT | Mod: ,,, | Performed by: INTERNAL MEDICINE

## 2021-02-10 PROCEDURE — 93017 CV STRESS TEST TRACING ONLY: CPT | Mod: PO

## 2021-02-10 PROCEDURE — 93018 PR CARDIAC STRESS TST,INTERP/REPT ONLY: ICD-10-PCS | Mod: ,,, | Performed by: INTERNAL MEDICINE

## 2021-02-10 PROCEDURE — 99999 PR PBB SHADOW E&M-EST. PATIENT-LVL I: ICD-10-PCS | Mod: PBBFAC,,,

## 2021-02-10 PROCEDURE — 78452 STRESS TEST WITH MYOCARDIAL PERFUSION (CUPID ONLY): ICD-10-PCS | Mod: 26,,, | Performed by: INTERNAL MEDICINE

## 2021-02-10 PROCEDURE — 93306 ECHO (CUPID ONLY): ICD-10-PCS | Mod: S$GLB,,, | Performed by: INTERNAL MEDICINE

## 2021-02-10 PROCEDURE — 78452 HT MUSCLE IMAGE SPECT MULT: CPT | Mod: 26,,, | Performed by: INTERNAL MEDICINE

## 2021-02-10 PROCEDURE — 93016 STRESS TEST WITH MYOCARDIAL PERFUSION (CUPID ONLY): ICD-10-PCS | Mod: ,,, | Performed by: INTERNAL MEDICINE

## 2021-02-10 PROCEDURE — 99999 PR PBB SHADOW E&M-EST. PATIENT-LVL I: CPT | Mod: PBBFAC,,,

## 2021-02-11 ENCOUNTER — TELEPHONE (OUTPATIENT)
Dept: CARDIOLOGY | Facility: CLINIC | Age: 66
End: 2021-02-11

## 2021-02-11 LAB
ASCENDING AORTA: 2.83 CM
AV INDEX (PROSTH): 1.03
AV MEAN GRADIENT: 3 MMHG
AV PEAK GRADIENT: 6 MMHG
AV VALVE AREA: 3.71 CM2
AV VELOCITY RATIO: 0.93
BSA FOR ECHO PROCEDURE: 1.42 M2
CV ECHO LV RWT: 0.32 CM
DOP CALC AO PEAK VEL: 1.2 M/S
DOP CALC AO VTI: 25.77 CM
DOP CALC LVOT AREA: 3.6 CM2
DOP CALC LVOT DIAMETER: 2.14 CM
DOP CALC LVOT PEAK VEL: 1.11 M/S
DOP CALC LVOT STROKE VOLUME: 95.63 CM3
DOP CALCLVOT PEAK VEL VTI: 26.6 CM
E WAVE DECELERATION TIME: 220.05 MSEC
E/A RATIO: 0.77
E/E' RATIO: 12.8 M/S
ECHO LV POSTERIOR WALL: 0.66 CM (ref 0.6–1.1)
FRACTIONAL SHORTENING: 29 % (ref 28–44)
INTERVENTRICULAR SEPTUM: 0.78 CM (ref 0.6–1.1)
LA MAJOR: 3.78 CM
LA MINOR: 3.96 CM
LA WIDTH: 2.88 CM
LEFT ATRIUM SIZE: 3.05 CM
LEFT ATRIUM VOLUME INDEX: 20.2 ML/M2
LEFT ATRIUM VOLUME: 28.88 CM3
LEFT INTERNAL DIMENSION IN SYSTOLE: 2.98 CM (ref 2.1–4)
LEFT VENTRICLE DIASTOLIC VOLUME INDEX: 54.19 ML/M2
LEFT VENTRICLE DIASTOLIC VOLUME: 77.49 ML
LEFT VENTRICLE MASS INDEX: 61 G/M2
LEFT VENTRICLE SYSTOLIC VOLUME INDEX: 24.1 ML/M2
LEFT VENTRICLE SYSTOLIC VOLUME: 34.5 ML
LEFT VENTRICULAR INTERNAL DIMENSION IN DIASTOLE: 4.18 CM (ref 3.5–6)
LEFT VENTRICULAR MASS: 87.52 G
LV LATERAL E/E' RATIO: 12 M/S
LV SEPTAL E/E' RATIO: 13.71 M/S
MV A" WAVE DURATION": 8.82 MSEC
MV PEAK A VEL: 1.25 M/S
MV PEAK E VEL: 0.96 M/S
PISA TR MAX VEL: 2.08 M/S
PULM VEIN S/D RATIO: 2.04
PV PEAK D VEL: 0.28 M/S
PV PEAK S VEL: 0.57 M/S
RA MAJOR: 3.49 CM
RA PRESSURE: 3 MMHG
RA WIDTH: 3.35 CM
RIGHT VENTRICULAR END-DIASTOLIC DIMENSION: 3.07 CM
RV TISSUE DOPPLER FREE WALL SYSTOLIC VELOCITY 1 (APICAL 4 CHAMBER VIEW): 10.78 CM/S
SINUS: 3.18 CM
STJ: 2.45 CM
TDI LATERAL: 0.08 M/S
TDI SEPTAL: 0.07 M/S
TDI: 0.08 M/S
TR MAX PG: 17 MMHG
TRICUSPID ANNULAR PLANE SYSTOLIC EXCURSION: 1.88 CM
TV REST PULMONARY ARTERY PRESSURE: 20 MMHG

## 2021-02-11 RX ORDER — HYDROCODONE BITARTRATE AND ACETAMINOPHEN 5; 325 MG/1; MG/1
1 TABLET ORAL EVERY 8 HOURS PRN
Qty: 20 TABLET | Refills: 0 | Status: SHIPPED | OUTPATIENT
Start: 2021-02-11 | End: 2021-02-17 | Stop reason: SDUPTHER

## 2021-02-12 ENCOUNTER — TELEPHONE (OUTPATIENT)
Dept: FAMILY MEDICINE | Facility: CLINIC | Age: 66
End: 2021-02-12

## 2021-02-17 ENCOUNTER — TELEPHONE (OUTPATIENT)
Dept: NEUROSURGERY | Facility: CLINIC | Age: 66
End: 2021-02-17

## 2021-02-17 ENCOUNTER — TELEPHONE (OUTPATIENT)
Dept: FAMILY MEDICINE | Facility: CLINIC | Age: 66
End: 2021-02-17

## 2021-02-17 RX ORDER — HYDROCODONE BITARTRATE AND ACETAMINOPHEN 5; 325 MG/1; MG/1
1 TABLET ORAL EVERY 8 HOURS PRN
Qty: 20 TABLET | Refills: 0 | Status: ON HOLD | OUTPATIENT
Start: 2021-02-17 | End: 2021-02-25 | Stop reason: HOSPADM

## 2021-02-19 ENCOUNTER — TELEPHONE (OUTPATIENT)
Dept: NEUROSURGERY | Facility: CLINIC | Age: 66
End: 2021-02-19

## 2021-02-19 DIAGNOSIS — R79.1 ABNORMAL COAGULATION PROFILE: ICD-10-CM

## 2021-02-19 DIAGNOSIS — Z01.818 PREOP EXAMINATION: Primary | ICD-10-CM

## 2021-02-22 ENCOUNTER — TELEPHONE (OUTPATIENT)
Dept: NEUROSURGERY | Facility: CLINIC | Age: 66
End: 2021-02-22

## 2021-02-22 ENCOUNTER — OFFICE VISIT (OUTPATIENT)
Dept: ORTHOPEDICS | Facility: CLINIC | Age: 66
End: 2021-02-22
Payer: MEDICARE

## 2021-02-22 ENCOUNTER — LAB VISIT (OUTPATIENT)
Dept: LAB | Facility: HOSPITAL | Age: 66
End: 2021-02-22
Attending: NEUROLOGICAL SURGERY
Payer: MEDICARE

## 2021-02-22 DIAGNOSIS — M19.032 ARTHRITIS OF LEFT WRIST: Primary | ICD-10-CM

## 2021-02-22 DIAGNOSIS — Z01.818 PREOP EXAMINATION: ICD-10-CM

## 2021-02-22 DIAGNOSIS — R79.1 ABNORMAL COAGULATION PROFILE: ICD-10-CM

## 2021-02-22 LAB
INR PPP: 0.9 (ref 0.8–1.2)
PROTHROMBIN TIME: 10.2 SEC (ref 9–12.5)

## 2021-02-22 PROCEDURE — 99213 OFFICE O/P EST LOW 20 MIN: CPT | Mod: S$GLB,,, | Performed by: ORTHOPAEDIC SURGERY

## 2021-02-22 PROCEDURE — 85025 COMPLETE CBC W/AUTO DIFF WBC: CPT

## 2021-02-22 PROCEDURE — 36415 COLL VENOUS BLD VENIPUNCTURE: CPT | Mod: PO

## 2021-02-22 PROCEDURE — 1125F PR PAIN SEVERITY QUANTIFIED, PAIN PRESENT: ICD-10-PCS | Mod: S$GLB,,, | Performed by: ORTHOPAEDIC SURGERY

## 2021-02-22 PROCEDURE — 1125F AMNT PAIN NOTED PAIN PRSNT: CPT | Mod: S$GLB,,, | Performed by: ORTHOPAEDIC SURGERY

## 2021-02-22 PROCEDURE — 99999 PR PBB SHADOW E&M-EST. PATIENT-LVL II: CPT | Mod: PBBFAC,,, | Performed by: ORTHOPAEDIC SURGERY

## 2021-02-22 PROCEDURE — 99213 PR OFFICE/OUTPT VISIT, EST, LEVL III, 20-29 MIN: ICD-10-PCS | Mod: S$GLB,,, | Performed by: ORTHOPAEDIC SURGERY

## 2021-02-22 PROCEDURE — 99999 PR PBB SHADOW E&M-EST. PATIENT-LVL II: ICD-10-PCS | Mod: PBBFAC,,, | Performed by: ORTHOPAEDIC SURGERY

## 2021-02-22 PROCEDURE — 85610 PROTHROMBIN TIME: CPT | Mod: PO

## 2021-02-22 PROCEDURE — 80048 BASIC METABOLIC PNL TOTAL CA: CPT

## 2021-02-23 LAB
ANION GAP SERPL CALC-SCNC: 8 MMOL/L (ref 8–16)
BASOPHILS # BLD AUTO: 0.05 K/UL (ref 0–0.2)
BASOPHILS NFR BLD: 0.9 % (ref 0–1.9)
BUN SERPL-MCNC: 15 MG/DL (ref 8–23)
CALCIUM SERPL-MCNC: 9.3 MG/DL (ref 8.7–10.5)
CHLORIDE SERPL-SCNC: 100 MMOL/L (ref 95–110)
CO2 SERPL-SCNC: 29 MMOL/L (ref 23–29)
CREAT SERPL-MCNC: 0.9 MG/DL (ref 0.5–1.4)
DIFFERENTIAL METHOD: ABNORMAL
EOSINOPHIL # BLD AUTO: 0.2 K/UL (ref 0–0.5)
EOSINOPHIL NFR BLD: 2.7 % (ref 0–8)
ERYTHROCYTE [DISTWIDTH] IN BLOOD BY AUTOMATED COUNT: 12.9 % (ref 11.5–14.5)
EST. GFR  (AFRICAN AMERICAN): >60 ML/MIN/1.73 M^2
EST. GFR  (NON AFRICAN AMERICAN): >60 ML/MIN/1.73 M^2
GLUCOSE SERPL-MCNC: 99 MG/DL (ref 70–110)
HCT VFR BLD AUTO: 39.7 % (ref 37–48.5)
HGB BLD-MCNC: 12.5 G/DL (ref 12–16)
IMM GRANULOCYTES # BLD AUTO: 0.01 K/UL (ref 0–0.04)
IMM GRANULOCYTES NFR BLD AUTO: 0.2 % (ref 0–0.5)
LYMPHOCYTES # BLD AUTO: 1.7 K/UL (ref 1–4.8)
LYMPHOCYTES NFR BLD: 29 % (ref 18–48)
MCH RBC QN AUTO: 29.6 PG (ref 27–31)
MCHC RBC AUTO-ENTMCNC: 31.5 G/DL (ref 32–36)
MCV RBC AUTO: 94 FL (ref 82–98)
MONOCYTES # BLD AUTO: 0.5 K/UL (ref 0.3–1)
MONOCYTES NFR BLD: 8.8 % (ref 4–15)
NEUTROPHILS # BLD AUTO: 3.4 K/UL (ref 1.8–7.7)
NEUTROPHILS NFR BLD: 58.4 % (ref 38–73)
NRBC BLD-RTO: 0 /100 WBC
PLATELET # BLD AUTO: 250 K/UL (ref 150–350)
PMV BLD AUTO: 10.9 FL (ref 9.2–12.9)
POTASSIUM SERPL-SCNC: 4.4 MMOL/L (ref 3.5–5.1)
RBC # BLD AUTO: 4.22 M/UL (ref 4–5.4)
SODIUM SERPL-SCNC: 137 MMOL/L (ref 136–145)
WBC # BLD AUTO: 5.82 K/UL (ref 3.9–12.7)

## 2021-02-25 PROBLEM — M54.16 LUMBAR RADICULOPATHY: Status: ACTIVE | Noted: 2021-02-25

## 2021-03-02 ENCOUNTER — TELEPHONE (OUTPATIENT)
Dept: NEUROSURGERY | Facility: CLINIC | Age: 66
End: 2021-03-02

## 2021-03-02 DIAGNOSIS — Z98.890 H/O LAMINECTOMY: Primary | ICD-10-CM

## 2021-03-02 RX ORDER — OXYCODONE AND ACETAMINOPHEN 7.5; 325 MG/1; MG/1
1 TABLET ORAL EVERY 6 HOURS PRN
Qty: 28 TABLET | Refills: 0 | Status: SHIPPED | OUTPATIENT
Start: 2021-03-02 | End: 2021-05-11

## 2021-03-03 DIAGNOSIS — Z98.890 H/O LAMINECTOMY: ICD-10-CM

## 2021-03-22 ENCOUNTER — TELEPHONE (OUTPATIENT)
Dept: NEUROSURGERY | Facility: CLINIC | Age: 66
End: 2021-03-22

## 2021-03-24 ENCOUNTER — OFFICE VISIT (OUTPATIENT)
Dept: ORTHOPEDICS | Facility: CLINIC | Age: 66
End: 2021-03-24
Payer: MEDICARE

## 2021-03-24 VITALS
WEIGHT: 110 LBS | BODY MASS INDEX: 20.24 KG/M2 | HEIGHT: 62 IN | SYSTOLIC BLOOD PRESSURE: 136 MMHG | DIASTOLIC BLOOD PRESSURE: 88 MMHG | HEART RATE: 86 BPM

## 2021-03-24 DIAGNOSIS — M19.032 ARTHRITIS OF LEFT WRIST: Primary | ICD-10-CM

## 2021-03-24 PROCEDURE — 3008F PR BODY MASS INDEX (BMI) DOCUMENTED: ICD-10-PCS | Mod: CPTII,S$GLB,, | Performed by: ORTHOPAEDIC SURGERY

## 2021-03-24 PROCEDURE — 99999 PR PBB SHADOW E&M-EST. PATIENT-LVL III: ICD-10-PCS | Mod: PBBFAC,,, | Performed by: ORTHOPAEDIC SURGERY

## 2021-03-24 PROCEDURE — 99999 PR PBB SHADOW E&M-EST. PATIENT-LVL III: CPT | Mod: PBBFAC,,, | Performed by: ORTHOPAEDIC SURGERY

## 2021-03-24 PROCEDURE — 99213 PR OFFICE/OUTPT VISIT, EST, LEVL III, 20-29 MIN: ICD-10-PCS | Mod: 25,S$GLB,, | Performed by: ORTHOPAEDIC SURGERY

## 2021-03-24 PROCEDURE — 20605 DRAIN/INJ JOINT/BURSA W/O US: CPT | Mod: LT,S$GLB,, | Performed by: ORTHOPAEDIC SURGERY

## 2021-03-24 PROCEDURE — 1125F PR PAIN SEVERITY QUANTIFIED, PAIN PRESENT: ICD-10-PCS | Mod: S$GLB,,, | Performed by: ORTHOPAEDIC SURGERY

## 2021-03-24 PROCEDURE — 99213 OFFICE O/P EST LOW 20 MIN: CPT | Mod: 25,S$GLB,, | Performed by: ORTHOPAEDIC SURGERY

## 2021-03-24 PROCEDURE — 3008F BODY MASS INDEX DOCD: CPT | Mod: CPTII,S$GLB,, | Performed by: ORTHOPAEDIC SURGERY

## 2021-03-24 PROCEDURE — 1125F AMNT PAIN NOTED PAIN PRSNT: CPT | Mod: S$GLB,,, | Performed by: ORTHOPAEDIC SURGERY

## 2021-03-24 PROCEDURE — 20605 INTERMEDIATE JOINT ASPIRATION/INJECTION: L RADIOCARPAL: ICD-10-PCS | Mod: LT,S$GLB,, | Performed by: ORTHOPAEDIC SURGERY

## 2021-03-24 RX ORDER — TRIAMCINOLONE ACETONIDE 40 MG/ML
40 INJECTION, SUSPENSION INTRA-ARTICULAR; INTRAMUSCULAR
Status: DISCONTINUED | OUTPATIENT
Start: 2021-03-24 | End: 2021-03-24 | Stop reason: HOSPADM

## 2021-03-24 RX ADMIN — TRIAMCINOLONE ACETONIDE 40 MG: 40 INJECTION, SUSPENSION INTRA-ARTICULAR; INTRAMUSCULAR at 03:03

## 2021-04-07 ENCOUNTER — OFFICE VISIT (OUTPATIENT)
Dept: NEUROSURGERY | Facility: CLINIC | Age: 66
End: 2021-04-07
Payer: MEDICARE

## 2021-04-07 VITALS
BODY MASS INDEX: 20.24 KG/M2 | DIASTOLIC BLOOD PRESSURE: 80 MMHG | HEART RATE: 81 BPM | SYSTOLIC BLOOD PRESSURE: 148 MMHG | HEIGHT: 62 IN | WEIGHT: 110 LBS

## 2021-04-07 DIAGNOSIS — Z98.890 H/O LAMINECTOMY: Primary | ICD-10-CM

## 2021-04-07 DIAGNOSIS — M54.16 LUMBAR RADICULOPATHY: ICD-10-CM

## 2021-04-07 PROCEDURE — 99999 PR PBB SHADOW E&M-EST. PATIENT-LVL III: ICD-10-PCS | Mod: PBBFAC,,, | Performed by: NEUROLOGICAL SURGERY

## 2021-04-07 PROCEDURE — 1126F AMNT PAIN NOTED NONE PRSNT: CPT | Mod: S$GLB,,, | Performed by: NEUROLOGICAL SURGERY

## 2021-04-07 PROCEDURE — 99999 PR PBB SHADOW E&M-EST. PATIENT-LVL III: CPT | Mod: PBBFAC,,, | Performed by: NEUROLOGICAL SURGERY

## 2021-04-07 PROCEDURE — 1126F PR PAIN SEVERITY QUANTIFIED, NO PAIN PRESENT: ICD-10-PCS | Mod: S$GLB,,, | Performed by: NEUROLOGICAL SURGERY

## 2021-04-07 PROCEDURE — 99024 POSTOP FOLLOW-UP VISIT: CPT | Mod: S$GLB,,, | Performed by: NEUROLOGICAL SURGERY

## 2021-04-07 PROCEDURE — 3008F PR BODY MASS INDEX (BMI) DOCUMENTED: ICD-10-PCS | Mod: CPTII,S$GLB,, | Performed by: NEUROLOGICAL SURGERY

## 2021-04-07 PROCEDURE — 99024 PR POST-OP FOLLOW-UP VISIT: ICD-10-PCS | Mod: S$GLB,,, | Performed by: NEUROLOGICAL SURGERY

## 2021-04-07 PROCEDURE — 3008F BODY MASS INDEX DOCD: CPT | Mod: CPTII,S$GLB,, | Performed by: NEUROLOGICAL SURGERY

## 2021-04-15 ENCOUNTER — DOCUMENTATION ONLY (OUTPATIENT)
Dept: REHABILITATION | Facility: HOSPITAL | Age: 66
End: 2021-04-15

## 2021-04-19 ENCOUNTER — CLINICAL SUPPORT (OUTPATIENT)
Dept: REHABILITATION | Facility: HOSPITAL | Age: 66
End: 2021-04-19
Payer: MEDICARE

## 2021-04-19 DIAGNOSIS — M54.41 CHRONIC RIGHT-SIDED LOW BACK PAIN WITH RIGHT-SIDED SCIATICA: Primary | ICD-10-CM

## 2021-04-19 DIAGNOSIS — R26.2 DIFFICULTY WALKING: ICD-10-CM

## 2021-04-19 DIAGNOSIS — M62.81 MUSCLE WEAKNESS: ICD-10-CM

## 2021-04-19 DIAGNOSIS — G89.29 CHRONIC RIGHT-SIDED LOW BACK PAIN WITH RIGHT-SIDED SCIATICA: Primary | ICD-10-CM

## 2021-04-19 PROCEDURE — 97110 THERAPEUTIC EXERCISES: CPT | Mod: PN | Performed by: PHYSICAL THERAPIST

## 2021-04-19 PROCEDURE — 97162 PT EVAL MOD COMPLEX 30 MIN: CPT | Mod: PN | Performed by: PHYSICAL THERAPIST

## 2021-04-26 ENCOUNTER — CLINICAL SUPPORT (OUTPATIENT)
Dept: REHABILITATION | Facility: HOSPITAL | Age: 66
End: 2021-04-26
Payer: MEDICARE

## 2021-04-26 DIAGNOSIS — G89.29 CHRONIC RIGHT-SIDED LOW BACK PAIN WITH RIGHT-SIDED SCIATICA: Primary | ICD-10-CM

## 2021-04-26 DIAGNOSIS — M54.41 CHRONIC RIGHT-SIDED LOW BACK PAIN WITH RIGHT-SIDED SCIATICA: Primary | ICD-10-CM

## 2021-04-26 DIAGNOSIS — R26.2 DIFFICULTY WALKING: ICD-10-CM

## 2021-04-26 DIAGNOSIS — M62.81 MUSCLE WEAKNESS: ICD-10-CM

## 2021-04-26 PROCEDURE — 97110 THERAPEUTIC EXERCISES: CPT | Mod: PN | Performed by: PHYSICAL THERAPIST

## 2021-04-26 PROCEDURE — 97140 MANUAL THERAPY 1/> REGIONS: CPT | Mod: PN | Performed by: PHYSICAL THERAPIST

## 2021-04-30 ENCOUNTER — CLINICAL SUPPORT (OUTPATIENT)
Dept: REHABILITATION | Facility: HOSPITAL | Age: 66
End: 2021-04-30
Payer: MEDICARE

## 2021-04-30 DIAGNOSIS — R26.2 DIFFICULTY WALKING: ICD-10-CM

## 2021-04-30 DIAGNOSIS — M54.41 CHRONIC RIGHT-SIDED LOW BACK PAIN WITH RIGHT-SIDED SCIATICA: Primary | ICD-10-CM

## 2021-04-30 DIAGNOSIS — M62.81 MUSCLE WEAKNESS: ICD-10-CM

## 2021-04-30 DIAGNOSIS — G89.29 CHRONIC RIGHT-SIDED LOW BACK PAIN WITH RIGHT-SIDED SCIATICA: Primary | ICD-10-CM

## 2021-04-30 PROCEDURE — 97140 MANUAL THERAPY 1/> REGIONS: CPT | Mod: PN | Performed by: PHYSICAL THERAPIST

## 2021-04-30 PROCEDURE — 97110 THERAPEUTIC EXERCISES: CPT | Mod: PN | Performed by: PHYSICAL THERAPIST

## 2021-05-03 ENCOUNTER — CLINICAL SUPPORT (OUTPATIENT)
Dept: REHABILITATION | Facility: HOSPITAL | Age: 66
End: 2021-05-03
Payer: MEDICARE

## 2021-05-03 DIAGNOSIS — G89.29 CHRONIC RIGHT-SIDED LOW BACK PAIN WITH RIGHT-SIDED SCIATICA: ICD-10-CM

## 2021-05-03 DIAGNOSIS — M62.81 MUSCLE WEAKNESS: Primary | ICD-10-CM

## 2021-05-03 DIAGNOSIS — M54.41 CHRONIC RIGHT-SIDED LOW BACK PAIN WITH RIGHT-SIDED SCIATICA: ICD-10-CM

## 2021-05-03 DIAGNOSIS — R26.2 DIFFICULTY WALKING: ICD-10-CM

## 2021-05-03 PROCEDURE — 97110 THERAPEUTIC EXERCISES: CPT | Mod: PN | Performed by: PHYSICAL THERAPIST

## 2021-05-05 ENCOUNTER — CLINICAL SUPPORT (OUTPATIENT)
Dept: REHABILITATION | Facility: HOSPITAL | Age: 66
End: 2021-05-05
Payer: MEDICARE

## 2021-05-05 DIAGNOSIS — M54.41 CHRONIC RIGHT-SIDED LOW BACK PAIN WITH RIGHT-SIDED SCIATICA: Primary | ICD-10-CM

## 2021-05-05 DIAGNOSIS — M62.81 MUSCLE WEAKNESS: ICD-10-CM

## 2021-05-05 DIAGNOSIS — R26.2 DIFFICULTY WALKING: ICD-10-CM

## 2021-05-05 DIAGNOSIS — G89.29 CHRONIC RIGHT-SIDED LOW BACK PAIN WITH RIGHT-SIDED SCIATICA: Primary | ICD-10-CM

## 2021-05-05 PROCEDURE — 97110 THERAPEUTIC EXERCISES: CPT | Mod: PN | Performed by: PHYSICAL THERAPIST

## 2021-05-10 ENCOUNTER — CLINICAL SUPPORT (OUTPATIENT)
Dept: REHABILITATION | Facility: HOSPITAL | Age: 66
End: 2021-05-10
Payer: MEDICARE

## 2021-05-10 ENCOUNTER — TELEPHONE (OUTPATIENT)
Dept: FAMILY MEDICINE | Facility: CLINIC | Age: 66
End: 2021-05-10

## 2021-05-10 DIAGNOSIS — M54.40 CHRONIC RIGHT-SIDED LOW BACK PAIN WITH SCIATICA, SCIATICA LATERALITY UNSPECIFIED: Primary | ICD-10-CM

## 2021-05-10 DIAGNOSIS — M62.81 MUSCLE WEAKNESS: ICD-10-CM

## 2021-05-10 DIAGNOSIS — R26.2 DIFFICULTY WALKING: ICD-10-CM

## 2021-05-10 DIAGNOSIS — G89.29 CHRONIC RIGHT-SIDED LOW BACK PAIN WITH SCIATICA, SCIATICA LATERALITY UNSPECIFIED: Primary | ICD-10-CM

## 2021-05-10 PROCEDURE — 97110 THERAPEUTIC EXERCISES: CPT | Mod: PN | Performed by: PHYSICAL THERAPIST

## 2021-05-11 ENCOUNTER — OFFICE VISIT (OUTPATIENT)
Dept: FAMILY MEDICINE | Facility: CLINIC | Age: 66
End: 2021-05-11
Payer: MEDICARE

## 2021-05-11 VITALS
WEIGHT: 120.5 LBS | BODY MASS INDEX: 22.18 KG/M2 | HEART RATE: 80 BPM | SYSTOLIC BLOOD PRESSURE: 134 MMHG | HEIGHT: 62 IN | DIASTOLIC BLOOD PRESSURE: 80 MMHG

## 2021-05-11 DIAGNOSIS — R10.13 DYSPEPSIA: ICD-10-CM

## 2021-05-11 DIAGNOSIS — F31.9 BIPOLAR I DISORDER: ICD-10-CM

## 2021-05-11 DIAGNOSIS — Z79.899 HIGH RISK MEDICATIONS (NOT ANTICOAGULANTS) LONG-TERM USE: ICD-10-CM

## 2021-05-11 DIAGNOSIS — M54.2 CERVICALGIA: ICD-10-CM

## 2021-05-11 DIAGNOSIS — M50.30 DEGENERATIVE DISC DISEASE, CERVICAL: Primary | ICD-10-CM

## 2021-05-11 PROCEDURE — 1157F ADVNC CARE PLAN IN RCRD: CPT | Mod: S$GLB,,, | Performed by: FAMILY MEDICINE

## 2021-05-11 PROCEDURE — 1157F PR ADVANCE CARE PLAN OR EQUIV PRESENT IN MEDICAL RECORD: ICD-10-PCS | Mod: S$GLB,,, | Performed by: FAMILY MEDICINE

## 2021-05-11 PROCEDURE — 1101F PR PT FALLS ASSESS DOC 0-1 FALLS W/OUT INJ PAST YR: ICD-10-PCS | Mod: CPTII,S$GLB,, | Performed by: FAMILY MEDICINE

## 2021-05-11 PROCEDURE — 1125F PR PAIN SEVERITY QUANTIFIED, PAIN PRESENT: ICD-10-PCS | Mod: S$GLB,,, | Performed by: FAMILY MEDICINE

## 2021-05-11 PROCEDURE — 3008F BODY MASS INDEX DOCD: CPT | Mod: CPTII,S$GLB,, | Performed by: FAMILY MEDICINE

## 2021-05-11 PROCEDURE — 3008F PR BODY MASS INDEX (BMI) DOCUMENTED: ICD-10-PCS | Mod: CPTII,S$GLB,, | Performed by: FAMILY MEDICINE

## 2021-05-11 PROCEDURE — 99999 PR PBB SHADOW E&M-EST. PATIENT-LVL III: ICD-10-PCS | Mod: PBBFAC,,, | Performed by: FAMILY MEDICINE

## 2021-05-11 PROCEDURE — 99999 PR PBB SHADOW E&M-EST. PATIENT-LVL III: CPT | Mod: PBBFAC,,, | Performed by: FAMILY MEDICINE

## 2021-05-11 PROCEDURE — 99214 OFFICE O/P EST MOD 30 MIN: CPT | Mod: S$GLB,,, | Performed by: FAMILY MEDICINE

## 2021-05-11 PROCEDURE — 1125F AMNT PAIN NOTED PAIN PRSNT: CPT | Mod: S$GLB,,, | Performed by: FAMILY MEDICINE

## 2021-05-11 PROCEDURE — 1101F PT FALLS ASSESS-DOCD LE1/YR: CPT | Mod: CPTII,S$GLB,, | Performed by: FAMILY MEDICINE

## 2021-05-11 PROCEDURE — 3288F PR FALLS RISK ASSESSMENT DOCUMENTED: ICD-10-PCS | Mod: CPTII,S$GLB,, | Performed by: FAMILY MEDICINE

## 2021-05-11 PROCEDURE — 99214 PR OFFICE/OUTPT VISIT, EST, LEVL IV, 30-39 MIN: ICD-10-PCS | Mod: S$GLB,,, | Performed by: FAMILY MEDICINE

## 2021-05-11 PROCEDURE — 3288F FALL RISK ASSESSMENT DOCD: CPT | Mod: CPTII,S$GLB,, | Performed by: FAMILY MEDICINE

## 2021-05-11 RX ORDER — TIZANIDINE 2 MG/1
4 TABLET ORAL NIGHTLY PRN
Qty: 30 TABLET | Refills: 1 | Status: SHIPPED | OUTPATIENT
Start: 2021-05-11 | End: 2021-05-21

## 2021-05-11 RX ORDER — DIVALPROEX SODIUM 250 MG/1
750 TABLET, DELAYED RELEASE ORAL NIGHTLY
Qty: 270 TABLET | Refills: 0 | Status: SHIPPED | OUTPATIENT
Start: 2021-05-11 | End: 2021-08-09 | Stop reason: SDUPTHER

## 2021-05-11 RX ORDER — PANTOPRAZOLE SODIUM 40 MG/1
40 TABLET, DELAYED RELEASE ORAL DAILY
Qty: 30 TABLET | Refills: 11 | Status: SHIPPED | OUTPATIENT
Start: 2021-05-11 | End: 2021-12-13

## 2021-05-11 RX ORDER — LOXAPINE SUCCINATE 10 MG/1
10 TABLET ORAL DAILY
Qty: 90 CAPSULE | Refills: 0 | Status: SHIPPED | OUTPATIENT
Start: 2021-05-11 | End: 2021-08-09 | Stop reason: SDUPTHER

## 2021-05-17 ENCOUNTER — CLINICAL SUPPORT (OUTPATIENT)
Dept: REHABILITATION | Facility: HOSPITAL | Age: 66
End: 2021-05-17
Payer: MEDICARE

## 2021-05-17 DIAGNOSIS — M54.41 CHRONIC RIGHT-SIDED LOW BACK PAIN WITH BILATERAL SCIATICA: Primary | ICD-10-CM

## 2021-05-17 DIAGNOSIS — M54.42 CHRONIC RIGHT-SIDED LOW BACK PAIN WITH BILATERAL SCIATICA: Primary | ICD-10-CM

## 2021-05-17 DIAGNOSIS — M62.81 MUSCLE WEAKNESS: ICD-10-CM

## 2021-05-17 DIAGNOSIS — R26.2 DIFFICULTY WALKING: ICD-10-CM

## 2021-05-17 DIAGNOSIS — G89.29 CHRONIC RIGHT-SIDED LOW BACK PAIN WITH BILATERAL SCIATICA: Primary | ICD-10-CM

## 2021-05-17 PROBLEM — M54.40 CHRONIC RIGHT-SIDED LOW BACK PAIN WITH SCIATICA: Status: RESOLVED | Noted: 2020-12-28 | Resolved: 2021-05-17

## 2021-05-17 PROCEDURE — 97110 THERAPEUTIC EXERCISES: CPT | Mod: PN | Performed by: PHYSICAL THERAPIST

## 2021-05-25 ENCOUNTER — OFFICE VISIT (OUTPATIENT)
Dept: PSYCHIATRY | Facility: CLINIC | Age: 66
End: 2021-05-25
Payer: MEDICARE

## 2021-05-25 VITALS
HEART RATE: 77 BPM | DIASTOLIC BLOOD PRESSURE: 89 MMHG | HEIGHT: 62 IN | BODY MASS INDEX: 21.93 KG/M2 | WEIGHT: 119.19 LBS | SYSTOLIC BLOOD PRESSURE: 133 MMHG

## 2021-05-25 DIAGNOSIS — Z79.899 HIGH RISK MEDICATIONS (NOT ANTICOAGULANTS) LONG-TERM USE: ICD-10-CM

## 2021-05-25 DIAGNOSIS — F31.9 BIPOLAR AFFECTIVE DISORDER, REMISSION STATUS UNSPECIFIED: Primary | ICD-10-CM

## 2021-05-25 PROCEDURE — 99999 PR PBB SHADOW E&M-EST. PATIENT-LVL III: ICD-10-PCS | Mod: PBBFAC,,, | Performed by: PSYCHIATRY & NEUROLOGY

## 2021-05-25 PROCEDURE — 3008F PR BODY MASS INDEX (BMI) DOCUMENTED: ICD-10-PCS | Mod: CPTII,S$GLB,, | Performed by: PSYCHIATRY & NEUROLOGY

## 2021-05-25 PROCEDURE — 3008F BODY MASS INDEX DOCD: CPT | Mod: CPTII,S$GLB,, | Performed by: PSYCHIATRY & NEUROLOGY

## 2021-05-25 PROCEDURE — 1101F PR PT FALLS ASSESS DOC 0-1 FALLS W/OUT INJ PAST YR: ICD-10-PCS | Mod: CPTII,S$GLB,, | Performed by: PSYCHIATRY & NEUROLOGY

## 2021-05-25 PROCEDURE — 1125F PR PAIN SEVERITY QUANTIFIED, PAIN PRESENT: ICD-10-PCS | Mod: S$GLB,,, | Performed by: PSYCHIATRY & NEUROLOGY

## 2021-05-25 PROCEDURE — 1101F PT FALLS ASSESS-DOCD LE1/YR: CPT | Mod: CPTII,S$GLB,, | Performed by: PSYCHIATRY & NEUROLOGY

## 2021-05-25 PROCEDURE — 90833 PSYTX W PT W E/M 30 MIN: CPT | Mod: S$GLB,,, | Performed by: PSYCHIATRY & NEUROLOGY

## 2021-05-25 PROCEDURE — 99999 PR PBB SHADOW E&M-EST. PATIENT-LVL III: CPT | Mod: PBBFAC,,, | Performed by: PSYCHIATRY & NEUROLOGY

## 2021-05-25 PROCEDURE — 3288F PR FALLS RISK ASSESSMENT DOCUMENTED: ICD-10-PCS | Mod: CPTII,S$GLB,, | Performed by: PSYCHIATRY & NEUROLOGY

## 2021-05-25 PROCEDURE — 1157F ADVNC CARE PLAN IN RCRD: CPT | Mod: S$GLB,,, | Performed by: PSYCHIATRY & NEUROLOGY

## 2021-05-25 PROCEDURE — 99214 PR OFFICE/OUTPT VISIT, EST, LEVL IV, 30-39 MIN: ICD-10-PCS | Mod: S$GLB,,, | Performed by: PSYCHIATRY & NEUROLOGY

## 2021-05-25 PROCEDURE — 1125F AMNT PAIN NOTED PAIN PRSNT: CPT | Mod: S$GLB,,, | Performed by: PSYCHIATRY & NEUROLOGY

## 2021-05-25 PROCEDURE — 1157F PR ADVANCE CARE PLAN OR EQUIV PRESENT IN MEDICAL RECORD: ICD-10-PCS | Mod: S$GLB,,, | Performed by: PSYCHIATRY & NEUROLOGY

## 2021-05-25 PROCEDURE — 99214 OFFICE O/P EST MOD 30 MIN: CPT | Mod: S$GLB,,, | Performed by: PSYCHIATRY & NEUROLOGY

## 2021-05-25 PROCEDURE — 3288F FALL RISK ASSESSMENT DOCD: CPT | Mod: CPTII,S$GLB,, | Performed by: PSYCHIATRY & NEUROLOGY

## 2021-05-25 PROCEDURE — 90833 PR PSYCHOTHERAPY W/PATIENT W/E&M, 30 MIN (ADD ON): ICD-10-PCS | Mod: S$GLB,,, | Performed by: PSYCHIATRY & NEUROLOGY

## 2021-05-27 ENCOUNTER — TELEPHONE (OUTPATIENT)
Dept: FAMILY MEDICINE | Facility: CLINIC | Age: 66
End: 2021-05-27

## 2021-05-27 ENCOUNTER — HOSPITAL ENCOUNTER (OUTPATIENT)
Dept: RADIOLOGY | Facility: HOSPITAL | Age: 66
Discharge: HOME | End: 2021-05-27
Attending: FAMILY MEDICINE
Payer: MEDICARE

## 2021-05-27 DIAGNOSIS — M54.2 CERVICALGIA: ICD-10-CM

## 2021-05-27 PROCEDURE — 72141 MRI NECK SPINE W/O DYE: CPT | Mod: 26,,, | Performed by: RADIOLOGY

## 2021-05-27 PROCEDURE — 72141 MRI CERVICAL SPINE WITHOUT CONTRAST: ICD-10-PCS | Mod: 26,,, | Performed by: RADIOLOGY

## 2021-05-27 PROCEDURE — 72141 MRI NECK SPINE W/O DYE: CPT | Mod: TC,PO

## 2021-05-28 ENCOUNTER — TELEPHONE (OUTPATIENT)
Dept: FAMILY MEDICINE | Facility: CLINIC | Age: 66
End: 2021-05-28

## 2021-05-28 ENCOUNTER — LAB VISIT (OUTPATIENT)
Dept: LAB | Facility: HOSPITAL | Age: 66
End: 2021-05-28
Attending: PSYCHIATRY & NEUROLOGY
Payer: MEDICARE

## 2021-05-28 DIAGNOSIS — M50.30 DEGENERATIVE DISC DISEASE, CERVICAL: ICD-10-CM

## 2021-05-28 DIAGNOSIS — Z79.899 HIGH RISK MEDICATIONS (NOT ANTICOAGULANTS) LONG-TERM USE: ICD-10-CM

## 2021-05-28 DIAGNOSIS — M54.2 CERVICALGIA: Primary | ICD-10-CM

## 2021-05-28 LAB
ALBUMIN SERPL BCP-MCNC: 3.7 G/DL (ref 3.5–5.2)
ALP SERPL-CCNC: 82 U/L (ref 55–135)
ALT SERPL W/O P-5'-P-CCNC: 15 U/L (ref 10–44)
ANION GAP SERPL CALC-SCNC: 13 MMOL/L (ref 8–16)
AST SERPL-CCNC: 20 U/L (ref 10–40)
BILIRUB SERPL-MCNC: 0.4 MG/DL (ref 0.1–1)
BUN SERPL-MCNC: 19 MG/DL (ref 8–23)
CALCIUM SERPL-MCNC: 9.6 MG/DL (ref 8.7–10.5)
CHLORIDE SERPL-SCNC: 108 MMOL/L (ref 95–110)
CHOLEST SERPL-MCNC: 203 MG/DL (ref 120–199)
CHOLEST/HDLC SERPL: 3.4 {RATIO} (ref 2–5)
CO2 SERPL-SCNC: 23 MMOL/L (ref 23–29)
CREAT SERPL-MCNC: 1.1 MG/DL (ref 0.5–1.4)
EST. GFR  (AFRICAN AMERICAN): >60 ML/MIN/1.73 M^2
EST. GFR  (NON AFRICAN AMERICAN): 52.8 ML/MIN/1.73 M^2
ESTIMATED AVG GLUCOSE: 103 MG/DL (ref 68–131)
GLUCOSE SERPL-MCNC: 94 MG/DL (ref 70–110)
HBA1C MFR BLD: 5.2 % (ref 4–5.6)
HDLC SERPL-MCNC: 60 MG/DL (ref 40–75)
HDLC SERPL: 29.6 % (ref 20–50)
LDLC SERPL CALC-MCNC: 130.6 MG/DL (ref 63–159)
NONHDLC SERPL-MCNC: 143 MG/DL
POTASSIUM SERPL-SCNC: 4.4 MMOL/L (ref 3.5–5.1)
PROT SERPL-MCNC: 6.8 G/DL (ref 6–8.4)
SODIUM SERPL-SCNC: 144 MMOL/L (ref 136–145)
TRIGL SERPL-MCNC: 62 MG/DL (ref 30–150)
VALPROATE SERPL-MCNC: 46.7 UG/ML (ref 50–100)

## 2021-05-28 PROCEDURE — 80061 LIPID PANEL: CPT | Performed by: PSYCHIATRY & NEUROLOGY

## 2021-05-28 PROCEDURE — 36415 COLL VENOUS BLD VENIPUNCTURE: CPT | Mod: PN | Performed by: PSYCHIATRY & NEUROLOGY

## 2021-05-28 PROCEDURE — 80164 ASSAY DIPROPYLACETIC ACD TOT: CPT | Performed by: PSYCHIATRY & NEUROLOGY

## 2021-05-28 PROCEDURE — 80053 COMPREHEN METABOLIC PANEL: CPT | Performed by: PSYCHIATRY & NEUROLOGY

## 2021-05-28 PROCEDURE — 83036 HEMOGLOBIN GLYCOSYLATED A1C: CPT | Performed by: PSYCHIATRY & NEUROLOGY

## 2021-05-31 ENCOUNTER — TELEPHONE (OUTPATIENT)
Dept: SPINE | Facility: CLINIC | Age: 66
End: 2021-05-31

## 2021-05-31 RX ORDER — TIZANIDINE 2 MG/1
TABLET ORAL
COMMUNITY
Start: 2021-05-23 | End: 2021-06-09

## 2021-06-03 ENCOUNTER — TELEPHONE (OUTPATIENT)
Dept: FAMILY MEDICINE | Facility: CLINIC | Age: 66
End: 2021-06-03

## 2021-06-09 ENCOUNTER — TELEPHONE (OUTPATIENT)
Dept: ORTHOPEDICS | Facility: CLINIC | Age: 66
End: 2021-06-09

## 2021-06-09 RX ORDER — TIZANIDINE 2 MG/1
TABLET ORAL
Qty: 30 TABLET | Refills: 3 | Status: SHIPPED | OUTPATIENT
Start: 2021-06-09 | End: 2021-12-13

## 2021-07-09 ENCOUNTER — TELEPHONE (OUTPATIENT)
Dept: NEUROSURGERY | Facility: CLINIC | Age: 66
End: 2021-07-09

## 2021-07-26 ENCOUNTER — TELEPHONE (OUTPATIENT)
Dept: ORTHOPEDICS | Facility: CLINIC | Age: 66
End: 2021-07-26

## 2021-08-02 ENCOUNTER — HOSPITAL ENCOUNTER (OUTPATIENT)
Dept: RADIOLOGY | Facility: HOSPITAL | Age: 66
Discharge: HOME | End: 2021-08-02
Attending: ORTHOPAEDIC SURGERY
Payer: MEDICARE

## 2021-08-02 ENCOUNTER — OFFICE VISIT (OUTPATIENT)
Dept: ORTHOPEDICS | Facility: CLINIC | Age: 66
End: 2021-08-02
Payer: MEDICARE

## 2021-08-02 VITALS
BODY MASS INDEX: 21.9 KG/M2 | SYSTOLIC BLOOD PRESSURE: 137 MMHG | WEIGHT: 119 LBS | HEIGHT: 62 IN | DIASTOLIC BLOOD PRESSURE: 81 MMHG | HEART RATE: 81 BPM

## 2021-08-02 DIAGNOSIS — M19.031 ARTHRITIS OF RIGHT WRIST: Primary | ICD-10-CM

## 2021-08-02 DIAGNOSIS — M19.032 ARTHRITIS OF LEFT WRIST: ICD-10-CM

## 2021-08-02 PROCEDURE — 3079F PR MOST RECENT DIASTOLIC BLOOD PRESSURE 80-89 MM HG: ICD-10-PCS | Mod: CPTII,S$GLB,, | Performed by: ORTHOPAEDIC SURGERY

## 2021-08-02 PROCEDURE — 3079F DIAST BP 80-89 MM HG: CPT | Mod: CPTII,S$GLB,, | Performed by: ORTHOPAEDIC SURGERY

## 2021-08-02 PROCEDURE — 3075F SYST BP GE 130 - 139MM HG: CPT | Mod: CPTII,S$GLB,, | Performed by: ORTHOPAEDIC SURGERY

## 2021-08-02 PROCEDURE — 20605 DRAIN/INJ JOINT/BURSA W/O US: CPT | Mod: LT,S$GLB,, | Performed by: ORTHOPAEDIC SURGERY

## 2021-08-02 PROCEDURE — 99999 PR PBB SHADOW E&M-EST. PATIENT-LVL III: ICD-10-PCS | Mod: PBBFAC,,, | Performed by: ORTHOPAEDIC SURGERY

## 2021-08-02 PROCEDURE — 73110 X-RAY EXAM OF WRIST: CPT | Mod: 26,LT,, | Performed by: RADIOLOGY

## 2021-08-02 PROCEDURE — 1125F PR PAIN SEVERITY QUANTIFIED, PAIN PRESENT: ICD-10-PCS | Mod: CPTII,S$GLB,, | Performed by: ORTHOPAEDIC SURGERY

## 2021-08-02 PROCEDURE — 73110 X-RAY EXAM OF WRIST: CPT | Mod: TC,PO,LT

## 2021-08-02 PROCEDURE — 3075F PR MOST RECENT SYSTOLIC BLOOD PRESS GE 130-139MM HG: ICD-10-PCS | Mod: CPTII,S$GLB,, | Performed by: ORTHOPAEDIC SURGERY

## 2021-08-02 PROCEDURE — 20605 INTERMEDIATE JOINT ASPIRATION/INJECTION: L RADIOCARPAL: ICD-10-PCS | Mod: LT,S$GLB,, | Performed by: ORTHOPAEDIC SURGERY

## 2021-08-02 PROCEDURE — 1160F PR REVIEW ALL MEDS BY PRESCRIBER/CLIN PHARMACIST DOCUMENTED: ICD-10-PCS | Mod: CPTII,S$GLB,, | Performed by: ORTHOPAEDIC SURGERY

## 2021-08-02 PROCEDURE — 73110 XR WRIST COMPLETE 3 VIEWS LEFT: ICD-10-PCS | Mod: 26,LT,, | Performed by: RADIOLOGY

## 2021-08-02 PROCEDURE — 1125F AMNT PAIN NOTED PAIN PRSNT: CPT | Mod: CPTII,S$GLB,, | Performed by: ORTHOPAEDIC SURGERY

## 2021-08-02 PROCEDURE — 1160F RVW MEDS BY RX/DR IN RCRD: CPT | Mod: CPTII,S$GLB,, | Performed by: ORTHOPAEDIC SURGERY

## 2021-08-02 PROCEDURE — 3288F PR FALLS RISK ASSESSMENT DOCUMENTED: ICD-10-PCS | Mod: CPTII,S$GLB,, | Performed by: ORTHOPAEDIC SURGERY

## 2021-08-02 PROCEDURE — 99213 PR OFFICE/OUTPT VISIT, EST, LEVL III, 20-29 MIN: ICD-10-PCS | Mod: 25,S$GLB,, | Performed by: ORTHOPAEDIC SURGERY

## 2021-08-02 PROCEDURE — 1157F PR ADVANCE CARE PLAN OR EQUIV PRESENT IN MEDICAL RECORD: ICD-10-PCS | Mod: CPTII,S$GLB,, | Performed by: ORTHOPAEDIC SURGERY

## 2021-08-02 PROCEDURE — 1101F PT FALLS ASSESS-DOCD LE1/YR: CPT | Mod: CPTII,S$GLB,, | Performed by: ORTHOPAEDIC SURGERY

## 2021-08-02 PROCEDURE — 1159F PR MEDICATION LIST DOCUMENTED IN MEDICAL RECORD: ICD-10-PCS | Mod: CPTII,S$GLB,, | Performed by: ORTHOPAEDIC SURGERY

## 2021-08-02 PROCEDURE — 3044F HG A1C LEVEL LT 7.0%: CPT | Mod: CPTII,S$GLB,, | Performed by: ORTHOPAEDIC SURGERY

## 2021-08-02 PROCEDURE — 3288F FALL RISK ASSESSMENT DOCD: CPT | Mod: CPTII,S$GLB,, | Performed by: ORTHOPAEDIC SURGERY

## 2021-08-02 PROCEDURE — 3008F PR BODY MASS INDEX (BMI) DOCUMENTED: ICD-10-PCS | Mod: CPTII,S$GLB,, | Performed by: ORTHOPAEDIC SURGERY

## 2021-08-02 PROCEDURE — 3044F PR MOST RECENT HEMOGLOBIN A1C LEVEL <7.0%: ICD-10-PCS | Mod: CPTII,S$GLB,, | Performed by: ORTHOPAEDIC SURGERY

## 2021-08-02 PROCEDURE — 1157F ADVNC CARE PLAN IN RCRD: CPT | Mod: CPTII,S$GLB,, | Performed by: ORTHOPAEDIC SURGERY

## 2021-08-02 PROCEDURE — 99999 PR PBB SHADOW E&M-EST. PATIENT-LVL III: CPT | Mod: PBBFAC,,, | Performed by: ORTHOPAEDIC SURGERY

## 2021-08-02 PROCEDURE — 1101F PR PT FALLS ASSESS DOC 0-1 FALLS W/OUT INJ PAST YR: ICD-10-PCS | Mod: CPTII,S$GLB,, | Performed by: ORTHOPAEDIC SURGERY

## 2021-08-02 PROCEDURE — 1159F MED LIST DOCD IN RCRD: CPT | Mod: CPTII,S$GLB,, | Performed by: ORTHOPAEDIC SURGERY

## 2021-08-02 PROCEDURE — 3008F BODY MASS INDEX DOCD: CPT | Mod: CPTII,S$GLB,, | Performed by: ORTHOPAEDIC SURGERY

## 2021-08-02 PROCEDURE — 99213 OFFICE O/P EST LOW 20 MIN: CPT | Mod: 25,S$GLB,, | Performed by: ORTHOPAEDIC SURGERY

## 2021-08-02 RX ORDER — TRIAMCINOLONE ACETONIDE 40 MG/ML
40 INJECTION, SUSPENSION INTRA-ARTICULAR; INTRAMUSCULAR
Status: DISCONTINUED | OUTPATIENT
Start: 2021-08-02 | End: 2021-08-02 | Stop reason: HOSPADM

## 2021-08-02 RX ADMIN — TRIAMCINOLONE ACETONIDE 40 MG: 40 INJECTION, SUSPENSION INTRA-ARTICULAR; INTRAMUSCULAR at 01:08

## 2021-08-04 ENCOUNTER — PATIENT OUTREACH (OUTPATIENT)
Dept: ADMINISTRATIVE | Facility: OTHER | Age: 66
End: 2021-08-04

## 2021-08-04 ENCOUNTER — HOSPITAL ENCOUNTER (OUTPATIENT)
Dept: RADIOLOGY | Facility: HOSPITAL | Age: 66
Discharge: HOME | End: 2021-08-04
Attending: ANESTHESIOLOGY
Payer: MEDICARE

## 2021-08-04 ENCOUNTER — OFFICE VISIT (OUTPATIENT)
Dept: PAIN MEDICINE | Facility: CLINIC | Age: 66
End: 2021-08-04
Payer: MEDICARE

## 2021-08-04 VITALS
HEIGHT: 62 IN | BODY MASS INDEX: 22.09 KG/M2 | RESPIRATION RATE: 20 BRPM | SYSTOLIC BLOOD PRESSURE: 124 MMHG | HEART RATE: 70 BPM | WEIGHT: 120.06 LBS | DIASTOLIC BLOOD PRESSURE: 60 MMHG | TEMPERATURE: 98 F | OXYGEN SATURATION: 99 %

## 2021-08-04 DIAGNOSIS — M47.812 CERVICAL SPONDYLOSIS: Primary | ICD-10-CM

## 2021-08-04 DIAGNOSIS — M54.2 CERVICALGIA: ICD-10-CM

## 2021-08-04 DIAGNOSIS — M47.816 LUMBAR SPONDYLOSIS: ICD-10-CM

## 2021-08-04 DIAGNOSIS — M50.30 DEGENERATIVE DISC DISEASE, CERVICAL: ICD-10-CM

## 2021-08-04 DIAGNOSIS — Z11.9 SCREENING EXAMINATION FOR INFECTIOUS DISEASE: ICD-10-CM

## 2021-08-04 PROCEDURE — 1159F MED LIST DOCD IN RCRD: CPT | Mod: CPTII,S$GLB,, | Performed by: ANESTHESIOLOGY

## 2021-08-04 PROCEDURE — 72052 X-RAY EXAM NECK SPINE 6/>VWS: CPT | Mod: 26,,, | Performed by: RADIOLOGY

## 2021-08-04 PROCEDURE — 1160F RVW MEDS BY RX/DR IN RCRD: CPT | Mod: CPTII,S$GLB,, | Performed by: ANESTHESIOLOGY

## 2021-08-04 PROCEDURE — 3008F PR BODY MASS INDEX (BMI) DOCUMENTED: ICD-10-PCS | Mod: CPTII,S$GLB,, | Performed by: ANESTHESIOLOGY

## 2021-08-04 PROCEDURE — 99999 PR PBB SHADOW E&M-EST. PATIENT-LVL V: ICD-10-PCS | Mod: PBBFAC,,, | Performed by: ANESTHESIOLOGY

## 2021-08-04 PROCEDURE — 3044F HG A1C LEVEL LT 7.0%: CPT | Mod: CPTII,S$GLB,, | Performed by: ANESTHESIOLOGY

## 2021-08-04 PROCEDURE — 3078F DIAST BP <80 MM HG: CPT | Mod: CPTII,S$GLB,, | Performed by: ANESTHESIOLOGY

## 2021-08-04 PROCEDURE — 1101F PT FALLS ASSESS-DOCD LE1/YR: CPT | Mod: CPTII,S$GLB,, | Performed by: ANESTHESIOLOGY

## 2021-08-04 PROCEDURE — 99204 PR OFFICE/OUTPT VISIT, NEW, LEVL IV, 45-59 MIN: ICD-10-PCS | Mod: S$GLB,CS,, | Performed by: ANESTHESIOLOGY

## 2021-08-04 PROCEDURE — 1160F PR REVIEW ALL MEDS BY PRESCRIBER/CLIN PHARMACIST DOCUMENTED: ICD-10-PCS | Mod: CPTII,S$GLB,, | Performed by: ANESTHESIOLOGY

## 2021-08-04 PROCEDURE — 3288F FALL RISK ASSESSMENT DOCD: CPT | Mod: CPTII,S$GLB,, | Performed by: ANESTHESIOLOGY

## 2021-08-04 PROCEDURE — 3074F SYST BP LT 130 MM HG: CPT | Mod: CPTII,S$GLB,, | Performed by: ANESTHESIOLOGY

## 2021-08-04 PROCEDURE — 3288F PR FALLS RISK ASSESSMENT DOCUMENTED: ICD-10-PCS | Mod: CPTII,S$GLB,, | Performed by: ANESTHESIOLOGY

## 2021-08-04 PROCEDURE — 1125F PR PAIN SEVERITY QUANTIFIED, PAIN PRESENT: ICD-10-PCS | Mod: CPTII,S$GLB,, | Performed by: ANESTHESIOLOGY

## 2021-08-04 PROCEDURE — 1101F PR PT FALLS ASSESS DOC 0-1 FALLS W/OUT INJ PAST YR: ICD-10-PCS | Mod: CPTII,S$GLB,, | Performed by: ANESTHESIOLOGY

## 2021-08-04 PROCEDURE — 72052 XR CERVICAL SPINE 5 VIEW WITH FLEX AND EXT: ICD-10-PCS | Mod: 26,,, | Performed by: RADIOLOGY

## 2021-08-04 PROCEDURE — 3078F PR MOST RECENT DIASTOLIC BLOOD PRESSURE < 80 MM HG: ICD-10-PCS | Mod: CPTII,S$GLB,, | Performed by: ANESTHESIOLOGY

## 2021-08-04 PROCEDURE — 3044F PR MOST RECENT HEMOGLOBIN A1C LEVEL <7.0%: ICD-10-PCS | Mod: CPTII,S$GLB,, | Performed by: ANESTHESIOLOGY

## 2021-08-04 PROCEDURE — 1157F ADVNC CARE PLAN IN RCRD: CPT | Mod: CPTII,S$GLB,, | Performed by: ANESTHESIOLOGY

## 2021-08-04 PROCEDURE — 3074F PR MOST RECENT SYSTOLIC BLOOD PRESSURE < 130 MM HG: ICD-10-PCS | Mod: CPTII,S$GLB,, | Performed by: ANESTHESIOLOGY

## 2021-08-04 PROCEDURE — 99204 OFFICE O/P NEW MOD 45 MIN: CPT | Mod: S$GLB,CS,, | Performed by: ANESTHESIOLOGY

## 2021-08-04 PROCEDURE — 3008F BODY MASS INDEX DOCD: CPT | Mod: CPTII,S$GLB,, | Performed by: ANESTHESIOLOGY

## 2021-08-04 PROCEDURE — 1159F PR MEDICATION LIST DOCUMENTED IN MEDICAL RECORD: ICD-10-PCS | Mod: CPTII,S$GLB,, | Performed by: ANESTHESIOLOGY

## 2021-08-04 PROCEDURE — 1157F PR ADVANCE CARE PLAN OR EQUIV PRESENT IN MEDICAL RECORD: ICD-10-PCS | Mod: CPTII,S$GLB,, | Performed by: ANESTHESIOLOGY

## 2021-08-04 PROCEDURE — 1125F AMNT PAIN NOTED PAIN PRSNT: CPT | Mod: CPTII,S$GLB,, | Performed by: ANESTHESIOLOGY

## 2021-08-04 PROCEDURE — 72052 X-RAY EXAM NECK SPINE 6/>VWS: CPT | Mod: TC,PO

## 2021-08-04 PROCEDURE — 99999 PR PBB SHADOW E&M-EST. PATIENT-LVL V: CPT | Mod: PBBFAC,,, | Performed by: ANESTHESIOLOGY

## 2021-08-04 RX ORDER — ALPRAZOLAM 0.5 MG/1
1 TABLET, ORALLY DISINTEGRATING ORAL ONCE AS NEEDED
Status: CANCELLED | OUTPATIENT
Start: 2021-08-12 | End: 2033-01-07

## 2021-08-09 DIAGNOSIS — F31.9 BIPOLAR I DISORDER: ICD-10-CM

## 2021-08-09 DIAGNOSIS — Z79.899 HIGH RISK MEDICATIONS (NOT ANTICOAGULANTS) LONG-TERM USE: ICD-10-CM

## 2021-08-09 RX ORDER — LOXAPINE SUCCINATE 10 MG/1
10 TABLET ORAL DAILY
Qty: 90 CAPSULE | Refills: 0 | Status: SHIPPED | OUTPATIENT
Start: 2021-08-09 | End: 2021-11-07

## 2021-08-09 RX ORDER — DIVALPROEX SODIUM 250 MG/1
750 TABLET, DELAYED RELEASE ORAL NIGHTLY
Qty: 270 TABLET | Refills: 0 | Status: SHIPPED | OUTPATIENT
Start: 2021-08-09 | End: 2021-11-07

## 2021-08-10 ENCOUNTER — TELEPHONE (OUTPATIENT)
Dept: PAIN MEDICINE | Facility: CLINIC | Age: 66
End: 2021-08-10

## 2021-09-13 ENCOUNTER — TELEPHONE (OUTPATIENT)
Dept: NEUROSURGERY | Facility: CLINIC | Age: 66
End: 2021-09-13

## 2021-09-13 DIAGNOSIS — Z98.890 H/O LAMINECTOMY: Primary | ICD-10-CM

## 2021-09-13 DIAGNOSIS — M54.16 LUMBAR RADICULOPATHY: ICD-10-CM

## 2021-09-21 ENCOUNTER — TELEPHONE (OUTPATIENT)
Dept: NEUROSURGERY | Facility: CLINIC | Age: 66
End: 2021-09-21

## 2021-09-22 ENCOUNTER — TELEPHONE (OUTPATIENT)
Dept: NEUROSURGERY | Facility: CLINIC | Age: 66
End: 2021-09-22

## 2021-09-23 ENCOUNTER — TELEPHONE (OUTPATIENT)
Dept: NEUROSURGERY | Facility: CLINIC | Age: 66
End: 2021-09-23

## 2021-09-27 ENCOUNTER — TELEPHONE (OUTPATIENT)
Dept: PAIN MEDICINE | Facility: CLINIC | Age: 66
End: 2021-09-27

## 2021-09-27 ENCOUNTER — PATIENT OUTREACH (OUTPATIENT)
Dept: ADMINISTRATIVE | Facility: OTHER | Age: 66
End: 2021-09-27

## 2021-09-28 ENCOUNTER — OFFICE VISIT (OUTPATIENT)
Dept: PSYCHIATRY | Facility: CLINIC | Age: 66
End: 2021-09-28
Payer: MEDICARE

## 2021-09-28 VITALS
DIASTOLIC BLOOD PRESSURE: 87 MMHG | BODY MASS INDEX: 22.76 KG/M2 | HEIGHT: 62 IN | HEART RATE: 71 BPM | SYSTOLIC BLOOD PRESSURE: 146 MMHG | WEIGHT: 123.69 LBS

## 2021-09-28 DIAGNOSIS — F31.9 BIPOLAR AFFECTIVE DISORDER, REMISSION STATUS UNSPECIFIED: Primary | ICD-10-CM

## 2021-09-28 DIAGNOSIS — Z79.899 HIGH RISK MEDICATIONS (NOT ANTICOAGULANTS) LONG-TERM USE: ICD-10-CM

## 2021-09-28 PROCEDURE — 99214 OFFICE O/P EST MOD 30 MIN: CPT | Mod: S$PBB,,, | Performed by: PSYCHIATRY & NEUROLOGY

## 2021-09-28 PROCEDURE — 99213 OFFICE O/P EST LOW 20 MIN: CPT | Mod: PBBFAC,PO | Performed by: PSYCHIATRY & NEUROLOGY

## 2021-09-28 PROCEDURE — 99214 PR OFFICE/OUTPT VISIT, EST, LEVL IV, 30-39 MIN: ICD-10-PCS | Mod: S$PBB,,, | Performed by: PSYCHIATRY & NEUROLOGY

## 2021-09-28 PROCEDURE — 99999 PR PBB SHADOW E&M-EST. PATIENT-LVL III: CPT | Mod: PBBFAC,,, | Performed by: PSYCHIATRY & NEUROLOGY

## 2021-09-28 PROCEDURE — 99999 PR PBB SHADOW E&M-EST. PATIENT-LVL III: ICD-10-PCS | Mod: PBBFAC,,, | Performed by: PSYCHIATRY & NEUROLOGY

## 2021-09-29 ENCOUNTER — OFFICE VISIT (OUTPATIENT)
Dept: PAIN MEDICINE | Facility: CLINIC | Age: 66
End: 2021-09-29
Payer: MEDICARE

## 2021-09-29 VITALS
HEART RATE: 73 BPM | OXYGEN SATURATION: 99 % | BODY MASS INDEX: 23.13 KG/M2 | WEIGHT: 125.69 LBS | SYSTOLIC BLOOD PRESSURE: 154 MMHG | DIASTOLIC BLOOD PRESSURE: 78 MMHG | HEIGHT: 62 IN

## 2021-09-29 DIAGNOSIS — Z01.818 PRE-OP TESTING: ICD-10-CM

## 2021-09-29 DIAGNOSIS — M54.16 LUMBAR RADICULOPATHY: Primary | ICD-10-CM

## 2021-09-29 DIAGNOSIS — M71.38 SYNOVIAL CYST OF LUMBAR FACET JOINT: ICD-10-CM

## 2021-09-29 PROCEDURE — 99999 PR PBB SHADOW E&M-EST. PATIENT-LVL III: ICD-10-PCS | Mod: PBBFAC,,, | Performed by: ANESTHESIOLOGY

## 2021-09-29 PROCEDURE — 99214 PR OFFICE/OUTPT VISIT, EST, LEVL IV, 30-39 MIN: ICD-10-PCS | Mod: CS,S$GLB,, | Performed by: ANESTHESIOLOGY

## 2021-09-29 PROCEDURE — 99214 OFFICE O/P EST MOD 30 MIN: CPT | Mod: CS,S$GLB,, | Performed by: ANESTHESIOLOGY

## 2021-09-29 PROCEDURE — 99213 OFFICE O/P EST LOW 20 MIN: CPT | Mod: PBBFAC,PN | Performed by: ANESTHESIOLOGY

## 2021-09-29 PROCEDURE — 99999 PR PBB SHADOW E&M-EST. PATIENT-LVL III: CPT | Mod: PBBFAC,,, | Performed by: ANESTHESIOLOGY

## 2021-09-29 RX ORDER — SODIUM CHLORIDE, SODIUM LACTATE, POTASSIUM CHLORIDE, CALCIUM CHLORIDE 600; 310; 30; 20 MG/100ML; MG/100ML; MG/100ML; MG/100ML
INJECTION, SOLUTION INTRAVENOUS CONTINUOUS
Status: CANCELLED | OUTPATIENT
Start: 2021-09-29

## 2021-10-11 ENCOUNTER — LAB VISIT (OUTPATIENT)
Dept: FAMILY MEDICINE | Facility: CLINIC | Age: 66
End: 2021-10-11
Payer: MEDICARE

## 2021-10-11 DIAGNOSIS — Z01.818 PRE-OP TESTING: ICD-10-CM

## 2021-10-11 LAB — SARS-COV-2- CYCLE NUMBER: 17

## 2021-10-11 PROCEDURE — U0003 INFECTIOUS AGENT DETECTION BY NUCLEIC ACID (DNA OR RNA); SEVERE ACUTE RESPIRATORY SYNDROME CORONAVIRUS 2 (SARS-COV-2) (CORONAVIRUS DISEASE [COVID-19]), AMPLIFIED PROBE TECHNIQUE, MAKING USE OF HIGH THROUGHPUT TECHNOLOGIES AS DESCRIBED BY CMS-2020-01-R: HCPCS | Performed by: ANESTHESIOLOGY

## 2021-10-11 PROCEDURE — U0005 INFEC AGEN DETEC AMPLI PROBE: HCPCS | Performed by: ANESTHESIOLOGY

## 2021-10-12 ENCOUNTER — TELEPHONE (OUTPATIENT)
Dept: PAIN MEDICINE | Facility: CLINIC | Age: 66
End: 2021-10-12

## 2021-10-12 DIAGNOSIS — U07.1 COVID-19 VIRUS DETECTED: ICD-10-CM

## 2021-10-12 LAB — SARS-COV-2 RNA RESP QL NAA+PROBE: DETECTED

## 2021-10-14 ENCOUNTER — TELEPHONE (OUTPATIENT)
Dept: RESEARCH | Facility: HOSPITAL | Age: 66
End: 2021-10-14

## 2021-10-19 ENCOUNTER — TELEPHONE (OUTPATIENT)
Dept: PAIN MEDICINE | Facility: CLINIC | Age: 66
End: 2021-10-19

## 2021-10-25 ENCOUNTER — TELEPHONE (OUTPATIENT)
Dept: ORTHOPEDICS | Facility: CLINIC | Age: 66
End: 2021-10-25
Payer: MEDICARE

## 2021-10-25 RX ORDER — VITAMIN A 3000 MCG
10000 CAPSULE ORAL DAILY
COMMUNITY
End: 2021-12-13

## 2021-10-26 DIAGNOSIS — M54.2 CERVICALGIA: Primary | ICD-10-CM

## 2021-10-27 ENCOUNTER — HOSPITAL ENCOUNTER (OUTPATIENT)
Facility: HOSPITAL | Age: 66
Discharge: HOME | End: 2021-10-27
Attending: ANESTHESIOLOGY | Admitting: ANESTHESIOLOGY
Payer: MEDICARE

## 2021-10-27 ENCOUNTER — HOSPITAL ENCOUNTER (OUTPATIENT)
Dept: RADIOLOGY | Facility: HOSPITAL | Age: 66
Discharge: HOME | End: 2021-10-27
Attending: ANESTHESIOLOGY
Payer: MEDICARE

## 2021-10-27 VITALS
HEIGHT: 62 IN | OXYGEN SATURATION: 98 % | TEMPERATURE: 98 F | WEIGHT: 120 LBS | DIASTOLIC BLOOD PRESSURE: 90 MMHG | HEART RATE: 80 BPM | RESPIRATION RATE: 16 BRPM | BODY MASS INDEX: 22.08 KG/M2 | SYSTOLIC BLOOD PRESSURE: 157 MMHG

## 2021-10-27 DIAGNOSIS — M54.16 LUMBAR RADICULOPATHY: Primary | ICD-10-CM

## 2021-10-27 DIAGNOSIS — M54.2 CERVICALGIA: ICD-10-CM

## 2021-10-27 PROCEDURE — 64483 NJX AA&/STRD TFRM EPI L/S 1: CPT | Mod: PO | Performed by: ANESTHESIOLOGY

## 2021-10-27 PROCEDURE — 64483 PR EPIDURAL INJ, ANES/STEROID, TRANSFORAMINAL, LUMB/SACR, SNGL LEVL: ICD-10-PCS | Mod: RT,,, | Performed by: ANESTHESIOLOGY

## 2021-10-27 PROCEDURE — 99152 MOD SED SAME PHYS/QHP 5/>YRS: CPT | Mod: ,,, | Performed by: ANESTHESIOLOGY

## 2021-10-27 PROCEDURE — 20615 TREATMENT OF BONE CYST: CPT | Mod: PO | Performed by: ANESTHESIOLOGY

## 2021-10-27 PROCEDURE — 99152 PR MOD CONSCIOUS SEDATION, SAME PHYS, 5+ YRS, FIRST 15 MIN: ICD-10-PCS | Mod: ,,, | Performed by: ANESTHESIOLOGY

## 2021-10-27 PROCEDURE — 64483 NJX AA&/STRD TFRM EPI L/S 1: CPT | Mod: RT,,, | Performed by: ANESTHESIOLOGY

## 2021-10-27 PROCEDURE — 76000 FLUOROSCOPY <1 HR PHYS/QHP: CPT | Mod: TC,PO

## 2021-10-27 PROCEDURE — 63600175 PHARM REV CODE 636 W HCPCS: Mod: PO | Performed by: ANESTHESIOLOGY

## 2021-10-27 PROCEDURE — 25000003 PHARM REV CODE 250: Mod: PO | Performed by: ANESTHESIOLOGY

## 2021-10-27 RX ORDER — BUPIVACAINE HYDROCHLORIDE 2.5 MG/ML
INJECTION, SOLUTION EPIDURAL; INFILTRATION; INTRACAUDAL
Status: DISCONTINUED | OUTPATIENT
Start: 2021-10-27 | End: 2021-10-27 | Stop reason: HOSPADM

## 2021-10-27 RX ORDER — MIDAZOLAM HYDROCHLORIDE 2 MG/2ML
INJECTION, SOLUTION INTRAMUSCULAR; INTRAVENOUS
Status: DISCONTINUED | OUTPATIENT
Start: 2021-10-27 | End: 2021-10-27 | Stop reason: HOSPADM

## 2021-10-27 RX ORDER — SODIUM CHLORIDE, SODIUM LACTATE, POTASSIUM CHLORIDE, CALCIUM CHLORIDE 600; 310; 30; 20 MG/100ML; MG/100ML; MG/100ML; MG/100ML
INJECTION, SOLUTION INTRAVENOUS CONTINUOUS
Status: DISCONTINUED | OUTPATIENT
Start: 2021-10-27 | End: 2021-10-27 | Stop reason: HOSPADM

## 2021-10-27 RX ORDER — TRIAMCINOLONE ACETONIDE 40 MG/ML
INJECTION, SUSPENSION INTRA-ARTICULAR; INTRAMUSCULAR
Status: DISCONTINUED | OUTPATIENT
Start: 2021-10-27 | End: 2021-10-27 | Stop reason: HOSPADM

## 2021-10-27 RX ORDER — LIDOCAINE HYDROCHLORIDE 10 MG/ML
INJECTION, SOLUTION EPIDURAL; INFILTRATION; INTRACAUDAL; PERINEURAL
Status: DISCONTINUED | OUTPATIENT
Start: 2021-10-27 | End: 2021-10-27 | Stop reason: HOSPADM

## 2021-10-27 RX ADMIN — SODIUM CHLORIDE, SODIUM LACTATE, POTASSIUM CHLORIDE, AND CALCIUM CHLORIDE: .6; .31; .03; .02 INJECTION, SOLUTION INTRAVENOUS at 12:10

## 2021-11-10 ENCOUNTER — TELEPHONE (OUTPATIENT)
Dept: NEUROSURGERY | Facility: CLINIC | Age: 66
End: 2021-11-10
Payer: MEDICARE

## 2021-11-10 ENCOUNTER — OFFICE VISIT (OUTPATIENT)
Dept: PAIN MEDICINE | Facility: CLINIC | Age: 66
End: 2021-11-10
Payer: MEDICARE

## 2021-11-10 VITALS
DIASTOLIC BLOOD PRESSURE: 60 MMHG | RESPIRATION RATE: 20 BRPM | OXYGEN SATURATION: 98 % | SYSTOLIC BLOOD PRESSURE: 132 MMHG | TEMPERATURE: 96 F | WEIGHT: 126.63 LBS | HEART RATE: 90 BPM | BODY MASS INDEX: 23.17 KG/M2

## 2021-11-10 DIAGNOSIS — M71.38 SYNOVIAL CYST OF LUMBAR FACET JOINT: ICD-10-CM

## 2021-11-10 DIAGNOSIS — M54.16 LUMBAR RADICULOPATHY: Primary | ICD-10-CM

## 2021-11-10 PROCEDURE — 3044F HG A1C LEVEL LT 7.0%: CPT | Mod: CPTII,S$GLB,,

## 2021-11-10 PROCEDURE — 3044F PR MOST RECENT HEMOGLOBIN A1C LEVEL <7.0%: ICD-10-PCS | Mod: CPTII,S$GLB,,

## 2021-11-10 PROCEDURE — 3075F PR MOST RECENT SYSTOLIC BLOOD PRESS GE 130-139MM HG: ICD-10-PCS | Mod: CPTII,S$GLB,,

## 2021-11-10 PROCEDURE — 3075F SYST BP GE 130 - 139MM HG: CPT | Mod: CPTII,S$GLB,,

## 2021-11-10 PROCEDURE — 1101F PR PT FALLS ASSESS DOC 0-1 FALLS W/OUT INJ PAST YR: ICD-10-PCS | Mod: CPTII,S$GLB,,

## 2021-11-10 PROCEDURE — 1157F PR ADVANCE CARE PLAN OR EQUIV PRESENT IN MEDICAL RECORD: ICD-10-PCS | Mod: CPTII,S$GLB,,

## 2021-11-10 PROCEDURE — 3008F BODY MASS INDEX DOCD: CPT | Mod: CPTII,S$GLB,,

## 2021-11-10 PROCEDURE — 3008F PR BODY MASS INDEX (BMI) DOCUMENTED: ICD-10-PCS | Mod: CPTII,S$GLB,,

## 2021-11-10 PROCEDURE — 1159F PR MEDICATION LIST DOCUMENTED IN MEDICAL RECORD: ICD-10-PCS | Mod: CPTII,S$GLB,,

## 2021-11-10 PROCEDURE — 99214 PR OFFICE/OUTPT VISIT, EST, LEVL IV, 30-39 MIN: ICD-10-PCS | Mod: S$GLB,,,

## 2021-11-10 PROCEDURE — 99999 PR PBB SHADOW E&M-EST. PATIENT-LVL IV: CPT | Mod: PBBFAC,,,

## 2021-11-10 PROCEDURE — 1159F MED LIST DOCD IN RCRD: CPT | Mod: CPTII,S$GLB,,

## 2021-11-10 PROCEDURE — 1101F PT FALLS ASSESS-DOCD LE1/YR: CPT | Mod: CPTII,S$GLB,,

## 2021-11-10 PROCEDURE — 3078F DIAST BP <80 MM HG: CPT | Mod: CPTII,S$GLB,,

## 2021-11-10 PROCEDURE — 3288F PR FALLS RISK ASSESSMENT DOCUMENTED: ICD-10-PCS | Mod: CPTII,S$GLB,,

## 2021-11-10 PROCEDURE — 99214 OFFICE O/P EST MOD 30 MIN: CPT | Mod: S$GLB,,,

## 2021-11-10 PROCEDURE — 99999 PR PBB SHADOW E&M-EST. PATIENT-LVL IV: ICD-10-PCS | Mod: PBBFAC,,,

## 2021-11-10 PROCEDURE — 1157F ADVNC CARE PLAN IN RCRD: CPT | Mod: CPTII,S$GLB,,

## 2021-11-10 PROCEDURE — 3078F PR MOST RECENT DIASTOLIC BLOOD PRESSURE < 80 MM HG: ICD-10-PCS | Mod: CPTII,S$GLB,,

## 2021-11-10 PROCEDURE — 3288F FALL RISK ASSESSMENT DOCD: CPT | Mod: CPTII,S$GLB,,

## 2021-11-15 ENCOUNTER — LAB VISIT (OUTPATIENT)
Dept: LAB | Facility: HOSPITAL | Age: 66
End: 2021-11-15
Attending: PSYCHIATRY & NEUROLOGY
Payer: MEDICARE

## 2021-11-15 DIAGNOSIS — Z79.899 HIGH RISK MEDICATIONS (NOT ANTICOAGULANTS) LONG-TERM USE: ICD-10-CM

## 2021-11-15 LAB
ALBUMIN SERPL BCP-MCNC: 3.3 G/DL (ref 3.5–5.2)
ALP SERPL-CCNC: 73 U/L (ref 55–135)
ALT SERPL W/O P-5'-P-CCNC: 10 U/L (ref 10–44)
ANION GAP SERPL CALC-SCNC: 11 MMOL/L (ref 8–16)
AST SERPL-CCNC: 15 U/L (ref 10–40)
BILIRUB SERPL-MCNC: 0.3 MG/DL (ref 0.1–1)
BUN SERPL-MCNC: 17 MG/DL (ref 8–23)
CALCIUM SERPL-MCNC: 9.4 MG/DL (ref 8.7–10.5)
CHLORIDE SERPL-SCNC: 108 MMOL/L (ref 95–110)
CHOLEST SERPL-MCNC: 186 MG/DL (ref 120–199)
CHOLEST/HDLC SERPL: 3.4 {RATIO} (ref 2–5)
CO2 SERPL-SCNC: 23 MMOL/L (ref 23–29)
CREAT SERPL-MCNC: 1.1 MG/DL (ref 0.5–1.4)
EST. GFR  (AFRICAN AMERICAN): >60 ML/MIN/1.73 M^2
EST. GFR  (NON AFRICAN AMERICAN): 52.8 ML/MIN/1.73 M^2
ESTIMATED AVG GLUCOSE: 114 MG/DL (ref 68–131)
GLUCOSE SERPL-MCNC: 84 MG/DL (ref 70–110)
HBA1C MFR BLD: 5.6 % (ref 4–5.6)
HDLC SERPL-MCNC: 55 MG/DL (ref 40–75)
HDLC SERPL: 29.6 % (ref 20–50)
LDLC SERPL CALC-MCNC: 115.4 MG/DL (ref 63–159)
NONHDLC SERPL-MCNC: 131 MG/DL
POTASSIUM SERPL-SCNC: 4.1 MMOL/L (ref 3.5–5.1)
PROT SERPL-MCNC: 6.9 G/DL (ref 6–8.4)
SODIUM SERPL-SCNC: 142 MMOL/L (ref 136–145)
TRIGL SERPL-MCNC: 78 MG/DL (ref 30–150)

## 2021-11-15 PROCEDURE — 36415 COLL VENOUS BLD VENIPUNCTURE: CPT | Mod: PN | Performed by: PSYCHIATRY & NEUROLOGY

## 2021-11-15 PROCEDURE — 80061 LIPID PANEL: CPT | Performed by: PSYCHIATRY & NEUROLOGY

## 2021-11-15 PROCEDURE — 80053 COMPREHEN METABOLIC PANEL: CPT | Performed by: PSYCHIATRY & NEUROLOGY

## 2021-11-15 PROCEDURE — 83036 HEMOGLOBIN GLYCOSYLATED A1C: CPT | Performed by: PSYCHIATRY & NEUROLOGY

## 2021-11-17 ENCOUNTER — OFFICE VISIT (OUTPATIENT)
Dept: NEUROSURGERY | Facility: CLINIC | Age: 66
End: 2021-11-17
Payer: MEDICARE

## 2021-11-17 VITALS
RESPIRATION RATE: 17 BRPM | SYSTOLIC BLOOD PRESSURE: 130 MMHG | DIASTOLIC BLOOD PRESSURE: 89 MMHG | HEART RATE: 77 BPM | HEIGHT: 62 IN | WEIGHT: 126.75 LBS | BODY MASS INDEX: 23.32 KG/M2

## 2021-11-17 DIAGNOSIS — M71.38 SYNOVIAL CYST OF LUMBAR SPINE: Primary | ICD-10-CM

## 2021-11-17 PROCEDURE — 1157F PR ADVANCE CARE PLAN OR EQUIV PRESENT IN MEDICAL RECORD: ICD-10-PCS | Mod: CPTII,,, | Performed by: NEUROLOGICAL SURGERY

## 2021-11-17 PROCEDURE — 99999 PR PBB SHADOW E&M-EST. PATIENT-LVL III: CPT | Mod: PBBFAC,,, | Performed by: NEUROLOGICAL SURGERY

## 2021-11-17 PROCEDURE — 99214 PR OFFICE/OUTPT VISIT, EST, LEVL IV, 30-39 MIN: ICD-10-PCS | Mod: S$PBB,,, | Performed by: NEUROLOGICAL SURGERY

## 2021-11-17 PROCEDURE — 99214 OFFICE O/P EST MOD 30 MIN: CPT | Mod: S$PBB,,, | Performed by: NEUROLOGICAL SURGERY

## 2021-11-17 PROCEDURE — 1157F ADVNC CARE PLAN IN RCRD: CPT | Mod: CPTII,,, | Performed by: NEUROLOGICAL SURGERY

## 2021-11-17 PROCEDURE — 99999 PR PBB SHADOW E&M-EST. PATIENT-LVL III: ICD-10-PCS | Mod: PBBFAC,,, | Performed by: NEUROLOGICAL SURGERY

## 2021-12-06 ENCOUNTER — OFFICE VISIT (OUTPATIENT)
Dept: ORTHOPEDICS | Facility: CLINIC | Age: 66
End: 2021-12-06
Payer: MEDICARE

## 2021-12-06 ENCOUNTER — HOSPITAL ENCOUNTER (OUTPATIENT)
Dept: RADIOLOGY | Facility: HOSPITAL | Age: 66
Discharge: HOME | End: 2021-12-06
Attending: PHYSICIAN ASSISTANT
Payer: MEDICARE

## 2021-12-06 VITALS
HEIGHT: 62 IN | BODY MASS INDEX: 23.19 KG/M2 | HEART RATE: 75 BPM | SYSTOLIC BLOOD PRESSURE: 143 MMHG | DIASTOLIC BLOOD PRESSURE: 85 MMHG | WEIGHT: 126 LBS

## 2021-12-06 DIAGNOSIS — M19.032 ARTHRITIS OF LEFT WRIST: ICD-10-CM

## 2021-12-06 DIAGNOSIS — M19.032 ARTHRITIS OF LEFT WRIST: Primary | ICD-10-CM

## 2021-12-06 PROCEDURE — 1157F PR ADVANCE CARE PLAN OR EQUIV PRESENT IN MEDICAL RECORD: ICD-10-PCS | Mod: CPTII,S$GLB,, | Performed by: PHYSICIAN ASSISTANT

## 2021-12-06 PROCEDURE — 99213 OFFICE O/P EST LOW 20 MIN: CPT | Mod: S$GLB,,, | Performed by: PHYSICIAN ASSISTANT

## 2021-12-06 PROCEDURE — 99999 PR PBB SHADOW E&M-EST. PATIENT-LVL III: CPT | Mod: PBBFAC,,, | Performed by: PHYSICIAN ASSISTANT

## 2021-12-06 PROCEDURE — 99213 OFFICE O/P EST LOW 20 MIN: CPT | Mod: PBBFAC,PN | Performed by: PHYSICIAN ASSISTANT

## 2021-12-06 PROCEDURE — 1157F ADVNC CARE PLAN IN RCRD: CPT | Mod: CPTII,S$GLB,, | Performed by: PHYSICIAN ASSISTANT

## 2021-12-06 PROCEDURE — 73110 XR WRIST COMPLETE 3 VIEWS LEFT: ICD-10-PCS | Mod: 26,LT,, | Performed by: RADIOLOGY

## 2021-12-06 PROCEDURE — 73110 X-RAY EXAM OF WRIST: CPT | Mod: TC,PO,LT

## 2021-12-06 PROCEDURE — 73110 X-RAY EXAM OF WRIST: CPT | Mod: 26,LT,, | Performed by: RADIOLOGY

## 2021-12-06 PROCEDURE — 99999 PR PBB SHADOW E&M-EST. PATIENT-LVL III: ICD-10-PCS | Mod: PBBFAC,,, | Performed by: PHYSICIAN ASSISTANT

## 2021-12-06 PROCEDURE — 99213 PR OFFICE/OUTPT VISIT, EST, LEVL III, 20-29 MIN: ICD-10-PCS | Mod: S$GLB,,, | Performed by: PHYSICIAN ASSISTANT

## 2021-12-13 ENCOUNTER — OFFICE VISIT (OUTPATIENT)
Dept: FAMILY MEDICINE | Facility: CLINIC | Age: 66
End: 2021-12-13
Payer: MEDICARE

## 2021-12-13 VITALS
BODY MASS INDEX: 23.65 KG/M2 | RESPIRATION RATE: 14 BRPM | HEIGHT: 62 IN | TEMPERATURE: 98 F | HEART RATE: 72 BPM | WEIGHT: 128.5 LBS | DIASTOLIC BLOOD PRESSURE: 86 MMHG | SYSTOLIC BLOOD PRESSURE: 128 MMHG

## 2021-12-13 DIAGNOSIS — R11.15 EMESIS, PERSISTENT: Primary | ICD-10-CM

## 2021-12-13 DIAGNOSIS — N18.30 STAGE 3 CHRONIC KIDNEY DISEASE, UNSPECIFIED WHETHER STAGE 3A OR 3B CKD: ICD-10-CM

## 2021-12-13 PROCEDURE — 99213 OFFICE O/P EST LOW 20 MIN: CPT | Mod: PBBFAC,PN | Performed by: FAMILY MEDICINE

## 2021-12-13 PROCEDURE — 99213 PR OFFICE/OUTPT VISIT, EST, LEVL III, 20-29 MIN: ICD-10-PCS | Mod: S$GLB,,, | Performed by: FAMILY MEDICINE

## 2021-12-13 PROCEDURE — 99213 OFFICE O/P EST LOW 20 MIN: CPT | Mod: S$GLB,,, | Performed by: FAMILY MEDICINE

## 2021-12-13 PROCEDURE — 1157F ADVNC CARE PLAN IN RCRD: CPT | Mod: CPTII,S$GLB,, | Performed by: FAMILY MEDICINE

## 2021-12-13 PROCEDURE — 1157F PR ADVANCE CARE PLAN OR EQUIV PRESENT IN MEDICAL RECORD: ICD-10-PCS | Mod: CPTII,S$GLB,, | Performed by: FAMILY MEDICINE

## 2021-12-13 PROCEDURE — 99999 PR PBB SHADOW E&M-EST. PATIENT-LVL III: CPT | Mod: PBBFAC,,, | Performed by: FAMILY MEDICINE

## 2021-12-13 PROCEDURE — 99999 PR PBB SHADOW E&M-EST. PATIENT-LVL III: ICD-10-PCS | Mod: PBBFAC,,, | Performed by: FAMILY MEDICINE

## 2021-12-13 RX ORDER — FAMOTIDINE 40 MG/1
40 TABLET, FILM COATED ORAL DAILY
Qty: 30 TABLET | Refills: 2 | Status: SHIPPED | OUTPATIENT
Start: 2021-12-13 | End: 2022-06-01

## 2021-12-14 ENCOUNTER — TELEPHONE (OUTPATIENT)
Dept: GASTROENTEROLOGY | Facility: CLINIC | Age: 66
End: 2021-12-14
Payer: MEDICARE

## 2021-12-15 ENCOUNTER — PATIENT OUTREACH (OUTPATIENT)
Dept: ADMINISTRATIVE | Facility: OTHER | Age: 66
End: 2021-12-15
Payer: MEDICARE

## 2021-12-16 DIAGNOSIS — M19.032 ARTHRITIS OF LEFT WRIST: Primary | ICD-10-CM

## 2021-12-20 ENCOUNTER — OFFICE VISIT (OUTPATIENT)
Dept: ORTHOPEDICS | Facility: CLINIC | Age: 66
End: 2021-12-20
Payer: MEDICARE

## 2021-12-20 ENCOUNTER — HOSPITAL ENCOUNTER (OUTPATIENT)
Dept: RADIOLOGY | Facility: HOSPITAL | Age: 66
Discharge: HOME | End: 2021-12-20
Attending: PHYSICIAN ASSISTANT
Payer: MEDICARE

## 2021-12-20 VITALS
SYSTOLIC BLOOD PRESSURE: 146 MMHG | HEART RATE: 67 BPM | DIASTOLIC BLOOD PRESSURE: 84 MMHG | WEIGHT: 128 LBS | BODY MASS INDEX: 23.55 KG/M2 | HEIGHT: 62 IN

## 2021-12-20 DIAGNOSIS — M19.032 ARTHRITIS OF LEFT WRIST: Primary | ICD-10-CM

## 2021-12-20 DIAGNOSIS — S52.502A CLOSED FRACTURE OF DISTAL END OF LEFT RADIUS, UNSPECIFIED FRACTURE MORPHOLOGY, INITIAL ENCOUNTER: ICD-10-CM

## 2021-12-20 DIAGNOSIS — M19.032 ARTHRITIS OF LEFT WRIST: ICD-10-CM

## 2021-12-20 PROCEDURE — 99213 OFFICE O/P EST LOW 20 MIN: CPT | Mod: PBBFAC,PN | Performed by: PHYSICIAN ASSISTANT

## 2021-12-20 PROCEDURE — 99999 PR PBB SHADOW E&M-EST. PATIENT-LVL III: CPT | Mod: PBBFAC,,, | Performed by: PHYSICIAN ASSISTANT

## 2021-12-20 PROCEDURE — 99213 PR OFFICE/OUTPT VISIT, EST, LEVL III, 20-29 MIN: ICD-10-PCS | Mod: S$GLB,,, | Performed by: PHYSICIAN ASSISTANT

## 2021-12-20 PROCEDURE — 99999 PR PBB SHADOW E&M-EST. PATIENT-LVL III: ICD-10-PCS | Mod: PBBFAC,,, | Performed by: PHYSICIAN ASSISTANT

## 2021-12-20 PROCEDURE — 1157F ADVNC CARE PLAN IN RCRD: CPT | Mod: CPTII,S$GLB,, | Performed by: PHYSICIAN ASSISTANT

## 2021-12-20 PROCEDURE — 73110 X-RAY EXAM OF WRIST: CPT | Mod: 26,LT,, | Performed by: RADIOLOGY

## 2021-12-20 PROCEDURE — 99213 OFFICE O/P EST LOW 20 MIN: CPT | Mod: S$GLB,,, | Performed by: PHYSICIAN ASSISTANT

## 2021-12-20 PROCEDURE — 1157F PR ADVANCE CARE PLAN OR EQUIV PRESENT IN MEDICAL RECORD: ICD-10-PCS | Mod: CPTII,S$GLB,, | Performed by: PHYSICIAN ASSISTANT

## 2021-12-20 PROCEDURE — 73110 X-RAY EXAM OF WRIST: CPT | Mod: TC,PO,LT

## 2021-12-20 PROCEDURE — 73110 XR WRIST COMPLETE 3 VIEWS LEFT: ICD-10-PCS | Mod: 26,LT,, | Performed by: RADIOLOGY

## 2022-01-19 ENCOUNTER — OFFICE VISIT (OUTPATIENT)
Dept: ORTHOPEDICS | Facility: CLINIC | Age: 67
End: 2022-01-19
Payer: MEDICARE

## 2022-01-19 VITALS
HEIGHT: 62 IN | WEIGHT: 128.06 LBS | HEART RATE: 72 BPM | SYSTOLIC BLOOD PRESSURE: 123 MMHG | DIASTOLIC BLOOD PRESSURE: 77 MMHG | BODY MASS INDEX: 23.57 KG/M2

## 2022-01-19 DIAGNOSIS — Z01.818 PRE-OP TESTING: Primary | ICD-10-CM

## 2022-01-19 DIAGNOSIS — M19.032 OSTEOARTHRITIS OF LEFT WRIST, UNSPECIFIED OSTEOARTHRITIS TYPE: Primary | ICD-10-CM

## 2022-01-19 PROCEDURE — 1101F PT FALLS ASSESS-DOCD LE1/YR: CPT | Mod: CPTII,S$GLB,, | Performed by: ORTHOPAEDIC SURGERY

## 2022-01-19 PROCEDURE — 1101F PR PT FALLS ASSESS DOC 0-1 FALLS W/OUT INJ PAST YR: ICD-10-PCS | Mod: CPTII,S$GLB,, | Performed by: ORTHOPAEDIC SURGERY

## 2022-01-19 PROCEDURE — 3074F PR MOST RECENT SYSTOLIC BLOOD PRESSURE < 130 MM HG: ICD-10-PCS | Mod: CPTII,S$GLB,, | Performed by: ORTHOPAEDIC SURGERY

## 2022-01-19 PROCEDURE — 3008F BODY MASS INDEX DOCD: CPT | Mod: CPTII,S$GLB,, | Performed by: ORTHOPAEDIC SURGERY

## 2022-01-19 PROCEDURE — 3078F PR MOST RECENT DIASTOLIC BLOOD PRESSURE < 80 MM HG: ICD-10-PCS | Mod: CPTII,S$GLB,, | Performed by: ORTHOPAEDIC SURGERY

## 2022-01-19 PROCEDURE — 1157F ADVNC CARE PLAN IN RCRD: CPT | Mod: CPTII,S$GLB,, | Performed by: ORTHOPAEDIC SURGERY

## 2022-01-19 PROCEDURE — 99999 PR PBB SHADOW E&M-EST. PATIENT-LVL III: CPT | Mod: PBBFAC,,, | Performed by: ORTHOPAEDIC SURGERY

## 2022-01-19 PROCEDURE — 1157F PR ADVANCE CARE PLAN OR EQUIV PRESENT IN MEDICAL RECORD: ICD-10-PCS | Mod: CPTII,S$GLB,, | Performed by: ORTHOPAEDIC SURGERY

## 2022-01-19 PROCEDURE — 3008F PR BODY MASS INDEX (BMI) DOCUMENTED: ICD-10-PCS | Mod: CPTII,S$GLB,, | Performed by: ORTHOPAEDIC SURGERY

## 2022-01-19 PROCEDURE — 1125F PR PAIN SEVERITY QUANTIFIED, PAIN PRESENT: ICD-10-PCS | Mod: CPTII,S$GLB,, | Performed by: ORTHOPAEDIC SURGERY

## 2022-01-19 PROCEDURE — 1159F MED LIST DOCD IN RCRD: CPT | Mod: CPTII,S$GLB,, | Performed by: ORTHOPAEDIC SURGERY

## 2022-01-19 PROCEDURE — 3074F SYST BP LT 130 MM HG: CPT | Mod: CPTII,S$GLB,, | Performed by: ORTHOPAEDIC SURGERY

## 2022-01-19 PROCEDURE — 99999 PR PBB SHADOW E&M-EST. PATIENT-LVL III: ICD-10-PCS | Mod: PBBFAC,,, | Performed by: ORTHOPAEDIC SURGERY

## 2022-01-19 PROCEDURE — 3288F PR FALLS RISK ASSESSMENT DOCUMENTED: ICD-10-PCS | Mod: CPTII,S$GLB,, | Performed by: ORTHOPAEDIC SURGERY

## 2022-01-19 PROCEDURE — 1159F PR MEDICATION LIST DOCUMENTED IN MEDICAL RECORD: ICD-10-PCS | Mod: CPTII,S$GLB,, | Performed by: ORTHOPAEDIC SURGERY

## 2022-01-19 PROCEDURE — 1125F AMNT PAIN NOTED PAIN PRSNT: CPT | Mod: CPTII,S$GLB,, | Performed by: ORTHOPAEDIC SURGERY

## 2022-01-19 PROCEDURE — 99214 PR OFFICE/OUTPT VISIT, EST, LEVL IV, 30-39 MIN: ICD-10-PCS | Mod: S$GLB,,, | Performed by: ORTHOPAEDIC SURGERY

## 2022-01-19 PROCEDURE — 3078F DIAST BP <80 MM HG: CPT | Mod: CPTII,S$GLB,, | Performed by: ORTHOPAEDIC SURGERY

## 2022-01-19 PROCEDURE — 3288F FALL RISK ASSESSMENT DOCD: CPT | Mod: CPTII,S$GLB,, | Performed by: ORTHOPAEDIC SURGERY

## 2022-01-19 PROCEDURE — 99214 OFFICE O/P EST MOD 30 MIN: CPT | Mod: S$GLB,,, | Performed by: ORTHOPAEDIC SURGERY

## 2022-01-20 NOTE — PROGRESS NOTES
1/20/2022    Chief Complaint:  Chief Complaint   Patient presents with    Left Wrist - Pain, Injury       HPI:  Viridiana Suresh is a 66 y.o. female, who presents to clinic today she has a history of severe left wrist arthritis.  We have treated her for a prolonged period of time with anti-inflammatories steroid injections and bracing.  She states that she is now having persistent severe pain and dysfunction of the left wrist and hand.  She is here today to discuss further treatment options.    PMHX:  Past Medical History:   Diagnosis Date    Bipolar disorder     Cancer 1995    melanoma rt third toe    COPD (chronic obstructive pulmonary disease)     CTS (carpal tunnel syndrome)     Diverticulosis     Hepatomegaly     Presence of dental bridge     UPPER       PSHX:  Past Surgical History:   Procedure Laterality Date    APPENDECTOMY      BREAST SURGERY      Needle and surgical biopsy    COLONOSCOPY N/A 6/12/2018    Procedure: COLONOSCOPY;  Surgeon: Uche Crowell MD;  Location[facility];  Service: Endoscopy;  Laterality: N/A; repeat in 10 years for screening    LUMBAR LAMINECTOMY Right 2/25/2021    Procedure: LAMINECTOMY, SPINE, LUMBAR RIGHT L5-S1 HEMILAMIOTOMY AND RESECTION OF SYNOVIAL CYST;  Surgeon: Ramos Boyd MD;  Location: [facility] OR;  Service: Neurosurgery;  Laterality: Right;    NASAL SEPTUM SURGERY      RHINOPHYMA RESECTION      RHINOPLASTY TIP      SKIN BIOPSY      TRANSFORAMINAL EPIDURAL INJECTION OF STEROID Right 10/27/2021    Procedure: Injection,steroid,epidural,transforaminal approach L5/S1 and facet cyst aspiration;  Surgeon: Rigo Ca MD;  Location: [facility] OR;  Service: Pain Management;  Laterality: Right;    TUBAL LIGATION         FMHX:  Family History   Problem Relation Age of Onset    Cancer Mother 80        breast cancer    Diabetes Mother     Cancer Father 77        pancreatic cancer    Diabetes Maternal Grandmother     Asthma Sister     Colon cancer Neg Hx   "   Colon polyps Neg Hx     Crohn's disease Neg Hx     Ulcerative colitis Neg Hx     Celiac disease Neg Hx        SOCHX:  Social History     Tobacco Use    Smoking status: Current Some Day Smoker     Packs/day: 2.00     Years: 43.00     Pack years: 86.00     Types: Vaping with nicotine    Smokeless tobacco: Never Used    Tobacco comment: pt reports quitting cigarettes 6-8 months ago. Still vaping with nicotine 12/13/21 KM   Substance Use Topics    Alcohol use: No       ALLERGIES:  Patient has no known allergies.    CURRENT MEDICATIONS:  Current Outpatient Medications on File Prior to Visit   Medication Sig Dispense Refill    divalproex (DEPAKOTE) 250 MG EC tablet TAKE 3 TABLETS(750 MG) BY MOUTH EVERY EVENING 270 tablet 0    famotidine (PEPCID) 40 MG tablet Take 1 tablet (40 mg total) by mouth once daily. 30 tablet 2    fluticasone-umeclidin-vilanter (TRELEGY ELLIPTA) 100-62.5-25 mcg DsDv Inhale 1 puff into the lungs once daily. 1 each 11    loxapine (LOXITANE) 10 MG Cap TAKE 1 CAPSULE(10 MG) BY MOUTH EVERY DAY 90 capsule 0     No current facility-administered medications on file prior to visit.       REVIEW OF SYSTEMS:  ROS    GENERAL PHYSICAL EXAM:   /77   Pulse 72   Ht 5' 2" (1.575 m)   Wt 58.1 kg (128 lb 1.4 oz)   BMI 23.43 kg/m²    GEN: well developed, well nourished, no acute distress   HENT: Normocephalic, atraumatic   EYES: No discharge, conjunctiva normal   NECK: Supple, non-tender   PULM: No wheezing, no respiratory distress   CV: RRR   ABD: Soft, non-tender    ORTHO EXAM:   Examination of the left wrist and hand reveals that there are changes consistent with severe arthritis of the wrist.  Palpation about the wrist does produce significant tenderness.  Range of motion of the wrist is very limited with extension of 20° and flexion of 10°.  She can pronate and supinate.  She is able to flex her fingers to her distal palmar crease.  She does have sensation intact in the median radial " ulnar distribution.  Capillary refill is less than 2 seconds in the digits    RADIOLOGY:   X-rays of the left wrist from 12/20/2021 have been reviewed.  She is noted to have severe arthritis of the left wrist with destruction of the proximal carpal row.    ASSESSMENT:   Left wrist arthritis    PLAN:  1. I have discussed treatment options with the patient.  I have discussed continuing conservative treatments versus a consideration of wrist arthrodesis.  Due to the severity of her arthritis she would require a total wrist arthrodesis.  I did discuss the risks and benefits of that surgical procedure with the patient and informed consent has been obtained.    2. Will proceed with left wrist arthrodesis under general anesthesia with a single-shot supraclavicular block     Three.  The patient will follow up with me 2 weeks postoperatively

## 2022-01-28 DIAGNOSIS — F31.9 BIPOLAR I DISORDER: ICD-10-CM

## 2022-01-28 DIAGNOSIS — Z79.899 HIGH RISK MEDICATIONS (NOT ANTICOAGULANTS) LONG-TERM USE: ICD-10-CM

## 2022-01-28 RX ORDER — DIVALPROEX SODIUM 250 MG/1
TABLET, DELAYED RELEASE ORAL
Qty: 270 TABLET | Refills: 0 | Status: SHIPPED | OUTPATIENT
Start: 2022-01-28 | End: 2022-05-01

## 2022-01-28 RX ORDER — LOXAPINE SUCCINATE 10 MG/1
10 TABLET ORAL DAILY
Qty: 90 CAPSULE | Refills: 0 | Status: SHIPPED | OUTPATIENT
Start: 2022-01-28 | End: 2022-05-02

## 2022-03-08 ENCOUNTER — TELEPHONE (OUTPATIENT)
Dept: ORTHOPEDICS | Facility: CLINIC | Age: 67
End: 2022-03-08
Payer: MEDICARE

## 2022-03-14 ENCOUNTER — OFFICE VISIT (OUTPATIENT)
Dept: ORTHOPEDICS | Facility: CLINIC | Age: 67
End: 2022-03-14
Payer: MEDICARE

## 2022-03-14 VITALS — WEIGHT: 128 LBS | HEIGHT: 62 IN | BODY MASS INDEX: 23.55 KG/M2

## 2022-03-14 DIAGNOSIS — M19.032 ARTHRITIS OF WRIST, LEFT: Primary | ICD-10-CM

## 2022-03-14 PROCEDURE — 3008F BODY MASS INDEX DOCD: CPT | Mod: CPTII,S$GLB,, | Performed by: ORTHOPAEDIC SURGERY

## 2022-03-14 PROCEDURE — 99213 PR OFFICE/OUTPT VISIT, EST, LEVL III, 20-29 MIN: ICD-10-PCS | Mod: 25,S$GLB,, | Performed by: ORTHOPAEDIC SURGERY

## 2022-03-14 PROCEDURE — 3288F FALL RISK ASSESSMENT DOCD: CPT | Mod: CPTII,S$GLB,, | Performed by: ORTHOPAEDIC SURGERY

## 2022-03-14 PROCEDURE — 1157F PR ADVANCE CARE PLAN OR EQUIV PRESENT IN MEDICAL RECORD: ICD-10-PCS | Mod: CPTII,S$GLB,, | Performed by: ORTHOPAEDIC SURGERY

## 2022-03-14 PROCEDURE — 1160F RVW MEDS BY RX/DR IN RCRD: CPT | Mod: CPTII,S$GLB,, | Performed by: ORTHOPAEDIC SURGERY

## 2022-03-14 PROCEDURE — 1101F PR PT FALLS ASSESS DOC 0-1 FALLS W/OUT INJ PAST YR: ICD-10-PCS | Mod: CPTII,S$GLB,, | Performed by: ORTHOPAEDIC SURGERY

## 2022-03-14 PROCEDURE — 99999 PR PBB SHADOW E&M-EST. PATIENT-LVL III: CPT | Mod: PBBFAC,,, | Performed by: ORTHOPAEDIC SURGERY

## 2022-03-14 PROCEDURE — 1159F MED LIST DOCD IN RCRD: CPT | Mod: CPTII,S$GLB,, | Performed by: ORTHOPAEDIC SURGERY

## 2022-03-14 PROCEDURE — 20605 INTERMEDIATE JOINT ASPIRATION/INJECTION: L RADIOCARPAL: ICD-10-PCS | Mod: LT,S$GLB,, | Performed by: ORTHOPAEDIC SURGERY

## 2022-03-14 PROCEDURE — 3008F PR BODY MASS INDEX (BMI) DOCUMENTED: ICD-10-PCS | Mod: CPTII,S$GLB,, | Performed by: ORTHOPAEDIC SURGERY

## 2022-03-14 PROCEDURE — 1159F PR MEDICATION LIST DOCUMENTED IN MEDICAL RECORD: ICD-10-PCS | Mod: CPTII,S$GLB,, | Performed by: ORTHOPAEDIC SURGERY

## 2022-03-14 PROCEDURE — 1125F PR PAIN SEVERITY QUANTIFIED, PAIN PRESENT: ICD-10-PCS | Mod: CPTII,S$GLB,, | Performed by: ORTHOPAEDIC SURGERY

## 2022-03-14 PROCEDURE — 20605 DRAIN/INJ JOINT/BURSA W/O US: CPT | Mod: LT,S$GLB,, | Performed by: ORTHOPAEDIC SURGERY

## 2022-03-14 PROCEDURE — 1125F AMNT PAIN NOTED PAIN PRSNT: CPT | Mod: CPTII,S$GLB,, | Performed by: ORTHOPAEDIC SURGERY

## 2022-03-14 PROCEDURE — 1101F PT FALLS ASSESS-DOCD LE1/YR: CPT | Mod: CPTII,S$GLB,, | Performed by: ORTHOPAEDIC SURGERY

## 2022-03-14 PROCEDURE — 1160F PR REVIEW ALL MEDS BY PRESCRIBER/CLIN PHARMACIST DOCUMENTED: ICD-10-PCS | Mod: CPTII,S$GLB,, | Performed by: ORTHOPAEDIC SURGERY

## 2022-03-14 PROCEDURE — 99999 PR PBB SHADOW E&M-EST. PATIENT-LVL III: ICD-10-PCS | Mod: PBBFAC,,, | Performed by: ORTHOPAEDIC SURGERY

## 2022-03-14 PROCEDURE — 1157F ADVNC CARE PLAN IN RCRD: CPT | Mod: CPTII,S$GLB,, | Performed by: ORTHOPAEDIC SURGERY

## 2022-03-14 PROCEDURE — 3288F PR FALLS RISK ASSESSMENT DOCUMENTED: ICD-10-PCS | Mod: CPTII,S$GLB,, | Performed by: ORTHOPAEDIC SURGERY

## 2022-03-14 PROCEDURE — 99213 OFFICE O/P EST LOW 20 MIN: CPT | Mod: 25,S$GLB,, | Performed by: ORTHOPAEDIC SURGERY

## 2022-03-14 RX ORDER — TRIAMCINOLONE ACETONIDE 40 MG/ML
40 INJECTION, SUSPENSION INTRA-ARTICULAR; INTRAMUSCULAR
Status: DISCONTINUED | OUTPATIENT
Start: 2022-03-14 | End: 2022-03-14 | Stop reason: HOSPADM

## 2022-03-14 RX ADMIN — TRIAMCINOLONE ACETONIDE 40 MG: 40 INJECTION, SUSPENSION INTRA-ARTICULAR; INTRAMUSCULAR at 02:03

## 2022-03-14 NOTE — PROGRESS NOTES
Ms Suresh returns to clinic today.  She has a history of severe left wrist arthritis.  We did schedule her for wrist arthrodesis in the past but the patient decided that she did not want to undergo surgery.  She is here today to discuss other treatment options.    Physical exam:  Examination left wrist and hand reveals that there are changes consistent with severe arthritis.  There are no major skin changes.  Palpation does produce mild tenderness overlying the radial carpal joint.  She has very limited range of motion with extension of 30° and flexion of 20°.  She does have crepitance with that range of motion.  She does have a 2+ radial pulse and sensation is grossly intact    Assessment:  Left wrist arthritis    Plan:    1. I have discussed treatment with the patient.  She states that she does not want to undergo the surgical procedure but would like to consider doing steroid injections.    2. After informed consent was obtained and injection was placed to the left wrist radial carpal joint.  The patient tolerated that well.    3. Follow-up with me in 4 months for repeat evaluation which point I will discuss further treatments including the possibility of continuing steroid injections

## 2022-03-14 NOTE — PROCEDURES
Intermediate Joint Aspiration/Injection: L radiocarpal    Date/Time: 3/14/2022 2:40 PM  Performed by: Gabriel Harman MD  Authorized by: Gabriel Harman MD     Consent Done?:  Yes (Verbal)  Indications:  Arthritis  Site marked: The procedure site was marked    Timeout: Prior to procedure the correct patient, procedure, and site was verified      Location:  Wrist  Site:  L radiocarpal  Prep: Patient was prepped and draped in usual sterile fashion    Medications:  40 mg triamcinolone acetonide 40 mg/mL  Patient tolerance:  Patient tolerated the procedure well with no immediate complications

## 2022-03-17 ENCOUNTER — TELEPHONE (OUTPATIENT)
Dept: NEUROSURGERY | Facility: CLINIC | Age: 67
End: 2022-03-17
Payer: MEDICARE

## 2022-03-18 ENCOUNTER — TELEPHONE (OUTPATIENT)
Dept: PSYCHIATRY | Facility: CLINIC | Age: 67
End: 2022-03-18
Payer: MEDICARE

## 2022-03-21 ENCOUNTER — OFFICE VISIT (OUTPATIENT)
Dept: PSYCHIATRY | Facility: CLINIC | Age: 67
End: 2022-03-21
Payer: MEDICARE

## 2022-03-21 VITALS
HEART RATE: 79 BPM | WEIGHT: 117.5 LBS | BODY MASS INDEX: 21.62 KG/M2 | DIASTOLIC BLOOD PRESSURE: 91 MMHG | SYSTOLIC BLOOD PRESSURE: 152 MMHG | HEIGHT: 62 IN

## 2022-03-21 DIAGNOSIS — F31.70 BIPOLAR DISORDER IN PARTIAL REMISSION, MOST RECENT EPISODE UNSPECIFIED TYPE: Primary | ICD-10-CM

## 2022-03-21 DIAGNOSIS — Z79.899 HIGH RISK MEDICATIONS (NOT ANTICOAGULANTS) LONG-TERM USE: ICD-10-CM

## 2022-03-21 DIAGNOSIS — F31.9 BIPOLAR I DISORDER: ICD-10-CM

## 2022-03-21 PROCEDURE — 1125F PR PAIN SEVERITY QUANTIFIED, PAIN PRESENT: ICD-10-PCS | Mod: CPTII,S$GLB,, | Performed by: PSYCHIATRY & NEUROLOGY

## 2022-03-21 PROCEDURE — 1159F PR MEDICATION LIST DOCUMENTED IN MEDICAL RECORD: ICD-10-PCS | Mod: CPTII,S$GLB,, | Performed by: PSYCHIATRY & NEUROLOGY

## 2022-03-21 PROCEDURE — 99214 PR OFFICE/OUTPT VISIT, EST, LEVL IV, 30-39 MIN: ICD-10-PCS | Mod: S$GLB,,, | Performed by: PSYCHIATRY & NEUROLOGY

## 2022-03-21 PROCEDURE — 99214 OFFICE O/P EST MOD 30 MIN: CPT | Mod: S$GLB,,, | Performed by: PSYCHIATRY & NEUROLOGY

## 2022-03-21 PROCEDURE — 1159F MED LIST DOCD IN RCRD: CPT | Mod: CPTII,S$GLB,, | Performed by: PSYCHIATRY & NEUROLOGY

## 2022-03-21 PROCEDURE — 3008F BODY MASS INDEX DOCD: CPT | Mod: CPTII,S$GLB,, | Performed by: PSYCHIATRY & NEUROLOGY

## 2022-03-21 PROCEDURE — 1101F PT FALLS ASSESS-DOCD LE1/YR: CPT | Mod: CPTII,S$GLB,, | Performed by: PSYCHIATRY & NEUROLOGY

## 2022-03-21 PROCEDURE — 1125F AMNT PAIN NOTED PAIN PRSNT: CPT | Mod: CPTII,S$GLB,, | Performed by: PSYCHIATRY & NEUROLOGY

## 2022-03-21 PROCEDURE — 99999 PR PBB SHADOW E&M-EST. PATIENT-LVL III: ICD-10-PCS | Mod: PBBFAC,,, | Performed by: PSYCHIATRY & NEUROLOGY

## 2022-03-21 PROCEDURE — 3077F PR MOST RECENT SYSTOLIC BLOOD PRESSURE >= 140 MM HG: ICD-10-PCS | Mod: CPTII,S$GLB,, | Performed by: PSYCHIATRY & NEUROLOGY

## 2022-03-21 PROCEDURE — 3080F PR MOST RECENT DIASTOLIC BLOOD PRESSURE >= 90 MM HG: ICD-10-PCS | Mod: CPTII,S$GLB,, | Performed by: PSYCHIATRY & NEUROLOGY

## 2022-03-21 PROCEDURE — 3008F PR BODY MASS INDEX (BMI) DOCUMENTED: ICD-10-PCS | Mod: CPTII,S$GLB,, | Performed by: PSYCHIATRY & NEUROLOGY

## 2022-03-21 PROCEDURE — 99999 PR PBB SHADOW E&M-EST. PATIENT-LVL III: CPT | Mod: PBBFAC,,, | Performed by: PSYCHIATRY & NEUROLOGY

## 2022-03-21 PROCEDURE — 1157F ADVNC CARE PLAN IN RCRD: CPT | Mod: CPTII,S$GLB,, | Performed by: PSYCHIATRY & NEUROLOGY

## 2022-03-21 PROCEDURE — 3288F FALL RISK ASSESSMENT DOCD: CPT | Mod: CPTII,S$GLB,, | Performed by: PSYCHIATRY & NEUROLOGY

## 2022-03-21 PROCEDURE — 3077F SYST BP >= 140 MM HG: CPT | Mod: CPTII,S$GLB,, | Performed by: PSYCHIATRY & NEUROLOGY

## 2022-03-21 PROCEDURE — 1157F PR ADVANCE CARE PLAN OR EQUIV PRESENT IN MEDICAL RECORD: ICD-10-PCS | Mod: CPTII,S$GLB,, | Performed by: PSYCHIATRY & NEUROLOGY

## 2022-03-21 PROCEDURE — 1101F PR PT FALLS ASSESS DOC 0-1 FALLS W/OUT INJ PAST YR: ICD-10-PCS | Mod: CPTII,S$GLB,, | Performed by: PSYCHIATRY & NEUROLOGY

## 2022-03-21 PROCEDURE — 3288F PR FALLS RISK ASSESSMENT DOCUMENTED: ICD-10-PCS | Mod: CPTII,S$GLB,, | Performed by: PSYCHIATRY & NEUROLOGY

## 2022-03-21 PROCEDURE — 3080F DIAST BP >= 90 MM HG: CPT | Mod: CPTII,S$GLB,, | Performed by: PSYCHIATRY & NEUROLOGY
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
## 2022-03-22 ENCOUNTER — TELEPHONE (OUTPATIENT)
Dept: GASTROENTEROLOGY | Facility: CLINIC | Age: 67
End: 2022-03-22
Payer: MEDICARE

## 2022-03-24 ENCOUNTER — TELEPHONE (OUTPATIENT)
Dept: GASTROENTEROLOGY | Facility: CLINIC | Age: 67
End: 2022-03-24
Payer: MEDICARE

## 2022-03-24 NOTE — TELEPHONE ENCOUNTER
Attempts have been made to contact patient with no response. Case request canceled at this time. PCP notified. Phone number provided for call back. Pt will be scheduled from this order upon call back.

## 2022-04-10 NOTE — TELEPHONE ENCOUNTER
Pt has been scheduled for colon on 6/12. Reviewed mg citrate prep. Pt is aware I will be mailing instructions and confirmation letter.    No

## 2022-05-02 DIAGNOSIS — Z79.899 HIGH RISK MEDICATIONS (NOT ANTICOAGULANTS) LONG-TERM USE: ICD-10-CM

## 2022-05-02 RX ORDER — LOXAPINE SUCCINATE 10 MG/1
TABLET ORAL
Qty: 90 CAPSULE | Refills: 0 | Status: SHIPPED | OUTPATIENT
Start: 2022-05-02 | End: 2022-07-13 | Stop reason: SDUPTHER

## 2022-05-04 ENCOUNTER — TELEPHONE (OUTPATIENT)
Dept: PSYCHIATRY | Facility: CLINIC | Age: 67
End: 2022-05-04
Payer: MEDICARE

## 2022-05-04 NOTE — TELEPHONE ENCOUNTER
Called the pt back. Left a v/m. Will need to discuss this further with her prior to moving forward with request.     Will try to reach her again later this afternoon.

## 2022-05-04 NOTE — TELEPHONE ENCOUNTER
"Patient called to cancel her follow up appointment in . States moving out of town asa.      Patient is requesting a letter from Dr. Rust for her cat and dog-emotional support.   States " I can't live without them they mean everything to me"    "the letter I got from [place] is , but it wouldn't matter because its not transferable from state to state"    "the lady from  housing in [place] told me getting a letter for my psychiatrist is all I would need to be able to keep them with me."  Debby"

## 2022-05-30 ENCOUNTER — HOSPITAL ENCOUNTER (OUTPATIENT)
Dept: RADIOLOGY | Facility: HOSPITAL | Age: 67
Discharge: HOME | End: 2022-05-30
Payer: MEDICARE

## 2022-05-30 ENCOUNTER — TELEPHONE (OUTPATIENT)
Dept: PAIN MEDICINE | Facility: CLINIC | Age: 67
End: 2022-05-30

## 2022-05-30 ENCOUNTER — OFFICE VISIT (OUTPATIENT)
Dept: PAIN MEDICINE | Facility: CLINIC | Age: 67
End: 2022-05-30
Payer: MEDICARE

## 2022-05-30 ENCOUNTER — PATIENT MESSAGE (OUTPATIENT)
Dept: PAIN MEDICINE | Facility: CLINIC | Age: 67
End: 2022-05-30

## 2022-05-30 VITALS
DIASTOLIC BLOOD PRESSURE: 76 MMHG | SYSTOLIC BLOOD PRESSURE: 141 MMHG | WEIGHT: 113.88 LBS | BODY MASS INDEX: 20.96 KG/M2 | HEIGHT: 62 IN | HEART RATE: 66 BPM

## 2022-05-30 DIAGNOSIS — M25.532 LEFT WRIST PAIN: ICD-10-CM

## 2022-05-30 DIAGNOSIS — M54.2 CERVICALGIA: ICD-10-CM

## 2022-05-30 DIAGNOSIS — M54.16 LUMBAR RADICULOPATHY: Primary | ICD-10-CM

## 2022-05-30 DIAGNOSIS — M54.9 DORSALGIA, UNSPECIFIED: ICD-10-CM

## 2022-05-30 DIAGNOSIS — M71.38 SYNOVIAL CYST OF LUMBAR FACET JOINT: ICD-10-CM

## 2022-05-30 PROCEDURE — 72052 X-RAY EXAM NECK SPINE 6/>VWS: CPT | Mod: TC,FY,PO

## 2022-05-30 PROCEDURE — 3077F PR MOST RECENT SYSTOLIC BLOOD PRESSURE >= 140 MM HG: ICD-10-PCS | Mod: CPTII,S$GLB,,

## 2022-05-30 PROCEDURE — 99214 PR OFFICE/OUTPT VISIT, EST, LEVL IV, 30-39 MIN: ICD-10-PCS | Mod: S$GLB,,,

## 2022-05-30 PROCEDURE — 1125F PR PAIN SEVERITY QUANTIFIED, PAIN PRESENT: ICD-10-PCS | Mod: CPTII,S$GLB,,

## 2022-05-30 PROCEDURE — 1157F ADVNC CARE PLAN IN RCRD: CPT | Mod: CPTII,S$GLB,,

## 2022-05-30 PROCEDURE — 1157F PR ADVANCE CARE PLAN OR EQUIV PRESENT IN MEDICAL RECORD: ICD-10-PCS | Mod: CPTII,S$GLB,,

## 2022-05-30 PROCEDURE — 1100F PR PT FALLS ASSESS DOC 2+ FALLS/FALL W/INJURY/YR: ICD-10-PCS | Mod: CPTII,S$GLB,,

## 2022-05-30 PROCEDURE — 73110 X-RAY EXAM OF WRIST: CPT | Mod: TC,FY,PO,LT

## 2022-05-30 PROCEDURE — 72052 XR CERVICAL SPINE 5 VIEW WITH FLEX AND EXT: ICD-10-PCS | Mod: 26,,, | Performed by: RADIOLOGY

## 2022-05-30 PROCEDURE — 3288F PR FALLS RISK ASSESSMENT DOCUMENTED: ICD-10-PCS | Mod: CPTII,S$GLB,,

## 2022-05-30 PROCEDURE — 99214 OFFICE O/P EST MOD 30 MIN: CPT | Mod: S$GLB,,,

## 2022-05-30 PROCEDURE — 1125F AMNT PAIN NOTED PAIN PRSNT: CPT | Mod: CPTII,S$GLB,,

## 2022-05-30 PROCEDURE — 3077F SYST BP >= 140 MM HG: CPT | Mod: CPTII,S$GLB,,

## 2022-05-30 PROCEDURE — 99999 PR PBB SHADOW E&M-EST. PATIENT-LVL III: ICD-10-PCS | Mod: PBBFAC,,,

## 2022-05-30 PROCEDURE — 3078F PR MOST RECENT DIASTOLIC BLOOD PRESSURE < 80 MM HG: ICD-10-PCS | Mod: CPTII,S$GLB,,

## 2022-05-30 PROCEDURE — 3008F PR BODY MASS INDEX (BMI) DOCUMENTED: ICD-10-PCS | Mod: CPTII,S$GLB,,

## 2022-05-30 PROCEDURE — 3288F FALL RISK ASSESSMENT DOCD: CPT | Mod: CPTII,S$GLB,,

## 2022-05-30 PROCEDURE — 1159F MED LIST DOCD IN RCRD: CPT | Mod: CPTII,S$GLB,,

## 2022-05-30 PROCEDURE — 3078F DIAST BP <80 MM HG: CPT | Mod: CPTII,S$GLB,,

## 2022-05-30 PROCEDURE — 99999 PR PBB SHADOW E&M-EST. PATIENT-LVL III: CPT | Mod: PBBFAC,,,

## 2022-05-30 PROCEDURE — 3008F BODY MASS INDEX DOCD: CPT | Mod: CPTII,S$GLB,,

## 2022-05-30 PROCEDURE — 73110 XR WRIST COMPLETE 3 VIEWS LEFT: ICD-10-PCS | Mod: 26,LT,, | Performed by: RADIOLOGY

## 2022-05-30 PROCEDURE — 1100F PTFALLS ASSESS-DOCD GE2>/YR: CPT | Mod: CPTII,S$GLB,,

## 2022-05-30 PROCEDURE — 72052 X-RAY EXAM NECK SPINE 6/>VWS: CPT | Mod: 26,,, | Performed by: RADIOLOGY

## 2022-05-30 PROCEDURE — 73110 X-RAY EXAM OF WRIST: CPT | Mod: 26,LT,, | Performed by: RADIOLOGY

## 2022-05-30 PROCEDURE — 1159F PR MEDICATION LIST DOCUMENTED IN MEDICAL RECORD: ICD-10-PCS | Mod: CPTII,S$GLB,,

## 2022-05-30 RX ORDER — METHYLPREDNISOLONE 4 MG/1
TABLET ORAL
Qty: 21 EACH | Refills: 0 | Status: SHIPPED | OUTPATIENT
Start: 2022-05-30 | End: 2022-06-01

## 2022-05-30 NOTE — TELEPHONE ENCOUNTER
Call placed to Pt to advise DARYL Boateng has reviewed the most recent x-ray of wrist and cervical spine showed no new acute fractures.  If your wrist pain persists you sould follow-up with .     Pt verbalized understanding and would like to know what is suggested for pain relief. Message forwarded to DARYL Boateng for review.

## 2022-05-30 NOTE — TELEPHONE ENCOUNTER
For her pain relief I recommend Tylenol.     She should avoid NSAIDs due to her reduced kidney function.    I also placed an order for a Medrol Dosepak.     If she is interested we can start her on a trial of a neuropathic medication such as gabapentin or Lyrica.

## 2022-05-30 NOTE — TELEPHONE ENCOUNTER
X-ray today shows no new acute fracture or dislocation.    If her pain persists, I recommend she follow-up with Dr. Harman.

## 2022-05-30 NOTE — TELEPHONE ENCOUNTER
Spoke with patient and she reports fall on Saturday this past weekend. She is having pain in her left wrist. She is requesting an xray order.

## 2022-05-30 NOTE — PROGRESS NOTES
[facility] Follow Up Evaluation    Referred by: Dr. Alatorre  Reason for referral: neck pain    CC:   Chief Complaint   Patient presents with    Low-back Pain    Shoulder Pain     wrist      Last 3 PDI Scores 5/30/2022 11/10/2021 9/29/2021   Pain Disability Index (PDI) 31 23 50       Interval HPI 5/30/2022: Viridiana Suresh returns to the clinic for follow up.  Today she is reporting return of her lower back pain, 8/10, constant.  She reports associated radiation into bilateral legs with right greater than left.  She denies any numbness, weakness or bowel or bladder incontinence.  She recently saw Neurosurgery for further evaluation of her back pain however she left before the visit was completed.  She is also reporting falling yesterday and landing on her wrist.  Since the fall she states her lower back pain has not worsened however she is reporting worsening neck pain.       Pain Intervention History:  - s/p LAMINECTOMY, SPINE, LUMBAR RIGHT L5-S1 HEMILAMIOTOMY AND RESECTION OF SYNOVIAL CYST 02/25/21  - s/p right transforaminal L5/S1 VALERI and right L5/S1 facet cyst aspiration on 10/27/21 with 0% relief.       HPI:   Viridiana Suresh is a 67 y.o. female who complains of neck pain    Onset: many months  Progression: since onset, pain is unchanged  Current Pain Score: 8/10  Typical Range: 2-10/10  Timing: intermittent  Quality: deep, aching, throbbing  Radiation: no  Associated numbness or weakness: no numbness, no weakness  Exacerbated by: standing, walking  Allievated by: heat, sleeping  Is Pain Level Acceptable?: No    Previous Therapies:  PT/OT:   HEP:   Interventions: - s/p right transforaminal L5/S1 VALERI and right L5/S1 facet cyst aspiration on 10/27/21 with 0% relief.   Surgery:  Medications:   - NSAIDS:   - MSK Relaxants: tizanidine   - TCAs:   - SNRIs:   - Topicals:   - Anticonvulsants:  - Opioids:     History:    Current Outpatient Medications:     divalproex (DEPAKOTE) 250 MG EC tablet, TAKE 3  TABLETS(750 MG) BY MOUTH EVERY EVENING, Disp: 270 tablet, Rfl: 0    loxapine (LOXITANE) 10 MG Cap, TAKE 1 CAPSULE BY MOUTH EVERY DAY, Disp: 90 capsule, Rfl: 0    famotidine (PEPCID) 40 MG tablet, Take 1 tablet (40 mg total) by mouth once daily. (Patient not taking: Reported on 5/30/2022), Disp: 30 tablet, Rfl: 2    fluticasone-umeclidin-vilanter (TRELEGY ELLIPTA) 100-62.5-25 mcg DsDv, Inhale 1 puff into the lungs once daily. (Patient not taking: Reported on 5/30/2022), Disp: 1 each, Rfl: 11    Past Medical History:   Diagnosis Date    Bipolar disorder     Cancer 1995    melanoma rt third toe    COPD (chronic obstructive pulmonary disease)     CTS (carpal tunnel syndrome)     Diverticulosis     Hepatomegaly     Presence of dental bridge     UPPER       Past Surgical History:   Procedure Laterality Date    APPENDECTOMY      BREAST SURGERY      Needle and surgical biopsy    COLONOSCOPY N/A 6/12/2018    Procedure: COLONOSCOPY;  Surgeon: Uche Crowell MD;  Location: [facility];  Service: Endoscopy;  Laterality: N/A; repeat in 10 years for screening    LUMBAR LAMINECTOMY Right 2/25/2021    Procedure: LAMINECTOMY, SPINE, LUMBAR RIGHT L5-S1 HEMILAMIOTOMY AND RESECTION OF SYNOVIAL CYST;  Surgeon: Ramos Boyd MD;  [facility] OR;  Service: Neurosurgery;  Laterality: Right;    NASAL SEPTUM SURGERY      RHINOPHYMA RESECTION      RHINOPLASTY TIP      SKIN BIOPSY      TRANSFORAMINAL EPIDURAL INJECTION OF STEROID Right 10/27/2021    Procedure: Injection,steroid,epidural,transforaminal approach L5/S1 and facet cyst aspiration;  Surgeon: Rigo Ca MD;  Location: [facility] OR;  Service: Pain Management;  Laterality: Right;    TUBAL LIGATION         Family History   Problem Relation Age of Onset    Cancer Mother 80        breast cancer    Diabetes Mother     Cancer Father 77        pancreatic cancer    Diabetes Maternal Grandmother     Asthma Sister     Colon cancer Neg Hx     Colon polyps  "Neg Hx     Crohn's disease Neg Hx     Ulcerative colitis Neg Hx     Celiac disease Neg Hx        Social History     Socioeconomic History    Marital status:    Tobacco Use    Smoking status: Current Some Day Smoker     Packs/day: 2.00     Years: 43.00     Pack years: 86.00     Types: Vaping with nicotine    Smokeless tobacco: Never Used    Tobacco comment: pt reports quitting cigarettes 6-8 months ago. Still vaping with nicotine 12/13/21 KM   Substance and Sexual Activity    Alcohol use: No    Drug use: No    Sexual activity: Not Currently       Review of patient's allergies indicates:  No Known Allergies    Review of Systems:  General ROS: negative for - fever  Psychological ROS: negative for - hostility  Hematological and Lymphatic ROS: negative for - bleeding problems  Endocrine ROS: negative for - unexpected weight changes  Respiratory ROS: no cough, shortness of breath, or wheezing  Cardiovascular ROS: no chest pain or dyspnea on exertion  Gastrointestinal ROS: no abdominal pain, change in bowel habits, or black or bloody stools  Musculoskeletal ROS: negative for - muscular weakness  Neurological ROS: negative for - numbness/tingling  Dermatological ROS: negative for rash    Physical Exam:  Vitals:    05/30/22 0745   BP: (!) 141/76   Pulse: 66   Weight: 51.6 kg (113 lb 13.9 oz)   Height: 5' 2" (1.575 m)   PainSc:   8   PainLoc: Back     Body mass index is 20.83 kg/m².     Gen: NAD  Psych: mood appropriate for given condition  CV: 2+ radial pulse  HEENT: anicteric   Respiratory: non labored  Sensation: intact to lt touch bilaterally in c4-t1   ROM: Cervical ROM full, shoulder, elbow and wrist ROM full  Tone:  Normal at elbow, wrist and shoulder   Inspection: no atrophy of bicep, FDI or APB noted  Palpation: tender cervical paraspinals, levator scapula and trapezius, no tenderness to brisk palpation of midline spine.   Skin:  Minor abrasion on left wrist, no signs of infection, current bleeding   "   Motor:      Right Left   C4 Shoulder Abduction  5  5   C5 Elbow Flexion    5   deferred   C6 Wrist Extension  5   deferred   C7 Elbow Extension   5   deferred   C8/T1 Hand Intrinsics   5  5                            Gait: gait intact  ROM: limited AROM of the L spine in all planes, full ROM at ankles, knees and hips  Lumbar flexion 90 degrees, extension 50 degrees, side bending 30 degrees.    Sensation: intact to light touch in all dermatomes tested from L2-S1 bilaterally, except decreased over the bilateral feet  Reflexes: 2+ b/l patella and achilles  Tone: normal in the b/l knees and hips   Skin: intact  Extremities: No edema in b/l ankles or hands  Palpation:  Only mild tenderness to brisk palpation of midline spine.       Right Left   L2/3 Iliacus Hip flexion  5  5   L3/4 Qudratus Femoris Knee Extension  5  5   L4/5 Tib Anterior Ankle Dorsiflexion   5  5   L5/S1 Extensor Hallicus Longus Great toe extension  5  5-                 S1/S2 Gastroc/Soleus Plantar Flexion  5  5     Imaging:  MRI cervical spine 5/27/21  FINDINGS:  An anterolisthesis is noted of 4 mm of the C3 on C4 vertebral body. An anterolisthesis is noted of 3 mm of the C4 on C5 vertebral body, a retrolisthesis is noted of 3 mm of the C5 on C6 vertebral body, a retrolisthesis is noted of 3 mm of the C6 on C7 vertebral body.Intervertebral disc height loss noted at the C3-C4, C5-C6, and C6-C7 levels.  Loss the normal cervical lordosis is noted which may be positional or relate to muscular strain    Vertebral body heights appear well preserved.    No STIR weighted signal abnormality is noted to suggest an aggressive bone marrow replacement process or recent fracture.  Osseous hypertrophy and fluid signal intensity surrounds the C2-C3 facet on the right suggestive of degenerative change.    At the C2-C3 level, facet arthropathy is noted right greater than left.  No significant central disc bulge is noted.  Mild bilateral neural foraminal stenosis  appears to be present.  Mild thecal sac narrowing appears to be present to 9 mm.  Some ligamentous hypertrophy is noted.  At the C3-C4 level, facet arthropathy is noted right greater than left with osseous hypertrophy on the right greater than left.  Uncovertebral spurring is noted bilaterally to the left greater than right of 3-4 mm.  Moderate to severe left neural foraminal stenosis is suspected greater than right.  The exiting nerve roots bilaterally may be affected.  Mild to moderate thecal sac narrowing is noted to 8 mm.  Possible anterior cord contact paracentric to the left may be present.  Fluid surrounds the posterior cord.  At the C4-C5 level, facet arthropathy is noted right greater than left with osseous hypertrophy.  Uncovertebral spurring is noted bilaterally of 4 to 5 mm towards the right neural foramen greater than left.  Moderate to severe right neural foraminal stenosis is noted greater than left.  Contact of each exiting nerve root may be present.  Moderate thecal sac stenosis is noted to 7 mm.  Possible anterior cord contact is noted.  Very little fluid surrounds the cord.  At the C5-C6 level, uncovertebral spurring is noted bilaterally with moderate to severe bilateral neural foraminal stenosis.  Contact of each exiting nerve root may be present.  Facet arthropathy is noted bilaterally.  Broad base protrusion is noted paracentric to the right of 4 mm with probable anterior cord contact.  No obvious cord signal abnormality is noted.  No significant fluid surrounds the cord with moderate to severe central canal narrowing to 7 mm.  At the C6-C7 level, uncovertebral spurring is noted towards each neural foramen left more noticeable than right with severe left neural foraminal stenosis and moderate to severe right neural foraminal stenosis.  The exiting nerve roots may be affected bilaterally.  Posterior disc osteophyte bulge is noted of 3 mm.  Anterior cord contact may be present.  Very little fluid  surrounds the cord with moderate to severe thecal sac narrowing of 7 mm.  No cord signal changes noted.  At the C7-T1 level, no significant disc bulge, central canal stenosis, or neural foraminal stenosis is noted.    MRI lumbar spine 9/21/21  FINDINGS:  NOMENCLATURE: Five lumbar type vertebral bodies.    CORD/CAUDA EQUINA: Conus has normal size and signal and ends at a normal level of L1-L2.    ALIGNMENT: Trace retrolisthesis of L2 on L3.    BONES: Interval right L5 hemilaminectomy.  STIR signal hyperintensity in the right greater than left L5-S1 facets.    PARASPINAL AREA: STIR signal hyperintensity surrounding the right greater than left L5-S1 facet.  Trace bilateral L5-S1 facet effusions.    LUMBAR DISC LEVELS:    T12-L1: No disc herniation or significant posterior osteophytic ridging.  No significant spinal canal or foraminal stenosis.  L1-L2: No disc herniation or significant posterior osteophytic ridging.  No significant spinal canal or foraminal stenosis.  L2-L3: Trace retrolisthesis.  Minimal disc bulge.  Minimal bilateral facet hypertrophy.  Minimal narrowing of the bilateral lateral recesses.  No significant spinal canal stenosis. Mild right and minimal left foraminal stenosis, unchanged.  L3-L4: Mild disc bulge.  Mild bilateral facet hypertrophy.  Ligamentum flavum thickening.  Mild narrowing of the left greater than right lateral recesses, unchanged.  No significant spinal canal stenosis.  Mild bilateral foraminal stenosis, unchanged.  L4-L5: Mild disc bulge. Mild-moderate left and mild right facet hypertrophy. Mild narrowing of the right greater than left lateral recess, unchanged.  No significant spinal canal stenosis. Mild bilateral foraminal stenosis, unchanged.  L5-S1: Interval right hemilaminectomy for resection of right lateral recess synovial cyst.  12 x 9 x 12 mm right lateral recurrent synovial cyst is present with clear direct communication with the facet joint (series 11, image 39, series 4,  image 11).  The size is smaller compared to prior study with less mass effect but persistent severe narrowing of the right lateral recess and displacement the descending nerve roots ventrally and medially.  There is significantly less extension into the foramen compared to prior study with mild-moderate right and mild left foraminal stenosis.    Labs:  BMP  Lab Results   Component Value Date     11/15/2021    K 4.1 11/15/2021     11/15/2021    CO2 23 11/15/2021    BUN 17 11/15/2021    CREATININE 1.1 11/15/2021    CALCIUM 9.4 11/15/2021    ANIONGAP 11 11/15/2021    ESTGFRAFRICA >60.0 11/15/2021    EGFRNONAA 52.8 (A) 11/15/2021     Lab Results   Component Value Date    ALT 10 11/15/2021    AST 15 11/15/2021    ALKPHOS 73 11/15/2021    BILITOT 0.3 11/15/2021       Assessment:   Problem List Items Addressed This Visit        Neuro    Lumbar radiculopathy - Primary      Other Visit Diagnoses     Cervicalgia        Relevant Orders    X-Ray Cervical Spine 5 View With Flex And Ext    Dorsalgia, unspecified        Relevant Orders    MRI Lumbar Spine W WO Contrast    Creatinine, serum    X-Ray Thoracolumbar Spine AP Lateral    Synovial cyst of lumbar facet joint            67 y.o. year old female with PMH bipolar, COPD, CKD present sot the office with back pain    5/30/2022: Viridiana Suresh returns to the clinic for follow up.  Today she is reporting return of her lower back pain, 8/10, constant.  She reports associated radiation into bilateral legs with right greater than left.  She denies any numbness, weakness or bowel or bladder incontinence.  She recently saw Neurosurgery for further evaluation of her back pain however she left before the visit was completed.  She is also reporting falling yesterday and landing on her wrist.  Since the fall she states her lower back pain has not worsened however she is reporting worsening neck pain.     - today on exam she has full strength.  She deferred testing of her left  upper extremity due to recent fall and tenderness with palpation of left wrist and arm.  She did not have any severe tenderness to brisk palpation of her midline spine.   - due to her most recent fall I recommended x-rays of her cervical and thoracolumbar spine to rule out any potential compression fractures as she is reporting worsening neck pain however she declined getting imaging due to the cost.  - she reports hurting her wrist as well, and is willing to have xray done of her left wrist. Physical exam showed no swelling, erythema, she will signs of a fracture.  She does have misalignment of the left wrist however she reports that is chronic from previous wrist surgeries.  - for her lower back pain she has followed up with Neurosurgery for further evaluation however left the office visit early before being fully evaluated.    - we have attempted to do cyst aspirations in the past without any significant relief.   - I will order updated lumbar MRI with contrast to further evaluate her neuro anatomy and see if there has been any significant changes.  Creatinine lab ordered today to ensure  proper kidney function prior to MRI with contrast.  - will call her with results of the MRI  - based on imaging will discuss her case with Dr. Ca and potential further treatment plans.    : Not applicable      The total time spent for evaluation and management on 05/30/2022 including reviewing separately obtained history, performing a medically appropriate exam and evaluation, documenting clinical information in the health record, independently interpreting results and communicating them to the patient/family/caregiver, and ordering medications/tests/procedures was between 30-39 minutes.

## 2022-05-31 ENCOUNTER — TELEPHONE (OUTPATIENT)
Dept: PAIN MEDICINE | Facility: CLINIC | Age: 67
End: 2022-05-31
Payer: MEDICARE

## 2022-05-31 NOTE — TELEPHONE ENCOUNTER
"Phone call taken from Kimberly Orlando LPN. Spoke with pt who was upset that MRI was being changed to MRI without contrast instead of with. Pt states that she doesn't understand why we are changing it that she needs the MRI with contrast. Discussed the safety issues with her lowered kidney functions and she exclaimed "Well fix my kidneys then!" Advised pt that we are unable to treat this issue and she would need to follow up with either her nephrologist Dr. Lala that she had seen in the past or her primary care provider. The pt states that she has never seen a kidney doctor and her  then states that we must have mixed up their charts. Advised that for safety and patient confidentiality, we have to confirm patients name and date of birth for all visits and with the EMR system, it is not likely that we mixed up the charts. Pt then states "well I might have seen him once, but it doesn't mean I have decreased kidney function." Advised per that note, she has stage 3 kidney disease, however I am not sure the updated status but due to decreased kidney function from the labs, the MRI staff will not proceed with the test either way. The patient was still upset and states "well just delete all of my information and never contact me again, I am never coming back." All appointments for our office have been cancelled. Pt disconnected the line.  "

## 2022-05-31 NOTE — TELEPHONE ENCOUNTER
----- Message from Christopher Pereira PA-C sent at 5/31/2022  8:26 AM CDT -----  Please let patient know, due to her reduced kidney function we will be adjusting the MRI to NOT include contrast.

## 2022-05-31 NOTE — TELEPHONE ENCOUNTER
Patient called and asked for information regarding her lab results. Tried to explain that the MRI with Contrast was cancelled d/t her decreased kidney function as evidenced by her Creatinine levels and her GFR levels. She started yelling that she needed the MRI with Contrast and if she couldn't have Contrast, then cancel the whole thing.

## 2022-05-31 NOTE — TELEPHONE ENCOUNTER
Left voicemail for patient and spoke with patient's SO and he voiced understanding regarding test results.

## 2022-06-01 ENCOUNTER — OFFICE VISIT (OUTPATIENT)
Dept: FAMILY MEDICINE | Facility: CLINIC | Age: 67
End: 2022-06-01
Payer: MEDICARE

## 2022-06-01 VITALS
SYSTOLIC BLOOD PRESSURE: 138 MMHG | HEART RATE: 68 BPM | HEIGHT: 62 IN | DIASTOLIC BLOOD PRESSURE: 88 MMHG | WEIGHT: 112.44 LBS | BODY MASS INDEX: 20.69 KG/M2

## 2022-06-01 DIAGNOSIS — R29.6 FALLS FREQUENTLY: Primary | ICD-10-CM

## 2022-06-01 DIAGNOSIS — R35.0 URINARY FREQUENCY: ICD-10-CM

## 2022-06-01 DIAGNOSIS — F31.30 BIPOLAR AFFECTIVE DISORDER, CURRENT EPISODE DEPRESSED, CURRENT EPISODE SEVERITY UNSPECIFIED: ICD-10-CM

## 2022-06-01 DIAGNOSIS — N30.00 ACUTE CYSTITIS WITHOUT HEMATURIA: ICD-10-CM

## 2022-06-01 LAB
BILIRUB SERPL-MCNC: NEGATIVE MG/DL
BILIRUB UR QL STRIP: NEGATIVE
BLOOD URINE, POC: NEGATIVE
CLARITY UR REFRACT.AUTO: CLEAR
CLARITY, POC UA: CLEAR
COLOR UR AUTO: COLORLESS
COLOR, POC UA: ABNORMAL
GLUCOSE UR QL STRIP: NEGATIVE
GLUCOSE UR QL STRIP: NORMAL
HGB UR QL STRIP: NEGATIVE
KETONES UR QL STRIP: ABNORMAL
KETONES UR QL STRIP: ABNORMAL
LEUKOCYTE ESTERASE UR QL STRIP: NEGATIVE
LEUKOCYTE ESTERASE URINE, POC: POSITIVE
NITRITE UR QL STRIP: NEGATIVE
NITRITE, POC UA: NEGATIVE
PH UR STRIP: 6 [PH] (ref 5–8)
PH, POC UA: 5
PROT UR QL STRIP: NEGATIVE
PROTEIN, POC: ABNORMAL
SP GR UR STRIP: 1 (ref 1–1.03)
SPECIFIC GRAVITY, POC UA: 1
URN SPEC COLLECT METH UR: ABNORMAL
UROBILINOGEN, POC UA: NORMAL

## 2022-06-01 PROCEDURE — 3079F DIAST BP 80-89 MM HG: CPT | Mod: CPTII,S$GLB,, | Performed by: PHYSICIAN ASSISTANT

## 2022-06-01 PROCEDURE — 81003 URINALYSIS AUTO W/O SCOPE: CPT | Performed by: PHYSICIAN ASSISTANT

## 2022-06-01 PROCEDURE — 3075F SYST BP GE 130 - 139MM HG: CPT | Mod: CPTII,S$GLB,, | Performed by: PHYSICIAN ASSISTANT

## 2022-06-01 PROCEDURE — 3079F PR MOST RECENT DIASTOLIC BLOOD PRESSURE 80-89 MM HG: ICD-10-PCS | Mod: CPTII,S$GLB,, | Performed by: PHYSICIAN ASSISTANT

## 2022-06-01 PROCEDURE — 99214 OFFICE O/P EST MOD 30 MIN: CPT | Mod: S$GLB,,, | Performed by: PHYSICIAN ASSISTANT

## 2022-06-01 PROCEDURE — 99999 PR PBB SHADOW E&M-EST. PATIENT-LVL III: ICD-10-PCS | Mod: PBBFAC,,, | Performed by: PHYSICIAN ASSISTANT

## 2022-06-01 PROCEDURE — 1125F PR PAIN SEVERITY QUANTIFIED, PAIN PRESENT: ICD-10-PCS | Mod: CPTII,S$GLB,, | Performed by: PHYSICIAN ASSISTANT

## 2022-06-01 PROCEDURE — 1125F AMNT PAIN NOTED PAIN PRSNT: CPT | Mod: CPTII,S$GLB,, | Performed by: PHYSICIAN ASSISTANT

## 2022-06-01 PROCEDURE — 99214 PR OFFICE/OUTPT VISIT, EST, LEVL IV, 30-39 MIN: ICD-10-PCS | Mod: S$GLB,,, | Performed by: PHYSICIAN ASSISTANT

## 2022-06-01 PROCEDURE — 1157F ADVNC CARE PLAN IN RCRD: CPT | Mod: CPTII,S$GLB,, | Performed by: PHYSICIAN ASSISTANT

## 2022-06-01 PROCEDURE — 3075F PR MOST RECENT SYSTOLIC BLOOD PRESS GE 130-139MM HG: ICD-10-PCS | Mod: CPTII,S$GLB,, | Performed by: PHYSICIAN ASSISTANT

## 2022-06-01 PROCEDURE — 81002 POCT URINE DIPSTICK WITHOUT MICROSCOPE: ICD-10-PCS | Mod: S$GLB,,, | Performed by: PHYSICIAN ASSISTANT

## 2022-06-01 PROCEDURE — 3288F PR FALLS RISK ASSESSMENT DOCUMENTED: ICD-10-PCS | Mod: CPTII,S$GLB,, | Performed by: PHYSICIAN ASSISTANT

## 2022-06-01 PROCEDURE — 81002 URINALYSIS NONAUTO W/O SCOPE: CPT | Mod: S$GLB,,, | Performed by: PHYSICIAN ASSISTANT

## 2022-06-01 PROCEDURE — 3288F FALL RISK ASSESSMENT DOCD: CPT | Mod: CPTII,S$GLB,, | Performed by: PHYSICIAN ASSISTANT

## 2022-06-01 PROCEDURE — 1100F PTFALLS ASSESS-DOCD GE2>/YR: CPT | Mod: CPTII,S$GLB,, | Performed by: PHYSICIAN ASSISTANT

## 2022-06-01 PROCEDURE — 3008F BODY MASS INDEX DOCD: CPT | Mod: CPTII,S$GLB,, | Performed by: PHYSICIAN ASSISTANT

## 2022-06-01 PROCEDURE — 1159F PR MEDICATION LIST DOCUMENTED IN MEDICAL RECORD: ICD-10-PCS | Mod: CPTII,S$GLB,, | Performed by: PHYSICIAN ASSISTANT

## 2022-06-01 PROCEDURE — 3008F PR BODY MASS INDEX (BMI) DOCUMENTED: ICD-10-PCS | Mod: CPTII,S$GLB,, | Performed by: PHYSICIAN ASSISTANT

## 2022-06-01 PROCEDURE — 1159F MED LIST DOCD IN RCRD: CPT | Mod: CPTII,S$GLB,, | Performed by: PHYSICIAN ASSISTANT

## 2022-06-01 PROCEDURE — 1157F PR ADVANCE CARE PLAN OR EQUIV PRESENT IN MEDICAL RECORD: ICD-10-PCS | Mod: CPTII,S$GLB,, | Performed by: PHYSICIAN ASSISTANT

## 2022-06-01 PROCEDURE — 99999 PR PBB SHADOW E&M-EST. PATIENT-LVL III: CPT | Mod: PBBFAC,,, | Performed by: PHYSICIAN ASSISTANT

## 2022-06-01 PROCEDURE — 1100F PR PT FALLS ASSESS DOC 2+ FALLS/FALL W/INJURY/YR: ICD-10-PCS | Mod: CPTII,S$GLB,, | Performed by: PHYSICIAN ASSISTANT

## 2022-06-01 RX ORDER — AMOXICILLIN AND CLAVULANATE POTASSIUM 875; 125 MG/1; MG/1
1 TABLET, FILM COATED ORAL EVERY 12 HOURS
Qty: 10 TABLET | Refills: 0 | Status: SHIPPED | OUTPATIENT
Start: 2022-06-01 | End: 2022-06-06

## 2022-06-01 RX ORDER — HYDROCODONE BITARTRATE AND ACETAMINOPHEN 7.5; 325 MG/1; MG/1
1 TABLET ORAL EVERY 6 HOURS PRN
COMMUNITY
Start: 2022-02-14 | End: 2022-06-01

## 2022-06-01 NOTE — PROGRESS NOTES
"Subjective:      Patient ID: Viridiana Suresh is a 67 y.o. female.    Chief Complaint: Fall (Fell, hurt arm. Was seen by her back doctor. Xrays ofr neck and wrist neg.) and Ear Fullness (Bilat ear pressure.)  Patient is new to me.    HPI   Patient has PMH of BPD, COPD, dyslipidemia, CKD3, chronic back pain, and tobacco use.    Patient reports many falls recently.  Her reports that her feet and ankles cramp up frequently.  Had back surgery 1/2021 with DARYL Pereira.    She is having more urinary incontinence.  Drinking many diet cokes daily, no water.    She reports crust in both her ears without pain.    3/21/2022-last appt with Dr. Rust.  She states she continues to be depressed.    Review of Systems   Constitutional: Negative for chills and fever.   HENT: Positive for ear pain (no pain, crust).    Respiratory: Negative for shortness of breath.    Cardiovascular: Negative for chest pain.   Genitourinary: Positive for frequency.   Musculoskeletal: Positive for back pain and gait problem.   Psychiatric/Behavioral: Positive for dysphoric mood. Negative for sleep disturbance.       Objective:   /88 (BP Location: Right arm)   Pulse 68   Ht 5' 2" (1.575 m)   Wt 51 kg (112 lb 7 oz)   BMI 20.56 kg/m²      Physical Exam  Vitals reviewed.   Constitutional:       General: She is not in acute distress.     Appearance: Normal appearance. She is well-developed and well-groomed.   HENT:      Head: Normocephalic and atraumatic.      Right Ear: Hearing, tympanic membrane, ear canal and external ear normal.      Left Ear: Hearing, tympanic membrane, ear canal and external ear normal.   Eyes:      General: Lids are normal.      Conjunctiva/sclera: Conjunctivae normal.   Neck:      Trachea: Phonation normal.   Cardiovascular:      Rate and Rhythm: Normal rate and regular rhythm.      Heart sounds: Normal heart sounds. No murmur heard.    No friction rub. No gallop.   Pulmonary:      Effort: Pulmonary effort is normal. No " respiratory distress.      Breath sounds: Normal breath sounds. No decreased breath sounds, wheezing, rhonchi or rales.   Abdominal:      General: Bowel sounds are normal.      Palpations: Abdomen is soft.      Tenderness: There is no abdominal tenderness. There is right CVA tenderness (mild) and left CVA tenderness (mild).   Musculoskeletal:         General: Normal range of motion.      Cervical back: Normal range of motion.   Skin:     General: Skin is warm and dry.      Findings: No rash.   Neurological:      General: No focal deficit present.      Mental Status: She is alert and oriented to person, place, and time.   Psychiatric:         Attention and Perception: Attention normal.         Mood and Affect: Mood is depressed.         Speech: Speech normal.         Behavior: Behavior is cooperative.        Assessment:      1. Falls frequently    2. Urinary frequency    3. Bipolar affective disorder, current episode depressed, current episode severity unspecified    4. Acute cystitis without hematuria       Plan:   1. Falls frequently  - Urinalysis, Reflex to Urine Culture Urine, Clean Catch  - POCT URINE DIPSTICK WITHOUT MICROSCOPE    2. Urinary frequency    3. Bipolar affective disorder, current episode depressed, current episode severity unspecified  Dr. Rust manages this.  Sent message for follow up appt with Dr. Rust and advised patient to call as well.    4. Acute cystitis without hematuria  Take antibiotic with food.  Eat yogurt while on medication.  Drink more water.  - amoxicillin-clavulanate 875-125mg (AUGMENTIN) 875-125 mg per tablet; Take 1 tablet by mouth every 12 (twelve) hours. for 5 days  Dispense: 10 tablet; Refill: 0    Follow up in 2 months with Dr. Gallo.  Patient agreed with plan and expressed understanding.    Thank you for allowing me to serve you,

## 2022-06-09 ENCOUNTER — TELEPHONE (OUTPATIENT)
Dept: PAIN MEDICINE | Facility: CLINIC | Age: 67
End: 2022-06-09
Payer: MEDICARE

## 2022-06-09 DIAGNOSIS — M54.9 DORSALGIA, UNSPECIFIED: ICD-10-CM

## 2022-06-09 NOTE — TELEPHONE ENCOUNTER
"----- Message from Amy Griffith sent at 6/9/2022  8:42 AM CDT -----  "Type:  Patient Call Back    Who Called:COSME MURRELL [[id number]    What is the reqeust in detail:Pt requesting call back in regards to appointment that was canceled today for an MRI. Please advise    Can the clinic reply by [facility]?no    Best Call Back Number:[phone]      Additional Information:            "

## 2022-06-09 NOTE — TELEPHONE ENCOUNTER
Return call placed to Pt who states she went to have MRI today and the appointment had been canceled. Pt would like new order to get the MRI done. Message forwarded to DARYL Boateng for review.

## 2022-06-09 NOTE — TELEPHONE ENCOUNTER
----- Message from Malissa Noel sent at 6/9/2022  2:00 PM CDT -----  Regarding: advice  Contact: patient  Type: Needs Medical Advice  Who Called:  patient  Symptoms (please be specific):    How long has patient had these symptoms:    Pharmacy name and phone #:    Best Call Back Number: [phone] (home)   Additional Information: Patient left a previous message regarding MRI orders. Patient would also like to know how she can get a prescription for medical wilberto. Please call patient to advise.Thanks!

## 2022-06-10 NOTE — TELEPHONE ENCOUNTER
Spoke with patient and rescheduled her MRI for this weekend and have instructed her to follow up with neurosurgery for review of her imaging. She states that she has the number and will call to make that appointment.

## 2022-06-10 NOTE — TELEPHONE ENCOUNTER
----- Message from Lidia Albright sent at 6/10/2022  8:18 AM CDT -----  Contact: pt  Type: Needs Medical Advice    Who Called:  PT  Best Call Back Number: [phone]    Additional Information: Requesting a call back regarding order for mri  Please Advise ---Thank you

## 2022-06-10 NOTE — TELEPHONE ENCOUNTER
----- Message from Kimberly Orlando LPN sent at 6/10/2022  9:05 AM CDT -----  Contact: pt    ----- Message -----  From: Lidia Albright  Sent: 6/10/2022   8:29 AM CDT  To: [facility] Staff    Type: Needs Medical Advice    Who Called:  PT  Best Call Back Number: [phone]    Additional Information: Requesting a call back regarding order for mri  Please Advise ---Thank you

## 2022-06-11 ENCOUNTER — HOSPITAL ENCOUNTER (OUTPATIENT)
Dept: RADIOLOGY | Facility: HOSPITAL | Age: 67
Discharge: HOME | End: 2022-06-11
Attending: ANESTHESIOLOGY
Payer: MEDICARE

## 2022-06-11 DIAGNOSIS — M54.9 DORSALGIA, UNSPECIFIED: ICD-10-CM

## 2022-06-11 PROCEDURE — 72148 MRI LUMBAR SPINE W/O DYE: CPT | Mod: 26,,, | Performed by: RADIOLOGY

## 2022-06-11 PROCEDURE — 72148 MRI LUMBAR SPINE W/O DYE: CPT | Mod: TC,PO

## 2022-06-11 PROCEDURE — 72148 MRI LUMBAR SPINE WITHOUT CONTRAST: ICD-10-PCS | Mod: 26,,, | Performed by: RADIOLOGY

## 2022-06-13 ENCOUNTER — TELEPHONE (OUTPATIENT)
Dept: PAIN MEDICINE | Facility: CLINIC | Age: 67
End: 2022-06-13
Payer: MEDICARE

## 2022-06-13 NOTE — TELEPHONE ENCOUNTER
Spoke with patient and advised her based on previous clinical encounters with our office and staff the provider requested that she seek care elsewhere. Previous actions involved yelling, being verbally aggressive to the staff and reduced cooperation in our clinic and previous clinical advise was not followed and or declined. I explained that due to the above mentioned behaviors she will need to cancel her appointment to follow up. She expressed that she has been in a lot of pain and that is why she has been rude. I advised her that she can make an appointment with neurosurgery to follow up on MRI if she likes and I will send her a list of alternate providers she can try. She requested that I mail her that list and hung up on me.  Will mail out list today along with printed MRI report.

## 2022-07-05 ENCOUNTER — OFFICE VISIT (OUTPATIENT)
Dept: PSYCHIATRY | Facility: CLINIC | Age: 67
End: 2022-07-05
Payer: MEDICARE

## 2022-07-05 VITALS
SYSTOLIC BLOOD PRESSURE: 141 MMHG | WEIGHT: 109.25 LBS | HEART RATE: 76 BPM | DIASTOLIC BLOOD PRESSURE: 78 MMHG | BODY MASS INDEX: 20.1 KG/M2 | HEIGHT: 62 IN

## 2022-07-05 DIAGNOSIS — Z79.899 HIGH RISK MEDICATIONS (NOT ANTICOAGULANTS) LONG-TERM USE: ICD-10-CM

## 2022-07-05 DIAGNOSIS — F31.9 BIPOLAR AFFECTIVE DISORDER, REMISSION STATUS UNSPECIFIED: Primary | ICD-10-CM

## 2022-07-05 PROCEDURE — 3288F PR FALLS RISK ASSESSMENT DOCUMENTED: ICD-10-PCS | Mod: CPTII,S$GLB,, | Performed by: PSYCHIATRY & NEUROLOGY

## 2022-07-05 PROCEDURE — 3008F BODY MASS INDEX DOCD: CPT | Mod: CPTII,S$GLB,, | Performed by: PSYCHIATRY & NEUROLOGY

## 2022-07-05 PROCEDURE — 3288F FALL RISK ASSESSMENT DOCD: CPT | Mod: CPTII,S$GLB,, | Performed by: PSYCHIATRY & NEUROLOGY

## 2022-07-05 PROCEDURE — 3008F PR BODY MASS INDEX (BMI) DOCUMENTED: ICD-10-PCS | Mod: CPTII,S$GLB,, | Performed by: PSYCHIATRY & NEUROLOGY

## 2022-07-05 PROCEDURE — 99214 OFFICE O/P EST MOD 30 MIN: CPT | Mod: S$GLB,,, | Performed by: PSYCHIATRY & NEUROLOGY

## 2022-07-05 PROCEDURE — 1159F PR MEDICATION LIST DOCUMENTED IN MEDICAL RECORD: ICD-10-PCS | Mod: CPTII,S$GLB,, | Performed by: PSYCHIATRY & NEUROLOGY

## 2022-07-05 PROCEDURE — 1157F ADVNC CARE PLAN IN RCRD: CPT | Mod: CPTII,S$GLB,, | Performed by: PSYCHIATRY & NEUROLOGY

## 2022-07-05 PROCEDURE — 3077F PR MOST RECENT SYSTOLIC BLOOD PRESSURE >= 140 MM HG: ICD-10-PCS | Mod: CPTII,S$GLB,, | Performed by: PSYCHIATRY & NEUROLOGY

## 2022-07-05 PROCEDURE — 99214 PR OFFICE/OUTPT VISIT, EST, LEVL IV, 30-39 MIN: ICD-10-PCS | Mod: S$GLB,,, | Performed by: PSYCHIATRY & NEUROLOGY

## 2022-07-05 PROCEDURE — 99499 RISK ADDL DX/OHS AUDIT: ICD-10-PCS | Mod: S$GLB,,, | Performed by: PSYCHIATRY & NEUROLOGY

## 2022-07-05 PROCEDURE — 1101F PT FALLS ASSESS-DOCD LE1/YR: CPT | Mod: CPTII,S$GLB,, | Performed by: PSYCHIATRY & NEUROLOGY

## 2022-07-05 PROCEDURE — 3077F SYST BP >= 140 MM HG: CPT | Mod: CPTII,S$GLB,, | Performed by: PSYCHIATRY & NEUROLOGY

## 2022-07-05 PROCEDURE — 99499 UNLISTED E&M SERVICE: CPT | Mod: S$GLB,,, | Performed by: PSYCHIATRY & NEUROLOGY

## 2022-07-05 PROCEDURE — 3078F PR MOST RECENT DIASTOLIC BLOOD PRESSURE < 80 MM HG: ICD-10-PCS | Mod: CPTII,S$GLB,, | Performed by: PSYCHIATRY & NEUROLOGY

## 2022-07-05 PROCEDURE — 1125F AMNT PAIN NOTED PAIN PRSNT: CPT | Mod: CPTII,S$GLB,, | Performed by: PSYCHIATRY & NEUROLOGY

## 2022-07-05 PROCEDURE — 1101F PR PT FALLS ASSESS DOC 0-1 FALLS W/OUT INJ PAST YR: ICD-10-PCS | Mod: CPTII,S$GLB,, | Performed by: PSYCHIATRY & NEUROLOGY

## 2022-07-05 PROCEDURE — 99999 PR PBB SHADOW E&M-EST. PATIENT-LVL II: ICD-10-PCS | Mod: PBBFAC,,, | Performed by: PSYCHIATRY & NEUROLOGY

## 2022-07-05 PROCEDURE — 1159F MED LIST DOCD IN RCRD: CPT | Mod: CPTII,S$GLB,, | Performed by: PSYCHIATRY & NEUROLOGY

## 2022-07-05 PROCEDURE — 1157F PR ADVANCE CARE PLAN OR EQUIV PRESENT IN MEDICAL RECORD: ICD-10-PCS | Mod: CPTII,S$GLB,, | Performed by: PSYCHIATRY & NEUROLOGY

## 2022-07-05 PROCEDURE — 99999 PR PBB SHADOW E&M-EST. PATIENT-LVL II: CPT | Mod: PBBFAC,,, | Performed by: PSYCHIATRY & NEUROLOGY

## 2022-07-05 PROCEDURE — 3078F DIAST BP <80 MM HG: CPT | Mod: CPTII,S$GLB,, | Performed by: PSYCHIATRY & NEUROLOGY

## 2022-07-05 PROCEDURE — 1125F PR PAIN SEVERITY QUANTIFIED, PAIN PRESENT: ICD-10-PCS | Mod: CPTII,S$GLB,, | Performed by: PSYCHIATRY & NEUROLOGY
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
## 2022-07-06 ENCOUNTER — LAB VISIT (OUTPATIENT)
Dept: LAB | Facility: HOSPITAL | Age: 67
End: 2022-07-06
Attending: PSYCHIATRY & NEUROLOGY
Payer: MEDICARE

## 2022-07-06 DIAGNOSIS — Z79.899 HIGH RISK MEDICATIONS (NOT ANTICOAGULANTS) LONG-TERM USE: ICD-10-CM

## 2022-07-06 LAB
ALBUMIN SERPL BCP-MCNC: 3.8 G/DL (ref 3.5–5.2)
ALP SERPL-CCNC: 62 U/L (ref 55–135)
ALT SERPL W/O P-5'-P-CCNC: 18 U/L (ref 10–44)
ANION GAP SERPL CALC-SCNC: 10 MMOL/L (ref 8–16)
AST SERPL-CCNC: 26 U/L (ref 10–40)
BILIRUB SERPL-MCNC: 0.6 MG/DL (ref 0.1–1)
BUN SERPL-MCNC: 14 MG/DL (ref 8–23)
CALCIUM SERPL-MCNC: 9.8 MG/DL (ref 8.7–10.5)
CHLORIDE SERPL-SCNC: 106 MMOL/L (ref 95–110)
CHOLEST SERPL-MCNC: 221 MG/DL (ref 120–199)
CHOLEST/HDLC SERPL: 3.6 {RATIO} (ref 2–5)
CO2 SERPL-SCNC: 26 MMOL/L (ref 23–29)
CREAT SERPL-MCNC: 1.2 MG/DL (ref 0.5–1.4)
EST. GFR  (AFRICAN AMERICAN): 54.4 ML/MIN/1.73 M^2
EST. GFR  (NON AFRICAN AMERICAN): 47.2 ML/MIN/1.73 M^2
ESTIMATED AVG GLUCOSE: 108 MG/DL (ref 68–131)
GLUCOSE SERPL-MCNC: 83 MG/DL (ref 70–110)
HBA1C MFR BLD: 5.4 % (ref 4–5.6)
HDLC SERPL-MCNC: 61 MG/DL (ref 40–75)
HDLC SERPL: 27.6 % (ref 20–50)
LDLC SERPL CALC-MCNC: 141.6 MG/DL (ref 63–159)
NONHDLC SERPL-MCNC: 160 MG/DL
POTASSIUM SERPL-SCNC: 4.3 MMOL/L (ref 3.5–5.1)
PROT SERPL-MCNC: 6.9 G/DL (ref 6–8.4)
SODIUM SERPL-SCNC: 142 MMOL/L (ref 136–145)
TRIGL SERPL-MCNC: 92 MG/DL (ref 30–150)
VALPROATE SERPL-MCNC: 100.7 UG/ML (ref 50–100)

## 2022-07-06 PROCEDURE — 80053 COMPREHEN METABOLIC PANEL: CPT | Performed by: PSYCHIATRY & NEUROLOGY

## 2022-07-06 PROCEDURE — 80061 LIPID PANEL: CPT | Performed by: PSYCHIATRY & NEUROLOGY

## 2022-07-06 PROCEDURE — 83036 HEMOGLOBIN GLYCOSYLATED A1C: CPT | Performed by: PSYCHIATRY & NEUROLOGY

## 2022-07-06 PROCEDURE — 36415 COLL VENOUS BLD VENIPUNCTURE: CPT | Mod: PN | Performed by: PSYCHIATRY & NEUROLOGY

## 2022-07-06 PROCEDURE — 80164 ASSAY DIPROPYLACETIC ACD TOT: CPT | Performed by: PSYCHIATRY & NEUROLOGY

## 2022-07-07 ENCOUNTER — TELEPHONE (OUTPATIENT)
Dept: PSYCHIATRY | Facility: CLINIC | Age: 67
End: 2022-07-07
Payer: MEDICARE

## 2022-07-07 DIAGNOSIS — F31.9 BIPOLAR I DISORDER: Primary | ICD-10-CM

## 2022-07-07 RX ORDER — CITALOPRAM 10 MG/1
10 TABLET ORAL DAILY
Qty: 30 TABLET | Refills: 0 | Status: SHIPPED | OUTPATIENT
Start: 2022-07-07 | End: 2022-07-29

## 2022-07-07 NOTE — TELEPHONE ENCOUNTER
Patient returned my call. MRI is scheduled for Monday but available Tuesday at 8 am for labs. Appointment scheduled at [place] location per patient's preference. No further concerns verbalized.

## 2022-07-07 NOTE — TELEPHONE ENCOUNTER
LVM for patient to return our to schedule appointment with lab to get valproic acid level drawn Monday, no later than Tuesday.

## 2022-07-07 NOTE — TELEPHONE ENCOUNTER
Called and reviewed pt's labs with her. Only concern is the elevated vpa which I wonder if it is accurate. No change in dosing and pt assures me she took evening dose and then fasted for labs the following morning.     Will redraw next week and f/u with her.

## 2022-07-12 ENCOUNTER — LAB VISIT (OUTPATIENT)
Dept: LAB | Facility: HOSPITAL | Age: 67
End: 2022-07-12
Attending: PSYCHIATRY & NEUROLOGY
Payer: MEDICARE

## 2022-07-12 DIAGNOSIS — F31.9 BIPOLAR I DISORDER: ICD-10-CM

## 2022-07-12 LAB — VALPROATE SERPL-MCNC: 97 UG/ML (ref 50–100)

## 2022-07-12 PROCEDURE — 80164 ASSAY DIPROPYLACETIC ACD TOT: CPT | Performed by: PSYCHIATRY & NEUROLOGY

## 2022-07-12 PROCEDURE — 36415 COLL VENOUS BLD VENIPUNCTURE: CPT | Mod: PN | Performed by: PSYCHIATRY & NEUROLOGY

## 2022-07-13 DIAGNOSIS — Z79.899 HIGH RISK MEDICATIONS (NOT ANTICOAGULANTS) LONG-TERM USE: ICD-10-CM

## 2022-07-13 DIAGNOSIS — F31.9 BIPOLAR I DISORDER: ICD-10-CM

## 2022-07-13 RX ORDER — LOXAPINE SUCCINATE 10 MG/1
10 TABLET ORAL DAILY
Qty: 90 CAPSULE | Refills: 0 | Status: SHIPPED | OUTPATIENT
Start: 2022-07-13 | End: 2022-10-27

## 2022-07-13 RX ORDER — DIVALPROEX SODIUM 250 MG/1
TABLET, DELAYED RELEASE ORAL
Qty: 270 TABLET | Refills: 0 | Status: SHIPPED | OUTPATIENT
Start: 2022-07-13 | End: 2022-10-27

## 2022-07-29 DIAGNOSIS — F31.9 BIPOLAR I DISORDER: ICD-10-CM

## 2022-07-29 RX ORDER — CITALOPRAM 10 MG/1
TABLET ORAL
Qty: 30 TABLET | Refills: 0 | Status: SHIPPED | OUTPATIENT
Start: 2022-07-29 | End: 2022-08-23

## 2022-08-01 ENCOUNTER — OFFICE VISIT (OUTPATIENT)
Dept: FAMILY MEDICINE | Facility: CLINIC | Age: 67
End: 2022-08-01
Payer: MEDICARE

## 2022-08-01 ENCOUNTER — HOSPITAL ENCOUNTER (OUTPATIENT)
Dept: RADIOLOGY | Facility: HOSPITAL | Age: 67
Discharge: HOME | End: 2022-08-01
Attending: FAMILY MEDICINE
Payer: MEDICARE

## 2022-08-01 VITALS
OXYGEN SATURATION: 96 % | HEIGHT: 62 IN | RESPIRATION RATE: 12 BRPM | HEART RATE: 74 BPM | WEIGHT: 107.69 LBS | DIASTOLIC BLOOD PRESSURE: 82 MMHG | BODY MASS INDEX: 19.82 KG/M2 | SYSTOLIC BLOOD PRESSURE: 134 MMHG

## 2022-08-01 DIAGNOSIS — J44.9 CHRONIC OBSTRUCTIVE PULMONARY DISEASE, UNSPECIFIED COPD TYPE: ICD-10-CM

## 2022-08-01 DIAGNOSIS — Z87.891 PERSONAL HISTORY OF NICOTINE DEPENDENCE: ICD-10-CM

## 2022-08-01 DIAGNOSIS — M19.032 ARTHRITIS OF WRIST, LEFT: ICD-10-CM

## 2022-08-01 DIAGNOSIS — M54.2 CERVICALGIA: ICD-10-CM

## 2022-08-01 DIAGNOSIS — G95.89 OTHER SPECIFIED DISEASES OF SPINAL CORD: ICD-10-CM

## 2022-08-01 DIAGNOSIS — N18.30 STAGE 3 CHRONIC KIDNEY DISEASE, UNSPECIFIED WHETHER STAGE 3A OR 3B CKD: Primary | ICD-10-CM

## 2022-08-01 DIAGNOSIS — I20.9 ANGINA PECTORIS: ICD-10-CM

## 2022-08-01 DIAGNOSIS — Z00.00 ROUTINE GENERAL MEDICAL EXAMINATION AT A HEALTH CARE FACILITY: ICD-10-CM

## 2022-08-01 PROCEDURE — 99499 UNLISTED E&M SERVICE: CPT | Mod: S$GLB,,, | Performed by: FAMILY MEDICINE

## 2022-08-01 PROCEDURE — 1100F PTFALLS ASSESS-DOCD GE2>/YR: CPT | Mod: CPTII,S$GLB,, | Performed by: FAMILY MEDICINE

## 2022-08-01 PROCEDURE — 1125F AMNT PAIN NOTED PAIN PRSNT: CPT | Mod: CPTII,S$GLB,, | Performed by: FAMILY MEDICINE

## 2022-08-01 PROCEDURE — 71046 XR CHEST PA AND LATERAL: ICD-10-PCS | Mod: 26,,, | Performed by: RADIOLOGY

## 2022-08-01 PROCEDURE — 3044F HG A1C LEVEL LT 7.0%: CPT | Mod: CPTII,S$GLB,, | Performed by: FAMILY MEDICINE

## 2022-08-01 PROCEDURE — 99999 PR PBB SHADOW E&M-EST. PATIENT-LVL IV: ICD-10-PCS | Mod: PBBFAC,,, | Performed by: FAMILY MEDICINE

## 2022-08-01 PROCEDURE — 1157F ADVNC CARE PLAN IN RCRD: CPT | Mod: CPTII,S$GLB,, | Performed by: FAMILY MEDICINE

## 2022-08-01 PROCEDURE — 3008F BODY MASS INDEX DOCD: CPT | Mod: CPTII,S$GLB,, | Performed by: FAMILY MEDICINE

## 2022-08-01 PROCEDURE — 1157F PR ADVANCE CARE PLAN OR EQUIV PRESENT IN MEDICAL RECORD: ICD-10-PCS | Mod: CPTII,S$GLB,, | Performed by: FAMILY MEDICINE

## 2022-08-01 PROCEDURE — 1160F PR REVIEW ALL MEDS BY PRESCRIBER/CLIN PHARMACIST DOCUMENTED: ICD-10-PCS | Mod: CPTII,S$GLB,, | Performed by: FAMILY MEDICINE

## 2022-08-01 PROCEDURE — 1125F PR PAIN SEVERITY QUANTIFIED, PAIN PRESENT: ICD-10-PCS | Mod: CPTII,S$GLB,, | Performed by: FAMILY MEDICINE

## 2022-08-01 PROCEDURE — 93005 EKG 12-LEAD: ICD-10-PCS | Mod: S$GLB,,, | Performed by: FAMILY MEDICINE

## 2022-08-01 PROCEDURE — 1159F PR MEDICATION LIST DOCUMENTED IN MEDICAL RECORD: ICD-10-PCS | Mod: CPTII,S$GLB,, | Performed by: FAMILY MEDICINE

## 2022-08-01 PROCEDURE — 3079F PR MOST RECENT DIASTOLIC BLOOD PRESSURE 80-89 MM HG: ICD-10-PCS | Mod: CPTII,S$GLB,, | Performed by: FAMILY MEDICINE

## 2022-08-01 PROCEDURE — 93005 ELECTROCARDIOGRAM TRACING: CPT | Mod: S$GLB,,, | Performed by: FAMILY MEDICINE

## 2022-08-01 PROCEDURE — 3288F PR FALLS RISK ASSESSMENT DOCUMENTED: ICD-10-PCS | Mod: CPTII,S$GLB,, | Performed by: FAMILY MEDICINE

## 2022-08-01 PROCEDURE — 71046 X-RAY EXAM CHEST 2 VIEWS: CPT | Mod: 26,,, | Performed by: RADIOLOGY

## 2022-08-01 PROCEDURE — 3008F PR BODY MASS INDEX (BMI) DOCUMENTED: ICD-10-PCS | Mod: CPTII,S$GLB,, | Performed by: FAMILY MEDICINE

## 2022-08-01 PROCEDURE — 71046 X-RAY EXAM CHEST 2 VIEWS: CPT | Mod: TC,PN

## 2022-08-01 PROCEDURE — 1159F MED LIST DOCD IN RCRD: CPT | Mod: CPTII,S$GLB,, | Performed by: FAMILY MEDICINE

## 2022-08-01 PROCEDURE — 99214 PR OFFICE/OUTPT VISIT, EST, LEVL IV, 30-39 MIN: ICD-10-PCS | Mod: S$GLB,,, | Performed by: FAMILY MEDICINE

## 2022-08-01 PROCEDURE — 1100F PR PT FALLS ASSESS DOC 2+ FALLS/FALL W/INJURY/YR: ICD-10-PCS | Mod: CPTII,S$GLB,, | Performed by: FAMILY MEDICINE

## 2022-08-01 PROCEDURE — 93010 EKG 12-LEAD: ICD-10-PCS | Mod: S$GLB,,, | Performed by: INTERNAL MEDICINE

## 2022-08-01 PROCEDURE — 3288F FALL RISK ASSESSMENT DOCD: CPT | Mod: CPTII,S$GLB,, | Performed by: FAMILY MEDICINE

## 2022-08-01 PROCEDURE — 93010 ELECTROCARDIOGRAM REPORT: CPT | Mod: S$GLB,,, | Performed by: INTERNAL MEDICINE

## 2022-08-01 PROCEDURE — 3079F DIAST BP 80-89 MM HG: CPT | Mod: CPTII,S$GLB,, | Performed by: FAMILY MEDICINE

## 2022-08-01 PROCEDURE — 3075F PR MOST RECENT SYSTOLIC BLOOD PRESS GE 130-139MM HG: ICD-10-PCS | Mod: CPTII,S$GLB,, | Performed by: FAMILY MEDICINE

## 2022-08-01 PROCEDURE — 99499 RISK ADDL DX/OHS AUDIT: ICD-10-PCS | Mod: S$GLB,,, | Performed by: FAMILY MEDICINE

## 2022-08-01 PROCEDURE — 99214 OFFICE O/P EST MOD 30 MIN: CPT | Mod: S$GLB,,, | Performed by: FAMILY MEDICINE

## 2022-08-01 PROCEDURE — 3044F PR MOST RECENT HEMOGLOBIN A1C LEVEL <7.0%: ICD-10-PCS | Mod: CPTII,S$GLB,, | Performed by: FAMILY MEDICINE

## 2022-08-01 PROCEDURE — 99999 PR PBB SHADOW E&M-EST. PATIENT-LVL IV: CPT | Mod: PBBFAC,,, | Performed by: FAMILY MEDICINE

## 2022-08-01 PROCEDURE — 1160F RVW MEDS BY RX/DR IN RCRD: CPT | Mod: CPTII,S$GLB,, | Performed by: FAMILY MEDICINE

## 2022-08-01 PROCEDURE — 3075F SYST BP GE 130 - 139MM HG: CPT | Mod: CPTII,S$GLB,, | Performed by: FAMILY MEDICINE

## 2022-08-01 NOTE — PROGRESS NOTES
"Subjective:       Patient ID: Viridiana Suresh is a 67 y.o. female.    Chief Complaint: Follow-up    HPI  Patient in clinic for f/u.  No updates to medical hx.  Has upcoming appt later this year with new psychiatrist since Dr Rust has moved. Stable med regimen.   Notes ongoing issues with her bowels - last saw GI in 2018 and notes that she did not maintain f/u.   Needs clearance for upcoming neck and then wrist surgeries.    Neck with Dr Dany TRAMMELL.   Wrist (L, replacement) with Dr Garcia.   Neither procedures are scheduled as yet.   Labs, 2022 rev.  Imaging 2022 rev.  Notes that she is anti-medication as much as possible.   vaping in place of tobacco.    Review of Systems:  Review of Systems   Constitutional: Positive for fatigue (attributes to starting citalopram). Negative for fever.   HENT: Positive for postnasal drip (occ, attributes to vaping). Negative for congestion.    Respiratory: Negative for cough, shortness of breath (occ) and wheezing.    Cardiovascular: Negative for chest pain (denies any recent), palpitations and leg swelling.   Gastrointestinal: Positive for diarrhea (discussed gi f/u). Negative for blood in stool and constipation.   Genitourinary: Negative for dysuria and frequency.   Musculoskeletal: Positive for arthralgias, back pain and neck pain.   Neurological: Negative for dizziness and light-headedness.   Psychiatric/Behavioral: Negative for confusion and sleep disturbance (gets ok sleep, but noticing some early am awakening).       Objective:     Vitals:    08/01/22 1022   BP: 134/82   Pulse: 74   Resp: 12   SpO2: 96%   Weight: 48.9 kg (107 lb 11.1 oz)   Height: 5' 2" (1.575 m)          Physical Exam  Vitals and nursing note reviewed.   Constitutional:       General: She is not in acute distress.     Appearance: Normal appearance. She is well-developed.      Comments: thin   HENT:      Head: Normocephalic and atraumatic.   Eyes:      General: No scleral icterus.        Right eye: No " discharge.         Left eye: No discharge.      Conjunctiva/sclera: Conjunctivae normal.   Cardiovascular:      Rate and Rhythm: Normal rate and regular rhythm.   Pulmonary:      Effort: Pulmonary effort is normal. No respiratory distress.      Breath sounds: Normal breath sounds.   Abdominal:      General: There is no distension.      Palpations: Abdomen is soft.   Musculoskeletal:         General: Normal range of motion.      Cervical back: Normal range of motion and neck supple.   Skin:     General: Skin is warm and dry.      Findings: No rash.   Neurological:      Mental Status: She is alert and oriented to person, place, and time. Mental status is at baseline.   Psychiatric:         Mood and Affect: Mood normal.         Behavior: Behavior normal.           Assessment & Plan:  Stage 3 chronic kidney disease, unspecified whether stage 3a or 3b CKD  Comments:  stable, cont monitoring  reviewed hydration, limiting nsaids  Orders:  -     CBC Without Differential; Future; Expected date: 08/01/2022  -     Comprehensive Metabolic Panel; Future; Expected date: 08/01/2022    Routine general medical examination at a health care facility  -     Hepatitis C Antibody; Future; Expected date: 08/01/2022    Other specified diseases of spinal cord  Comments:  upcoming procedure with outside provider for neck issues    Angina pectoris  Comments:  resolved, ekg stable  Orders:  -     EKG 12-lead; Future    Chronic obstructive pulmonary disease, unspecified COPD type  Comments:  chronic/stable, cont regimen  Orders:  -     X-Ray Chest PA And Lateral; Future; Expected date: 08/01/2022  -     CT Chest Lung Screening Low Dose; Future; Expected date: 08/01/2022    Arthritis of wrist, left  Comments:  per ortho    Cervicalgia    Personal history of nicotine dependence   -     CT Chest Lung Screening Low Dose; Future; Expected date: 08/01/2022

## 2022-08-02 ENCOUNTER — TELEPHONE (OUTPATIENT)
Dept: FAMILY MEDICINE | Facility: CLINIC | Age: 67
End: 2022-08-02
Payer: MEDICARE

## 2022-08-02 NOTE — TELEPHONE ENCOUNTER
Received fax from [facility]. Pt needs surgical clearance for Cervical MBB/ RFA. Pt was seen yesterday by Dr Gallo. Can that visit be used to clear pt for surgery ? Clearance form in provider inbox.--lp .

## 2022-08-16 ENCOUNTER — TELEPHONE (OUTPATIENT)
Dept: SMOKING CESSATION | Facility: CLINIC | Age: 67
End: 2022-08-16
Payer: MEDICARE

## 2022-08-16 NOTE — TELEPHONE ENCOUNTER
I called patient to verify that she has a virtual appointment for tobacco cessation but patient states she not interested in quitting at this time. She will contact me if she changes her mind.   Physical Therapy Evaluation    ASSESSMENT:   Patient seen on med-surg nursing unit.  Patient presents at baseline with concerns which was modified independent with mobility.  Pt lives alone in a ranch home with 2 steps to enter.  He has a WW for mobility.  He is admitted with abdominal pain and melena.  For safe return to prior living situation the patient needs to be at a modified independent  level for mobility.      Patient is currently functioning at modified independent  for mobility.  Pt was able to safely transfer, completed cares standing at sink for >10 minutes and ambulated 150' with WW without any LOB.  He appears at his functional baseline.  He reports feeling generally tired.  He does not have any ongoing skilled PT needs at this time and should continue to ambulate daily with nursing staff.  Will DC PT.     Skilled physical therapy services are not needed due to at baseline    PT Identified Barriers to Discharge: medical       PLAN AND RECOMMENDATIONS:   Recommendations for Discharge:  Recommendation for Discharge: PT WI: Home      Plan:   Continue skilled PT, including the following                              EQUIPMENT:   PT/OT Mobility Equipment for Discharge: has WW (05/26/20 0856)         DIAGNOSIS:   1. Blood in stool    2. Acute renal failure, unspecified acute renal failure type (CMS/HCC)    3. Transaminitis    4. Pancytopenia (CMS/HCC)    5. PVC (premature ventricular contraction)           PRECAUTIONS:           EDUCATION:   On this date, the patient was educated on role of therapy and plan of care.  The response to education was: Verbalizes understanding.     SUBJECTIVE:   Subjective: \"oh I'm doing alright\" (05/26/20 0856)       OBJECTIVE:   Bed Mobility:    Bed Mobility  Supine to Sit: Modified Independent (05/26/20 0856)  Bed Mobility Comments: manages on his own with some extra time (05/26/20 0856)     Transfers:    Transfers  Sit to Stand: Modified Independent (05/26/20 0856)  Stand to  Sit: Modified Independent (05/26/20 0856)  Stand Pivot Transfers: Modified Independent (05/26/20 0856)  Assistive Device/: 2-wheeled walker;Gait Belt (05/26/20 0856)  Transfer Comments 1: assist for line management only (05/26/20 0856)      Gait:    Gait  Gait Assistance: Modified Independent (05/26/20 0856)  Assistive Device/: 2-wheeled walker;Gait Belt (05/26/20 0856)  Ambulation Distance (Feet): 150 Feet (05/26/20 0856)  Pattern: Shuffle (05/26/20 0856)  Ambulation Surface: Tile (05/26/20 0856)  Gait Comments 1: assist for line management only; no unsteadiness (05/26/20 0856)       Stairs:             GOALS:           EVALUATION CODE JUSTIFICATION:   Eval Code:  $ PT Eval: Low Complexity: 1 Procedure  Problems/Co-morbidities:   Patient Active Problem List   Diagnosis   • Kidney stones   • BPH (benign prostatic hyperplasia)   • DM (diabetes mellitus) (CMS/[facility])   • Essential hypertension   • Right knee pain   • Renal cysts, acquired, bilateral   • Primary osteoarthritis of both knees   • Status post total bilateral knee replacement   • Impaired mobility and ADLs   • Encounter for therapeutic drug monitoring [Z51.81]     Clinical Presentation: Stable and/or uncomplicated characteristics  Predicted patient presentation: Moderate (evolving) - Patient comorbidities and complexities, as defined above, may have varying impact on steady progress for prescribed plan of care.  Clinical decision making: Moderate - patient has 1-2 personal factors/comorbidities that impact the plan of care; addressing 3+ measures from the evaluation; evolving clinical presentation with changing characteristics consistent with moderate clinical decision making complexity     BILLING INFORMATION:   Total Treatment Time: PT Time Spent: 35 minutes   Timed Treatment Minutes:

## 2022-08-31 DIAGNOSIS — Z78.0 MENOPAUSE: ICD-10-CM

## 2022-09-19 ENCOUNTER — PATIENT MESSAGE (OUTPATIENT)
Dept: PSYCHIATRY | Facility: CLINIC | Age: 67
End: 2022-09-19
Payer: MEDICARE

## 2022-09-29 ENCOUNTER — TELEPHONE (OUTPATIENT)
Dept: FAMILY MEDICINE | Facility: CLINIC | Age: 67
End: 2022-09-29
Payer: MEDICARE

## 2022-09-29 NOTE — TELEPHONE ENCOUNTER
----- Message from Rasheed San sent at 9/28/2022  2:13 PM CDT -----  Contact: pt at [phone]  Type:  Sooner Appointment Request    Caller is requesting a sooner appointment.  Caller declined first available appointment listed below.  Caller will not accept being placed on the waitlist and is requesting a message be sent to doctor.    Name of Caller:  pt  When is the first available appointment?  11/15/22  Symptoms:  diarrhea for weeks  Best Call Back Number:  [phone] or [phone]  Additional Information:  Please call back and advise.

## 2022-10-04 ENCOUNTER — OFFICE VISIT (OUTPATIENT)
Dept: FAMILY MEDICINE | Facility: CLINIC | Age: 67
End: 2022-10-04
Payer: MEDICARE

## 2022-10-04 ENCOUNTER — LAB VISIT (OUTPATIENT)
Dept: LAB | Facility: HOSPITAL | Age: 67
End: 2022-10-04
Attending: STUDENT IN AN ORGANIZED HEALTH CARE EDUCATION/TRAINING PROGRAM
Payer: MEDICARE

## 2022-10-04 VITALS
SYSTOLIC BLOOD PRESSURE: 136 MMHG | HEIGHT: 62 IN | RESPIRATION RATE: 18 BRPM | DIASTOLIC BLOOD PRESSURE: 76 MMHG | OXYGEN SATURATION: 96 % | HEART RATE: 77 BPM | BODY MASS INDEX: 19.25 KG/M2 | WEIGHT: 104.63 LBS

## 2022-10-04 DIAGNOSIS — K52.9 CHRONIC DIARRHEA: ICD-10-CM

## 2022-10-04 DIAGNOSIS — J44.9 CHRONIC OBSTRUCTIVE PULMONARY DISEASE, UNSPECIFIED COPD TYPE: ICD-10-CM

## 2022-10-04 DIAGNOSIS — R11.10 CHRONIC VOMITING: ICD-10-CM

## 2022-10-04 DIAGNOSIS — K52.9 CHRONIC DIARRHEA: Primary | ICD-10-CM

## 2022-10-04 DIAGNOSIS — E86.0 DEHYDRATION: ICD-10-CM

## 2022-10-04 LAB
ALBUMIN SERPL BCP-MCNC: 3.5 G/DL (ref 3.5–5.2)
ALP SERPL-CCNC: 71 U/L (ref 55–135)
ALT SERPL W/O P-5'-P-CCNC: 10 U/L (ref 10–44)
ANION GAP SERPL CALC-SCNC: 8 MMOL/L (ref 8–16)
AST SERPL-CCNC: 12 U/L (ref 10–40)
BASOPHILS # BLD AUTO: 0.04 K/UL (ref 0–0.2)
BASOPHILS NFR BLD: 0.6 % (ref 0–1.9)
BILIRUB SERPL-MCNC: 0.4 MG/DL (ref 0.1–1)
BUN SERPL-MCNC: 10 MG/DL (ref 8–23)
CALCIUM SERPL-MCNC: 9.2 MG/DL (ref 8.7–10.5)
CHLORIDE SERPL-SCNC: 107 MMOL/L (ref 95–110)
CO2 SERPL-SCNC: 23 MMOL/L (ref 23–29)
CREAT SERPL-MCNC: 0.9 MG/DL (ref 0.5–1.4)
DIFFERENTIAL METHOD: ABNORMAL
EOSINOPHIL # BLD AUTO: 0.1 K/UL (ref 0–0.5)
EOSINOPHIL NFR BLD: 1.7 % (ref 0–8)
ERYTHROCYTE [DISTWIDTH] IN BLOOD BY AUTOMATED COUNT: 13.5 % (ref 11.5–14.5)
EST. GFR  (NO RACE VARIABLE): >60 ML/MIN/1.73 M^2
GLUCOSE SERPL-MCNC: 93 MG/DL (ref 70–110)
HCT VFR BLD AUTO: 43.9 % (ref 37–48.5)
HGB BLD-MCNC: 13.6 G/DL (ref 12–16)
IMM GRANULOCYTES # BLD AUTO: 0.02 K/UL (ref 0–0.04)
IMM GRANULOCYTES NFR BLD AUTO: 0.3 % (ref 0–0.5)
LYMPHOCYTES # BLD AUTO: 1.9 K/UL (ref 1–4.8)
LYMPHOCYTES NFR BLD: 26.1 % (ref 18–48)
MAGNESIUM SERPL-MCNC: 1.7 MG/DL (ref 1.6–2.6)
MCH RBC QN AUTO: 30 PG (ref 27–31)
MCHC RBC AUTO-ENTMCNC: 31 G/DL (ref 32–36)
MCV RBC AUTO: 97 FL (ref 82–98)
MONOCYTES # BLD AUTO: 0.5 K/UL (ref 0.3–1)
MONOCYTES NFR BLD: 7.1 % (ref 4–15)
NEUTROPHILS # BLD AUTO: 4.7 K/UL (ref 1.8–7.7)
NEUTROPHILS NFR BLD: 64.2 % (ref 38–73)
NRBC BLD-RTO: 0 /100 WBC
PHOSPHATE SERPL-MCNC: 3.6 MG/DL (ref 2.7–4.5)
PLATELET # BLD AUTO: 207 K/UL (ref 150–450)
PMV BLD AUTO: 11.9 FL (ref 9.2–12.9)
POTASSIUM SERPL-SCNC: 4.3 MMOL/L (ref 3.5–5.1)
PROT SERPL-MCNC: 6.1 G/DL (ref 6–8.4)
RBC # BLD AUTO: 4.53 M/UL (ref 4–5.4)
SODIUM SERPL-SCNC: 138 MMOL/L (ref 136–145)
WBC # BLD AUTO: 7.23 K/UL (ref 3.9–12.7)

## 2022-10-04 PROCEDURE — 83735 ASSAY OF MAGNESIUM: CPT | Performed by: STUDENT IN AN ORGANIZED HEALTH CARE EDUCATION/TRAINING PROGRAM

## 2022-10-04 PROCEDURE — 1160F RVW MEDS BY RX/DR IN RCRD: CPT | Mod: CPTII,S$GLB,, | Performed by: STUDENT IN AN ORGANIZED HEALTH CARE EDUCATION/TRAINING PROGRAM

## 2022-10-04 PROCEDURE — 1160F PR REVIEW ALL MEDS BY PRESCRIBER/CLIN PHARMACIST DOCUMENTED: ICD-10-PCS | Mod: CPTII,S$GLB,, | Performed by: STUDENT IN AN ORGANIZED HEALTH CARE EDUCATION/TRAINING PROGRAM

## 2022-10-04 PROCEDURE — 1125F AMNT PAIN NOTED PAIN PRSNT: CPT | Mod: CPTII,S$GLB,, | Performed by: STUDENT IN AN ORGANIZED HEALTH CARE EDUCATION/TRAINING PROGRAM

## 2022-10-04 PROCEDURE — 99214 OFFICE O/P EST MOD 30 MIN: CPT | Mod: S$GLB,,, | Performed by: STUDENT IN AN ORGANIZED HEALTH CARE EDUCATION/TRAINING PROGRAM

## 2022-10-04 PROCEDURE — 99999 PR PBB SHADOW E&M-EST. PATIENT-LVL IV: CPT | Mod: PBBFAC,,, | Performed by: STUDENT IN AN ORGANIZED HEALTH CARE EDUCATION/TRAINING PROGRAM

## 2022-10-04 PROCEDURE — 1159F MED LIST DOCD IN RCRD: CPT | Mod: CPTII,S$GLB,, | Performed by: STUDENT IN AN ORGANIZED HEALTH CARE EDUCATION/TRAINING PROGRAM

## 2022-10-04 PROCEDURE — 99214 PR OFFICE/OUTPT VISIT, EST, LEVL IV, 30-39 MIN: ICD-10-PCS | Mod: S$GLB,,, | Performed by: STUDENT IN AN ORGANIZED HEALTH CARE EDUCATION/TRAINING PROGRAM

## 2022-10-04 PROCEDURE — 84100 ASSAY OF PHOSPHORUS: CPT | Performed by: STUDENT IN AN ORGANIZED HEALTH CARE EDUCATION/TRAINING PROGRAM

## 2022-10-04 PROCEDURE — 3044F PR MOST RECENT HEMOGLOBIN A1C LEVEL <7.0%: ICD-10-PCS | Mod: CPTII,S$GLB,, | Performed by: STUDENT IN AN ORGANIZED HEALTH CARE EDUCATION/TRAINING PROGRAM

## 2022-10-04 PROCEDURE — 3075F SYST BP GE 130 - 139MM HG: CPT | Mod: CPTII,S$GLB,, | Performed by: STUDENT IN AN ORGANIZED HEALTH CARE EDUCATION/TRAINING PROGRAM

## 2022-10-04 PROCEDURE — 3008F PR BODY MASS INDEX (BMI) DOCUMENTED: ICD-10-PCS | Mod: CPTII,S$GLB,, | Performed by: STUDENT IN AN ORGANIZED HEALTH CARE EDUCATION/TRAINING PROGRAM

## 2022-10-04 PROCEDURE — 3044F HG A1C LEVEL LT 7.0%: CPT | Mod: CPTII,S$GLB,, | Performed by: STUDENT IN AN ORGANIZED HEALTH CARE EDUCATION/TRAINING PROGRAM

## 2022-10-04 PROCEDURE — 99999 PR PBB SHADOW E&M-EST. PATIENT-LVL IV: ICD-10-PCS | Mod: PBBFAC,,, | Performed by: STUDENT IN AN ORGANIZED HEALTH CARE EDUCATION/TRAINING PROGRAM

## 2022-10-04 PROCEDURE — 85025 COMPLETE CBC W/AUTO DIFF WBC: CPT | Performed by: STUDENT IN AN ORGANIZED HEALTH CARE EDUCATION/TRAINING PROGRAM

## 2022-10-04 PROCEDURE — 3075F PR MOST RECENT SYSTOLIC BLOOD PRESS GE 130-139MM HG: ICD-10-PCS | Mod: CPTII,S$GLB,, | Performed by: STUDENT IN AN ORGANIZED HEALTH CARE EDUCATION/TRAINING PROGRAM

## 2022-10-04 PROCEDURE — 3008F BODY MASS INDEX DOCD: CPT | Mod: CPTII,S$GLB,, | Performed by: STUDENT IN AN ORGANIZED HEALTH CARE EDUCATION/TRAINING PROGRAM

## 2022-10-04 PROCEDURE — 36415 COLL VENOUS BLD VENIPUNCTURE: CPT | Mod: PN | Performed by: STUDENT IN AN ORGANIZED HEALTH CARE EDUCATION/TRAINING PROGRAM

## 2022-10-04 PROCEDURE — 1125F PR PAIN SEVERITY QUANTIFIED, PAIN PRESENT: ICD-10-PCS | Mod: CPTII,S$GLB,, | Performed by: STUDENT IN AN ORGANIZED HEALTH CARE EDUCATION/TRAINING PROGRAM

## 2022-10-04 PROCEDURE — 1159F PR MEDICATION LIST DOCUMENTED IN MEDICAL RECORD: ICD-10-PCS | Mod: CPTII,S$GLB,, | Performed by: STUDENT IN AN ORGANIZED HEALTH CARE EDUCATION/TRAINING PROGRAM

## 2022-10-04 PROCEDURE — 3078F DIAST BP <80 MM HG: CPT | Mod: CPTII,S$GLB,, | Performed by: STUDENT IN AN ORGANIZED HEALTH CARE EDUCATION/TRAINING PROGRAM

## 2022-10-04 PROCEDURE — 1157F PR ADVANCE CARE PLAN OR EQUIV PRESENT IN MEDICAL RECORD: ICD-10-PCS | Mod: CPTII,S$GLB,, | Performed by: STUDENT IN AN ORGANIZED HEALTH CARE EDUCATION/TRAINING PROGRAM

## 2022-10-04 PROCEDURE — 80053 COMPREHEN METABOLIC PANEL: CPT | Performed by: STUDENT IN AN ORGANIZED HEALTH CARE EDUCATION/TRAINING PROGRAM

## 2022-10-04 PROCEDURE — 1157F ADVNC CARE PLAN IN RCRD: CPT | Mod: CPTII,S$GLB,, | Performed by: STUDENT IN AN ORGANIZED HEALTH CARE EDUCATION/TRAINING PROGRAM

## 2022-10-04 PROCEDURE — 3078F PR MOST RECENT DIASTOLIC BLOOD PRESSURE < 80 MM HG: ICD-10-PCS | Mod: CPTII,S$GLB,, | Performed by: STUDENT IN AN ORGANIZED HEALTH CARE EDUCATION/TRAINING PROGRAM

## 2022-10-04 RX ORDER — ALBUTEROL SULFATE 90 UG/1
2 AEROSOL, METERED RESPIRATORY (INHALATION) EVERY 6 HOURS PRN
Qty: 6.7 G | Refills: 2 | Status: SHIPPED | OUTPATIENT
Start: 2022-10-04 | End: 2023-03-14

## 2022-10-04 RX ORDER — DICYCLOMINE HYDROCHLORIDE 10 MG/1
10 CAPSULE ORAL
Qty: 120 CAPSULE | Refills: 0 | Status: SHIPPED | OUTPATIENT
Start: 2022-10-04 | End: 2022-10-18

## 2022-10-04 RX ORDER — FLUTICASONE FUROATE AND VILANTEROL 100; 25 UG/1; UG/1
1 POWDER RESPIRATORY (INHALATION) DAILY
Qty: 30 EACH | Refills: 11 | Status: SHIPPED | OUTPATIENT
Start: 2022-10-04 | End: 2022-12-05 | Stop reason: CLARIF

## 2022-10-04 NOTE — PROGRESS NOTES
Subjective:      Patient ID: Viridiana Suresh is a 67 y.o. female    Chief Complaint   Patient presents with    Diarrhea     For month lost weight     Nausea     HPI  67 y.o. female with a PMHx as documented below presents to clinic today for the following:  - Patient reports diarrhea associated with nausea, abdominal cramping, and intermittent vomiting for the past 4-5 weeks.  Patient describes diarrhea as intermittent watery to loose stool.  No blood in stool, no melena.  Patient reports mild weight loss during this.  Due to difficulty eating and keeping down foods.  Patient additionally reports feeling dehydrated.  Counseled on staying hydrated and avoiding diet soda at this time (pt reports drinking diet sodas most days instead of water).  - Patient reports intermittent cough, shortness of breath.  Patient with history of COPD - currently GOLD stage 2.  Patient currently vaping and smoking.  Declines referral to smoking cessation program at this time.  Patient reports being prescribed Trelegy inhaler in the past without relief.  She would like to try different inhaler at this time.  Counseled on importance of smoking cessation.    PFTs (2/2021):   Date of Service: 02/05/2021  INTERPRETATION:  Spirometry reveals moderate obstruction with an FEV1 of 1.16 L   or 53% of predicted with significant bronchodilator response with FEV1   increasing to 1.41 L or by 21%.  Lung volumes reveal evidence of mild   hyperinflation and air trapping.  Diffusing capacity is mildly reduced.  It may   be secondary to pulmonary parenchymal abnormalities, pulmonary hypertension, or   anemia.        RG/IN  dd: 02/11/2021 10:17:26 (CST)  td: 02/11/2021 18:24:32 (CST)  Doc ID   #[id number]  Job ID #[id number]        Review of Systems   Constitutional:  Positive for malaise/fatigue and weight loss. Negative for chills and fever.   Respiratory:  Negative for shortness of breath.    Cardiovascular:  Negative for chest pain.   Gastrointestinal:   Positive for abdominal pain, diarrhea, nausea and vomiting. Negative for blood in stool, constipation and melena.   Genitourinary:  Negative for dysuria.     Past Medical History:   Diagnosis Date    Bipolar disorder     Cancer 1995    melanoma rt third toe    COPD (chronic obstructive pulmonary disease)     CTS (carpal tunnel syndrome)     Diverticulosis     Hepatomegaly     Presence of dental bridge     UPPER     Objective:      Vitals:    10/04/22 0815   BP: 136/76   Pulse: 77   Resp: 18     Physical Exam  Vitals reviewed.   Constitutional:       General: She is not in acute distress.  HENT:      Head: Normocephalic and atraumatic.   Cardiovascular:      Rate and Rhythm: Normal rate.   Pulmonary:      Effort: Pulmonary effort is normal. No respiratory distress.   Neurological:      General: No focal deficit present.      Mental Status: She is alert and oriented to person, place, and time. Mental status is at baseline.      Assessment:       1. Chronic diarrhea    2. Dehydration    3. Chronic vomiting    4. Chronic obstructive pulmonary disease, unspecified COPD type      Plan:       Viridiana was seen today for diarrhea and nausea.    Diagnoses and all orders for this visit:    Chronic diarrhea  -     Ambulatory referral/consult to Gastroenterology; Future  -     H. pylori antigen, stool; Future  -     Pancreatic elastase, fecal; Future  -     Fecal fat, qualitative; Future  -     Occult blood x 1, stool; Future  -     WBC, Stool; Future  -     Rotavirus antigen, stool; Future  -     Adenovirus Antigen EIA, Stool; Future  -     Calprotectin, Stool; Future  -     Giardia / Cryptosporidum, EIA; Future  -     Stool Exam-Ova,Cysts,Parasites; Future  -     Clostridium difficile EIA; Future  -     Stool culture; Future  -     Comprehensive Metabolic Panel; Future  -     Adenovirus Antigen EIA, Stool  -     dicyclomine (BENTYL) 10 MG capsule; Take 1 capsule (10 mg total) by mouth 4 (four) times daily before meals and  nightly.  -     Magnesium; Future  -     Phosphorus; Future  -     CBC Auto Differential; Future    Dehydration  -     Comprehensive Metabolic Panel; Future  -     Magnesium; Future  -     Phosphorus; Future    Chronic vomiting  -     Ambulatory referral/consult to Gastroenterology; Future  -     Comprehensive Metabolic Panel; Future    Chronic obstructive pulmonary disease, unspecified COPD type  -     fluticasone furoate-vilanteroL (BREO ELLIPTA) 100-25 mcg/dose diskus inhaler; Inhale 1 puff into the lungs once daily. Controller    Other orders  -     albuterol (VENTOLIN HFA) 90 mcg/actuation inhaler; Inhale 2 puffs into the lungs every 6 (six) hours as needed for Wheezing or Shortness of Breath. Rescue       Follow up in about 2 weeks (around 10/18/2022), or if symptoms worsen or fail to improve.    Louise Adames MD  [facility]  Office: [phone]   Fax: [phone]  10/04/2022    Disclaimer: This note was partly generated using dictation software which may occasionally result in transcription errors.

## 2022-10-18 ENCOUNTER — OFFICE VISIT (OUTPATIENT)
Dept: FAMILY MEDICINE | Facility: CLINIC | Age: 67
End: 2022-10-18
Payer: MEDICARE

## 2022-10-18 VITALS
WEIGHT: 104.19 LBS | DIASTOLIC BLOOD PRESSURE: 74 MMHG | RESPIRATION RATE: 18 BRPM | BODY MASS INDEX: 19.17 KG/M2 | SYSTOLIC BLOOD PRESSURE: 122 MMHG | OXYGEN SATURATION: 98 % | HEIGHT: 62 IN | HEART RATE: 68 BPM

## 2022-10-18 DIAGNOSIS — R11.10 CHRONIC VOMITING: ICD-10-CM

## 2022-10-18 DIAGNOSIS — K52.9 CHRONIC DIARRHEA: ICD-10-CM

## 2022-10-18 DIAGNOSIS — J44.1 COPD EXACERBATION: Primary | ICD-10-CM

## 2022-10-18 PROCEDURE — 3044F HG A1C LEVEL LT 7.0%: CPT | Mod: CPTII,S$GLB,, | Performed by: STUDENT IN AN ORGANIZED HEALTH CARE EDUCATION/TRAINING PROGRAM

## 2022-10-18 PROCEDURE — 1157F ADVNC CARE PLAN IN RCRD: CPT | Mod: CPTII,S$GLB,, | Performed by: STUDENT IN AN ORGANIZED HEALTH CARE EDUCATION/TRAINING PROGRAM

## 2022-10-18 PROCEDURE — 99214 PR OFFICE/OUTPT VISIT, EST, LEVL IV, 30-39 MIN: ICD-10-PCS | Mod: S$GLB,,, | Performed by: STUDENT IN AN ORGANIZED HEALTH CARE EDUCATION/TRAINING PROGRAM

## 2022-10-18 PROCEDURE — 1157F PR ADVANCE CARE PLAN OR EQUIV PRESENT IN MEDICAL RECORD: ICD-10-PCS | Mod: CPTII,S$GLB,, | Performed by: STUDENT IN AN ORGANIZED HEALTH CARE EDUCATION/TRAINING PROGRAM

## 2022-10-18 PROCEDURE — 3288F FALL RISK ASSESSMENT DOCD: CPT | Mod: CPTII,S$GLB,, | Performed by: STUDENT IN AN ORGANIZED HEALTH CARE EDUCATION/TRAINING PROGRAM

## 2022-10-18 PROCEDURE — 1125F PR PAIN SEVERITY QUANTIFIED, PAIN PRESENT: ICD-10-PCS | Mod: CPTII,S$GLB,, | Performed by: STUDENT IN AN ORGANIZED HEALTH CARE EDUCATION/TRAINING PROGRAM

## 2022-10-18 PROCEDURE — 1101F PT FALLS ASSESS-DOCD LE1/YR: CPT | Mod: CPTII,S$GLB,, | Performed by: STUDENT IN AN ORGANIZED HEALTH CARE EDUCATION/TRAINING PROGRAM

## 2022-10-18 PROCEDURE — 3078F PR MOST RECENT DIASTOLIC BLOOD PRESSURE < 80 MM HG: ICD-10-PCS | Mod: CPTII,S$GLB,, | Performed by: STUDENT IN AN ORGANIZED HEALTH CARE EDUCATION/TRAINING PROGRAM

## 2022-10-18 PROCEDURE — 3078F DIAST BP <80 MM HG: CPT | Mod: CPTII,S$GLB,, | Performed by: STUDENT IN AN ORGANIZED HEALTH CARE EDUCATION/TRAINING PROGRAM

## 2022-10-18 PROCEDURE — 1159F PR MEDICATION LIST DOCUMENTED IN MEDICAL RECORD: ICD-10-PCS | Mod: CPTII,S$GLB,, | Performed by: STUDENT IN AN ORGANIZED HEALTH CARE EDUCATION/TRAINING PROGRAM

## 2022-10-18 PROCEDURE — 99999 PR PBB SHADOW E&M-EST. PATIENT-LVL III: ICD-10-PCS | Mod: PBBFAC,,, | Performed by: STUDENT IN AN ORGANIZED HEALTH CARE EDUCATION/TRAINING PROGRAM

## 2022-10-18 PROCEDURE — 3288F PR FALLS RISK ASSESSMENT DOCUMENTED: ICD-10-PCS | Mod: CPTII,S$GLB,, | Performed by: STUDENT IN AN ORGANIZED HEALTH CARE EDUCATION/TRAINING PROGRAM

## 2022-10-18 PROCEDURE — 99999 PR PBB SHADOW E&M-EST. PATIENT-LVL III: CPT | Mod: PBBFAC,,, | Performed by: STUDENT IN AN ORGANIZED HEALTH CARE EDUCATION/TRAINING PROGRAM

## 2022-10-18 PROCEDURE — 1125F AMNT PAIN NOTED PAIN PRSNT: CPT | Mod: CPTII,S$GLB,, | Performed by: STUDENT IN AN ORGANIZED HEALTH CARE EDUCATION/TRAINING PROGRAM

## 2022-10-18 PROCEDURE — 3044F PR MOST RECENT HEMOGLOBIN A1C LEVEL <7.0%: ICD-10-PCS | Mod: CPTII,S$GLB,, | Performed by: STUDENT IN AN ORGANIZED HEALTH CARE EDUCATION/TRAINING PROGRAM

## 2022-10-18 PROCEDURE — 3074F PR MOST RECENT SYSTOLIC BLOOD PRESSURE < 130 MM HG: ICD-10-PCS | Mod: CPTII,S$GLB,, | Performed by: STUDENT IN AN ORGANIZED HEALTH CARE EDUCATION/TRAINING PROGRAM

## 2022-10-18 PROCEDURE — 3074F SYST BP LT 130 MM HG: CPT | Mod: CPTII,S$GLB,, | Performed by: STUDENT IN AN ORGANIZED HEALTH CARE EDUCATION/TRAINING PROGRAM

## 2022-10-18 PROCEDURE — 1101F PR PT FALLS ASSESS DOC 0-1 FALLS W/OUT INJ PAST YR: ICD-10-PCS | Mod: CPTII,S$GLB,, | Performed by: STUDENT IN AN ORGANIZED HEALTH CARE EDUCATION/TRAINING PROGRAM

## 2022-10-18 PROCEDURE — 1159F MED LIST DOCD IN RCRD: CPT | Mod: CPTII,S$GLB,, | Performed by: STUDENT IN AN ORGANIZED HEALTH CARE EDUCATION/TRAINING PROGRAM

## 2022-10-18 PROCEDURE — 99214 OFFICE O/P EST MOD 30 MIN: CPT | Mod: S$GLB,,, | Performed by: STUDENT IN AN ORGANIZED HEALTH CARE EDUCATION/TRAINING PROGRAM

## 2022-10-18 RX ORDER — PREDNISONE 20 MG/1
40 TABLET ORAL DAILY
Qty: 10 TABLET | Refills: 0 | Status: SHIPPED | OUTPATIENT
Start: 2022-10-18 | End: 2022-10-23

## 2022-10-18 RX ORDER — AZITHROMYCIN 250 MG/1
TABLET, FILM COATED ORAL
Qty: 6 TABLET | Refills: 0 | Status: SHIPPED | OUTPATIENT
Start: 2022-10-18 | End: 2022-10-23

## 2022-10-18 NOTE — PROGRESS NOTES
Subjective:      Patient ID: Viridiana Suresh is a 67 y.o. female    Chief Complaint   Patient presents with    Follow-up     Med. instructions     HPI  67 y.o. female with a PMHx as documented below presents to clinic today for the following:  - COPD: Patient presents today with Breo inhaler and albuterol inhaler prescribed at last appointment. Reviewed instructions for use, had pt administer Breo while in clinic today to ensure understanding.   - Pt reports worsening cough w/ thick mucus over the past few days. No associated fever, chills.  - Pt reports intermittent diarrhea and N/V, overall slightly improved since last visit and with addition of Bentyl to medication regimen.    Review of Systems   All other systems reviewed and are negative.    Past Medical History:   Diagnosis Date    Bipolar disorder     Cancer 1995    melanoma rt third toe    COPD (chronic obstructive pulmonary disease)     CTS (carpal tunnel syndrome)     Diverticulosis     Hepatomegaly     Presence of dental bridge     UPPER     Objective:      Vitals:    10/18/22 0848   BP: 122/74   Pulse: 68   Resp: 18     Physical Exam  Vitals reviewed.   Constitutional:       General: She is not in acute distress.  HENT:      Head: Normocephalic and atraumatic.   Cardiovascular:      Rate and Rhythm: Normal rate.   Pulmonary:      Effort: Pulmonary effort is normal. No respiratory distress.   Abdominal:      General: Bowel sounds are normal. There is no distension.      Palpations: Abdomen is soft.      Tenderness: There is no abdominal tenderness.   Neurological:      General: No focal deficit present.      Mental Status: She is alert and oriented to person, place, and time. Mental status is at baseline.      Assessment:       1. COPD exacerbation    2. Chronic diarrhea    3. Chronic vomiting      Plan:       Viridiana was seen today for follow-up.    Diagnoses and all orders for this visit:    COPD exacerbation  -     predniSONE (DELTASONE) 20 MG tablet;  Take 2 tablets (40 mg total) by mouth once daily. for 5 days  -     azithromycin (Z-MEET) 250 MG tablet; Take 2 tablets by mouth on day 1; Take 1 tablet by mouth on days 2-5    Chronic diarrhea  -     Ambulatory referral/consult to Gastroenterology; Future    Chronic vomiting  -     Ambulatory referral/consult to Gastroenterology; Future       Follow up in about 4 weeks (around 11/15/2022), or if symptoms worsen or fail to improve.    Louise Adames MD  [facility]  Office: [phone]   Fax: [phone]  10/18/2022    Disclaimer: This note was partly generated using dictation software which may occasionally result in transcription errors.

## 2022-11-14 ENCOUNTER — OFFICE VISIT (OUTPATIENT)
Dept: GASTROENTEROLOGY | Facility: CLINIC | Age: 67
End: 2022-11-14
Payer: MEDICARE

## 2022-11-14 ENCOUNTER — LAB VISIT (OUTPATIENT)
Dept: LAB | Facility: HOSPITAL | Age: 67
End: 2022-11-14
Payer: MEDICARE

## 2022-11-14 VITALS — HEIGHT: 62 IN | BODY MASS INDEX: 19.27 KG/M2 | WEIGHT: 104.75 LBS

## 2022-11-14 DIAGNOSIS — R10.30 LOWER ABDOMINAL PAIN: ICD-10-CM

## 2022-11-14 DIAGNOSIS — R11.10 CHRONIC VOMITING: ICD-10-CM

## 2022-11-14 DIAGNOSIS — R63.4 WEIGHT LOSS: ICD-10-CM

## 2022-11-14 DIAGNOSIS — K52.9 CHRONIC DIARRHEA: Primary | ICD-10-CM

## 2022-11-14 DIAGNOSIS — R13.10 PILL DYSPHAGIA: ICD-10-CM

## 2022-11-14 DIAGNOSIS — Z87.898 HISTORY OF URINARY INCONTINENCE: ICD-10-CM

## 2022-11-14 LAB — TSH SERPL DL<=0.005 MIU/L-ACNC: 1.49 UIU/ML (ref 0.4–4)

## 2022-11-14 PROCEDURE — 99999 PR PBB SHADOW E&M-EST. PATIENT-LVL IV: CPT | Mod: PBBFAC,,, | Performed by: NURSE PRACTITIONER

## 2022-11-14 PROCEDURE — 3044F HG A1C LEVEL LT 7.0%: CPT | Mod: CPTII,S$GLB,, | Performed by: NURSE PRACTITIONER

## 2022-11-14 PROCEDURE — 99204 PR OFFICE/OUTPT VISIT, NEW, LEVL IV, 45-59 MIN: ICD-10-PCS | Mod: S$GLB,,, | Performed by: NURSE PRACTITIONER

## 2022-11-14 PROCEDURE — 1126F AMNT PAIN NOTED NONE PRSNT: CPT | Mod: CPTII,S$GLB,, | Performed by: NURSE PRACTITIONER

## 2022-11-14 PROCEDURE — 3008F PR BODY MASS INDEX (BMI) DOCUMENTED: ICD-10-PCS | Mod: CPTII,S$GLB,, | Performed by: NURSE PRACTITIONER

## 2022-11-14 PROCEDURE — 1157F PR ADVANCE CARE PLAN OR EQUIV PRESENT IN MEDICAL RECORD: ICD-10-PCS | Mod: CPTII,S$GLB,, | Performed by: NURSE PRACTITIONER

## 2022-11-14 PROCEDURE — 99999 PR PBB SHADOW E&M-EST. PATIENT-LVL IV: ICD-10-PCS | Mod: PBBFAC,,, | Performed by: NURSE PRACTITIONER

## 2022-11-14 PROCEDURE — 1126F PR PAIN SEVERITY QUANTIFIED, NO PAIN PRESENT: ICD-10-PCS | Mod: CPTII,S$GLB,, | Performed by: NURSE PRACTITIONER

## 2022-11-14 PROCEDURE — 36415 COLL VENOUS BLD VENIPUNCTURE: CPT | Mod: PO | Performed by: NURSE PRACTITIONER

## 2022-11-14 PROCEDURE — 3008F BODY MASS INDEX DOCD: CPT | Mod: CPTII,S$GLB,, | Performed by: NURSE PRACTITIONER

## 2022-11-14 PROCEDURE — 3288F FALL RISK ASSESSMENT DOCD: CPT | Mod: CPTII,S$GLB,, | Performed by: NURSE PRACTITIONER

## 2022-11-14 PROCEDURE — 1159F PR MEDICATION LIST DOCUMENTED IN MEDICAL RECORD: ICD-10-PCS | Mod: CPTII,S$GLB,, | Performed by: NURSE PRACTITIONER

## 2022-11-14 PROCEDURE — 1160F PR REVIEW ALL MEDS BY PRESCRIBER/CLIN PHARMACIST DOCUMENTED: ICD-10-PCS | Mod: CPTII,S$GLB,, | Performed by: NURSE PRACTITIONER

## 2022-11-14 PROCEDURE — 1159F MED LIST DOCD IN RCRD: CPT | Mod: CPTII,S$GLB,, | Performed by: NURSE PRACTITIONER

## 2022-11-14 PROCEDURE — 3288F PR FALLS RISK ASSESSMENT DOCUMENTED: ICD-10-PCS | Mod: CPTII,S$GLB,, | Performed by: NURSE PRACTITIONER

## 2022-11-14 PROCEDURE — 1101F PR PT FALLS ASSESS DOC 0-1 FALLS W/OUT INJ PAST YR: ICD-10-PCS | Mod: CPTII,S$GLB,, | Performed by: NURSE PRACTITIONER

## 2022-11-14 PROCEDURE — 1101F PT FALLS ASSESS-DOCD LE1/YR: CPT | Mod: CPTII,S$GLB,, | Performed by: NURSE PRACTITIONER

## 2022-11-14 PROCEDURE — 84443 ASSAY THYROID STIM HORMONE: CPT | Performed by: NURSE PRACTITIONER

## 2022-11-14 PROCEDURE — 99204 OFFICE O/P NEW MOD 45 MIN: CPT | Mod: S$GLB,,, | Performed by: NURSE PRACTITIONER

## 2022-11-14 PROCEDURE — 1157F ADVNC CARE PLAN IN RCRD: CPT | Mod: CPTII,S$GLB,, | Performed by: NURSE PRACTITIONER

## 2022-11-14 PROCEDURE — 1160F RVW MEDS BY RX/DR IN RCRD: CPT | Mod: CPTII,S$GLB,, | Performed by: NURSE PRACTITIONER

## 2022-11-14 PROCEDURE — 3044F PR MOST RECENT HEMOGLOBIN A1C LEVEL <7.0%: ICD-10-PCS | Mod: CPTII,S$GLB,, | Performed by: NURSE PRACTITIONER

## 2022-11-14 NOTE — PATIENT INSTRUCTIONS
Uncertain Causes of Diarrhea (Adult)    Diarrhea is when stools are loose and watery. This can be caused by:  Viral infections  Bacterial infections  Food poisoning  Parasites  Irritable bowel syndrome (IBS)  Inflammatory bowel diseases such as ulcerative colitis, Crohn's disease, and celiac disease  Food intolerance, such as to lactose, the sugar found in milk and milk products  Reaction to medicines like antibiotics, laxatives, cancer drugs, and antacids  Along with diarrhea, you may also have:  Abdominal pain and cramping  Nausea and vomiting  Loss of bowel control  Fever and chills  Bloody stools  In some cases, antibiotics may help to treat diarrhea. You may have a stool sample test. This is done to see what is causing your diarrhea, and if antibiotics will help treat it. The results of a stool sample test may take up to 2 days. The healthcare provider may not give you antibiotics until he or she has the stool test results.  Diarrhea can cause dehydration. This is the loss of too much water and other fluids from the body. When this occurs, body fluid must be replaced. This can be done with oral rehydration solutions. Oral rehydration solutions are available at drugstores and grocery stores without a prescription.  Home care  Follow all instructions given by your healthcare provider. Rest at home for the next 24 hours, or until you feel better. Avoid caffeine, tobacco, and alcohol. These can make diarrhea, cramping, and pain worse.  If taking medicines:  Dont take over-the-counter diarrhea or nausea medicines unless your healthcare provider tells you to.  You may use acetaminophen or NSAID medicines like ibuprofen or naproxen to reduce pain and fever. Dont use these if you have chronic liver or kidney disease, or ever had a stomach ulcer or gastrointestinal bleeding. Don't use NSAID medicines if you are already taking one for another condition (like arthritis) or are on daily aspirin therapy (such as for heart  disease or after a stroke). Talk with your healthcare provider first.  If antibiotics were prescribed, be sure you take them until they are finished. Dont stop taking them even when you feel better. Antibiotics must be taken as a full course.  To prevent the spread of illness:  Remember that washing with soap and water and using alcohol-based  is the best way to prevent the spread of infection.  Clean the toilet after each use.  Wash your hands before eating.  Wash your hands before and after preparing food. Keep in mind that people with diarrhea or vomiting should not prepare food for others.  Wash your hands after using cutting boards, countertops, and knives that have been in contact with raw foods.  Wash and then peel fruits and vegetables.  Keep uncooked meats away from cooked and ready-to-eat foods.  Use a food thermometer when cooking. Cook poultry to at least 165°F (74°C). Cook ground meat (beef, veal, pork, lamb) to at least 160°F (71°C). Cook fresh beef, veal, lamb, and pork to at least 145°F (63°C).  Dont eat raw or undercooked eggs (poached or jessica side up), poultry, meat, or unpasteurized milk and juices.  Food and drinks  The main goal while treating vomiting or diarrhea is to prevent dehydration. This is done by taking small amounts of liquids often.  Keep in mind that liquids are more important than food right now.  Drink only small amounts of liquids at a time.  Dont force yourself to eat, especially if you are having cramping, vomiting, or diarrhea. Dont eat large amounts at a time, even if you are hungry.  If you eat, avoid fatty, greasy, spicy, or fried foods.  Dont eat dairy foods or drink milk if you have diarrhea. These can make diarrhea worse.  During the first 24 hours you can try:  Oral rehydration solutions. Do not use sports drinks. They have too much sugar and not enough electrolytes.  Soft drinks without caffeine  Ginger ale  Water (plain or flavored)  Decaf tea or  coffee  Clear broth, consommé, or bouillon  Gelatin, popsicles, or frozen fruit juice bars  The second 24 hours, if you are feeling better, you can add:  Hot cereal, plain toast, bread, rolls, or crackers  Plain noodles, rice, mashed potatoes, chicken noodle soup, or rice soup  Unsweetened canned fruit (no pineapple)  Bananas  As you recover:  Limit fat intake to less than 15 grams per day. Dont eat margarine, butter, oils, mayonnaise, sauces, gravies, fried foods, peanut butter, meat, poultry, or fish.  Limit fiber. Dont eat raw or cooked vegetables, fresh fruits except bananas, or bran cereals.  Limit caffeine and chocolate.  Limit dairy.  Dont use spices or seasonings except salt.  Go back to your normal diet over time, as you feel better and your symptoms improve.  If the symptoms come back, go back to a simple diet or clear liquids.  Follow-up care  Follow up with your healthcare provider, or as advised. If a stool sample was taken or cultures were done, call the healthcare provider for the results as instructed.  Call 911  Call 911 if you have any of these symptoms:  Trouble breathing  Confusion  Extreme drowsiness or trouble walking  Loss of consciousness  Rapid heart rate  Chest pain  Stiff neck  Seizure  When to seek medical advice  Call your healthcare provider right away if any of these occur:  Abdominal pain that gets worse  Constant lower right abdominal pain  Continued vomiting and inability to keep liquids down  Diarrhea more than 5 times a day  Blood in vomit or stool  Dark urine or no urine for 8 hours, dry mouth and tongue, tiredness, weakness, or dizziness  Drowsiness  New rash  You dont get better in 2 to 3 days  Fever of 100.4°F (38°C) or higher that doesnt get lower with medicine  Date Last Reviewed: 1/3/2016  © 4793-3402 [employer]. [address] [place] [address], [place] [address]. All rights reserved. This information is not intended as a substitute for professional medical care.  "Always follow your healthcare professional's instructions.         Vomiting (Adult)  Vomiting is a common symptom that may be due to different causes. These include gastroenteritis ("stomach flu"), food poisoning and gastritis. There are other more serious causes of vomiting which may be hard to diagnose early in the illness. Therefore, it is important to watch for the warning signs listed below.  The main danger from repeated vomiting is dehydration. This is due to excess loss of water and minerals from the body. When this occurs, body fluids must be replaced.  Home care  If symptoms are severe, rest at home for the next 24 hours.  Because your symptoms may be from an infection, wash your hands frequently and well, and use alcohol-based  to avoid spreading the infection to others.  Wash your hands for at least 20 seconds. Hum the happy birthday song twice for the correct length of time.  Wash your hands after using the toilet, before and after preparing food, before eating food, after changing a diaper, cleaning a wound, caring for a sick person, and blowing your nose, coughing, or sneezing. You should also wash your hands after caring for someone who is sick, touching pet food, or treats, and touching an animal, or animal waste.  You may use acetaminophen or NSAID medicines like ibuprofen or naproxen to control fever, unless another medicine was prescribed. If you have chronic liver or kidney disease or ever had a stomach ulcer or GI bleeding, talk with your doctor before using these medicines. Aspirin should never be used in anyone under 18 years of age who is ill with a fever. It may cause severe liver damage. Don't use NSAID medicines if you are already taking one for another condition (like arthritis) or are on aspirin (such as for heart disease, or after a stroke)  Avoid tobacco and alcohol use, which may worsen your symptoms.  If medicines for vomiting were prescribed, take as directed.  Once " vomiting stops, then follow these guidelines:  During the first 12 to 24 hours follow the diet below:  Fruit juices. Apple, grape juice, clear fruit drinks, and electrolyte replacement drinks.  Beverages. Soft drinks without caffeine; mineral water (plain or flavored), decaffeinated tea and coffee.  Soups. Clear broth, consommé and bouillon  Desserts. Plain gelatin, popsicles and fruit juice bars. As you feel better, you may add 6-8 ounces of yogurt per day.  During the next 24 hours you may add the following to the above:  Hot cereal, plain toast, bread, rolls, crackers  Plain noodles, rice, mashed potatoes, chicken noodle or rice soup  Unsweetened canned fruit (avoid pineapple), bananas  Limit caffeine and chocolate. No spices or seasonings except salt.  During the next 24 hours:  Gradually resume a normal diet, as you feel better and your symptoms lessen.  Follow-up care  Follow up with your healthcare provider, or as advised.  When to seek medical advice  Call your healthcare provider right away if any of these occur:  Constant right-sided lower abdominal pain or increasing general abdominal pain  Continued vomiting (unable to keep liquids down) for 24 hours  Frequent diarrhea (more than 5 times a day); blood (red or black color) or mucus in diarrhea  Reduced urine output or extreme thirst  Weakness, dizziness or fainting  Unusually drowsy or confused  Fever of 100.4°F (38°C) oral or higher, or as directed  Yellow color of the eyes or skin  Date Last Reviewed: 11/16/2015 © 2000-2017 [employer]. [address] [place] [address], [place] [address]. All rights reserved. This information is not intended as a substitute for professional medical care. Always follow your healthcare professional's instructions.
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
## 2022-11-15 ENCOUNTER — TELEPHONE (OUTPATIENT)
Dept: GASTROENTEROLOGY | Facility: CLINIC | Age: 67
End: 2022-11-15
Payer: MEDICARE

## 2022-11-15 NOTE — TELEPHONE ENCOUNTER
----- Message from RADHA Dimas sent at 11/15/2022  8:41 AM CST -----  Please call to inform & review the results with the patient- Lab result is normal.  Continue with previous recommendations.  Thanks,  Penny GARCIA-C

## 2022-11-21 ENCOUNTER — TELEPHONE (OUTPATIENT)
Dept: FAMILY MEDICINE | Facility: CLINIC | Age: 67
End: 2022-11-21
Payer: MEDICARE

## 2022-11-25 ENCOUNTER — HOSPITAL ENCOUNTER (OUTPATIENT)
Dept: RADIOLOGY | Facility: HOSPITAL | Age: 67
Discharge: HOME | End: 2022-11-25
Attending: NURSE PRACTITIONER
Payer: MEDICARE

## 2022-11-25 DIAGNOSIS — Z87.898 HISTORY OF URINARY INCONTINENCE: ICD-10-CM

## 2022-11-25 DIAGNOSIS — R63.4 WEIGHT LOSS: ICD-10-CM

## 2022-11-25 DIAGNOSIS — R10.30 LOWER ABDOMINAL PAIN: ICD-10-CM

## 2022-11-25 DIAGNOSIS — R11.10 CHRONIC VOMITING: ICD-10-CM

## 2022-11-25 DIAGNOSIS — K52.9 CHRONIC DIARRHEA: ICD-10-CM

## 2022-11-25 PROCEDURE — 71260 CT CHEST ABDOMEN PELVIS WITH CONTRAST (XPD): ICD-10-PCS | Mod: 26,,, | Performed by: RADIOLOGY

## 2022-11-25 PROCEDURE — 71260 CT THORAX DX C+: CPT | Mod: 26,,, | Performed by: RADIOLOGY

## 2022-11-25 PROCEDURE — A9698 NON-RAD CONTRAST MATERIALNOC: HCPCS | Mod: PO | Performed by: NURSE PRACTITIONER

## 2022-11-25 PROCEDURE — 25500020 PHARM REV CODE 255: Mod: PO | Performed by: NURSE PRACTITIONER

## 2022-11-25 PROCEDURE — 74177 CT ABD & PELVIS W/CONTRAST: CPT | Mod: TC,PO

## 2022-11-25 PROCEDURE — 71260 CT THORAX DX C+: CPT | Mod: TC,PO

## 2022-11-25 PROCEDURE — 74177 CT CHEST ABDOMEN PELVIS WITH CONTRAST (XPD): ICD-10-PCS | Mod: 26,,, | Performed by: RADIOLOGY

## 2022-11-25 PROCEDURE — 74177 CT ABD & PELVIS W/CONTRAST: CPT | Mod: 26,,, | Performed by: RADIOLOGY

## 2022-11-25 RX ADMIN — BARIUM SULFATE 900 ML: 20 SUSPENSION ORAL at 12:11

## 2022-11-25 RX ADMIN — IOHEXOL 75 ML: 350 INJECTION, SOLUTION INTRAVENOUS at 11:11

## 2022-11-28 ENCOUNTER — TELEPHONE (OUTPATIENT)
Dept: GASTROENTEROLOGY | Facility: CLINIC | Age: 67
End: 2022-11-28
Payer: MEDICARE

## 2022-11-28 DIAGNOSIS — N28.1 KIDNEY CYSTS: Primary | ICD-10-CM

## 2022-11-28 NOTE — TELEPHONE ENCOUNTER
Please call to inform & review the results with the patient- radiology report of the ct chest abdomen pelvis showed no acute findings of the GI tract/GI organs. Large amount of stool in the colon which can suggest constipation. Recommend high fiber diet (20-30 grams of fiber daily)/OTC fiber supplements daily as directed, such as Benefiber or Metamucil.  Some liver cysts noted. Liver cysts are typically benign. These can be monitored/managed by primary care or if specialist is needed, recommend hepatology.    Otherwise, unremarkable findings of the GI tract/GI organs.  Other findings showed pulmonary emphysema. Recommend follow-up with Pulmonology for continued evaluation and management. Referral placed.  Kidneys noted multiple kidney cysts. Recommend follow-up with nephrology for continued evaluation and management of this finding.    Otherwise, findings are unremarkable.    Continue with previous recommendations. If no improvement in symptoms or symptoms worsen, call/follow-up at clinic or go to ER.    Thanks,

## 2022-11-30 ENCOUNTER — TELEPHONE (OUTPATIENT)
Dept: GASTROENTEROLOGY | Facility: CLINIC | Age: 67
End: 2022-11-30
Payer: MEDICARE

## 2022-11-30 ENCOUNTER — PATIENT MESSAGE (OUTPATIENT)
Dept: FAMILY MEDICINE | Facility: CLINIC | Age: 67
End: 2022-11-30
Payer: MEDICARE

## 2022-11-30 NOTE — TELEPHONE ENCOUNTER
----- Message from Rasheed San sent at 11/30/2022 12:43 PM CST -----  Contact: pt at [phone]  Type:  Patient Returning Call    Who Called:  pt  Who Left Message for Patient:  Arlette  Does the patient know what this is regarding?:  yes, results  Best Call Back Number:  [phone]    Additional Information:  Please call back and advise.

## 2022-11-30 NOTE — TELEPHONE ENCOUNTER
Returned patient call, went over CT results with patient. Patient has upcoming appt with PCP and will discuss referrals placed further with PCP. Pt verbalized understanding of results and recommendations

## 2022-12-02 ENCOUNTER — PATIENT MESSAGE (OUTPATIENT)
Dept: FAMILY MEDICINE | Facility: CLINIC | Age: 67
End: 2022-12-02
Payer: MEDICARE

## 2022-12-05 ENCOUNTER — OFFICE VISIT (OUTPATIENT)
Dept: FAMILY MEDICINE | Facility: CLINIC | Age: 67
End: 2022-12-05
Payer: MEDICARE

## 2022-12-05 VITALS
RESPIRATION RATE: 14 BRPM | HEIGHT: 62 IN | DIASTOLIC BLOOD PRESSURE: 84 MMHG | HEART RATE: 72 BPM | SYSTOLIC BLOOD PRESSURE: 132 MMHG | BODY MASS INDEX: 19.07 KG/M2 | WEIGHT: 103.63 LBS

## 2022-12-05 DIAGNOSIS — R79.9 ABNORMAL FINDING OF BLOOD CHEMISTRY, UNSPECIFIED: ICD-10-CM

## 2022-12-05 DIAGNOSIS — Z79.899 HIGH RISK MEDICATIONS (NOT ANTICOAGULANTS) LONG-TERM USE: ICD-10-CM

## 2022-12-05 DIAGNOSIS — K52.9 CHRONIC DIARRHEA: Primary | ICD-10-CM

## 2022-12-05 DIAGNOSIS — J44.9 CHRONIC OBSTRUCTIVE PULMONARY DISEASE, UNSPECIFIED COPD TYPE: ICD-10-CM

## 2022-12-05 DIAGNOSIS — M79.10 MYALGIA: ICD-10-CM

## 2022-12-05 DIAGNOSIS — N18.30 STAGE 3 CHRONIC KIDNEY DISEASE, UNSPECIFIED WHETHER STAGE 3A OR 3B CKD: ICD-10-CM

## 2022-12-05 PROCEDURE — 99214 OFFICE O/P EST MOD 30 MIN: CPT | Mod: S$GLB,,, | Performed by: FAMILY MEDICINE

## 2022-12-05 PROCEDURE — 1159F PR MEDICATION LIST DOCUMENTED IN MEDICAL RECORD: ICD-10-PCS | Mod: CPTII,S$GLB,, | Performed by: FAMILY MEDICINE

## 2022-12-05 PROCEDURE — 3044F HG A1C LEVEL LT 7.0%: CPT | Mod: CPTII,S$GLB,, | Performed by: FAMILY MEDICINE

## 2022-12-05 PROCEDURE — 1101F PT FALLS ASSESS-DOCD LE1/YR: CPT | Mod: CPTII,S$GLB,, | Performed by: FAMILY MEDICINE

## 2022-12-05 PROCEDURE — 1160F RVW MEDS BY RX/DR IN RCRD: CPT | Mod: CPTII,S$GLB,, | Performed by: FAMILY MEDICINE

## 2022-12-05 PROCEDURE — 3288F FALL RISK ASSESSMENT DOCD: CPT | Mod: CPTII,S$GLB,, | Performed by: FAMILY MEDICINE

## 2022-12-05 PROCEDURE — 3044F PR MOST RECENT HEMOGLOBIN A1C LEVEL <7.0%: ICD-10-PCS | Mod: CPTII,S$GLB,, | Performed by: FAMILY MEDICINE

## 2022-12-05 PROCEDURE — 1126F PR PAIN SEVERITY QUANTIFIED, NO PAIN PRESENT: ICD-10-PCS | Mod: CPTII,S$GLB,, | Performed by: FAMILY MEDICINE

## 2022-12-05 PROCEDURE — 1101F PR PT FALLS ASSESS DOC 0-1 FALLS W/OUT INJ PAST YR: ICD-10-PCS | Mod: CPTII,S$GLB,, | Performed by: FAMILY MEDICINE

## 2022-12-05 PROCEDURE — 3075F SYST BP GE 130 - 139MM HG: CPT | Mod: CPTII,S$GLB,, | Performed by: FAMILY MEDICINE

## 2022-12-05 PROCEDURE — 1157F ADVNC CARE PLAN IN RCRD: CPT | Mod: CPTII,S$GLB,, | Performed by: FAMILY MEDICINE

## 2022-12-05 PROCEDURE — 1159F MED LIST DOCD IN RCRD: CPT | Mod: CPTII,S$GLB,, | Performed by: FAMILY MEDICINE

## 2022-12-05 PROCEDURE — 3008F BODY MASS INDEX DOCD: CPT | Mod: CPTII,S$GLB,, | Performed by: FAMILY MEDICINE

## 2022-12-05 PROCEDURE — 3079F DIAST BP 80-89 MM HG: CPT | Mod: CPTII,S$GLB,, | Performed by: FAMILY MEDICINE

## 2022-12-05 PROCEDURE — 3288F PR FALLS RISK ASSESSMENT DOCUMENTED: ICD-10-PCS | Mod: CPTII,S$GLB,, | Performed by: FAMILY MEDICINE

## 2022-12-05 PROCEDURE — 3079F PR MOST RECENT DIASTOLIC BLOOD PRESSURE 80-89 MM HG: ICD-10-PCS | Mod: CPTII,S$GLB,, | Performed by: FAMILY MEDICINE

## 2022-12-05 PROCEDURE — 1157F PR ADVANCE CARE PLAN OR EQUIV PRESENT IN MEDICAL RECORD: ICD-10-PCS | Mod: CPTII,S$GLB,, | Performed by: FAMILY MEDICINE

## 2022-12-05 PROCEDURE — 99999 PR PBB SHADOW E&M-EST. PATIENT-LVL III: CPT | Mod: PBBFAC,,, | Performed by: FAMILY MEDICINE

## 2022-12-05 PROCEDURE — 3008F PR BODY MASS INDEX (BMI) DOCUMENTED: ICD-10-PCS | Mod: CPTII,S$GLB,, | Performed by: FAMILY MEDICINE

## 2022-12-05 PROCEDURE — 1126F AMNT PAIN NOTED NONE PRSNT: CPT | Mod: CPTII,S$GLB,, | Performed by: FAMILY MEDICINE

## 2022-12-05 PROCEDURE — 99999 PR PBB SHADOW E&M-EST. PATIENT-LVL III: ICD-10-PCS | Mod: PBBFAC,,, | Performed by: FAMILY MEDICINE

## 2022-12-05 PROCEDURE — 3075F PR MOST RECENT SYSTOLIC BLOOD PRESS GE 130-139MM HG: ICD-10-PCS | Mod: CPTII,S$GLB,, | Performed by: FAMILY MEDICINE

## 2022-12-05 PROCEDURE — 99214 PR OFFICE/OUTPT VISIT, EST, LEVL IV, 30-39 MIN: ICD-10-PCS | Mod: S$GLB,,, | Performed by: FAMILY MEDICINE

## 2022-12-05 PROCEDURE — 1160F PR REVIEW ALL MEDS BY PRESCRIBER/CLIN PHARMACIST DOCUMENTED: ICD-10-PCS | Mod: CPTII,S$GLB,, | Performed by: FAMILY MEDICINE

## 2022-12-05 NOTE — PROGRESS NOTES
"Subjective:       Patient ID: Viridiana Suresh is a 67 y.o. female.    Chief Complaint: Follow-up (4 month)    Follow-up  Associated symptoms include arthralgias, fatigue (attributes to starting citalopram) and neck pain. Pertinent negatives include no chest pain (denies any recent), congestion, coughing or fever.   Patient in clinic for f/u and review.  CT from gi briefly reviewed with patient.   Discussed meds (breo/albuterol) with pt. Letter from N that breo may not be covered. Discussed trying to switch to trelegy pending coverage.   Discussed continued weight loss, down another lb from previous visit with GI. She notes that she eats healthy but maybe not enough for activity levels.  Recent lab with TSH normal.     Review of Systems:  Review of Systems   Constitutional:  Positive for fatigue (attributes to starting citalopram) and unexpected weight change. Negative for fever.   HENT:  Positive for postnasal drip (occ, attributes to vaping). Negative for congestion.    Respiratory:  Negative for cough and shortness of breath (occ).    Cardiovascular:  Negative for chest pain (denies any recent), palpitations and leg swelling.   Gastrointestinal:  Negative for blood in stool, constipation and diarrhea (improved).   Genitourinary:  Negative for dysuria and frequency.   Musculoskeletal:  Positive for arthralgias, back pain and neck pain.   Neurological:  Negative for dizziness and light-headedness.   Psychiatric/Behavioral:  Negative for confusion and sleep disturbance (gets ok sleep, but noticing some early am awakening).      Objective:     Vitals:    12/05/22 1109   BP: 132/84   BP Location: Right arm   Patient Position: Sitting   BP Method: Medium (Manual)   Pulse: 72   Resp: 14   Weight: 47 kg (103 lb 9.9 oz)   Height: 5' 2" (1.575 m)          Physical Exam  Vitals and nursing note reviewed.   Constitutional:       General: She is not in acute distress.     Appearance: Normal appearance. She is well-developed. "   HENT:      Head: Normocephalic and atraumatic.   Eyes:      General: No scleral icterus.        Right eye: No discharge.         Left eye: No discharge.      Conjunctiva/sclera: Conjunctivae normal.   Cardiovascular:      Rate and Rhythm: Normal rate.   Pulmonary:      Effort: Pulmonary effort is normal. No respiratory distress.   Abdominal:      Palpations: Abdomen is soft.      Tenderness: There is no guarding.   Musculoskeletal:         General: No deformity or signs of injury.      Cervical back: Neck supple.   Lymphadenopathy:      Cervical: No cervical adenopathy.   Skin:     General: Skin is warm and dry.      Findings: No rash.   Neurological:      General: No focal deficit present.      Mental Status: She is alert and oriented to person, place, and time.   Psychiatric:         Mood and Affect: Mood normal.         Behavior: Behavior normal.         Assessment & Plan:  Chronic diarrhea  Comments:  agree with gi rec for addl fiber intake  Orders:  -     Iron; Future; Expected date: 12/05/2022  -     Vitamin B12; Future; Expected date: 12/05/2022    Chronic obstructive pulmonary disease, unspecified COPD type  Comments:  per formulary, try switching to trelegy  continue controller + prn  Orders:  -     fluticasone-umeclidin-vilanter (TRELEGY ELLIPTA) 100-62.5-25 mcg DsDv; Inhale 1 puff into the lungs once daily.  Dispense: 28 each; Refill: 11    Stage 3 chronic kidney disease, unspecified whether stage 3a or 3b CKD  Comments:  cont monitoring  Orders:  -     Comprehensive Metabolic Panel; Future; Expected date: 12/05/2022  -     Iron; Future; Expected date: 12/05/2022    High risk medications (not anticoagulants) long-term use  -     Iron; Future; Expected date: 12/05/2022  -     Vitamin B12; Future; Expected date: 12/05/2022    Abnormal finding of blood chemistry, unspecified  -     Iron; Future; Expected date: 12/05/2022

## 2022-12-06 ENCOUNTER — TELEPHONE (OUTPATIENT)
Dept: GASTROENTEROLOGY | Facility: CLINIC | Age: 67
End: 2022-12-06
Payer: MEDICARE

## 2022-12-06 NOTE — TELEPHONE ENCOUNTER
----- Message from Jeana Chilel sent at 12/6/2022 10:36 AM CST -----  Regarding: pt call back  Name of Who is Calling:COSME MURRELL [[id number]]          What is the request in detail:    Lactobacillus rhamnosus GG (CULTURELLE) 10 billion cell capsule  Pt is trying to find out out this medication           Can the clinic reply by [facility]:no           What Number to Call Back if not in [facility]:[phone] (home) [phone]
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
## 2022-12-12 ENCOUNTER — OFFICE VISIT (OUTPATIENT)
Dept: PSYCHIATRY | Facility: CLINIC | Age: 67
End: 2022-12-12
Payer: COMMERCIAL

## 2022-12-12 VITALS
DIASTOLIC BLOOD PRESSURE: 88 MMHG | HEART RATE: 73 BPM | WEIGHT: 105.25 LBS | BODY MASS INDEX: 19.37 KG/M2 | SYSTOLIC BLOOD PRESSURE: 158 MMHG | HEIGHT: 62 IN

## 2022-12-12 DIAGNOSIS — Z79.899 HIGH RISK MEDICATIONS (NOT ANTICOAGULANTS) LONG-TERM USE: ICD-10-CM

## 2022-12-12 DIAGNOSIS — F31.9 BIPOLAR I DISORDER: ICD-10-CM

## 2022-12-12 PROCEDURE — 99213 OFFICE O/P EST LOW 20 MIN: CPT | Mod: PBBFAC,PO | Performed by: PSYCHOLOGIST

## 2022-12-12 PROCEDURE — 3079F PR MOST RECENT DIASTOLIC BLOOD PRESSURE 80-89 MM HG: ICD-10-PCS | Mod: CPTII,S$GLB,, | Performed by: PSYCHOLOGIST

## 2022-12-12 PROCEDURE — 1157F ADVNC CARE PLAN IN RCRD: CPT | Mod: CPTII,S$GLB,, | Performed by: PSYCHOLOGIST

## 2022-12-12 PROCEDURE — 90833 PSYTX W PT W E/M 30 MIN: CPT | Mod: S$GLB,,, | Performed by: PSYCHOLOGIST

## 2022-12-12 PROCEDURE — 3044F PR MOST RECENT HEMOGLOBIN A1C LEVEL <7.0%: ICD-10-PCS | Mod: CPTII,S$GLB,, | Performed by: PSYCHOLOGIST

## 2022-12-12 PROCEDURE — 3008F PR BODY MASS INDEX (BMI) DOCUMENTED: ICD-10-PCS | Mod: CPTII,S$GLB,, | Performed by: PSYCHOLOGIST

## 2022-12-12 PROCEDURE — 99214 OFFICE O/P EST MOD 30 MIN: CPT | Mod: S$GLB,,, | Performed by: PSYCHOLOGIST

## 2022-12-12 PROCEDURE — 3288F PR FALLS RISK ASSESSMENT DOCUMENTED: ICD-10-PCS | Mod: CPTII,S$GLB,, | Performed by: PSYCHOLOGIST

## 2022-12-12 PROCEDURE — 3079F DIAST BP 80-89 MM HG: CPT | Mod: CPTII,S$GLB,, | Performed by: PSYCHOLOGIST

## 2022-12-12 PROCEDURE — 3288F FALL RISK ASSESSMENT DOCD: CPT | Mod: CPTII,S$GLB,, | Performed by: PSYCHOLOGIST

## 2022-12-12 PROCEDURE — 99214 PR OFFICE/OUTPT VISIT, EST, LEVL IV, 30-39 MIN: ICD-10-PCS | Mod: S$GLB,,, | Performed by: PSYCHOLOGIST

## 2022-12-12 PROCEDURE — 1159F PR MEDICATION LIST DOCUMENTED IN MEDICAL RECORD: ICD-10-PCS | Mod: CPTII,S$GLB,, | Performed by: PSYCHOLOGIST

## 2022-12-12 PROCEDURE — 1125F AMNT PAIN NOTED PAIN PRSNT: CPT | Mod: CPTII,S$GLB,, | Performed by: PSYCHOLOGIST

## 2022-12-12 PROCEDURE — 99999 PR PBB SHADOW E&M-EST. PATIENT-LVL III: CPT | Mod: PBBFAC,,, | Performed by: PSYCHOLOGIST

## 2022-12-12 PROCEDURE — 1101F PR PT FALLS ASSESS DOC 0-1 FALLS W/OUT INJ PAST YR: ICD-10-PCS | Mod: CPTII,S$GLB,, | Performed by: PSYCHOLOGIST

## 2022-12-12 PROCEDURE — 1125F PR PAIN SEVERITY QUANTIFIED, PAIN PRESENT: ICD-10-PCS | Mod: CPTII,S$GLB,, | Performed by: PSYCHOLOGIST

## 2022-12-12 PROCEDURE — 3044F HG A1C LEVEL LT 7.0%: CPT | Mod: CPTII,S$GLB,, | Performed by: PSYCHOLOGIST

## 2022-12-12 PROCEDURE — 90833 PR PSYCHOTHERAPY W/PATIENT W/E&M, 30 MIN (ADD ON): ICD-10-PCS | Mod: S$GLB,,, | Performed by: PSYCHOLOGIST

## 2022-12-12 PROCEDURE — 1157F PR ADVANCE CARE PLAN OR EQUIV PRESENT IN MEDICAL RECORD: ICD-10-PCS | Mod: CPTII,S$GLB,, | Performed by: PSYCHOLOGIST

## 2022-12-12 PROCEDURE — 3008F BODY MASS INDEX DOCD: CPT | Mod: CPTII,S$GLB,, | Performed by: PSYCHOLOGIST

## 2022-12-12 PROCEDURE — 99999 PR PBB SHADOW E&M-EST. PATIENT-LVL III: ICD-10-PCS | Mod: PBBFAC,,, | Performed by: PSYCHOLOGIST

## 2022-12-12 PROCEDURE — 3077F PR MOST RECENT SYSTOLIC BLOOD PRESSURE >= 140 MM HG: ICD-10-PCS | Mod: CPTII,S$GLB,, | Performed by: PSYCHOLOGIST

## 2022-12-12 PROCEDURE — 1159F MED LIST DOCD IN RCRD: CPT | Mod: CPTII,S$GLB,, | Performed by: PSYCHOLOGIST

## 2022-12-12 PROCEDURE — 3077F SYST BP >= 140 MM HG: CPT | Mod: CPTII,S$GLB,, | Performed by: PSYCHOLOGIST

## 2022-12-12 PROCEDURE — 1101F PT FALLS ASSESS-DOCD LE1/YR: CPT | Mod: CPTII,S$GLB,, | Performed by: PSYCHOLOGIST

## 2022-12-12 RX ORDER — DIVALPROEX SODIUM 250 MG/1
750 TABLET, DELAYED RELEASE ORAL DAILY
Qty: 270 TABLET | Refills: 1 | Status: SHIPPED | OUTPATIENT
Start: 2022-12-12 | End: 2023-07-13

## 2022-12-12 RX ORDER — LOXAPINE SUCCINATE 10 MG/1
10 TABLET ORAL DAILY
Qty: 90 CAPSULE | Refills: 1 | Status: SHIPPED | OUTPATIENT
Start: 2022-12-12 | End: 2023-07-13

## 2022-12-16 ENCOUNTER — TELEPHONE (OUTPATIENT)
Dept: GASTROENTEROLOGY | Facility: CLINIC | Age: 67
End: 2022-12-16
Payer: MEDICARE

## 2022-12-16 NOTE — TELEPHONE ENCOUNTER
Called her cell phone and the call was dropped. Tried to call her back at both numbers listed there was no answer.

## 2022-12-16 NOTE — TELEPHONE ENCOUNTER
----- Message from Nanci Schmidt sent at 12/16/2022  1:25 PM CST -----  Contact: pt at [phone]  Type: Needs Medical Advice  Who Called:  Pt   Best Call Back Number: [phone]    Additional Information: pt needs this appointment canceled on January 24,2022 please call to advise

## 2022-12-19 ENCOUNTER — TELEPHONE (OUTPATIENT)
Dept: GASTROENTEROLOGY | Facility: CLINIC | Age: 67
End: 2022-12-19
Payer: MEDICARE

## 2022-12-19 NOTE — TELEPHONE ENCOUNTER
----- Message from Zach Zaragoza sent at 12/19/2022  8:45 AM CST -----  Type:  Reschedule Appointment Request    Caller is requesting reschedule appointment.      Name of Caller:  PT  When is the first available appointment?  1/24  Symptoms:  Colonoscopy  Best Call Back Number:  [phone] or [phone]    Additional Information:  Sts that she needs to push her colonoscopy back till early Feb.  Please advise -- thank you

## 2023-01-11 ENCOUNTER — LAB VISIT (OUTPATIENT)
Dept: LAB | Facility: HOSPITAL | Age: 68
End: 2023-01-11
Attending: FAMILY MEDICINE
Payer: MEDICARE

## 2023-01-11 DIAGNOSIS — Z79.899 HIGH RISK MEDICATIONS (NOT ANTICOAGULANTS) LONG-TERM USE: ICD-10-CM

## 2023-01-11 DIAGNOSIS — N18.30 STAGE 3 CHRONIC KIDNEY DISEASE, UNSPECIFIED WHETHER STAGE 3A OR 3B CKD: ICD-10-CM

## 2023-01-11 DIAGNOSIS — R79.9 ABNORMAL FINDING OF BLOOD CHEMISTRY, UNSPECIFIED: ICD-10-CM

## 2023-01-11 DIAGNOSIS — K52.9 CHRONIC DIARRHEA: ICD-10-CM

## 2023-01-11 PROCEDURE — 83540 ASSAY OF IRON: CPT | Performed by: FAMILY MEDICINE

## 2023-01-11 PROCEDURE — 82607 VITAMIN B-12: CPT | Performed by: FAMILY MEDICINE

## 2023-01-11 PROCEDURE — 80053 COMPREHEN METABOLIC PANEL: CPT | Performed by: FAMILY MEDICINE

## 2023-01-11 PROCEDURE — 36415 COLL VENOUS BLD VENIPUNCTURE: CPT | Mod: PN | Performed by: FAMILY MEDICINE

## 2023-01-12 LAB
ALBUMIN SERPL BCP-MCNC: 3.7 G/DL (ref 3.5–5.2)
ALP SERPL-CCNC: 68 U/L (ref 55–135)
ALT SERPL W/O P-5'-P-CCNC: 11 U/L (ref 10–44)
ANION GAP SERPL CALC-SCNC: 11 MMOL/L (ref 8–16)
AST SERPL-CCNC: 16 U/L (ref 10–40)
BILIRUB SERPL-MCNC: 0.4 MG/DL (ref 0.1–1)
BUN SERPL-MCNC: 13 MG/DL (ref 8–23)
CALCIUM SERPL-MCNC: 9.6 MG/DL (ref 8.7–10.5)
CHLORIDE SERPL-SCNC: 106 MMOL/L (ref 95–110)
CO2 SERPL-SCNC: 21 MMOL/L (ref 23–29)
CREAT SERPL-MCNC: 1.2 MG/DL (ref 0.5–1.4)
EST. GFR  (NO RACE VARIABLE): 49.9 ML/MIN/1.73 M^2
GLUCOSE SERPL-MCNC: 90 MG/DL (ref 70–110)
IRON SERPL-MCNC: 82 UG/DL (ref 30–160)
POTASSIUM SERPL-SCNC: 4.5 MMOL/L (ref 3.5–5.1)
PROT SERPL-MCNC: 6.7 G/DL (ref 6–8.4)
SODIUM SERPL-SCNC: 138 MMOL/L (ref 136–145)
VIT B12 SERPL-MCNC: 698 PG/ML (ref 210–950)

## 2023-02-24 ENCOUNTER — OFFICE VISIT (OUTPATIENT)
Dept: FAMILY MEDICINE | Facility: CLINIC | Age: 68
End: 2023-02-24
Payer: MEDICARE

## 2023-02-24 VITALS
SYSTOLIC BLOOD PRESSURE: 124 MMHG | DIASTOLIC BLOOD PRESSURE: 72 MMHG | WEIGHT: 108 LBS | HEART RATE: 76 BPM | HEIGHT: 62 IN | TEMPERATURE: 98 F | BODY MASS INDEX: 19.88 KG/M2

## 2023-02-24 DIAGNOSIS — R09.89 DECREASED DORSALIS PEDIS PULSE: ICD-10-CM

## 2023-02-24 DIAGNOSIS — R60.0 EDEMA OF RIGHT LOWER EXTREMITY: Primary | ICD-10-CM

## 2023-02-24 DIAGNOSIS — L84 FOOT CALLUS: ICD-10-CM

## 2023-02-24 PROCEDURE — 3288F FALL RISK ASSESSMENT DOCD: CPT | Mod: CPTII,S$GLB,,

## 2023-02-24 PROCEDURE — 1159F MED LIST DOCD IN RCRD: CPT | Mod: CPTII,S$GLB,,

## 2023-02-24 PROCEDURE — 3008F PR BODY MASS INDEX (BMI) DOCUMENTED: ICD-10-PCS | Mod: CPTII,S$GLB,,

## 2023-02-24 PROCEDURE — 99213 PR OFFICE/OUTPT VISIT, EST, LEVL III, 20-29 MIN: ICD-10-PCS | Mod: S$GLB,,,

## 2023-02-24 PROCEDURE — 1157F ADVNC CARE PLAN IN RCRD: CPT | Mod: CPTII,S$GLB,,

## 2023-02-24 PROCEDURE — 3078F DIAST BP <80 MM HG: CPT | Mod: CPTII,S$GLB,,

## 2023-02-24 PROCEDURE — 3074F PR MOST RECENT SYSTOLIC BLOOD PRESSURE < 130 MM HG: ICD-10-PCS | Mod: CPTII,S$GLB,,

## 2023-02-24 PROCEDURE — 1125F AMNT PAIN NOTED PAIN PRSNT: CPT | Mod: CPTII,S$GLB,,

## 2023-02-24 PROCEDURE — 3078F PR MOST RECENT DIASTOLIC BLOOD PRESSURE < 80 MM HG: ICD-10-PCS | Mod: CPTII,S$GLB,,

## 2023-02-24 PROCEDURE — 99999 PR PBB SHADOW E&M-EST. PATIENT-LVL IV: ICD-10-PCS | Mod: PBBFAC,,,

## 2023-02-24 PROCEDURE — 3288F PR FALLS RISK ASSESSMENT DOCUMENTED: ICD-10-PCS | Mod: CPTII,S$GLB,,

## 2023-02-24 PROCEDURE — 1101F PT FALLS ASSESS-DOCD LE1/YR: CPT | Mod: CPTII,S$GLB,,

## 2023-02-24 PROCEDURE — 3008F BODY MASS INDEX DOCD: CPT | Mod: CPTII,S$GLB,,

## 2023-02-24 PROCEDURE — 1159F PR MEDICATION LIST DOCUMENTED IN MEDICAL RECORD: ICD-10-PCS | Mod: CPTII,S$GLB,,

## 2023-02-24 PROCEDURE — 1157F PR ADVANCE CARE PLAN OR EQUIV PRESENT IN MEDICAL RECORD: ICD-10-PCS | Mod: CPTII,S$GLB,,

## 2023-02-24 PROCEDURE — 3074F SYST BP LT 130 MM HG: CPT | Mod: CPTII,S$GLB,,

## 2023-02-24 PROCEDURE — 99213 OFFICE O/P EST LOW 20 MIN: CPT | Mod: S$GLB,,,

## 2023-02-24 PROCEDURE — 1101F PR PT FALLS ASSESS DOC 0-1 FALLS W/OUT INJ PAST YR: ICD-10-PCS | Mod: CPTII,S$GLB,,

## 2023-02-24 PROCEDURE — 99999 PR PBB SHADOW E&M-EST. PATIENT-LVL IV: CPT | Mod: PBBFAC,,,

## 2023-02-24 PROCEDURE — 1125F PR PAIN SEVERITY QUANTIFIED, PAIN PRESENT: ICD-10-PCS | Mod: CPTII,S$GLB,,

## 2023-02-24 NOTE — PROGRESS NOTES
[facility]  [address]  [place] [address]    Subjective    Chief Complaint: No chief complaint on file.      History of Present Illness:     Viridiana Suresh is a(n) 67 y.o. female with past medical history as noted below who presents to the clinic today for pain of right foot for the past month.     She has seen podiatry in the past for a chronic callus of her right foot, reports having the callus shaved in the past to relieve pressure while walking. Tried to shave the callus herself at home but has limited dexterity in her hands, is worried she may have injured the area and caused infection. The callus is located on the distal lateral plantar surface of her right foot. She denies fevers, chills, drainage, redness of the area. Reports pain while walking. She reports chronic numbness of her legs. She reports swelling of her feet for about the past few months, though cannot exactly quantify timing of swelling. Patient unwilling to answer many of my questions regarding other symptoms, PMH and timing of swelling.     Problem List:   Patient Active Problem List   Diagnosis    Mammographic microcalcification    Bipolar disorder    Trigger finger    Cigarette smoker    High risk medications (not anticoagulants) long-term use    Stage 3 chronic kidney disease, unspecified whether stage 3a or 3b CKD    Diverticulosis    Arthritis of wrist, left    RBBB (right bundle branch block)    LAFB (left anterior fascicular block)    COPD (chronic obstructive pulmonary disease)    Dyslipidemia    Dyspnea    Lumbar radiculopathy    Chronic right-sided low back pain with right-sided sciatica    Other specified diseases of spinal cord    Angina pectoris       Current Outpatient Medications:   Current Outpatient Medications   Medication Instructions    albuterol (VENTOLIN HFA) 90 mcg/actuation inhaler 2 puffs, Inhalation, Every 6 hours PRN, Rescue    divalproex (DEPAKOTE) 750 mg,  Oral, Daily    famotidine (PEPCID) 40 mg, Oral, Nightly    fluticasone-umeclidin-vilanter (TRELEGY ELLIPTA) 100-62.5-25 mcg DsDv 1 puff, Inhalation, Daily    Lactobacillus rhamnosus GG (CULTURELLE) 10 billion cell capsule 1 capsule, Oral, Daily    loxapine (LOXITANE) 10 mg, Oral, Daily       Surgical History:   Past Surgical History:   Procedure Laterality Date    APPENDECTOMY      BREAST SURGERY      Needle and surgical biopsy    COLONOSCOPY N/A 06/12/2018    Procedure: COLONOSCOPY;  Surgeon: Uche Crowell MD;  Location: [facility] ENDO;  Service: Endoscopy;  Laterality: N/A; repeat in 10 years for screening    LUMBAR LAMINECTOMY Right 02/25/2021    Procedure: LAMINECTOMY, SPINE, LUMBAR RIGHT L5-S1 HEMILAMIOTOMY AND RESECTION OF SYNOVIAL CYST;  Surgeon: Ramos Boyd MD;  Location: [facility] OR;  Service: Neurosurgery;  Laterality: Right;    NASAL SEPTUM SURGERY      RHINOPHYMA RESECTION      RHINOPLASTY TIP      SKIN BIOPSY      TRANSFORAMINAL EPIDURAL INJECTION OF STEROID Right 10/27/2021    Procedure: Injection,steroid,epidural,transforaminal approach L5/S1 and facet cyst aspiration;  Surgeon: Rigo Ca MD;  Location: [facility] OR;  Service: Pain Management;  Laterality: Right;    TUBAL LIGATION         Family History:   Family History   Problem Relation Age of Onset    Cancer Mother 80        breast cancer    Diabetes Mother     Cancer Father 77        pancreatic cancer    Diabetes Maternal Grandmother     Asthma Sister     Colon cancer Neg Hx     Colon polyps Neg Hx     Crohn's disease Neg Hx     Ulcerative colitis Neg Hx     Celiac disease Neg Hx        Allergies: Review of patient's allergies indicates:  No Known Allergies    Tobacco Status:   Tobacco Use: High Risk    Smoking Tobacco Use: Every Day    Smokeless Tobacco Use: Never    Passive Exposure: Not on file       Sexual Activity:   Social History     Substance and Sexual Activity   Sexual Activity Not Currently       Alcohol Use:   Social History  "    Substance and Sexual Activity   Alcohol Use No         Objective       Vitals:    02/24/23 0909   BP: 124/72   Pulse: 76   Weight: 49 kg (108 lb 0.4 oz)   Height: 5' 2" (1.575 m)       Review of Systems   Constitutional:  Negative for chills and fever.   Musculoskeletal:         +right foot pain with walking, area of callus on plantar surface    +chronic swelling of feet (past few months?)     No pain or swelling in legs, feet only.   Skin:         Denies color change of LEs     Physical Exam  Constitutional:       Appearance: Normal appearance.   HENT:      Head: Normocephalic and atraumatic.      Nose: Nose normal.      Mouth/Throat:      Mouth: Mucous membranes are moist.   Cardiovascular:      Pulses:           Dorsalis pedis pulses are 1+ on the right side and 1+ on the left side.        Posterior tibial pulses are 1+ on the right side and 1+ on the left side.      Comments: BLLE edema, R foot swelling > left. Capillary refill 3-4 seconds in right foot, 2-3 seconds in left. No significant color difference between feet.  Pulmonary:      Effort: Pulmonary effort is normal. No respiratory distress.      Breath sounds: No wheezing.   Musculoskeletal:         General: Normal range of motion.      Cervical back: Normal range of motion.        Feet:    Feet:      Comments: Callus  Skin:     General: Skin is warm and dry.      Comments: No wound to bilateral feet, no disruption in skin integrity with other than callus of right foot   Neurological:      Mental Status: She is alert and oriented to person, place, and time.   Psychiatric:         Attention and Perception: Attention normal.      Comments: Pt outwardly frustrated         Assessment and Plan:    1. Edema of right lower extremity  -     US Lower Extremity Veins Bilateral; Future; Expected date: 02/24/2023    2. Decreased dorsalis pedis pulse  -     Ankle Brachial Indices (YAHAIRA); Future    3. Foot callus  -     Ambulatory referral/consult to Podiatry; " Future; Expected date: 03/03/2023        Visit summary:    Viridiana Suresh presented today for left foot pain with hx callus.     Pt afebrile, no signs of infection, skin intact. Pt requesting podiatry referral to manage callus.     Lower extremity pulses difficult to locate, poor capillary refill, suspect venous insufficiency. Ordered YAHAIRA.     Wells score -2, DVT not suspected.    Patient was instructed to report to ER with severe symptoms.    Follow up: No follow-ups on file.      Reina Jackson PA-C

## 2023-03-01 ENCOUNTER — OFFICE VISIT (OUTPATIENT)
Dept: UROLOGY | Facility: CLINIC | Age: 68
End: 2023-03-01
Payer: MEDICARE

## 2023-03-01 VITALS — WEIGHT: 102.31 LBS | BODY MASS INDEX: 18.71 KG/M2

## 2023-03-01 DIAGNOSIS — N32.81 OVERACTIVE BLADDER: ICD-10-CM

## 2023-03-01 DIAGNOSIS — N39.41 URGE INCONTINENCE: ICD-10-CM

## 2023-03-01 DIAGNOSIS — R39.15 URINARY URGENCY: Primary | ICD-10-CM

## 2023-03-01 PROCEDURE — 99999 PR PBB SHADOW E&M-EST. PATIENT-LVL II: ICD-10-PCS | Mod: PBBFAC,,, | Performed by: UROLOGY

## 2023-03-01 PROCEDURE — 3288F FALL RISK ASSESSMENT DOCD: CPT | Mod: CPTII,S$GLB,, | Performed by: UROLOGY

## 2023-03-01 PROCEDURE — 3008F PR BODY MASS INDEX (BMI) DOCUMENTED: ICD-10-PCS | Mod: CPTII,S$GLB,, | Performed by: UROLOGY

## 2023-03-01 PROCEDURE — 1157F PR ADVANCE CARE PLAN OR EQUIV PRESENT IN MEDICAL RECORD: ICD-10-PCS | Mod: CPTII,S$GLB,, | Performed by: UROLOGY

## 2023-03-01 PROCEDURE — 99203 OFFICE O/P NEW LOW 30 MIN: CPT | Mod: S$GLB,,, | Performed by: UROLOGY

## 2023-03-01 PROCEDURE — 3008F BODY MASS INDEX DOCD: CPT | Mod: CPTII,S$GLB,, | Performed by: UROLOGY

## 2023-03-01 PROCEDURE — 99203 PR OFFICE/OUTPT VISIT, NEW, LEVL III, 30-44 MIN: ICD-10-PCS | Mod: S$GLB,,, | Performed by: UROLOGY

## 2023-03-01 PROCEDURE — 1101F PR PT FALLS ASSESS DOC 0-1 FALLS W/OUT INJ PAST YR: ICD-10-PCS | Mod: CPTII,S$GLB,, | Performed by: UROLOGY

## 2023-03-01 PROCEDURE — 3288F PR FALLS RISK ASSESSMENT DOCUMENTED: ICD-10-PCS | Mod: CPTII,S$GLB,, | Performed by: UROLOGY

## 2023-03-01 PROCEDURE — 1157F ADVNC CARE PLAN IN RCRD: CPT | Mod: CPTII,S$GLB,, | Performed by: UROLOGY

## 2023-03-01 PROCEDURE — 1101F PT FALLS ASSESS-DOCD LE1/YR: CPT | Mod: CPTII,S$GLB,, | Performed by: UROLOGY

## 2023-03-01 PROCEDURE — 99999 PR PBB SHADOW E&M-EST. PATIENT-LVL II: CPT | Mod: PBBFAC,,, | Performed by: UROLOGY

## 2023-03-01 PROCEDURE — 1159F PR MEDICATION LIST DOCUMENTED IN MEDICAL RECORD: ICD-10-PCS | Mod: CPTII,S$GLB,, | Performed by: UROLOGY

## 2023-03-01 PROCEDURE — 1159F MED LIST DOCD IN RCRD: CPT | Mod: CPTII,S$GLB,, | Performed by: UROLOGY

## 2023-03-01 NOTE — PROGRESS NOTES
Subjective:       Patient ID: Viridiana Suresh is a 67 y.o. female.    Chief Complaint: Urinary Incontinence    HPI    67-year-old with overactive bladder symptoms for years.  She has mild incontinence associated with urgency.  She is not wearing pads but occasionally has to change her underwear.  She denies hematuria and dysuria.  She is never had a urinary tract infections.  She denies constipation.  She is not tried any previous medicines for overactive bladder.  She says she is not been going taking any new pharmaceuticals.  We also discuss surgical treatment options including Botox injections and sacral neuromodulation.  She seemed somewhat disinterested during the visit and was fixed to her phone screen during our conversation.  For now she wants to observe.    Urine dipstick shows negative for all components except trace leukocyte.    Past Medical History:   Diagnosis Date    Bipolar disorder     Cancer 1995    melanoma rt third toe    CKD (chronic kidney disease)     COPD (chronic obstructive pulmonary disease)     CTS (carpal tunnel syndrome)     Diverticulosis     Hepatomegaly     Presence of dental bridge     UPPER     Past Surgical History:   Procedure Laterality Date    APPENDECTOMY      BREAST SURGERY      Needle and surgical biopsy    COLONOSCOPY N/A 06/12/2018    Procedure: COLONOSCOPY;  Surgeon: Uche Crowell MD;  Location: [facility];  Service: Endoscopy;  Laterality: N/A; repeat in 10 years for screening    COLONOSCOPY N/A 2/23/2023    Procedure: COLONOSCOPY;  Surgeon: Uche Crowell MD;  Location: [facility];  Service: Endoscopy;  Laterality: N/A;    ESOPHAGOGASTRODUODENOSCOPY N/A 2/23/2023    Procedure: EGD (ESOPHAGOGASTRODUODENOSCOPY);  Surgeon: Uche Crowell MD;  Location: [facility];  Service: Endoscopy;  Laterality: N/A;    LUMBAR LAMINECTOMY Right 02/25/2021    Procedure: LAMINECTOMY, SPINE, LUMBAR RIGHT L5-S1 HEMILAMIOTOMY AND RESECTION OF SYNOVIAL CYST;  Surgeon: Ramos PEACE  MD Oliver;  Location: [facility] OR;  Service: Neurosurgery;  Laterality: Right;    NASAL SEPTUM SURGERY      RHINOPHYMA RESECTION      RHINOPLASTY TIP      SKIN BIOPSY      TRANSFORAMINAL EPIDURAL INJECTION OF STEROID Right 10/27/2021    Procedure: Injection,steroid,epidural,transforaminal approach L5/S1 and facet cyst aspiration;  Surgeon: Rigo Ca MD;  Location: [facility] OR;  Service: Pain Management;  Laterality: Right;    TUBAL LIGATION           Current Outpatient Medications:     albuterol (VENTOLIN HFA) 90 mcg/actuation inhaler, Inhale 2 puffs into the lungs every 6 (six) hours as needed for Wheezing or Shortness of Breath. Rescue, Disp: 6.7 g, Rfl: 2    divalproex (DEPAKOTE) 250 MG EC tablet, Take 3 tablets (750 mg total) by mouth once daily., Disp: 270 tablet, Rfl: 1    famotidine (PEPCID) 40 MG tablet, Take 1 tablet (40 mg total) by mouth every evening., Disp: 30 tablet, Rfl: 2    fluticasone-umeclidin-vilanter (TRELEGY ELLIPTA) 100-62.5-25 mcg DsDv, Inhale 1 puff into the lungs once daily., Disp: 28 each, Rfl: 11    Lactobacillus rhamnosus GG (CULTURELLE) 10 billion cell capsule, Take 1 capsule by mouth once daily., Disp: , Rfl:     loxapine (LOXITANE) 10 MG Cap, Take 1 capsule (10 mg total) by mouth once daily., Disp: 90 capsule, Rfl: 1      Review of Systems   Constitutional:  Negative for fever.   Eyes:  Negative for visual disturbance.   Respiratory:  Negative for shortness of breath.    Cardiovascular:  Negative for chest pain.   Gastrointestinal:  Positive for diarrhea. Negative for constipation.   Genitourinary:  Positive for frequency and urgency. Negative for dysuria, flank pain and hematuria.   Musculoskeletal:  Negative for gait problem.   Skin:  Negative for rash.   Neurological:  Negative for seizures.   Psychiatric/Behavioral:  Negative for confusion.      Objective:      Physical Exam  Vitals reviewed.   Constitutional:       Appearance: She is well-developed.   Pulmonary:      Effort:  Pulmonary effort is normal. No respiratory distress.   Skin:     General: Skin is warm.   Neurological:      Mental Status: She is alert and oriented to person, place, and time.   Psychiatric:         Behavior: Behavior normal.       Assessment:       1. Urinary urgency    2. Overactive bladder    3. Urge incontinence          Plan:       Urinary urgency    Overactive bladder  -     Ambulatory referral/consult to Urology    Urge incontinence      We will hold treatment for now.  Follow-up as needed.

## 2023-03-02 ENCOUNTER — HOSPITAL ENCOUNTER (OUTPATIENT)
Dept: RADIOLOGY | Facility: HOSPITAL | Age: 68
Discharge: HOME | End: 2023-03-02
Payer: MEDICARE

## 2023-03-02 DIAGNOSIS — R60.0 EDEMA OF RIGHT LOWER EXTREMITY: ICD-10-CM

## 2023-03-02 PROCEDURE — 93970 EXTREMITY STUDY: CPT | Mod: TC,PO

## 2023-03-02 PROCEDURE — 93970 EXTREMITY STUDY: CPT | Mod: 26,,, | Performed by: RADIOLOGY

## 2023-03-02 PROCEDURE — 93970 US LOWER EXTREMITY VEINS BILATERAL: ICD-10-PCS | Mod: 26,,, | Performed by: RADIOLOGY

## 2023-03-10 ENCOUNTER — OFFICE VISIT (OUTPATIENT)
Dept: FAMILY MEDICINE | Facility: CLINIC | Age: 68
End: 2023-03-10
Payer: MEDICARE

## 2023-03-10 ENCOUNTER — HOSPITAL ENCOUNTER (OUTPATIENT)
Dept: RADIOLOGY | Facility: HOSPITAL | Age: 68
Discharge: HOME | End: 2023-03-10
Attending: FAMILY MEDICINE
Payer: MEDICARE

## 2023-03-10 VITALS
HEART RATE: 77 BPM | DIASTOLIC BLOOD PRESSURE: 60 MMHG | SYSTOLIC BLOOD PRESSURE: 118 MMHG | HEIGHT: 62 IN | OXYGEN SATURATION: 98 % | BODY MASS INDEX: 20.65 KG/M2 | WEIGHT: 112.19 LBS

## 2023-03-10 DIAGNOSIS — J44.9 CHRONIC OBSTRUCTIVE PULMONARY DISEASE, UNSPECIFIED COPD TYPE: ICD-10-CM

## 2023-03-10 DIAGNOSIS — Z01.818 PREOP EXAMINATION: Primary | ICD-10-CM

## 2023-03-10 DIAGNOSIS — Z01.818 PREOP EXAMINATION: ICD-10-CM

## 2023-03-10 DIAGNOSIS — G95.89 OTHER SPECIFIED DISEASES OF SPINAL CORD: ICD-10-CM

## 2023-03-10 DIAGNOSIS — N18.30 STAGE 3 CHRONIC KIDNEY DISEASE, UNSPECIFIED WHETHER STAGE 3A OR 3B CKD: ICD-10-CM

## 2023-03-10 PROBLEM — I20.9 ANGINA PECTORIS: Status: RESOLVED | Noted: 2022-08-01 | Resolved: 2023-03-10

## 2023-03-10 PROCEDURE — 99214 PR OFFICE/OUTPT VISIT, EST, LEVL IV, 30-39 MIN: ICD-10-PCS | Mod: S$GLB,,, | Performed by: FAMILY MEDICINE

## 2023-03-10 PROCEDURE — 1160F RVW MEDS BY RX/DR IN RCRD: CPT | Mod: CPTII,S$GLB,, | Performed by: FAMILY MEDICINE

## 2023-03-10 PROCEDURE — 71046 X-RAY EXAM CHEST 2 VIEWS: CPT | Mod: TC,PN

## 2023-03-10 PROCEDURE — 71046 X-RAY EXAM CHEST 2 VIEWS: CPT | Mod: 26,,, | Performed by: RADIOLOGY

## 2023-03-10 PROCEDURE — 3074F SYST BP LT 130 MM HG: CPT | Mod: CPTII,S$GLB,, | Performed by: FAMILY MEDICINE

## 2023-03-10 PROCEDURE — 3078F DIAST BP <80 MM HG: CPT | Mod: CPTII,S$GLB,, | Performed by: FAMILY MEDICINE

## 2023-03-10 PROCEDURE — 3078F PR MOST RECENT DIASTOLIC BLOOD PRESSURE < 80 MM HG: ICD-10-PCS | Mod: CPTII,S$GLB,, | Performed by: FAMILY MEDICINE

## 2023-03-10 PROCEDURE — 1101F PT FALLS ASSESS-DOCD LE1/YR: CPT | Mod: CPTII,S$GLB,, | Performed by: FAMILY MEDICINE

## 2023-03-10 PROCEDURE — 1125F PR PAIN SEVERITY QUANTIFIED, PAIN PRESENT: ICD-10-PCS | Mod: CPTII,S$GLB,, | Performed by: FAMILY MEDICINE

## 2023-03-10 PROCEDURE — 3008F BODY MASS INDEX DOCD: CPT | Mod: CPTII,S$GLB,, | Performed by: FAMILY MEDICINE

## 2023-03-10 PROCEDURE — 1125F AMNT PAIN NOTED PAIN PRSNT: CPT | Mod: CPTII,S$GLB,, | Performed by: FAMILY MEDICINE

## 2023-03-10 PROCEDURE — 1101F PR PT FALLS ASSESS DOC 0-1 FALLS W/OUT INJ PAST YR: ICD-10-PCS | Mod: CPTII,S$GLB,, | Performed by: FAMILY MEDICINE

## 2023-03-10 PROCEDURE — 1160F PR REVIEW ALL MEDS BY PRESCRIBER/CLIN PHARMACIST DOCUMENTED: ICD-10-PCS | Mod: CPTII,S$GLB,, | Performed by: FAMILY MEDICINE

## 2023-03-10 PROCEDURE — 99214 OFFICE O/P EST MOD 30 MIN: CPT | Mod: S$GLB,,, | Performed by: FAMILY MEDICINE

## 2023-03-10 PROCEDURE — 3074F PR MOST RECENT SYSTOLIC BLOOD PRESSURE < 130 MM HG: ICD-10-PCS | Mod: CPTII,S$GLB,, | Performed by: FAMILY MEDICINE

## 2023-03-10 PROCEDURE — 3288F FALL RISK ASSESSMENT DOCD: CPT | Mod: CPTII,S$GLB,, | Performed by: FAMILY MEDICINE

## 2023-03-10 PROCEDURE — 99999 PR PBB SHADOW E&M-EST. PATIENT-LVL III: CPT | Mod: PBBFAC,,, | Performed by: FAMILY MEDICINE

## 2023-03-10 PROCEDURE — 93010 EKG 12-LEAD: ICD-10-PCS | Mod: S$GLB,,, | Performed by: INTERNAL MEDICINE

## 2023-03-10 PROCEDURE — 1157F ADVNC CARE PLAN IN RCRD: CPT | Mod: CPTII,S$GLB,, | Performed by: FAMILY MEDICINE

## 2023-03-10 PROCEDURE — 3288F PR FALLS RISK ASSESSMENT DOCUMENTED: ICD-10-PCS | Mod: CPTII,S$GLB,, | Performed by: FAMILY MEDICINE

## 2023-03-10 PROCEDURE — 1159F PR MEDICATION LIST DOCUMENTED IN MEDICAL RECORD: ICD-10-PCS | Mod: CPTII,S$GLB,, | Performed by: FAMILY MEDICINE

## 2023-03-10 PROCEDURE — 3008F PR BODY MASS INDEX (BMI) DOCUMENTED: ICD-10-PCS | Mod: CPTII,S$GLB,, | Performed by: FAMILY MEDICINE

## 2023-03-10 PROCEDURE — 1157F PR ADVANCE CARE PLAN OR EQUIV PRESENT IN MEDICAL RECORD: ICD-10-PCS | Mod: CPTII,S$GLB,, | Performed by: FAMILY MEDICINE

## 2023-03-10 PROCEDURE — 93005 ELECTROCARDIOGRAM TRACING: CPT | Mod: S$GLB,,, | Performed by: FAMILY MEDICINE

## 2023-03-10 PROCEDURE — 99999 PR PBB SHADOW E&M-EST. PATIENT-LVL III: ICD-10-PCS | Mod: PBBFAC,,, | Performed by: FAMILY MEDICINE

## 2023-03-10 PROCEDURE — 1159F MED LIST DOCD IN RCRD: CPT | Mod: CPTII,S$GLB,, | Performed by: FAMILY MEDICINE

## 2023-03-10 PROCEDURE — 71046 XR CHEST PA AND LATERAL: ICD-10-PCS | Mod: 26,,, | Performed by: RADIOLOGY

## 2023-03-10 PROCEDURE — 93010 ELECTROCARDIOGRAM REPORT: CPT | Mod: S$GLB,,, | Performed by: INTERNAL MEDICINE

## 2023-03-10 PROCEDURE — 93005 EKG 12-LEAD: ICD-10-PCS | Mod: S$GLB,,, | Performed by: FAMILY MEDICINE

## 2023-03-10 NOTE — PROGRESS NOTES
Patient ID: Viridiana Suresh is a 67 y.o. female.    Chief Complaint: Follow-up (Feet swollen about month ago)      Viridiana Suresh is in the office for preop, RFA with viktoria/john at [facility].    Follow-up  Associated symptoms include arthralgias, coughing, fatigue (attributes to starting citalopram) and neck pain. Pertinent negatives include no chest pain (denies any recent), congestion or fever.   Medical hx reviewed, no updates.   Past Medical History:   Diagnosis Date    Bipolar disorder     Cancer 1995    melanoma rt third toe    CKD (chronic kidney disease)     COPD (chronic obstructive pulmonary disease)     CTS (carpal tunnel syndrome)     Diverticulosis     Hepatomegaly     Presence of dental bridge     UPPER        Copd: trelegy daily, albuterol prn. She finds she's been coughing a little more than normal, slight sob. +chest congestion.     Current Outpatient Medications:     albuterol (VENTOLIN HFA) 90 mcg/actuation inhaler, Inhale 2 puffs into the lungs every 6 (six) hours as needed for Wheezing or Shortness of Breath. Rescue, Disp: 6.7 g, Rfl: 2    divalproex (DEPAKOTE) 250 MG EC tablet, Take 3 tablets (750 mg total) by mouth once daily., Disp: 270 tablet, Rfl: 1    famotidine (PEPCID) 40 MG tablet, Take 1 tablet (40 mg total) by mouth every evening., Disp: 30 tablet, Rfl: 2    fluticasone-umeclidin-vilanter (TRELEGY ELLIPTA) 100-62.5-25 mcg DsDv, Inhale 1 puff into the lungs once daily., Disp: 28 each, Rfl: 11    Lactobacillus rhamnosus GG (CULTURELLE) 10 billion cell capsule, Take 1 capsule by mouth once daily., Disp: , Rfl:     loxapine (LOXITANE) 10 MG Cap, Take 1 capsule (10 mg total) by mouth once daily., Disp: 90 capsule, Rfl: 1    The 10-year ASCVD risk score (Tomi HARDIN, et al., 2019) is: 9.5%    Values used to calculate the score:      Age: 67 years      Sex: Female      Is Non- : No      Diabetic: No      Tobacco smoker: Yes      Systolic Blood Pressure: 118 mmHg      Is  BP treated: No      HDL Cholesterol: 61 mg/dL      Total Cholesterol: 221 mg/dL     Wt Readings from Last 3 Encounters:   03/10/23 50.9 kg (112 lb 3.4 oz)   03/01/23 46.4 kg (102 lb 4.7 oz)   02/24/23 49 kg (108 lb 0.4 oz)     Temp Readings from Last 3 Encounters:   02/24/23 98.3 °F (36.8 °C)   02/23/23 97.8 °F (36.6 °C) (Oral)   12/13/21 97.9 °F (36.6 °C) (Oral)     BP Readings from Last 3 Encounters:   03/10/23 118/60   02/24/23 124/72   02/23/23 115/62     Pulse Readings from Last 3 Encounters:   03/10/23 77   02/24/23 76   02/23/23 78     Resp Readings from Last 3 Encounters:   02/23/23 16   12/05/22 14   10/18/22 18     PF Readings from Last 3 Encounters:   No data found for PF     SpO2 Readings from Last 3 Encounters:   03/10/23 98%   02/23/23 98%   10/18/22 98%        Lab Results   Component Value Date    HGBA1C 5.4 07/06/2022    HGBA1C 5.6 11/15/2021    HGBA1C 5.2 05/28/2021     Lab Results   Component Value Date    LDLCALC 141.6 07/06/2022    CREATININE 1.2 01/11/2023   Labs 2023 rev.    Review of Systems   Constitutional:  Positive for fatigue (attributes to starting citalopram). Negative for fever and unexpected weight change.   HENT:  Positive for postnasal drip (occ, attributes to vaping). Negative for congestion.    Respiratory:  Positive for cough. Negative for shortness of breath (occ).    Cardiovascular:  Positive for leg swelling. Negative for chest pain (denies any recent) and palpitations.   Gastrointestinal:  Negative for blood in stool, constipation and diarrhea (improved).   Genitourinary:  Negative for dysuria and frequency.   Musculoskeletal:  Positive for arthralgias, back pain and neck pain.   Neurological:  Negative for dizziness and light-headedness.   Psychiatric/Behavioral:  Negative for confusion and sleep disturbance (gets ok sleep, but noticing some early am awakening).          Objective:      Physical Exam  Constitutional:       Appearance: Normal appearance.   HENT:      Head:  Normocephalic and atraumatic.      Nose: Nose normal.      Mouth/Throat:      Mouth: Mucous membranes are moist.   Cardiovascular:      Rate and Rhythm: Normal rate.      Pulses:           Dorsalis pedis pulses are 1+ on the right side and 1+ on the left side.        Posterior tibial pulses are 1+ on the right side and 1+ on the left side.      Comments: BLLE edema, R foot swelling > left. Capillary refill 3-4 seconds in right foot, 2-3 seconds in left. No significant color difference between feet.  Pulmonary:      Effort: Pulmonary effort is normal. No respiratory distress.      Breath sounds: No wheezing.   Musculoskeletal:         General: No tenderness or signs of injury.        Feet:    Feet:      Comments: Callus  Skin:     General: Skin is warm and dry.      Comments: No wound to bilateral feet, no disruption in skin integrity with other than callus of right foot   Neurological:      Mental Status: She is alert and oriented to person, place, and time. Mental status is at baseline.   Psychiatric:         Attention and Perception: Attention normal.         Behavior: Behavior normal.         Thought Content: Thought content normal.           Screening recommendations appropriate to age and health status were reviewed.    Preop examination  -     CBC Without Differential; Future; Expected date: 03/10/2023  -     Comprehensive Metabolic Panel; Future; Expected date: 03/10/2023  -     Urinalysis, Reflex to Urine Culture Urine, Clean Catch; Future  -     EKG 12-lead; Future  -     X-Ray Chest PA And Lateral; Future; Expected date: 03/10/2023    Other specified diseases of spinal cord  Comments:  for surgery per pain mgmt    Chronic obstructive pulmonary disease, unspecified COPD type  Comments:  cont trelegy with albuterol as needed    Stage 3 chronic kidney disease, unspecified whether stage 3a or 3b CKD  Comments:  reviewed hydration, water intake        RCRI risk factors include: high risk type of surgery,  history of ischemic disease, history of heart failure, history of cerebrovascular disease, diabetes requiring treatment with insulin, pre-op serum creatinine >2.0, and no known RCRI risk factors. As such, per RCRI the risk of cardiac death, nonfatal myocardial infarction, or nonfatal cardiac arrest is 0.4% and the risk of myocardial infarction, pulmonary edema, ventricular fibrillation, primary cardiac arrest, or complete heart block is 0.5%.  Overall this patient can be considered low risk for this low risk procedure. No further cardiac testing is recommended at this time.     Patient denies any symptoms (as per HPI) concerning for undiagnosed lung disease including NANCY. Would not recommend obtaining chest X-ray, sleep study, or PFTs at this time. Patient was counseled on the importance of quitting smoking ideally 8 weeks prior to surgery. We discussed the benefits of early mobilization and deep breathing after surgery.      Screened patient for alcohol misuse, use of illicit drugs, and personal or family history of anesthetic complications or bleeding diathesis and no substantial concerns were identified.     All current medications were reviewed and at this time no changes to medications are recommended prior to surgery.     I recommend use of standard pre-op and post-op precautions for this patient. In my opinion, she is medically optimized for this procedure, and can proceed without further evaluation.

## 2023-03-13 ENCOUNTER — TELEPHONE (OUTPATIENT)
Dept: FAMILY MEDICINE | Facility: CLINIC | Age: 68
End: 2023-03-13
Payer: MEDICARE

## 2023-03-13 NOTE — TELEPHONE ENCOUNTER
----- Message from Marilee De Leon sent at 3/13/2023  3:10 PM CDT -----  Type:  RX Refill Request    Who Called:  pt   Refill or New Rx:  refill   RX Name and Strength:  albuterol (VENTOLIN HFA) 90 mcg/actuation inhaler  Fluid pill   How is the patient currently taking it? (ex. 1XDay):  as directed   Is this a 30 day or 90 day RX:  90   Preferred Pharmacy with phone number:      [id number] [place] - [address]  [address]  [place] [address]  Phone: [phone] Fax: [phone]     Local or Mail Order:  local   Ordering Provider:  siobhan Roy Call Back Number:  [phone] (home)   Additional Information:  pt sts the doctor sts she was going to give her a fluid pill,.. pt dont knwo the name of it   please advise thank you

## 2023-03-14 ENCOUNTER — TELEPHONE (OUTPATIENT)
Dept: FAMILY MEDICINE | Facility: CLINIC | Age: 68
End: 2023-03-14
Payer: MEDICARE

## 2023-03-14 RX ORDER — FUROSEMIDE 20 MG/1
10 TABLET ORAL DAILY PRN
Qty: 30 TABLET | Refills: 0 | Status: SHIPPED | OUTPATIENT
Start: 2023-03-14 | End: 2024-03-13

## 2023-03-14 NOTE — TELEPHONE ENCOUNTER
----- Message from Elida Walker LPN sent at 3/14/2023  2:38 PM CDT -----  Regarding: FW: pt call back    ----- Message -----  From: Chloé Li  Sent: 3/14/2023   2:26 PM CDT  To: Sabino Grover Staff  Subject: pt call back                                     Name of Who is Calling: COSME MURRELL [[id number]]      What is the request in detail: pt is returning missed call to office in regards to her fluid pill. Please advise.       Can the clinic reply by [facility]: no       [facility]: [phone]

## 2023-03-14 NOTE — TELEPHONE ENCOUNTER
Attempted to contact pt. No answer. Left VM for return call back regarding about fluid pill sent to pharmacy.

## 2023-03-17 ENCOUNTER — OFFICE VISIT (OUTPATIENT)
Dept: PODIATRY | Facility: CLINIC | Age: 68
End: 2023-03-17
Payer: MEDICARE

## 2023-03-17 VITALS — HEIGHT: 62 IN | BODY MASS INDEX: 20.65 KG/M2 | WEIGHT: 112.19 LBS

## 2023-03-17 DIAGNOSIS — M20.11 VALGUS DEFORMITY OF BOTH GREAT TOES: Primary | ICD-10-CM

## 2023-03-17 DIAGNOSIS — L84 FOOT CALLUS: ICD-10-CM

## 2023-03-17 DIAGNOSIS — M21.621 TAILOR'S BUNION OF BOTH FEET: ICD-10-CM

## 2023-03-17 DIAGNOSIS — M20.12 VALGUS DEFORMITY OF BOTH GREAT TOES: Primary | ICD-10-CM

## 2023-03-17 DIAGNOSIS — M21.622 TAILOR'S BUNION OF BOTH FEET: ICD-10-CM

## 2023-03-17 PROCEDURE — 1159F PR MEDICATION LIST DOCUMENTED IN MEDICAL RECORD: ICD-10-PCS | Mod: CPTII,S$GLB,, | Performed by: PODIATRIST

## 2023-03-17 PROCEDURE — 1101F PT FALLS ASSESS-DOCD LE1/YR: CPT | Mod: CPTII,S$GLB,, | Performed by: PODIATRIST

## 2023-03-17 PROCEDURE — 1159F MED LIST DOCD IN RCRD: CPT | Mod: CPTII,S$GLB,, | Performed by: PODIATRIST

## 2023-03-17 PROCEDURE — 3008F BODY MASS INDEX DOCD: CPT | Mod: CPTII,S$GLB,, | Performed by: PODIATRIST

## 2023-03-17 PROCEDURE — 1157F PR ADVANCE CARE PLAN OR EQUIV PRESENT IN MEDICAL RECORD: ICD-10-PCS | Mod: CPTII,S$GLB,, | Performed by: PODIATRIST

## 2023-03-17 PROCEDURE — 1101F PR PT FALLS ASSESS DOC 0-1 FALLS W/OUT INJ PAST YR: ICD-10-PCS | Mod: CPTII,S$GLB,, | Performed by: PODIATRIST

## 2023-03-17 PROCEDURE — 99999 PR PBB SHADOW E&M-EST. PATIENT-LVL III: CPT | Mod: PBBFAC,,, | Performed by: PODIATRIST

## 2023-03-17 PROCEDURE — 3288F PR FALLS RISK ASSESSMENT DOCUMENTED: ICD-10-PCS | Mod: CPTII,S$GLB,, | Performed by: PODIATRIST

## 2023-03-17 PROCEDURE — 1160F PR REVIEW ALL MEDS BY PRESCRIBER/CLIN PHARMACIST DOCUMENTED: ICD-10-PCS | Mod: CPTII,S$GLB,, | Performed by: PODIATRIST

## 2023-03-17 PROCEDURE — 3288F FALL RISK ASSESSMENT DOCD: CPT | Mod: CPTII,S$GLB,, | Performed by: PODIATRIST

## 2023-03-17 PROCEDURE — 99203 OFFICE O/P NEW LOW 30 MIN: CPT | Mod: S$GLB,,, | Performed by: PODIATRIST

## 2023-03-17 PROCEDURE — 99203 PR OFFICE/OUTPT VISIT, NEW, LEVL III, 30-44 MIN: ICD-10-PCS | Mod: S$GLB,,, | Performed by: PODIATRIST

## 2023-03-17 PROCEDURE — 1157F ADVNC CARE PLAN IN RCRD: CPT | Mod: CPTII,S$GLB,, | Performed by: PODIATRIST

## 2023-03-17 PROCEDURE — 1160F RVW MEDS BY RX/DR IN RCRD: CPT | Mod: CPTII,S$GLB,, | Performed by: PODIATRIST

## 2023-03-17 PROCEDURE — 99999 PR PBB SHADOW E&M-EST. PATIENT-LVL III: ICD-10-PCS | Mod: PBBFAC,,, | Performed by: PODIATRIST

## 2023-03-17 PROCEDURE — 3008F PR BODY MASS INDEX (BMI) DOCUMENTED: ICD-10-PCS | Mod: CPTII,S$GLB,, | Performed by: PODIATRIST

## 2023-03-17 NOTE — PROGRESS NOTES
Subjective:      Patient ID: Viridiana Suresh is a 67 y.o. female.    Chief Complaint: Callouses and Foot Pain    Viridiana is a 67 y.o. female who presents to the podiatry clinic  with complaint of bilateral bunions and painful calluses, pain with pressure and shoe wear, increases with activity and better with rest. No prior treatments noted other than changing her shoe wear    Review of Systems   Constitutional: Negative for chills and fever.   Cardiovascular:  Negative for claudication and leg swelling.   Respiratory:  Negative for shortness of breath.    Skin:  Negative for itching, nail changes and rash.   Musculoskeletal:  Negative for muscle cramps, muscle weakness and myalgias.        Toe pain   Gastrointestinal:  Negative for nausea and vomiting.   Neurological:  Positive for numbness and paresthesias. Negative for focal weakness and loss of balance.         Objective:      Physical Exam  Constitutional:       General: She is not in acute distress.     Appearance: She is well-developed. She is not diaphoretic.   Cardiovascular:      Pulses:           Dorsalis pedis pulses are 2+ on the right side and 2+ on the left side.        Posterior tibial pulses are 2+ on the right side and 2+ on the left side.      Comments: < 3 sec capillary refill time to toes 1-5 bilateral. Toes and feet are warm to touch proximally with normal distal cooling b/l. There is some hair growth on the feet and toes b/l. There is no edema b/l. No spider veins or varicosities present b/l.     Musculoskeletal:      Comments: Medial 1st MTPJ exostosis. Lateral deviation of hallux, non trackbound. No pain w/ ROM to 1st or 2nd MTPJs. No First ray hypermobility or sub second MT head callus. No lesser toe deformities or pain.     Tailor bunion present 5th mtpj bilateral with medial deviation of 5th toe, prominent bony bump lateral 5th mtpj, and pain to palpation without evidence of trauma or infection.        Equinus noted b/l ankles with < 10 deg  DF noted. MMT 5/5 in DF/PF/Inv/Ev resistance with no reproduction of pain in any direction. Passive range of motion of ankle and pedal joints is painless b/l.     Skin:     General: Skin is warm and dry.      Coloration: Skin is not pale.      Findings: Lesion present. No abrasion, bruising, burn, ecchymosis, erythema, laceration, petechiae or rash.      Nails: There is no clubbing.      Comments: Skin temperature, texture and turgor within normal limits.    Hyperkeratotic lesion bilateral plantar fifth met head   Neurological:      Mental Status: She is alert and oriented to person, place, and time.      Sensory: Sensory deficit present.      Motor: No tremor, atrophy or abnormal muscle tone.      Comments: Negative tinel sign bilateral. Diminished vibratory and protective sensation bilateral.   Psychiatric:         Behavior: Behavior normal.             Assessment:       Encounter Diagnoses   Name Primary?    Foot callus     Valgus deformity of both great toes Yes    Tailor's bunion of both feet          Plan:       Viridiana was seen today for callouses and foot pain.    Diagnoses and all orders for this visit:    Valgus deformity of both great toes    Foot callus  -     Ambulatory referral/consult to Podiatry    Tailor's bunion of both feet      I counseled the patient on her conditions, their implications and medical management.    Discussed importance of supportive shoes with accommodative toe box to reduce pressure and irritation to forefoot.     Avoid barefoot or flats    Patient will obtain over the counter arch supports and wear them in shoes whenever possible.  Athletic shoes intended for walking or running are usually best.    Patient will stretch the tendo achilles complex three times daily as demonstrated in the office.  Literature was dispensed illustrating proper stretching technique.    Return PRN    Cali Elaine DPM

## 2023-03-30 ENCOUNTER — HOSPITAL ENCOUNTER (OUTPATIENT)
Dept: RADIOLOGY | Facility: HOSPITAL | Age: 68
Discharge: HOME | End: 2023-03-30
Attending: FAMILY MEDICINE
Payer: MEDICARE

## 2023-03-30 DIAGNOSIS — Z78.0 MENOPAUSE: ICD-10-CM

## 2023-03-30 PROCEDURE — 77080 DXA BONE DENSITY AXIAL: CPT | Mod: TC,PO

## 2023-03-30 PROCEDURE — 77080 DXA BONE DENSITY AXIAL: CPT | Mod: 26,,, | Performed by: RADIOLOGY

## 2023-03-30 PROCEDURE — 77080 DEXA BONE DENSITY SPINE HIP: ICD-10-PCS | Mod: 26,,, | Performed by: RADIOLOGY

## 2023-03-30 NOTE — TELEPHONE ENCOUNTER
----- Message from Sasha Natarajan sent at 6/13/2018  1:33 PM CDT -----  Contact: Patient  Rodger, patient [phone] calling to speak with nurse in office to see if the letter that was put at the  yesterday could be emailed to her at [email or web address]. Please advise. Thanks.   Negative

## 2023-04-17 ENCOUNTER — TELEPHONE (OUTPATIENT)
Dept: FAMILY MEDICINE | Facility: CLINIC | Age: 68
End: 2023-04-17
Payer: MEDICARE

## 2023-04-17 ENCOUNTER — TELEPHONE (OUTPATIENT)
Dept: FAMILY MEDICINE | Facility: CLINIC | Age: 68
End: 2023-04-17

## 2023-04-17 NOTE — TELEPHONE ENCOUNTER
----- Message from Rayna Lechuga sent at 4/17/2023  8:45 AM CDT -----  Regarding: Needs retrun call  Type: Needs Medical Advice  Who Called:  Viridiana    Kirill Call Back Number: [phone]    Additional Information: Pt was waiting on call to be scheduled for procedure, was tiold that the office cant be reached so she neeeds a return call as soon as possible regarding this please advise.

## 2023-04-17 NOTE — TELEPHONE ENCOUNTER
Spoke with pt. Pt reported that [facility] has not received the pre-op clearance testing and paperwork. Notified pt that paperwork on testing and OV pre-op has been faxed x 2 to [facility] and unsure why there is a transmission issue with the fax. Informed pt copy of all paperwork can be placed up front for pickup. Pt request that paperwork will be mailed to her. Confirmed pt address and paperwork mailed to pt.

## 2023-04-18 ENCOUNTER — TELEPHONE (OUTPATIENT)
Dept: FAMILY MEDICINE | Facility: CLINIC | Age: 68
End: 2023-04-18
Payer: MEDICARE

## 2023-04-18 NOTE — TELEPHONE ENCOUNTER
Spoke with pt yesterday and notified her that pre-op clearance and testing has been faxed x 2 to \[facility]\"" and unsure where the transmission error is. Informed pt will fax again and also mail out LOV, and testing to pt for her to have and bring to \[facility]\"". Pt verbalized understanding.

## 2023-04-18 NOTE — TELEPHONE ENCOUNTER
----- Message from Jennifer Russell sent at 4/17/2023  3:37 PM CDT -----  Contact: self  Type: Needs Medical Advice  Who Called: Patient   Best Call Back Number: [phone]  Additional Information: Pt has been on hold with getting her procedure because she cant get a  clearance from . [facility] is stating they are sending request and never get a  reply back from the office. They keep telling the pt they keep faxing but nor reply plz call them to confirm. Thanks Thanks

## 2023-04-26 ENCOUNTER — TELEPHONE (OUTPATIENT)
Dept: FAMILY MEDICINE | Facility: CLINIC | Age: 68
End: 2023-04-26
Payer: MEDICARE

## 2023-04-26 NOTE — TELEPHONE ENCOUNTER
"Spoke with pt. Pt reports that when she wakes up in the morning her feet are "extremely swollen and red and hurt" Pt reports that she is taking the Lasix 0.5 tab daily and reports no improvement. Scheduled pt for appt on Friday 4/28/23.    Pt also stated that [facility] not receiving clearance. Informed pt faxed to [facility] on 4/18/23 x 2 and also mailed copy to pt. Pt reports that she has not received mailed copies.     Informed pt that copy of clearance will be given to her at her upcoming appt.   "

## 2023-04-26 NOTE — TELEPHONE ENCOUNTER
----- Message from Erinn Myers sent at 4/26/2023 12:46 PM CDT -----  Contact: pt [phone]  Type: Needs Medical Advice  Who Called:  Pt     Best Call Back Number: [phone]    Additional Information: Pt requesting a call back about her feet swelling and being in pain. Pt stated medication that rx'd to her is not helping.      Pt also asking if you can call [facility] because they say they do not have pts surgical clearance.

## 2023-04-28 ENCOUNTER — OFFICE VISIT (OUTPATIENT)
Dept: FAMILY MEDICINE | Facility: CLINIC | Age: 68
End: 2023-04-28
Payer: MEDICARE

## 2023-04-28 ENCOUNTER — HOSPITAL ENCOUNTER (OUTPATIENT)
Dept: RADIOLOGY | Facility: HOSPITAL | Age: 68
Discharge: HOME | End: 2023-04-28
Attending: FAMILY MEDICINE
Payer: MEDICARE

## 2023-04-28 VITALS
TEMPERATURE: 98 F | HEART RATE: 76 BPM | BODY MASS INDEX: 19.31 KG/M2 | SYSTOLIC BLOOD PRESSURE: 98 MMHG | OXYGEN SATURATION: 96 % | DIASTOLIC BLOOD PRESSURE: 54 MMHG | WEIGHT: 104.94 LBS | HEIGHT: 62 IN

## 2023-04-28 DIAGNOSIS — I73.9 CLAUDICATION OF BOTH LOWER EXTREMITIES: Primary | ICD-10-CM

## 2023-04-28 DIAGNOSIS — J44.9 CHRONIC OBSTRUCTIVE PULMONARY DISEASE, UNSPECIFIED COPD TYPE: ICD-10-CM

## 2023-04-28 DIAGNOSIS — N18.30 STAGE 3 CHRONIC KIDNEY DISEASE, UNSPECIFIED WHETHER STAGE 3A OR 3B CKD: ICD-10-CM

## 2023-04-28 DIAGNOSIS — I73.9 CLAUDICATION OF BOTH LOWER EXTREMITIES: ICD-10-CM

## 2023-04-28 PROCEDURE — 73630 X-RAY EXAM OF FOOT: CPT | Mod: TC,50,PN

## 2023-04-28 PROCEDURE — 3008F PR BODY MASS INDEX (BMI) DOCUMENTED: ICD-10-PCS | Mod: CPTII,S$GLB,, | Performed by: FAMILY MEDICINE

## 2023-04-28 PROCEDURE — 73630 XR FOOT COMPLETE 3 VIEW BILATERAL: ICD-10-PCS | Mod: 26,50,, | Performed by: RADIOLOGY

## 2023-04-28 PROCEDURE — 1125F PR PAIN SEVERITY QUANTIFIED, PAIN PRESENT: ICD-10-PCS | Mod: CPTII,S$GLB,, | Performed by: FAMILY MEDICINE

## 2023-04-28 PROCEDURE — 3074F PR MOST RECENT SYSTOLIC BLOOD PRESSURE < 130 MM HG: ICD-10-PCS | Mod: CPTII,S$GLB,, | Performed by: FAMILY MEDICINE

## 2023-04-28 PROCEDURE — 73630 X-RAY EXAM OF FOOT: CPT | Mod: 26,50,, | Performed by: RADIOLOGY

## 2023-04-28 PROCEDURE — 3288F PR FALLS RISK ASSESSMENT DOCUMENTED: ICD-10-PCS | Mod: CPTII,S$GLB,, | Performed by: FAMILY MEDICINE

## 2023-04-28 PROCEDURE — 1157F ADVNC CARE PLAN IN RCRD: CPT | Mod: CPTII,S$GLB,, | Performed by: FAMILY MEDICINE

## 2023-04-28 PROCEDURE — 1101F PT FALLS ASSESS-DOCD LE1/YR: CPT | Mod: CPTII,S$GLB,, | Performed by: FAMILY MEDICINE

## 2023-04-28 PROCEDURE — 3288F FALL RISK ASSESSMENT DOCD: CPT | Mod: CPTII,S$GLB,, | Performed by: FAMILY MEDICINE

## 2023-04-28 PROCEDURE — 99214 PR OFFICE/OUTPT VISIT, EST, LEVL IV, 30-39 MIN: ICD-10-PCS | Mod: S$GLB,,, | Performed by: FAMILY MEDICINE

## 2023-04-28 PROCEDURE — 1125F AMNT PAIN NOTED PAIN PRSNT: CPT | Mod: CPTII,S$GLB,, | Performed by: FAMILY MEDICINE

## 2023-04-28 PROCEDURE — 1160F PR REVIEW ALL MEDS BY PRESCRIBER/CLIN PHARMACIST DOCUMENTED: ICD-10-PCS | Mod: CPTII,S$GLB,, | Performed by: FAMILY MEDICINE

## 2023-04-28 PROCEDURE — 99214 OFFICE O/P EST MOD 30 MIN: CPT | Mod: S$GLB,,, | Performed by: FAMILY MEDICINE

## 2023-04-28 PROCEDURE — 1160F RVW MEDS BY RX/DR IN RCRD: CPT | Mod: CPTII,S$GLB,, | Performed by: FAMILY MEDICINE

## 2023-04-28 PROCEDURE — 3078F PR MOST RECENT DIASTOLIC BLOOD PRESSURE < 80 MM HG: ICD-10-PCS | Mod: CPTII,S$GLB,, | Performed by: FAMILY MEDICINE

## 2023-04-28 PROCEDURE — 1159F PR MEDICATION LIST DOCUMENTED IN MEDICAL RECORD: ICD-10-PCS | Mod: CPTII,S$GLB,, | Performed by: FAMILY MEDICINE

## 2023-04-28 PROCEDURE — 1101F PR PT FALLS ASSESS DOC 0-1 FALLS W/OUT INJ PAST YR: ICD-10-PCS | Mod: CPTII,S$GLB,, | Performed by: FAMILY MEDICINE

## 2023-04-28 PROCEDURE — 3078F DIAST BP <80 MM HG: CPT | Mod: CPTII,S$GLB,, | Performed by: FAMILY MEDICINE

## 2023-04-28 PROCEDURE — 3008F BODY MASS INDEX DOCD: CPT | Mod: CPTII,S$GLB,, | Performed by: FAMILY MEDICINE

## 2023-04-28 PROCEDURE — 3074F SYST BP LT 130 MM HG: CPT | Mod: CPTII,S$GLB,, | Performed by: FAMILY MEDICINE

## 2023-04-28 PROCEDURE — 99999 PR PBB SHADOW E&M-EST. PATIENT-LVL IV: CPT | Mod: PBBFAC,,, | Performed by: FAMILY MEDICINE

## 2023-04-28 PROCEDURE — 99999 PR PBB SHADOW E&M-EST. PATIENT-LVL IV: ICD-10-PCS | Mod: PBBFAC,,, | Performed by: FAMILY MEDICINE

## 2023-04-28 PROCEDURE — 1157F PR ADVANCE CARE PLAN OR EQUIV PRESENT IN MEDICAL RECORD: ICD-10-PCS | Mod: CPTII,S$GLB,, | Performed by: FAMILY MEDICINE

## 2023-04-28 PROCEDURE — 1159F MED LIST DOCD IN RCRD: CPT | Mod: CPTII,S$GLB,, | Performed by: FAMILY MEDICINE

## 2023-04-28 NOTE — PROGRESS NOTES
"  Subjective:       Patient ID: Viridiana Suresh is a 67 y.o. female.    Chief Complaint: follow up edema both ankle    HPI  Patient in clinic for f/u. Bilateral ankle/foot swelling with tenderness. Swelling does not sig go down at night. +redness and discomfort with walking and palpation. +prone to muscle cramps.   She did start an otc herbal supplement for lung and bronchial health about 2 weeks ago. Has taken previously without issue.   She has been taking lasix 10mg the last few weeks, and increased to 20mg the last week.   She started magnesium otc to help with muscle cramps which seems to help a little.   Venous u/s last month was negative for DVT.     Review of Systems:  Review of Systems   Constitutional:  Positive for fatigue (attributes to starting citalopram). Negative for fever and unexpected weight change.   HENT:  Positive for postnasal drip (occ, attributes to vaping). Negative for congestion.    Respiratory:  Negative for shortness of breath (occ) and stridor.    Cardiovascular:  Positive for leg swelling. Negative for chest pain (denies any recent) and palpitations.   Gastrointestinal:  Negative for blood in stool, constipation and diarrhea (improved).   Genitourinary:  Negative for dysuria and frequency.   Musculoskeletal:  Positive for arthralgias, back pain and neck pain.   Neurological:  Negative for dizziness and light-headedness.   Psychiatric/Behavioral:  Negative for confusion and sleep disturbance (gets ok sleep, but noticing some early am awakening).      Objective:     Vitals:    04/28/23 1110   BP: (!) 98/54   BP Location: Right arm   Patient Position: Sitting   Pulse: 76   Temp: 98 °F (36.7 °C)   TempSrc: Oral   SpO2: 96%   Weight: 47.6 kg (104 lb 15 oz)   Height: 5' 2" (1.575 m)          Physical Exam  Vitals and nursing note reviewed.   Constitutional:       General: She is not in acute distress.     Appearance: Normal appearance. She is well-developed.   HENT:      Head: Normocephalic " and atraumatic.   Eyes:      General: No scleral icterus.        Right eye: No discharge.         Left eye: No discharge.      Conjunctiva/sclera: Conjunctivae normal.   Cardiovascular:      Rate and Rhythm: Normal rate.   Pulmonary:      Effort: Pulmonary effort is normal. No respiratory distress.   Abdominal:      Palpations: Abdomen is soft.      Tenderness: There is no guarding.   Musculoskeletal:         General: No deformity or signs of injury.      Cervical back: Neck supple.      Right lower leg: Edema present.      Left lower leg: Edema present.   Lymphadenopathy:      Cervical: No cervical adenopathy.   Skin:     General: Skin is warm and dry.      Findings: No rash.   Neurological:      General: No focal deficit present.      Mental Status: She is alert and oriented to person, place, and time.   Psychiatric:         Mood and Affect: Mood normal.         Behavior: Behavior normal.         Assessment & Plan:  Claudication of both lower extremities  Comments:  acute changes noted with swelling  compression and lasix reviewed  update studies  Orders:  -     US Lower Extremity Arteries Bilateral; Future; Expected date: 04/28/2023  -     X-Ray Foot Complete Bilateral; Future; Expected date: 04/28/2023    Stage 3 chronic kidney disease, unspecified whether stage 3a or 3b CKD  Comments:  increase water intake as able, recheck in 2-3 mos    Chronic obstructive pulmonary disease, unspecified COPD type  Comments:  stable    Continue lasix 20mg daily. Compression stockings would be of benefit.  Hold the herbal supplement for now. Update xray and u/s.   locker#[id number]

## 2023-05-01 ENCOUNTER — TELEPHONE (OUTPATIENT)
Dept: FAMILY MEDICINE | Facility: CLINIC | Age: 68
End: 2023-05-01
Payer: MEDICARE

## 2023-05-01 NOTE — TELEPHONE ENCOUNTER
No specific recommendations as it's just degenerative changes. Will need to cont f/u with podiatry.

## 2023-05-01 NOTE — TELEPHONE ENCOUNTER
----- Message from Penny Barillas sent at 5/1/2023  8:19 AM CDT -----  Contact: Self  Type:  Test Results    Who Called:  Patient  Name of Test (Lab/Mammo/Etc):  Xray  Date of Test:  04/28  Ordering Provider:  Dr Gallo  Where the test was performed:  [facility]  Best Call Back Number:  [phone]  Additional Information:  Please call pt back to advise,  needs to know what the step is? Thank You.      ##Pt is also requesting us to fax over a copy of these results to Dr Tamayo at [facility] at fax # [phone] so he can look at them too.

## 2023-05-10 ENCOUNTER — TELEPHONE (OUTPATIENT)
Dept: PSYCHIATRY | Facility: CLINIC | Age: 68
End: 2023-05-10
Payer: MEDICARE
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
## 2023-05-12 ENCOUNTER — TELEPHONE (OUTPATIENT)
Dept: FAMILY MEDICINE | Facility: CLINIC | Age: 68
End: 2023-05-12
Payer: MEDICARE

## 2023-05-12 NOTE — TELEPHONE ENCOUNTER
----- Message from Rayna Lechuga sent at 5/11/2023  1:28 PM CDT -----  Regarding: Needs return call  Type: Needs Medical Advice  Who Called:  Viridiana    Kirill Call Back Number: [phone]    Additional Information: Opt was calling for the resullts of her us she was concerned, please give a call back when can

## 2023-05-18 ENCOUNTER — OFFICE VISIT (OUTPATIENT)
Dept: PSYCHIATRY | Facility: CLINIC | Age: 68
End: 2023-05-18
Payer: MEDICARE

## 2023-05-18 VITALS
WEIGHT: 106.06 LBS | SYSTOLIC BLOOD PRESSURE: 154 MMHG | DIASTOLIC BLOOD PRESSURE: 92 MMHG | BODY MASS INDEX: 19.52 KG/M2 | HEIGHT: 62 IN | HEART RATE: 79 BPM

## 2023-05-18 DIAGNOSIS — F41.9 ANXIETY DISORDER, UNSPECIFIED TYPE: ICD-10-CM

## 2023-05-18 DIAGNOSIS — F31.9 BIPOLAR I DISORDER: Primary | ICD-10-CM

## 2023-05-18 DIAGNOSIS — Z79.899 HIGH RISK MEDICATIONS (NOT ANTICOAGULANTS) LONG-TERM USE: ICD-10-CM

## 2023-05-18 PROCEDURE — 3044F PR MOST RECENT HEMOGLOBIN A1C LEVEL <7.0%: ICD-10-PCS | Mod: CPTII,S$GLB,,

## 2023-05-18 PROCEDURE — 3288F FALL RISK ASSESSMENT DOCD: CPT | Mod: CPTII,S$GLB,,

## 2023-05-18 PROCEDURE — 90792 PR PSYCHIATRIC DIAGNOSTIC EVALUATION W/MEDICAL SERVICES: ICD-10-PCS | Mod: S$GLB,,,

## 2023-05-18 PROCEDURE — 1101F PR PT FALLS ASSESS DOC 0-1 FALLS W/OUT INJ PAST YR: ICD-10-PCS | Mod: CPTII,S$GLB,,

## 2023-05-18 PROCEDURE — 1160F RVW MEDS BY RX/DR IN RCRD: CPT | Mod: CPTII,S$GLB,,

## 2023-05-18 PROCEDURE — 1101F PT FALLS ASSESS-DOCD LE1/YR: CPT | Mod: CPTII,S$GLB,,

## 2023-05-18 PROCEDURE — 90792 PSYCH DIAG EVAL W/MED SRVCS: CPT | Mod: S$GLB,,,

## 2023-05-18 PROCEDURE — 3008F PR BODY MASS INDEX (BMI) DOCUMENTED: ICD-10-PCS | Mod: CPTII,S$GLB,,

## 2023-05-18 PROCEDURE — 3080F DIAST BP >= 90 MM HG: CPT | Mod: CPTII,S$GLB,,

## 2023-05-18 PROCEDURE — 1125F PR PAIN SEVERITY QUANTIFIED, PAIN PRESENT: ICD-10-PCS | Mod: CPTII,S$GLB,,

## 2023-05-18 PROCEDURE — 1160F PR REVIEW ALL MEDS BY PRESCRIBER/CLIN PHARMACIST DOCUMENTED: ICD-10-PCS | Mod: CPTII,S$GLB,,

## 2023-05-18 PROCEDURE — 3008F BODY MASS INDEX DOCD: CPT | Mod: CPTII,S$GLB,,

## 2023-05-18 PROCEDURE — 99999 PR PBB SHADOW E&M-EST. PATIENT-LVL III: CPT | Mod: PBBFAC,,,

## 2023-05-18 PROCEDURE — 99999 PR PBB SHADOW E&M-EST. PATIENT-LVL III: ICD-10-PCS | Mod: PBBFAC,,,

## 2023-05-18 PROCEDURE — 3077F PR MOST RECENT SYSTOLIC BLOOD PRESSURE >= 140 MM HG: ICD-10-PCS | Mod: CPTII,S$GLB,,

## 2023-05-18 PROCEDURE — 1159F PR MEDICATION LIST DOCUMENTED IN MEDICAL RECORD: ICD-10-PCS | Mod: CPTII,S$GLB,,

## 2023-05-18 PROCEDURE — 3077F SYST BP >= 140 MM HG: CPT | Mod: CPTII,S$GLB,,

## 2023-05-18 PROCEDURE — 1125F AMNT PAIN NOTED PAIN PRSNT: CPT | Mod: CPTII,S$GLB,,

## 2023-05-18 PROCEDURE — 1157F ADVNC CARE PLAN IN RCRD: CPT | Mod: CPTII,S$GLB,,

## 2023-05-18 PROCEDURE — 3288F PR FALLS RISK ASSESSMENT DOCUMENTED: ICD-10-PCS | Mod: CPTII,S$GLB,,

## 2023-05-18 PROCEDURE — 3080F PR MOST RECENT DIASTOLIC BLOOD PRESSURE >= 90 MM HG: ICD-10-PCS | Mod: CPTII,S$GLB,,

## 2023-05-18 PROCEDURE — 1159F MED LIST DOCD IN RCRD: CPT | Mod: CPTII,S$GLB,,

## 2023-05-18 PROCEDURE — 3044F HG A1C LEVEL LT 7.0%: CPT | Mod: CPTII,S$GLB,,

## 2023-05-18 PROCEDURE — 1157F PR ADVANCE CARE PLAN OR EQUIV PRESENT IN MEDICAL RECORD: ICD-10-PCS | Mod: CPTII,S$GLB,,
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
## 2023-05-23 ENCOUNTER — LAB VISIT (OUTPATIENT)
Dept: LAB | Facility: HOSPITAL | Age: 68
End: 2023-05-23
Payer: MEDICARE

## 2023-05-23 DIAGNOSIS — Z79.899 HIGH RISK MEDICATIONS (NOT ANTICOAGULANTS) LONG-TERM USE: ICD-10-CM

## 2023-05-23 DIAGNOSIS — F31.9 BIPOLAR I DISORDER: ICD-10-CM

## 2023-05-23 LAB
ALBUMIN SERPL BCP-MCNC: 3.7 G/DL (ref 3.5–5.2)
ALP SERPL-CCNC: 67 U/L (ref 55–135)
ALT SERPL W/O P-5'-P-CCNC: 19 U/L (ref 10–44)
ANION GAP SERPL CALC-SCNC: 7 MMOL/L (ref 8–16)
AST SERPL-CCNC: 19 U/L (ref 10–40)
BASOPHILS # BLD AUTO: 0.05 K/UL (ref 0–0.2)
BASOPHILS NFR BLD: 0.7 % (ref 0–1.9)
BILIRUB SERPL-MCNC: 0.3 MG/DL (ref 0.1–1)
BUN SERPL-MCNC: 36 MG/DL (ref 8–23)
CALCIUM SERPL-MCNC: 9.5 MG/DL (ref 8.7–10.5)
CHLORIDE SERPL-SCNC: 108 MMOL/L (ref 95–110)
CHOLEST SERPL-MCNC: 191 MG/DL (ref 120–199)
CHOLEST/HDLC SERPL: 4 {RATIO} (ref 2–5)
CO2 SERPL-SCNC: 26 MMOL/L (ref 23–29)
CREAT SERPL-MCNC: 1.4 MG/DL (ref 0.5–1.4)
DIFFERENTIAL METHOD: NORMAL
EOSINOPHIL # BLD AUTO: 0.2 K/UL (ref 0–0.5)
EOSINOPHIL NFR BLD: 2.4 % (ref 0–8)
ERYTHROCYTE [DISTWIDTH] IN BLOOD BY AUTOMATED COUNT: 13.7 % (ref 11.5–14.5)
EST. GFR  (NO RACE VARIABLE): 41.2 ML/MIN/1.73 M^2
GLUCOSE SERPL-MCNC: 90 MG/DL (ref 70–110)
HCT VFR BLD AUTO: 38.6 % (ref 37–48.5)
HDLC SERPL-MCNC: 48 MG/DL (ref 40–75)
HDLC SERPL: 25.1 % (ref 20–50)
HGB BLD-MCNC: 12.6 G/DL (ref 12–16)
IMM GRANULOCYTES # BLD AUTO: 0.01 K/UL (ref 0–0.04)
IMM GRANULOCYTES NFR BLD AUTO: 0.1 % (ref 0–0.5)
LDLC SERPL CALC-MCNC: 125.2 MG/DL (ref 63–159)
LYMPHOCYTES # BLD AUTO: 2.1 K/UL (ref 1–4.8)
LYMPHOCYTES NFR BLD: 30.1 % (ref 18–48)
MCH RBC QN AUTO: 30 PG (ref 27–31)
MCHC RBC AUTO-ENTMCNC: 32.6 G/DL (ref 32–36)
MCV RBC AUTO: 92 FL (ref 82–98)
MONOCYTES # BLD AUTO: 0.6 K/UL (ref 0.3–1)
MONOCYTES NFR BLD: 8.4 % (ref 4–15)
NEUTROPHILS # BLD AUTO: 4 K/UL (ref 1.8–7.7)
NEUTROPHILS NFR BLD: 58.3 % (ref 38–73)
NONHDLC SERPL-MCNC: 143 MG/DL
NRBC BLD-RTO: 0 /100 WBC
PLATELET # BLD AUTO: 249 K/UL (ref 150–450)
PMV BLD AUTO: 11 FL (ref 9.2–12.9)
POTASSIUM SERPL-SCNC: 4.8 MMOL/L (ref 3.5–5.1)
PROT SERPL-MCNC: 6.5 G/DL (ref 6–8.4)
RBC # BLD AUTO: 4.2 M/UL (ref 4–5.4)
SODIUM SERPL-SCNC: 141 MMOL/L (ref 136–145)
TRIGL SERPL-MCNC: 89 MG/DL (ref 30–150)
VALPROATE SERPL-MCNC: 95.5 UG/ML (ref 50–100)
WBC # BLD AUTO: 6.8 K/UL (ref 3.9–12.7)

## 2023-05-23 PROCEDURE — 36415 COLL VENOUS BLD VENIPUNCTURE: CPT | Mod: PN

## 2023-05-23 PROCEDURE — 80053 COMPREHEN METABOLIC PANEL: CPT

## 2023-05-23 PROCEDURE — 85025 COMPLETE CBC W/AUTO DIFF WBC: CPT

## 2023-05-23 PROCEDURE — 80061 LIPID PANEL: CPT

## 2023-05-23 PROCEDURE — 80164 ASSAY DIPROPYLACETIC ACD TOT: CPT

## 2023-05-23 PROCEDURE — 83036 HEMOGLOBIN GLYCOSYLATED A1C: CPT

## 2023-05-24 LAB
ESTIMATED AVG GLUCOSE: 100 MG/DL (ref 68–131)
HBA1C MFR BLD: 5.1 % (ref 4–5.6)

## 2023-06-01 ENCOUNTER — PATIENT MESSAGE (OUTPATIENT)
Dept: ADMINISTRATIVE | Facility: HOSPITAL | Age: 68
End: 2023-06-01
Payer: MEDICARE
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
## 2023-06-21 ENCOUNTER — OFFICE VISIT (OUTPATIENT)
Dept: PSYCHIATRY | Facility: CLINIC | Age: 68
End: 2023-06-21
Payer: COMMERCIAL

## 2023-06-21 DIAGNOSIS — F41.9 ANXIETY DISORDER, UNSPECIFIED TYPE: ICD-10-CM

## 2023-06-21 DIAGNOSIS — Z79.899 HIGH RISK MEDICATIONS (NOT ANTICOAGULANTS) LONG-TERM USE: ICD-10-CM

## 2023-06-21 DIAGNOSIS — F31.9 BIPOLAR I DISORDER: Primary | ICD-10-CM

## 2023-06-21 PROCEDURE — 1157F PR ADVANCE CARE PLAN OR EQUIV PRESENT IN MEDICAL RECORD: ICD-10-PCS | Mod: CPTII,S$GLB,,

## 2023-06-21 PROCEDURE — 3044F HG A1C LEVEL LT 7.0%: CPT | Mod: CPTII,S$GLB,,

## 2023-06-21 PROCEDURE — 1159F MED LIST DOCD IN RCRD: CPT | Mod: CPTII,S$GLB,,

## 2023-06-21 PROCEDURE — 1160F RVW MEDS BY RX/DR IN RCRD: CPT | Mod: CPTII,S$GLB,,

## 2023-06-21 PROCEDURE — 3044F PR MOST RECENT HEMOGLOBIN A1C LEVEL <7.0%: ICD-10-PCS | Mod: CPTII,S$GLB,,

## 2023-06-21 PROCEDURE — 99214 PR OFFICE/OUTPT VISIT, EST, LEVL IV, 30-39 MIN: ICD-10-PCS | Mod: S$GLB,,,

## 2023-06-21 PROCEDURE — 99999 PR PBB SHADOW E&M-EST. PATIENT-LVL II: CPT | Mod: PBBFAC,,,

## 2023-06-21 PROCEDURE — 99214 OFFICE O/P EST MOD 30 MIN: CPT | Mod: S$GLB,,,

## 2023-06-21 PROCEDURE — 1159F PR MEDICATION LIST DOCUMENTED IN MEDICAL RECORD: ICD-10-PCS | Mod: CPTII,S$GLB,,

## 2023-06-21 PROCEDURE — 1157F ADVNC CARE PLAN IN RCRD: CPT | Mod: CPTII,S$GLB,,

## 2023-06-21 PROCEDURE — 99999 PR PBB SHADOW E&M-EST. PATIENT-LVL II: ICD-10-PCS | Mod: PBBFAC,,,

## 2023-06-21 PROCEDURE — 1160F PR REVIEW ALL MEDS BY PRESCRIBER/CLIN PHARMACIST DOCUMENTED: ICD-10-PCS | Mod: CPTII,S$GLB,,

## 2023-07-11 ENCOUNTER — TELEPHONE (OUTPATIENT)
Dept: PSYCHIATRY | Facility: CLINIC | Age: 68
End: 2023-07-11
Payer: MEDICARE

## 2023-07-11 ENCOUNTER — TELEPHONE (OUTPATIENT)
Dept: FAMILY MEDICINE | Facility: CLINIC | Age: 68
End: 2023-07-11
Payer: MEDICARE

## 2023-07-11 DIAGNOSIS — F43.9 STRESS: Primary | ICD-10-CM

## 2023-07-11 DIAGNOSIS — Z91.89 AT RISK FOR DOMESTIC ABUSE: ICD-10-CM
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
## 2023-07-11 NOTE — TELEPHONE ENCOUNTER
Patient called to say she needed to speak with provider in reference to power of . She said provider would know what she's talking about. It is a on going situation.

## 2023-07-12 ENCOUNTER — OFFICE VISIT (OUTPATIENT)
Dept: PSYCHIATRY | Facility: CLINIC | Age: 68
End: 2023-07-12
Payer: COMMERCIAL

## 2023-07-12 DIAGNOSIS — Z79.899 HIGH RISK MEDICATIONS (NOT ANTICOAGULANTS) LONG-TERM USE: ICD-10-CM

## 2023-07-12 DIAGNOSIS — F31.9 BIPOLAR I DISORDER: Primary | ICD-10-CM

## 2023-07-12 DIAGNOSIS — F41.9 ANXIETY DISORDER, UNSPECIFIED TYPE: ICD-10-CM

## 2023-07-12 PROCEDURE — 1157F PR ADVANCE CARE PLAN OR EQUIV PRESENT IN MEDICAL RECORD: ICD-10-PCS | Mod: CPTII,S$GLB,,

## 2023-07-12 PROCEDURE — 99214 PR OFFICE/OUTPT VISIT, EST, LEVL IV, 30-39 MIN: ICD-10-PCS | Mod: S$GLB,,,

## 2023-07-12 PROCEDURE — 90833 PR PSYCHOTHERAPY W/PATIENT W/E&M, 30 MIN (ADD ON): ICD-10-PCS | Mod: S$GLB,,,

## 2023-07-12 PROCEDURE — 1159F MED LIST DOCD IN RCRD: CPT | Mod: CPTII,S$GLB,,

## 2023-07-12 PROCEDURE — 99999 PR PBB SHADOW E&M-EST. PATIENT-LVL II: ICD-10-PCS | Mod: PBBFAC,,,

## 2023-07-12 PROCEDURE — 1157F ADVNC CARE PLAN IN RCRD: CPT | Mod: CPTII,S$GLB,,

## 2023-07-12 PROCEDURE — 3044F PR MOST RECENT HEMOGLOBIN A1C LEVEL <7.0%: ICD-10-PCS | Mod: CPTII,S$GLB,,

## 2023-07-12 PROCEDURE — 90833 PSYTX W PT W E/M 30 MIN: CPT | Mod: S$GLB,,,

## 2023-07-12 PROCEDURE — 3044F HG A1C LEVEL LT 7.0%: CPT | Mod: CPTII,S$GLB,,

## 2023-07-12 PROCEDURE — 99214 OFFICE O/P EST MOD 30 MIN: CPT | Mod: S$GLB,,,

## 2023-07-12 PROCEDURE — 1159F PR MEDICATION LIST DOCUMENTED IN MEDICAL RECORD: ICD-10-PCS | Mod: CPTII,S$GLB,,

## 2023-07-12 PROCEDURE — 1160F PR REVIEW ALL MEDS BY PRESCRIBER/CLIN PHARMACIST DOCUMENTED: ICD-10-PCS | Mod: CPTII,S$GLB,,

## 2023-07-12 PROCEDURE — 1160F RVW MEDS BY RX/DR IN RCRD: CPT | Mod: CPTII,S$GLB,,

## 2023-07-12 PROCEDURE — 99999 PR PBB SHADOW E&M-EST. PATIENT-LVL II: CPT | Mod: PBBFAC,,,

## 2023-07-12 NOTE — PATIENT INSTRUCTIONS
"Commodity Supplemental Food Program (CSFP)    The [place] Commodity Supplemental Food Program ([place] CSFP) is one of forty-three CSFP state programs nationwide, it is currently the third largest CSFP in the country.  As of 2020, our sole local agency, [employer] [facility] [employer] [facility], is allowed to serve a maximum of 51,098 clients each month.            CSF NEWS:     Please continue to check this site for updates and changes.         Types of Clients Who May Receive CSFP  The CSFP is designed to improve the diets and health of low income persons at least sixty years of age.      The Food Packages    All food packages contain high protein, nutrient dense foods, specially selected in order to ensure that clients receive proper nutrition each month. All foods are top-quality, purchased especially for our program. These foods are donated to [place] by the U.S. Department of Agriculture and are very similar to foods you would find in grocery stores. In many cases, CSFP foods come packed in the same boxes and bags as the popular brand-name foods found on grocery store shelves.    Senior citizens receive a food box each month containing the following:    canned meat, either boneless chicken, beef, pork, tuna, or beef stew  vegetables  fruits  a box of breakfast cereal or a bag of grits, oatmeal, or farina (Cream of Wheat)  a two-pound block of American cheese  a two-pound package of either spaghetti, macaroni, or rice  Ultra High Temperature Shelf Stable 1% Milk  1.8-pound box of non-fat dry milk every other month  a jar of peanut butter or a two-pound bag of dried beans  fruit juice  Nutrition Education    Clients learn about good nutrition in the following ways:     tastings of recipes made with commodity foods  short talks about good eating and the importance of exercise  delicious, easy-to-make recipes using your commodity foods  senior clients receive "Dear Friends" nutrition newsletters in their boxes  Where can " I get CSFP?    All residents of [place] who meet the income requirements and the other eligibility requirements listed above may participate in CSFP. Currently, [facility] operates food distribution operations in every [place] in the [facility], with the exceptions of [facility] and [place] (as these [facility]' populations return during the post-Andra and post-Janet period, food distribution services will be resumed when and where appropriate. Residents of these [facility] are allowed to participate in CSFP if they are able to travel to a food site in another [place]. Persons who are interested in doing so should contact [employer] [facility] [employer] [facility] at one of the telephone numbers listed below.    If you are a senior citizen and are unable to travel to one of our food sites, or lack friends, family, or neighbors who can go get your food box for you, you may be eligible for a volunteer to deliver your monthly food box to your home. Contact [employer] [facility] [employer] [facility] to learn whether you are eligible for this service.    How do I sign up for CSFP?    Please visit the Commodity Supplemental Food Program (CSFP) Participant Preliminary Certification Webpage for more information on how to qualify for the CSFP program.    Or    Call [employer] [facility] [employer] [facility] on their toll-free line: 1-755.802.1045 or 1-998.287.1455. They will tell you where the closest food site to your home is, what the serving days and times are, and they will tell you if your household income qualifies you to be part of the program.    When you go to get registered at a food site, you will need to bring:    Proof of your address (such as a utility bill, phone bill, or 's license)  Proof of your income (such as a check stub, award letter from Social Security or [employer], or check stub from one of those programs)  Proof of your age (a document listing your birth date, such as a birth certificate or a 's license)  Can I get  benefits from both CSFP and other nutrition programs at the same time?    Yes. You can receive benefits from CSFP and other nutrition programs. It is perfectly okay for the same person to receive both CSFP and:    Food [place] ([place] Purchase card)  Quarterly commodities (foods from the Emergency Food Assistance Program, TEFAP)  Congregate meals for senior citizens  Meals-on-Wheels  Foods from charitable food pantries, food cuevas, or soup kitchen  Can I get benefits from two different [facility] sites at the same time?    No. An individual may not receive food at two sites in the same month. Receiving more than one [facility] food box in the same month from two different [facility] site is illegal and may result in removal from the program, and a bill for the value of benefits illegally received.    For More Information:    Glenis Miguel, CSFP Manager  Nutrition Section, [place] [facility]  [address] [place] [address], [address]  [place] [address]    [phone]    Or     Families/[employer] for Seniors on their toll-free line: 1-474.437.9026 or [phone]     Or     Visit the USDA CSFP website    Or    Visit the [facility] Food for Families/Food for Seniors Webpage.
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
## 2023-07-13 DIAGNOSIS — F31.9 BIPOLAR I DISORDER: ICD-10-CM

## 2023-07-13 DIAGNOSIS — Z79.899 HIGH RISK MEDICATIONS (NOT ANTICOAGULANTS) LONG-TERM USE: ICD-10-CM

## 2023-07-13 RX ORDER — LOXAPINE SUCCINATE 10 MG/1
TABLET ORAL
Qty: 90 CAPSULE | Refills: 0 | Status: SHIPPED | OUTPATIENT
Start: 2023-07-13 | End: 2023-10-16

## 2023-07-13 RX ORDER — DIVALPROEX SODIUM 250 MG/1
750 TABLET, DELAYED RELEASE ORAL DAILY
Qty: 270 TABLET | Refills: 0 | Status: SHIPPED | OUTPATIENT
Start: 2023-07-13 | End: 2023-10-11

## 2023-07-13 NOTE — TELEPHONE ENCOUNTER
divalproex (DEPAKOTE) 250 MG EC tablet  Last filled 12/12/22, last office visit yesterday 7/12/23      loxapine (LOXITANE) 10 MG Cap  Last filled 12/12/22

## 2023-07-17 ENCOUNTER — TELEPHONE (OUTPATIENT)
Dept: PSYCHIATRY | Facility: CLINIC | Age: 68
End: 2023-07-17
Payer: MEDICARE

## 2023-07-17 NOTE — TELEPHONE ENCOUNTER
Called patient back per providers advice to schedule a 1 month follow up.   Patient didn't answer. I lvm

## 2023-07-17 NOTE — TELEPHONE ENCOUNTER
Patient called and lvm wanting a return call.   I called patient back and no answer lvm to let patient know that we were calling her back
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
## 2023-07-27 ENCOUNTER — PATIENT OUTREACH (OUTPATIENT)
Dept: ADMINISTRATIVE | Facility: OTHER | Age: 68
End: 2023-07-27
Payer: MEDICARE

## 2023-08-08 ENCOUNTER — PATIENT OUTREACH (OUTPATIENT)
Dept: ADMINISTRATIVE | Facility: OTHER | Age: 68
End: 2023-08-08
Payer: MEDICARE

## 2023-08-08 NOTE — PROGRESS NOTES
CHW - Case Closure    This Community Health Worker spoke to patient via telephone today.   Pt/ reported: CHW attempted to speak with patient regarding possible options for her and her living conditions with her , when patient decline any assistance and stated she will deal with her own situation.   Pt/Caregiver denied any additional needs at this time and agrees with episode closure at this time.  Provided patient with Community Health Worker's contact information and encouraged him/her to contact this Community Health Worker if additional needs arise.

## 2023-08-23 ENCOUNTER — OFFICE VISIT (OUTPATIENT)
Dept: PSYCHIATRY | Facility: CLINIC | Age: 68
End: 2023-08-23
Payer: MEDICARE

## 2023-08-23 VITALS
BODY MASS INDEX: 18.38 KG/M2 | DIASTOLIC BLOOD PRESSURE: 84 MMHG | HEART RATE: 78 BPM | WEIGHT: 99.88 LBS | SYSTOLIC BLOOD PRESSURE: 160 MMHG | HEIGHT: 62 IN

## 2023-08-23 DIAGNOSIS — Z79.899 HIGH RISK MEDICATION USE: ICD-10-CM

## 2023-08-23 DIAGNOSIS — F31.70 BIPOLAR DISORDER IN FULL REMISSION, MOST RECENT EPISODE UNSPECIFIED TYPE: Primary | ICD-10-CM

## 2023-08-23 DIAGNOSIS — F41.9 ANXIETY DISORDER, UNSPECIFIED TYPE: ICD-10-CM

## 2023-08-23 PROCEDURE — 1160F PR REVIEW ALL MEDS BY PRESCRIBER/CLIN PHARMACIST DOCUMENTED: ICD-10-PCS | Mod: CPTII,S$GLB,,

## 2023-08-23 PROCEDURE — 1157F ADVNC CARE PLAN IN RCRD: CPT | Mod: CPTII,S$GLB,,

## 2023-08-23 PROCEDURE — 3079F DIAST BP 80-89 MM HG: CPT | Mod: CPTII,S$GLB,,

## 2023-08-23 PROCEDURE — 1101F PT FALLS ASSESS-DOCD LE1/YR: CPT | Mod: CPTII,S$GLB,,

## 2023-08-23 PROCEDURE — 1126F AMNT PAIN NOTED NONE PRSNT: CPT | Mod: CPTII,S$GLB,,

## 2023-08-23 PROCEDURE — 90833 PR PSYCHOTHERAPY W/PATIENT W/E&M, 30 MIN (ADD ON): ICD-10-PCS | Mod: S$GLB,,,

## 2023-08-23 PROCEDURE — 90833 PSYTX W PT W E/M 30 MIN: CPT | Mod: S$GLB,,,

## 2023-08-23 PROCEDURE — 1159F MED LIST DOCD IN RCRD: CPT | Mod: CPTII,S$GLB,,

## 2023-08-23 PROCEDURE — 3008F PR BODY MASS INDEX (BMI) DOCUMENTED: ICD-10-PCS | Mod: CPTII,S$GLB,,

## 2023-08-23 PROCEDURE — 99214 OFFICE O/P EST MOD 30 MIN: CPT | Mod: S$GLB,,,

## 2023-08-23 PROCEDURE — 1101F PR PT FALLS ASSESS DOC 0-1 FALLS W/OUT INJ PAST YR: ICD-10-PCS | Mod: CPTII,S$GLB,,

## 2023-08-23 PROCEDURE — 1157F PR ADVANCE CARE PLAN OR EQUIV PRESENT IN MEDICAL RECORD: ICD-10-PCS | Mod: CPTII,S$GLB,,

## 2023-08-23 PROCEDURE — 99999 PR PBB SHADOW E&M-EST. PATIENT-LVL III: ICD-10-PCS | Mod: PBBFAC,,,

## 2023-08-23 PROCEDURE — 3079F PR MOST RECENT DIASTOLIC BLOOD PRESSURE 80-89 MM HG: ICD-10-PCS | Mod: CPTII,S$GLB,,

## 2023-08-23 PROCEDURE — 1126F PR PAIN SEVERITY QUANTIFIED, NO PAIN PRESENT: ICD-10-PCS | Mod: CPTII,S$GLB,,

## 2023-08-23 PROCEDURE — 3044F HG A1C LEVEL LT 7.0%: CPT | Mod: CPTII,S$GLB,,

## 2023-08-23 PROCEDURE — 3077F PR MOST RECENT SYSTOLIC BLOOD PRESSURE >= 140 MM HG: ICD-10-PCS | Mod: CPTII,S$GLB,,

## 2023-08-23 PROCEDURE — 3008F BODY MASS INDEX DOCD: CPT | Mod: CPTII,S$GLB,,

## 2023-08-23 PROCEDURE — 3044F PR MOST RECENT HEMOGLOBIN A1C LEVEL <7.0%: ICD-10-PCS | Mod: CPTII,S$GLB,,

## 2023-08-23 PROCEDURE — 1160F RVW MEDS BY RX/DR IN RCRD: CPT | Mod: CPTII,S$GLB,,

## 2023-08-23 PROCEDURE — 1159F PR MEDICATION LIST DOCUMENTED IN MEDICAL RECORD: ICD-10-PCS | Mod: CPTII,S$GLB,,

## 2023-08-23 PROCEDURE — 3288F FALL RISK ASSESSMENT DOCD: CPT | Mod: CPTII,S$GLB,,

## 2023-08-23 PROCEDURE — 99214 PR OFFICE/OUTPT VISIT, EST, LEVL IV, 30-39 MIN: ICD-10-PCS | Mod: S$GLB,,,

## 2023-08-23 PROCEDURE — 3288F PR FALLS RISK ASSESSMENT DOCUMENTED: ICD-10-PCS | Mod: CPTII,S$GLB,,

## 2023-08-23 PROCEDURE — 99999 PR PBB SHADOW E&M-EST. PATIENT-LVL III: CPT | Mod: PBBFAC,,,

## 2023-08-23 PROCEDURE — 3077F SYST BP >= 140 MM HG: CPT | Mod: CPTII,S$GLB,,
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
## 2023-08-24 ENCOUNTER — TELEPHONE (OUTPATIENT)
Dept: PSYCHIATRY | Facility: CLINIC | Age: 68
End: 2023-08-24
Payer: MEDICARE

## 2023-09-12 ENCOUNTER — OFFICE VISIT (OUTPATIENT)
Dept: PODIATRY | Facility: CLINIC | Age: 68
End: 2023-09-12
Payer: MEDICARE

## 2023-09-12 VITALS — BODY MASS INDEX: 18.38 KG/M2 | HEIGHT: 62 IN | WEIGHT: 99.88 LBS

## 2023-09-12 DIAGNOSIS — M79.89 LEG SWELLING: ICD-10-CM

## 2023-09-12 DIAGNOSIS — L84 FOOT CALLUS: Primary | ICD-10-CM

## 2023-09-12 PROCEDURE — 1125F PR PAIN SEVERITY QUANTIFIED, PAIN PRESENT: ICD-10-PCS | Mod: CPTII,S$GLB,, | Performed by: STUDENT IN AN ORGANIZED HEALTH CARE EDUCATION/TRAINING PROGRAM

## 2023-09-12 PROCEDURE — 1159F MED LIST DOCD IN RCRD: CPT | Mod: CPTII,S$GLB,, | Performed by: STUDENT IN AN ORGANIZED HEALTH CARE EDUCATION/TRAINING PROGRAM

## 2023-09-12 PROCEDURE — 1160F RVW MEDS BY RX/DR IN RCRD: CPT | Mod: CPTII,S$GLB,, | Performed by: STUDENT IN AN ORGANIZED HEALTH CARE EDUCATION/TRAINING PROGRAM

## 2023-09-12 PROCEDURE — 3044F HG A1C LEVEL LT 7.0%: CPT | Mod: CPTII,S$GLB,, | Performed by: STUDENT IN AN ORGANIZED HEALTH CARE EDUCATION/TRAINING PROGRAM

## 2023-09-12 PROCEDURE — 3044F PR MOST RECENT HEMOGLOBIN A1C LEVEL <7.0%: ICD-10-PCS | Mod: CPTII,S$GLB,, | Performed by: STUDENT IN AN ORGANIZED HEALTH CARE EDUCATION/TRAINING PROGRAM

## 2023-09-12 PROCEDURE — 1157F ADVNC CARE PLAN IN RCRD: CPT | Mod: CPTII,S$GLB,, | Performed by: STUDENT IN AN ORGANIZED HEALTH CARE EDUCATION/TRAINING PROGRAM

## 2023-09-12 PROCEDURE — 1125F AMNT PAIN NOTED PAIN PRSNT: CPT | Mod: CPTII,S$GLB,, | Performed by: STUDENT IN AN ORGANIZED HEALTH CARE EDUCATION/TRAINING PROGRAM

## 2023-09-12 PROCEDURE — 1160F PR REVIEW ALL MEDS BY PRESCRIBER/CLIN PHARMACIST DOCUMENTED: ICD-10-PCS | Mod: CPTII,S$GLB,, | Performed by: STUDENT IN AN ORGANIZED HEALTH CARE EDUCATION/TRAINING PROGRAM

## 2023-09-12 PROCEDURE — 1159F PR MEDICATION LIST DOCUMENTED IN MEDICAL RECORD: ICD-10-PCS | Mod: CPTII,S$GLB,, | Performed by: STUDENT IN AN ORGANIZED HEALTH CARE EDUCATION/TRAINING PROGRAM

## 2023-09-12 PROCEDURE — 99999 PR PBB SHADOW E&M-EST. PATIENT-LVL III: ICD-10-PCS | Mod: PBBFAC,,, | Performed by: STUDENT IN AN ORGANIZED HEALTH CARE EDUCATION/TRAINING PROGRAM

## 2023-09-12 PROCEDURE — 99999 PR PBB SHADOW E&M-EST. PATIENT-LVL III: CPT | Mod: PBBFAC,,, | Performed by: STUDENT IN AN ORGANIZED HEALTH CARE EDUCATION/TRAINING PROGRAM

## 2023-09-12 PROCEDURE — 99213 OFFICE O/P EST LOW 20 MIN: CPT | Mod: S$GLB,,, | Performed by: STUDENT IN AN ORGANIZED HEALTH CARE EDUCATION/TRAINING PROGRAM

## 2023-09-12 PROCEDURE — 99213 PR OFFICE/OUTPT VISIT, EST, LEVL III, 20-29 MIN: ICD-10-PCS | Mod: S$GLB,,, | Performed by: STUDENT IN AN ORGANIZED HEALTH CARE EDUCATION/TRAINING PROGRAM

## 2023-09-12 PROCEDURE — 3008F BODY MASS INDEX DOCD: CPT | Mod: CPTII,S$GLB,, | Performed by: STUDENT IN AN ORGANIZED HEALTH CARE EDUCATION/TRAINING PROGRAM

## 2023-09-12 PROCEDURE — 3008F PR BODY MASS INDEX (BMI) DOCUMENTED: ICD-10-PCS | Mod: CPTII,S$GLB,, | Performed by: STUDENT IN AN ORGANIZED HEALTH CARE EDUCATION/TRAINING PROGRAM

## 2023-09-12 PROCEDURE — 1157F PR ADVANCE CARE PLAN OR EQUIV PRESENT IN MEDICAL RECORD: ICD-10-PCS | Mod: CPTII,S$GLB,, | Performed by: STUDENT IN AN ORGANIZED HEALTH CARE EDUCATION/TRAINING PROGRAM

## 2023-09-12 NOTE — PROGRESS NOTES
Subjective:      Patient ID: Viridiana Suresh is a 68 y.o. female.    Chief Complaint: Foot Swelling    Viridiana is a 68 y.o. female who presents to the podiatry clinic  with complaint of bilateral bunions and painful calluses. She also complains of swelling to both legs that is unexplained and cramping to the toes. She has tried fluid pills for swelling without much success. US veins and arteries are WNL. Denies compression stocking use.     Review of Systems   Constitutional: Negative for chills and fever.   Cardiovascular:  Positive for leg swelling. Negative for claudication.   Respiratory:  Negative for shortness of breath.    Skin:  Negative for itching, nail changes and rash.   Musculoskeletal:  Positive for muscle cramps. Negative for muscle weakness and myalgias.        Toe pain   Gastrointestinal:  Negative for nausea and vomiting.   Neurological:  Positive for numbness and paresthesias. Negative for focal weakness and loss of balance.           Objective:      Physical Exam  Constitutional:       General: She is not in acute distress.     Appearance: She is well-developed. She is not diaphoretic.   Cardiovascular:      Pulses:           Dorsalis pedis pulses are 2+ on the right side and 2+ on the left side.        Posterior tibial pulses are 2+ on the right side and 2+ on the left side.      Comments: < 3 sec capillary refill time to toes 1-5 bilateral. Toes and feet are warm to touch proximally with normal distal cooling b/l. There is some hair growth on the feet and toes b/l. There is no edema b/l. No spider veins or varicosities present b/l.     Musculoskeletal:      Right lower le+ Edema present.      Left lower le+ Edema present.   Skin:     General: Skin is warm and dry.      Coloration: Skin is not pale.      Findings: Lesion present. No abrasion, bruising, burn, ecchymosis, erythema, laceration, petechiae or rash.      Nails: There is no clubbing.      Comments: Skin temperature, texture and  turgor within normal limits.    Hyperkeratotic lesion bilateral plantar fifth met head   Neurological:      Mental Status: She is alert and oriented to person, place, and time.      Sensory: Sensory deficit present.      Motor: No tremor, atrophy or abnormal muscle tone.      Comments: Negative tinel sign bilateral. Diminished vibratory and protective sensation bilateral.   Psychiatric:         Behavior: Behavior normal.               Assessment:       Encounter Diagnoses   Name Primary?    Foot callus Yes    Leg swelling          Plan:       Viridiana was seen today for foot swelling.    Diagnoses and all orders for this visit:    Foot callus    Leg swelling  -     COMPRESSION STOCKINGS      I counseled the patient on her conditions, their implications and medical management.    Wear aperture pads for the foot callus.    I recommend compression stockings for the b/l LE edema.     F/u prn.    Rasta Marcelo DPM

## 2023-10-11 DIAGNOSIS — F31.9 BIPOLAR I DISORDER: ICD-10-CM

## 2023-10-11 RX ORDER — DIVALPROEX SODIUM 250 MG/1
750 TABLET, DELAYED RELEASE ORAL DAILY
Qty: 270 TABLET | Refills: 0 | Status: SHIPPED | OUTPATIENT
Start: 2023-10-11 | End: 2023-11-28 | Stop reason: SDUPTHER

## 2023-10-11 NOTE — TELEPHONE ENCOUNTER
Last ordered: 3 months ago (7/13/2023) by Mary Herrera NP    Last refill: 7/13/2023     Nov 11/28/23

## 2023-11-06 ENCOUNTER — LAB VISIT (OUTPATIENT)
Dept: LAB | Facility: HOSPITAL | Age: 68
End: 2023-11-06
Payer: MEDICARE

## 2023-11-06 DIAGNOSIS — Z79.899 HIGH RISK MEDICATION USE: ICD-10-CM

## 2023-11-06 LAB
ALBUMIN SERPL BCP-MCNC: 3.5 G/DL (ref 3.5–5.2)
ALP SERPL-CCNC: 68 U/L (ref 55–135)
ALT SERPL W/O P-5'-P-CCNC: 12 U/L (ref 10–44)
ANION GAP SERPL CALC-SCNC: 8 MMOL/L (ref 8–16)
AST SERPL-CCNC: 15 U/L (ref 10–40)
BASOPHILS # BLD AUTO: 0.06 K/UL (ref 0–0.2)
BASOPHILS NFR BLD: 0.9 % (ref 0–1.9)
BILIRUB SERPL-MCNC: 0.4 MG/DL (ref 0.1–1)
BUN SERPL-MCNC: 20 MG/DL (ref 8–23)
CALCIUM SERPL-MCNC: 10 MG/DL (ref 8.7–10.5)
CHLORIDE SERPL-SCNC: 110 MMOL/L (ref 95–110)
CHOLEST SERPL-MCNC: 195 MG/DL (ref 120–199)
CHOLEST/HDLC SERPL: 3.9 {RATIO} (ref 2–5)
CO2 SERPL-SCNC: 28 MMOL/L (ref 23–29)
CREAT SERPL-MCNC: 1.3 MG/DL (ref 0.5–1.4)
DIFFERENTIAL METHOD: ABNORMAL
EOSINOPHIL # BLD AUTO: 0.3 K/UL (ref 0–0.5)
EOSINOPHIL NFR BLD: 3.8 % (ref 0–8)
ERYTHROCYTE [DISTWIDTH] IN BLOOD BY AUTOMATED COUNT: 14.4 % (ref 11.5–14.5)
EST. GFR  (NO RACE VARIABLE): 45.1 ML/MIN/1.73 M^2
ESTIMATED AVG GLUCOSE: 97 MG/DL (ref 68–131)
GLUCOSE SERPL-MCNC: 88 MG/DL (ref 70–110)
HBA1C MFR BLD: 5 % (ref 4–5.6)
HCT VFR BLD AUTO: 47.3 % (ref 37–48.5)
HDLC SERPL-MCNC: 50 MG/DL (ref 40–75)
HDLC SERPL: 25.6 % (ref 20–50)
HGB BLD-MCNC: 14.9 G/DL (ref 12–16)
IMM GRANULOCYTES # BLD AUTO: 0.01 K/UL (ref 0–0.04)
IMM GRANULOCYTES NFR BLD AUTO: 0.1 % (ref 0–0.5)
LDLC SERPL CALC-MCNC: 123 MG/DL (ref 63–159)
LYMPHOCYTES # BLD AUTO: 2.6 K/UL (ref 1–4.8)
LYMPHOCYTES NFR BLD: 37.9 % (ref 18–48)
MCH RBC QN AUTO: 29.7 PG (ref 27–31)
MCHC RBC AUTO-ENTMCNC: 31.5 G/DL (ref 32–36)
MCV RBC AUTO: 94 FL (ref 82–98)
MONOCYTES # BLD AUTO: 0.5 K/UL (ref 0.3–1)
MONOCYTES NFR BLD: 7.3 % (ref 4–15)
NEUTROPHILS # BLD AUTO: 3.4 K/UL (ref 1.8–7.7)
NEUTROPHILS NFR BLD: 50 % (ref 38–73)
NONHDLC SERPL-MCNC: 145 MG/DL
NRBC BLD-RTO: 0 /100 WBC
PLATELET # BLD AUTO: 231 K/UL (ref 150–450)
PMV BLD AUTO: 10.7 FL (ref 9.2–12.9)
POTASSIUM SERPL-SCNC: 4.9 MMOL/L (ref 3.5–5.1)
PROT SERPL-MCNC: 6.5 G/DL (ref 6–8.4)
RBC # BLD AUTO: 5.01 M/UL (ref 4–5.4)
SODIUM SERPL-SCNC: 146 MMOL/L (ref 136–145)
TRIGL SERPL-MCNC: 110 MG/DL (ref 30–150)
VALPROATE SERPL-MCNC: 59.1 UG/ML (ref 50–100)
WBC # BLD AUTO: 6.86 K/UL (ref 3.9–12.7)

## 2023-11-06 PROCEDURE — 83036 HEMOGLOBIN GLYCOSYLATED A1C: CPT

## 2023-11-06 PROCEDURE — 80053 COMPREHEN METABOLIC PANEL: CPT

## 2023-11-06 PROCEDURE — 36415 COLL VENOUS BLD VENIPUNCTURE: CPT | Mod: PN

## 2023-11-06 PROCEDURE — 80061 LIPID PANEL: CPT

## 2023-11-06 PROCEDURE — 80164 ASSAY DIPROPYLACETIC ACD TOT: CPT

## 2023-11-06 PROCEDURE — 85025 COMPLETE CBC W/AUTO DIFF WBC: CPT

## 2023-11-28 ENCOUNTER — OFFICE VISIT (OUTPATIENT)
Dept: PSYCHIATRY | Facility: CLINIC | Age: 68
End: 2023-11-28
Payer: COMMERCIAL

## 2023-11-28 VITALS
BODY MASS INDEX: 19.53 KG/M2 | WEIGHT: 106.13 LBS | DIASTOLIC BLOOD PRESSURE: 75 MMHG | HEIGHT: 62 IN | SYSTOLIC BLOOD PRESSURE: 120 MMHG | HEART RATE: 83 BPM

## 2023-11-28 DIAGNOSIS — Z79.899 HIGH RISK MEDICATIONS (NOT ANTICOAGULANTS) LONG-TERM USE: ICD-10-CM

## 2023-11-28 DIAGNOSIS — F31.9 BIPOLAR I DISORDER: Primary | ICD-10-CM

## 2023-11-28 DIAGNOSIS — F41.9 ANXIETY DISORDER, UNSPECIFIED TYPE: ICD-10-CM

## 2023-11-28 PROCEDURE — 3008F BODY MASS INDEX DOCD: CPT | Mod: CPTII,S$GLB,,

## 2023-11-28 PROCEDURE — 99214 PR OFFICE/OUTPT VISIT, EST, LEVL IV, 30-39 MIN: ICD-10-PCS | Mod: S$GLB,,,

## 2023-11-28 PROCEDURE — 1157F ADVNC CARE PLAN IN RCRD: CPT | Mod: CPTII,S$GLB,,

## 2023-11-28 PROCEDURE — 3078F PR MOST RECENT DIASTOLIC BLOOD PRESSURE < 80 MM HG: ICD-10-PCS | Mod: CPTII,S$GLB,,

## 2023-11-28 PROCEDURE — 1159F PR MEDICATION LIST DOCUMENTED IN MEDICAL RECORD: ICD-10-PCS | Mod: CPTII,S$GLB,,

## 2023-11-28 PROCEDURE — 3074F PR MOST RECENT SYSTOLIC BLOOD PRESSURE < 130 MM HG: ICD-10-PCS | Mod: CPTII,S$GLB,,

## 2023-11-28 PROCEDURE — 90833 PR PSYCHOTHERAPY W/PATIENT W/E&M, 30 MIN (ADD ON): ICD-10-PCS | Mod: S$GLB,,,

## 2023-11-28 PROCEDURE — 3008F PR BODY MASS INDEX (BMI) DOCUMENTED: ICD-10-PCS | Mod: CPTII,S$GLB,,

## 2023-11-28 PROCEDURE — 1160F PR REVIEW ALL MEDS BY PRESCRIBER/CLIN PHARMACIST DOCUMENTED: ICD-10-PCS | Mod: CPTII,S$GLB,,

## 2023-11-28 PROCEDURE — 3078F DIAST BP <80 MM HG: CPT | Mod: CPTII,S$GLB,,

## 2023-11-28 PROCEDURE — 1157F PR ADVANCE CARE PLAN OR EQUIV PRESENT IN MEDICAL RECORD: ICD-10-PCS | Mod: CPTII,S$GLB,,

## 2023-11-28 PROCEDURE — 3074F SYST BP LT 130 MM HG: CPT | Mod: CPTII,S$GLB,,

## 2023-11-28 PROCEDURE — 99999 PR PBB SHADOW E&M-EST. PATIENT-LVL III: ICD-10-PCS | Mod: PBBFAC,,,

## 2023-11-28 PROCEDURE — 1126F AMNT PAIN NOTED NONE PRSNT: CPT | Mod: CPTII,S$GLB,,

## 2023-11-28 PROCEDURE — 90833 PSYTX W PT W E/M 30 MIN: CPT | Mod: S$GLB,,,

## 2023-11-28 PROCEDURE — 3288F FALL RISK ASSESSMENT DOCD: CPT | Mod: CPTII,S$GLB,,

## 2023-11-28 PROCEDURE — 1160F RVW MEDS BY RX/DR IN RCRD: CPT | Mod: CPTII,S$GLB,,

## 2023-11-28 PROCEDURE — 1101F PR PT FALLS ASSESS DOC 0-1 FALLS W/OUT INJ PAST YR: ICD-10-PCS | Mod: CPTII,S$GLB,,

## 2023-11-28 PROCEDURE — 1101F PT FALLS ASSESS-DOCD LE1/YR: CPT | Mod: CPTII,S$GLB,,

## 2023-11-28 PROCEDURE — 1126F PR PAIN SEVERITY QUANTIFIED, NO PAIN PRESENT: ICD-10-PCS | Mod: CPTII,S$GLB,,

## 2023-11-28 PROCEDURE — 3288F PR FALLS RISK ASSESSMENT DOCUMENTED: ICD-10-PCS | Mod: CPTII,S$GLB,,

## 2023-11-28 PROCEDURE — 99999 PR PBB SHADOW E&M-EST. PATIENT-LVL III: CPT | Mod: PBBFAC,,,

## 2023-11-28 PROCEDURE — 99214 OFFICE O/P EST MOD 30 MIN: CPT | Mod: S$GLB,,,

## 2023-11-28 PROCEDURE — 3044F PR MOST RECENT HEMOGLOBIN A1C LEVEL <7.0%: ICD-10-PCS | Mod: CPTII,S$GLB,,

## 2023-11-28 PROCEDURE — 3044F HG A1C LEVEL LT 7.0%: CPT | Mod: CPTII,S$GLB,,

## 2023-11-28 PROCEDURE — 1159F MED LIST DOCD IN RCRD: CPT | Mod: CPTII,S$GLB,,

## 2023-11-28 RX ORDER — LOXAPINE SUCCINATE 10 MG/1
10 TABLET ORAL DAILY
Qty: 90 CAPSULE | Refills: 0 | Status: SHIPPED | OUTPATIENT
Start: 2023-11-28 | End: 2024-02-29

## 2023-11-28 RX ORDER — DIVALPROEX SODIUM 250 MG/1
750 TABLET, DELAYED RELEASE ORAL DAILY
Qty: 270 TABLET | Refills: 0 | Status: SHIPPED | OUTPATIENT
Start: 2023-11-28 | End: 2024-03-21
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
## 2023-12-14 ENCOUNTER — OFFICE VISIT (OUTPATIENT)
Dept: PODIATRY | Facility: CLINIC | Age: 68
End: 2023-12-14
Payer: MEDICARE

## 2023-12-14 VITALS — BODY MASS INDEX: 19.51 KG/M2 | HEIGHT: 62 IN | WEIGHT: 106 LBS

## 2023-12-14 DIAGNOSIS — M21.621 TAILOR'S BUNION OF BOTH FEET: Primary | ICD-10-CM

## 2023-12-14 DIAGNOSIS — L84 FOOT CALLUS: ICD-10-CM

## 2023-12-14 DIAGNOSIS — M21.622 TAILOR'S BUNION OF BOTH FEET: Primary | ICD-10-CM

## 2023-12-14 PROCEDURE — 99999 PR PBB SHADOW E&M-EST. PATIENT-LVL III: CPT | Mod: PBBFAC,,, | Performed by: STUDENT IN AN ORGANIZED HEALTH CARE EDUCATION/TRAINING PROGRAM

## 2023-12-14 PROCEDURE — 99213 OFFICE O/P EST LOW 20 MIN: CPT | Mod: S$GLB,,, | Performed by: STUDENT IN AN ORGANIZED HEALTH CARE EDUCATION/TRAINING PROGRAM

## 2023-12-14 PROCEDURE — 1160F RVW MEDS BY RX/DR IN RCRD: CPT | Mod: CPTII,S$GLB,, | Performed by: STUDENT IN AN ORGANIZED HEALTH CARE EDUCATION/TRAINING PROGRAM

## 2023-12-14 PROCEDURE — 1157F PR ADVANCE CARE PLAN OR EQUIV PRESENT IN MEDICAL RECORD: ICD-10-PCS | Mod: CPTII,S$GLB,, | Performed by: STUDENT IN AN ORGANIZED HEALTH CARE EDUCATION/TRAINING PROGRAM

## 2023-12-14 PROCEDURE — 1101F PT FALLS ASSESS-DOCD LE1/YR: CPT | Mod: CPTII,S$GLB,, | Performed by: STUDENT IN AN ORGANIZED HEALTH CARE EDUCATION/TRAINING PROGRAM

## 2023-12-14 PROCEDURE — 3044F PR MOST RECENT HEMOGLOBIN A1C LEVEL <7.0%: ICD-10-PCS | Mod: CPTII,S$GLB,, | Performed by: STUDENT IN AN ORGANIZED HEALTH CARE EDUCATION/TRAINING PROGRAM

## 2023-12-14 PROCEDURE — 3288F PR FALLS RISK ASSESSMENT DOCUMENTED: ICD-10-PCS | Mod: CPTII,S$GLB,, | Performed by: STUDENT IN AN ORGANIZED HEALTH CARE EDUCATION/TRAINING PROGRAM

## 2023-12-14 PROCEDURE — 1159F PR MEDICATION LIST DOCUMENTED IN MEDICAL RECORD: ICD-10-PCS | Mod: CPTII,S$GLB,, | Performed by: STUDENT IN AN ORGANIZED HEALTH CARE EDUCATION/TRAINING PROGRAM

## 2023-12-14 PROCEDURE — 3008F BODY MASS INDEX DOCD: CPT | Mod: CPTII,S$GLB,, | Performed by: STUDENT IN AN ORGANIZED HEALTH CARE EDUCATION/TRAINING PROGRAM

## 2023-12-14 PROCEDURE — 1159F MED LIST DOCD IN RCRD: CPT | Mod: CPTII,S$GLB,, | Performed by: STUDENT IN AN ORGANIZED HEALTH CARE EDUCATION/TRAINING PROGRAM

## 2023-12-14 PROCEDURE — 3288F FALL RISK ASSESSMENT DOCD: CPT | Mod: CPTII,S$GLB,, | Performed by: STUDENT IN AN ORGANIZED HEALTH CARE EDUCATION/TRAINING PROGRAM

## 2023-12-14 PROCEDURE — 1101F PR PT FALLS ASSESS DOC 0-1 FALLS W/OUT INJ PAST YR: ICD-10-PCS | Mod: CPTII,S$GLB,, | Performed by: STUDENT IN AN ORGANIZED HEALTH CARE EDUCATION/TRAINING PROGRAM

## 2023-12-14 PROCEDURE — 99213 PR OFFICE/OUTPT VISIT, EST, LEVL III, 20-29 MIN: ICD-10-PCS | Mod: S$GLB,,, | Performed by: STUDENT IN AN ORGANIZED HEALTH CARE EDUCATION/TRAINING PROGRAM

## 2023-12-14 PROCEDURE — 99999 PR PBB SHADOW E&M-EST. PATIENT-LVL III: ICD-10-PCS | Mod: PBBFAC,,, | Performed by: STUDENT IN AN ORGANIZED HEALTH CARE EDUCATION/TRAINING PROGRAM

## 2023-12-14 PROCEDURE — 3008F PR BODY MASS INDEX (BMI) DOCUMENTED: ICD-10-PCS | Mod: CPTII,S$GLB,, | Performed by: STUDENT IN AN ORGANIZED HEALTH CARE EDUCATION/TRAINING PROGRAM

## 2023-12-14 PROCEDURE — 1157F ADVNC CARE PLAN IN RCRD: CPT | Mod: CPTII,S$GLB,, | Performed by: STUDENT IN AN ORGANIZED HEALTH CARE EDUCATION/TRAINING PROGRAM

## 2023-12-14 PROCEDURE — 1125F AMNT PAIN NOTED PAIN PRSNT: CPT | Mod: CPTII,S$GLB,, | Performed by: STUDENT IN AN ORGANIZED HEALTH CARE EDUCATION/TRAINING PROGRAM

## 2023-12-14 PROCEDURE — 3044F HG A1C LEVEL LT 7.0%: CPT | Mod: CPTII,S$GLB,, | Performed by: STUDENT IN AN ORGANIZED HEALTH CARE EDUCATION/TRAINING PROGRAM

## 2023-12-14 PROCEDURE — 1160F PR REVIEW ALL MEDS BY PRESCRIBER/CLIN PHARMACIST DOCUMENTED: ICD-10-PCS | Mod: CPTII,S$GLB,, | Performed by: STUDENT IN AN ORGANIZED HEALTH CARE EDUCATION/TRAINING PROGRAM

## 2023-12-14 PROCEDURE — 1125F PR PAIN SEVERITY QUANTIFIED, PAIN PRESENT: ICD-10-PCS | Mod: CPTII,S$GLB,, | Performed by: STUDENT IN AN ORGANIZED HEALTH CARE EDUCATION/TRAINING PROGRAM

## 2023-12-18 NOTE — PROGRESS NOTES
Subjective:      Patient ID: Viridiana Suresh is a 68 y.o. female.    Chief Complaint: Carole Kemp is a 68 y.o. female who presents to the podiatry clinic  with complaint of bilateral bunions and painful calluses. She also complains of swelling to both legs that is unexplained and cramping to the toes. She has tried fluid pills for swelling without much success. US veins and arteries are WNL. Denies compression stocking use.     Ms. Suresh returns for painful calluses.     Review of Systems   Constitutional: Negative for chills and fever.   Cardiovascular:  Positive for leg swelling. Negative for claudication.   Respiratory:  Negative for shortness of breath.    Skin:  Negative for itching, nail changes and rash.   Musculoskeletal:  Positive for muscle cramps. Negative for muscle weakness and myalgias.        Toe pain   Gastrointestinal:  Negative for nausea and vomiting.   Neurological:  Positive for numbness and paresthesias. Negative for focal weakness and loss of balance.           Objective:      Physical Exam  Constitutional:       General: She is not in acute distress.     Appearance: She is well-developed. She is not diaphoretic.   Cardiovascular:      Pulses:           Dorsalis pedis pulses are 2+ on the right side and 2+ on the left side.        Posterior tibial pulses are 2+ on the right side and 2+ on the left side.      Comments: < 3 sec capillary refill time to toes 1-5 bilateral. Toes and feet are warm to touch proximally with normal distal cooling b/l. There is some hair growth on the feet and toes b/l. There is no edema b/l. No spider veins or varicosities present b/l.     Musculoskeletal:      Right lower le+ Edema present.      Left lower le+ Edema present.   Skin:     General: Skin is warm and dry.      Coloration: Skin is not pale.      Findings: Lesion present. No abrasion, bruising, burn, ecchymosis, erythema, laceration, petechiae or rash.      Nails: There is no clubbing.       Comments: Skin temperature, texture and turgor within normal limits.    Hyperkeratotic lesion bilateral plantar fifth met head   Neurological:      Mental Status: She is alert and oriented to person, place, and time.      Sensory: Sensory deficit present.      Motor: No tremor, atrophy or abnormal muscle tone.      Comments: Negative tinel sign bilateral. Diminished vibratory and protective sensation bilateral.   Psychiatric:         Behavior: Behavior normal.               Assessment:       Encounter Diagnoses   Name Primary?    Tailor's bunion of both feet Yes    Foot callus            Plan:       Viridiana was seen today for [facility].    Diagnoses and all orders for this visit:    Reagan's bunion of both feet    Foot callus        I counseled the patient on her conditions, their implications and medical management.    Callus was cored out today. I again discussed surgical options but we can try offloading with metatarsal pads.    F/u as needed.    Rasta Marcelo DPM

## 2024-01-05 ENCOUNTER — PATIENT MESSAGE (OUTPATIENT)
Dept: ADMINISTRATIVE | Facility: HOSPITAL | Age: 69
End: 2024-01-05
Payer: MEDICARE

## 2024-01-12 ENCOUNTER — OFFICE VISIT (OUTPATIENT)
Dept: PULMONOLOGY | Facility: CLINIC | Age: 69
End: 2024-01-12
Payer: MEDICARE

## 2024-01-12 VITALS
OXYGEN SATURATION: 94 % | HEART RATE: 77 BPM | DIASTOLIC BLOOD PRESSURE: 77 MMHG | HEIGHT: 62 IN | SYSTOLIC BLOOD PRESSURE: 128 MMHG | BODY MASS INDEX: 19.6 KG/M2 | WEIGHT: 106.5 LBS

## 2024-01-12 DIAGNOSIS — J44.89 COPD WITH ASTHMA: ICD-10-CM

## 2024-01-12 DIAGNOSIS — F17.200 TOBACCO DEPENDENCE: ICD-10-CM

## 2024-01-12 DIAGNOSIS — N18.30 STAGE 3 CHRONIC KIDNEY DISEASE, UNSPECIFIED WHETHER STAGE 3A OR 3B CKD: ICD-10-CM

## 2024-01-12 DIAGNOSIS — R06.02 SOB (SHORTNESS OF BREATH): ICD-10-CM

## 2024-01-12 DIAGNOSIS — J43.2 CENTRILOBULAR EMPHYSEMA: Primary | ICD-10-CM

## 2024-01-12 PROCEDURE — 99999 PR PBB SHADOW E&M-EST. PATIENT-LVL III: CPT | Mod: PBBFAC,,, | Performed by: NURSE PRACTITIONER

## 2024-01-12 PROCEDURE — 99204 OFFICE O/P NEW MOD 45 MIN: CPT | Mod: 25,S$GLB,, | Performed by: NURSE PRACTITIONER

## 2024-01-12 PROCEDURE — 94664 DEMO&/EVAL PT USE INHALER: CPT | Mod: S$GLB,,, | Performed by: NURSE PRACTITIONER

## 2024-01-12 RX ORDER — ALBUTEROL SULFATE 90 UG/1
2 AEROSOL, METERED RESPIRATORY (INHALATION) EVERY 6 HOURS PRN
Qty: 18 G | Refills: 11 | Status: SHIPPED | OUTPATIENT
Start: 2024-01-12

## 2024-01-12 RX ORDER — FLUTICASONE FUROATE, UMECLIDINIUM BROMIDE AND VILANTEROL TRIFENATATE 200; 62.5; 25 UG/1; UG/1; UG/1
1 POWDER RESPIRATORY (INHALATION) DAILY
Qty: 60 EACH | Refills: 11 | Status: SHIPPED | OUTPATIENT
Start: 2024-01-12

## 2024-01-12 RX ORDER — IPRATROPIUM BROMIDE AND ALBUTEROL SULFATE 2.5; .5 MG/3ML; MG/3ML
3 SOLUTION RESPIRATORY (INHALATION) EVERY 6 HOURS PRN
Qty: 120 ML | Refills: 5 | Status: SHIPPED | OUTPATIENT
Start: 2024-01-12 | End: 2025-01-11

## 2024-01-12 NOTE — PROGRESS NOTES
1/12/2024    Viridiana Suresh  New Patient Consult    Chief Complaint   Patient presents with    COPD       HPI:  01/12/2024: Hx: COPD, Bipolar, Heavy smoking history   In office today alone.  Denies being seen by prior Pulmonologist. Denies current use of supplemental oxygen, CPAP use. Denies personal history of cancer, PE, current anticoagulation use.  Patient endorses previous history of COPD.   Shortness of breath:  Gradually worsening over the years.  Shortness of breath occurs at rest with conversation.  Affecting ADLs.  Can not walk throughout her home and ascend staircase without having to stop for rest.  Associated with wheezing and chest tightness.  Cough:  Productive with thick mucous described as black/brown in color.  Cough does not have a time correlation.  Patient states approximately 2 weeks ago she coughed up a 6 legged bug mixed in with her mucus.  Cough occurs daily.  Feels as if she can not catch her breath until she clears the mucus each day.  Currently using Trelegy 1 puff once per day, states she has been on this inhaler for approximately 2 years.  Also use albuterol as needed approximately 2-3 times per day.  Does not appreciate any benefit with inhaler therapy.  Not currently on nebulized treatments.  Endorses significant tobacco use.  Started smoking at age 14 years old, current tobacco use is at 1 pack per day.  Approximately 8 years ago started vaping.  Also endorses the use of of medical marijuana.              Social Hx:  Lives with  - One dog and One cat in the home. Retired Artist. No Asbestosis exposure, Smoking Hx: Current smoker - smoking history as above.  Family Hx: No Lung Cancer, Aunt with COPD, Sister with Asthma  Medical Hx: Previous pneumonia ; No previous shoulder/chest surgery        The Chief Complaint is: New to me      PFSH:  Past Medical History:   Diagnosis Date    Bipolar disorder     Cancer 1995    melanoma rt third toe    CKD (chronic kidney disease)     COPD  (chronic obstructive pulmonary disease)     CTS (carpal tunnel syndrome)     Diverticulosis     Hepatomegaly     Presence of dental bridge     UPPER         Past Surgical History:   Procedure Laterality Date    APPENDECTOMY      BREAST SURGERY      Needle and surgical biopsy    COLONOSCOPY N/A 06/12/2018    Procedure: COLONOSCOPY;  Surgeon: Uche Crowell MD;  [facility];  Service: Endoscopy;  Laterality: N/A; repeat in 10 years for screening    COLONOSCOPY N/A 2/23/2023    Procedure: COLONOSCOPY;  Surgeon: Uche Crowell MD;  Location: [facility];  Service: Endoscopy;  Laterality: N/A;    ESOPHAGOGASTRODUODENOSCOPY N/A 2/23/2023    Procedure: EGD (ESOPHAGOGASTRODUODENOSCOPY);  Surgeon: Uche Crowell MD;  Location: [facility];  Service: Endoscopy;  Laterality: N/A;    LUMBAR LAMINECTOMY Right 02/25/2021    Procedure: LAMINECTOMY, SPINE, LUMBAR RIGHT L5-S1 HEMILAMIOTOMY AND RESECTION OF SYNOVIAL CYST;  Surgeon: Ramos Boyd MD;  Location: [facility];  Service: Neurosurgery;  Laterality: Right;    NASAL SEPTUM SURGERY      RHINOPHYMA RESECTION      RHINOPLASTY TIP      SKIN BIOPSY      TRANSFORAMINAL EPIDURAL INJECTION OF STEROID Right 10/27/2021    Procedure: Injection,steroid,epidural,transforaminal approach L5/S1 and facet cyst aspiration;  Surgeon: Rigo Ca MD;  Location: [facility];  Service: Pain Management;  Laterality: Right;    TUBAL LIGATION       Social History     Tobacco Use    Smoking status: Every Day     Current packs/day: 2.00     Average packs/day: 2.0 packs/day for 43.0 years (86.0 ttl pk-yrs)     Types: Vaping with nicotine, Cigarettes    Smokeless tobacco: Never   Substance Use Topics    Alcohol use: No    Drug use: No     Family History   Problem Relation Age of Onset    Cancer Mother 80        breast cancer    Diabetes Mother     Cancer Father 77        pancreatic cancer    Diabetes Maternal Grandmother     Asthma Sister     Colon cancer Neg Hx     Colon polyps Neg  "Hx     Crohn's disease Neg Hx     Ulcerative colitis Neg Hx     Celiac disease Neg Hx      Review of patient's allergies indicates:  No Known Allergies      I have reviewed past medical, family, and social history.     Performance Status:The patient's activity level is housebound activities.        Review of Systems   Constitutional:  Positive for activity change and unexpected weight change. Negative for appetite change, chills, diaphoresis, fatigue and fever.        Weight on and off     HENT:  Positive for congestion. Negative for sinus pressure, sinus pain, sore throat and trouble swallowing.    Eyes:  Negative for photophobia and visual disturbance.   Respiratory:  Positive for cough, chest tightness, shortness of breath and wheezing. Negative for choking.    Cardiovascular:  Positive for leg swelling. Negative for chest pain and palpitations.        Present for one year   Gastrointestinal:  Positive for nausea and vomiting. Negative for abdominal distention, abdominal pain, blood in stool, constipation and diarrhea.   Genitourinary:  Negative for difficulty urinating, dysuria, flank pain and hematuria.   Musculoskeletal:  Positive for back pain. Negative for gait problem, joint swelling and neck pain.   Skin:  Negative for rash and wound.   Allergic/Immunologic: Negative for environmental allergies and food allergies.   Neurological:  Negative for dizziness, seizures, syncope, weakness, light-headedness, numbness and headaches.        Balance issues     Hematological:  Does not bruise/bleed easily.   Psychiatric/Behavioral:  Negative for confusion and sleep disturbance. The patient is nervous/anxious.          Exam:Comprehensive exam done. /77 (BP Location: Left arm, Patient Position: Sitting, BP Method: Medium (Automatic))   Pulse 77   Ht 5' 2" (1.575 m)   Wt 48.3 kg (106 lb 7.7 oz)   SpO2 (!) 94%   BMI 19.48 kg/m²   Exam included Vitals as listed  Constitutional: She is oriented to person, place, " and time. She appears well-developed. No distress.   Nose: Nose normal.   Mouth/Throat: Uvula is midline, oropharynx is clear and moist and mucous membranes are normal. No dental caries. No oropharyngeal exudate, posterior oropharyngeal edema, posterior oropharyngeal erythema or tonsillar abscesses.    Eyes: Pupils are equal, round, and reactive to light.   Neck: No JVD present. No thyromegaly present.   Cardiovascular: Normal rate, regular rhythm and normal heart sounds. Exam reveals no gallop and no friction rub.   No murmur heard.  Pulmonary/Chest: Effort normal and breath sounds normal. No accessory muscle usage or stridor. No apnea and no tachypnea. No respiratory distress, decreased breath sounds, wheezes, rhonchi, rales, or tenderness.   Abdominal: Soft. She exhibits no mass. There is no tenderness. No hepatosplenomegaly, hernias and normoactive bowel sounds  Musculoskeletal: Normal range of motion. exhibits no edema.   Neurological:  alert and oriented to person, place, and time. not disoriented.   Skin: Skin is warm and dry. Capillary refill takes less 2 sec. No cyanosis or erythema. No pallor. Nails show no clubbing.   Psychiatric: normal mood and affect. behavior is normal. Judgment and thought content normal.       Radiographs (ct chest and cxr) reviewed: was done by direct view   Patient imaging studies were reviewed and interpreted independently. My personal interpretation of most recent images include:  CXR - 3/10/2023 - No acute process  CT Chest Abdomen Pelvis With Contrast (xpd)11/25/2022 - Pulmonary emphysema bilaterally      Labs Patient's labs were reviewed including CBC and CMP  Lab Results   Component Value Date    WBC 6.86 11/06/2023    RBC 5.01 11/06/2023    HGB 14.9 11/06/2023    HCT 47.3 11/06/2023    MCV 94 11/06/2023    MCH 29.7 11/06/2023    MCHC 31.5 (L) 11/06/2023    RDW 14.4 11/06/2023     11/06/2023    MPV 10.7 11/06/2023    GRAN 3.4 11/06/2023    GRAN 50.0 11/06/2023     LYMPH 2.6 11/06/2023    LYMPH 37.9 11/06/2023    MONO 0.5 11/06/2023    MONO 7.3 11/06/2023    EOS 0.3 11/06/2023    BASO 0.06 11/06/2023    EOSINOPHIL 3.8 11/06/2023    BASOPHIL 0.9 11/06/2023   CMP  Sodium   Date Value Ref Range Status   11/06/2023 146 (H) 136 - 145 mmol/L Final     Potassium   Date Value Ref Range Status   11/06/2023 4.9 3.5 - 5.1 mmol/L Final     Chloride   Date Value Ref Range Status   11/06/2023 110 95 - 110 mmol/L Final     CO2   Date Value Ref Range Status   11/06/2023 28 23 - 29 mmol/L Final     Glucose   Date Value Ref Range Status   11/06/2023 88 70 - 110 mg/dL Final     BUN   Date Value Ref Range Status   11/06/2023 20 8 - 23 mg/dL Final     Creatinine   Date Value Ref Range Status   11/06/2023 1.3 0.5 - 1.4 mg/dL Final   01/14/2013 1.1 0.5 - 1.4 mg/dL Final     Calcium   Date Value Ref Range Status   11/06/2023 10.0 8.7 - 10.5 mg/dL Final   01/14/2013 10.1 8.7 - 10.5 mg/dL Final     Total Protein   Date Value Ref Range Status   11/06/2023 6.5 6.0 - 8.4 g/dL Final     Albumin   Date Value Ref Range Status   11/06/2023 3.5 3.5 - 5.2 g/dL Final     Total Bilirubin   Date Value Ref Range Status   11/06/2023 0.4 0.1 - 1.0 mg/dL Final     Comment:     For infants and newborns, interpretation of results should be based  on gestational age, weight and in agreement with clinical  observations.    Premature Infant recommended reference ranges:  Up to 24 hours.............<8.0 mg/dL  Up to 48 hours............<12.0 mg/dL  3-5 days..................<15.0 mg/dL  6-29 days.................<15.0 mg/dL       Alkaline Phosphatase   Date Value Ref Range Status   11/06/2023 68 55 - 135 U/L Final     AST   Date Value Ref Range Status   11/06/2023 15 10 - 40 U/L Final     ALT   Date Value Ref Range Status   11/06/2023 12 10 - 44 U/L Final     Anion Gap   Date Value Ref Range Status   11/06/2023 8 8 - 16 mmol/L Final   01/14/2013 12 5 - 15 meq/L Final     eGFR   Date Value Ref Range Status   11/06/2023 45.1  (A) >60 mL/min/1.73 m^2 Final         PFT results reviewed  Pulmonary Functions Testing Results:        Plan:  Clinical impression is resonably certain and repeated evaluation prn +/- follow up will be needed as below.    Viridiana was seen today for copd.    Diagnoses and all orders for this visit:    Centrilobular emphysema  -     Culture, Respiratory with Gram Stain; Standing  -     NEBULIZER FOR HOME USE  -     albuterol-ipratropium (DUO-NEB) 2.5 mg-0.5 mg/3 mL nebulizer solution; Take 3 mLs by nebulization every 6 (six) hours as needed for Wheezing or Shortness of Breath. Rescue  -     albuterol (PROVENTIL/VENTOLIN HFA) 90 mcg/actuation inhaler; Inhale 2 puffs into the lungs every 6 (six) hours as needed for Wheezing or Shortness of Breath. Rescue  -     fluticasone-umeclidin-vilanter (TRELEGY ELLIPTA) 200-62.5-25 mcg inhaler; Inhale 1 puff into the lungs once daily.  -     CT Chest Lung Screening Low Dose; Future  -     AFB Culture & Smear; Standing  -     Six Minute Walk Test to qualify for Home Oxygen; Future    Stage 3 chronic kidney disease, unspecified whether stage 3a or 3b CKD    COPD with asthma  -     Culture, Respiratory with Gram Stain; Standing  -     NEBULIZER FOR HOME USE  -     albuterol-ipratropium (DUO-NEB) 2.5 mg-0.5 mg/3 mL nebulizer solution; Take 3 mLs by nebulization every 6 (six) hours as needed for Wheezing or Shortness of Breath. Rescue  -     albuterol (PROVENTIL/VENTOLIN HFA) 90 mcg/actuation inhaler; Inhale 2 puffs into the lungs every 6 (six) hours as needed for Wheezing or Shortness of Breath. Rescue  -     fluticasone-umeclidin-vilanter (TRELEGY ELLIPTA) 200-62.5-25 mcg inhaler; Inhale 1 puff into the lungs once daily.  -     CT Chest Lung Screening Low Dose; Future  -     AFB Culture & Smear; Standing  -     Six Minute Walk Test to qualify for Home Oxygen; Future  -     Complete PFT with bronchodilator; Future    Tobacco dependence    SOB (shortness of breath)        Follow up in  about 2 months (around 3/12/2024), or if symptoms worsen or fail to improve.    Discussed with patient above for education the following:      Patient Instructions   Overall based your CT chest from 2022, there was evidence of emphysema to bilateral lungs this is consistent with smoking damage.  This gives you a diagnosis of COPD.    On your lung function tests from 2021, there was evidence of lung obstruction consistent with COPD.  There was also evidence of positive bronchodilator response which would be consistent with COPD-asthma overlap syndrome.    Currently you are using albuterol inhaler with no reported benefit.  I have low suspicion that he may even have a lung capacity to fully inhale the medication.  Will start nebulized treatments with albuterol/ipratropium.  I recommend you do 1-2 treatments per day x7 days then wean down to as needed for shortness of breath, cough, wheezing.    Will increase your Trelegy dose to 200.  This is still 1 puff once per day. This inhaler contains an inhaled steroid component. Rinse mouth after each use due to risk for thrush development. If mouth or tongue develops white sores please contact the clinic and I will order a prescription mouth wash.     Metered Dose Inhaler technique teaching done in office today - patient demonstrated appropriate use with good understanding.   Spacer device technique teaching done in office today.     You are due for a CT chest now.  Will have my staff schedule.    Will repeat PFT in approximately 2 months.    I have ordered sputum cultures - you will leave the office today with sputum specimen cups. Bring to any [facility] within 4 hours of obtaining specimen. Rinse your mouth prior to collecting sample. Please collect sample in the morning by deep coughing prior to eating or drinking.     It would be beneficial to stop tobacco use. Please inform the office if you become interested in the [facility] as discussed in the  clinic today.      Six minute walk test to see if you qualify for oxygen.     Continue current prescription medication regiment. Keep follow up appointment as scheduled. Please call the office if you have any questions or concerns.

## 2024-01-12 NOTE — PATIENT INSTRUCTIONS
Overall based your CT chest from 2022, there was evidence of emphysema to bilateral lungs this is consistent with smoking damage.  This gives you a diagnosis of COPD.    On your lung function tests from 2021, there was evidence of lung obstruction consistent with COPD.  There was also evidence of positive bronchodilator response which would be consistent with COPD-asthma overlap syndrome.    Currently you are using albuterol inhaler with no reported benefit.  I have low suspicion that he may even have a lung capacity to fully inhale the medication.  Will start nebulized treatments with albuterol/ipratropium.  I recommend you do 1-2 treatments per day x7 days then wean down to as needed for shortness of breath, cough, wheezing.    Will increase your Trelegy dose to 200.  This is still 1 puff once per day. This inhaler contains an inhaled steroid component. Rinse mouth after each use due to risk for thrush development. If mouth or tongue develops white sores please contact the clinic and I will order a prescription mouth wash.     Metered Dose Inhaler technique teaching done in office today - patient demonstrated appropriate use with good understanding.   Spacer device technique teaching done in office today.     You are due for a CT chest now.  Will have my staff schedule.    Will repeat PFT in approximately 2 months.    I have ordered sputum cultures - you will leave the office today with sputum specimen cups. Bring to any [facility] within 4 hours of obtaining specimen. Rinse your mouth prior to collecting sample. Please collect sample in the morning by deep coughing prior to eating or drinking.     It would be beneficial to stop tobacco use. Please inform the office if you become interested in the [facility] as discussed in the clinic today.      Six minute walk test to see if you qualify for oxygen.     Continue current prescription medication regiment. Keep follow up appointment as  scheduled. Please call the office if you have any questions or concerns.

## 2024-01-16 ENCOUNTER — LAB VISIT (OUTPATIENT)
Dept: LAB | Facility: HOSPITAL | Age: 69
End: 2024-01-16
Attending: NURSE PRACTITIONER
Payer: MEDICARE

## 2024-01-16 DIAGNOSIS — J44.89 COPD WITH ASTHMA: ICD-10-CM

## 2024-01-16 DIAGNOSIS — J43.2 CENTRILOBULAR EMPHYSEMA: ICD-10-CM

## 2024-01-16 PROCEDURE — 87116 MYCOBACTERIA CULTURE: CPT | Performed by: NURSE PRACTITIONER

## 2024-01-16 PROCEDURE — 87205 SMEAR GRAM STAIN: CPT | Performed by: NURSE PRACTITIONER

## 2024-01-16 PROCEDURE — 87118 MYCOBACTERIC IDENTIFICATION: CPT

## 2024-01-16 PROCEDURE — 87206 SMEAR FLUORESCENT/ACID STAI: CPT | Performed by: NURSE PRACTITIONER

## 2024-01-16 PROCEDURE — 87186 SC STD MICRODIL/AGAR DIL: CPT

## 2024-01-16 PROCEDURE — 87070 CULTURE OTHR SPECIMN AEROBIC: CPT | Performed by: NURSE PRACTITIONER

## 2024-01-16 PROCEDURE — 87015 SPECIMEN INFECT AGNT CONCNTJ: CPT | Performed by: NURSE PRACTITIONER

## 2024-01-16 PROCEDURE — 87118 MYCOBACTERIC IDENTIFICATION: CPT | Mod: 91 | Performed by: NURSE PRACTITIONER

## 2024-01-16 PROCEDURE — 87118 MYCOBACTERIC IDENTIFICATION: CPT | Performed by: NURSE PRACTITIONER

## 2024-01-19 LAB
BACTERIA SPEC AEROBE CULT: NORMAL
BACTERIA SPEC AEROBE CULT: NORMAL
GRAM STN SPEC: NORMAL

## 2024-02-01 ENCOUNTER — TELEPHONE (OUTPATIENT)
Dept: PULMONOLOGY | Facility: CLINIC | Age: 69
End: 2024-02-01
Payer: MEDICARE

## 2024-02-01 NOTE — TELEPHONE ENCOUNTER
----- Message from Rosangela Monroe sent at 2/1/2024 11:14 AM CST -----  Contact: self  Type: Needs Medical Advice  Who Called:  pt  Symptoms (please be specific):  regarding follow up letter  How long has patient had these symptoms:  n/a  Pharmacy name and phone #:  n/a  Best Call Back Number: [phone]   Additional Information: please call

## 2024-02-01 NOTE — TELEPHONE ENCOUNTER
----- Message from Anette Giron sent at 2/1/2024 12:16 PM CST -----  Type:  Needs Medical Advice    Who Called:  Vanessa from [facility]    Would the patient rather a call back or a response via [facility]?  Call back    Best Call Back Number:  [phone]    Additional Information:  has critical labs for pt.  Please call back to advise. Thanks!

## 2024-02-01 NOTE — TELEPHONE ENCOUNTER
----- Message from Aura Lyle LPN sent at 2/1/2024 12:01 PM CST -----  Contact: Patient    ----- Message -----  From: Gabby Delong  Sent: 2/1/2024  11:43 AM CST  To: [facility] Staff    Type:  Patient Returning Call    Who Called:  Patient  Who Left Message for Patient:  Trena  Does the patient know what this is regarding?:  Possibly tests / results    Would the patient rather a call back or a response via [facility]?   Call back  Best Call Back Number:  [phone]    Additional Information:   States she is returning a missed call - please call back - thank you

## 2024-02-01 NOTE — TELEPHONE ENCOUNTER
Spoke to patient.  She was calling about her CT results.  I don't see that you have reviewed them as of yet.  I informed her that I would from this message to you about what the results say.  In addition the patient will be [place]oc[place] to another state.  DEXTER.

## 2024-02-01 NOTE — TELEPHONE ENCOUNTER
Patient has Positive AFB culuture from 1/15/24 Sputum sample.  Vanessa from [facility] called.

## 2024-02-02 ENCOUNTER — TELEPHONE (OUTPATIENT)
Dept: PULMONOLOGY | Facility: CLINIC | Age: 69
End: 2024-02-02
Payer: MEDICARE

## 2024-02-02 DIAGNOSIS — J43.2 CENTRILOBULAR EMPHYSEMA: Primary | ICD-10-CM

## 2024-02-02 NOTE — PROGRESS NOTES
Spoke with patient on 02/02/2024 at 1430 - she states she will be moving to [place] soon - moving date 03/15 - requesting referral to a new Pulmonologist in [place]. Referral placed and faxed to [facility].

## 2024-02-05 ENCOUNTER — DOCUMENTATION ONLY (OUTPATIENT)
Dept: PULMONOLOGY | Facility: CLINIC | Age: 69
End: 2024-02-05
Payer: MEDICARE

## 2024-02-05 DIAGNOSIS — R91.1 LUNG NODULE: Primary | ICD-10-CM

## 2024-02-05 NOTE — PROGRESS NOTES
Discussed case with Dr. TORRES - concern for cavitary nodule to the RUL.     Recommend sputum samples - bronchoscopy. Patient states she hasn't used neb machine as of yet due to being busy packing for move. States she will use that and then get back with me on Friday regarding bronchoscopy.

## 2024-02-07 ENCOUNTER — LAB VISIT (OUTPATIENT)
Dept: LAB | Facility: HOSPITAL | Age: 69
End: 2024-02-07
Attending: NURSE PRACTITIONER
Payer: MEDICARE

## 2024-02-07 DIAGNOSIS — J44.89 COPD WITH ASTHMA: ICD-10-CM

## 2024-02-07 DIAGNOSIS — J43.2 CENTRILOBULAR EMPHYSEMA: ICD-10-CM

## 2024-02-07 PROCEDURE — 87206 SMEAR FLUORESCENT/ACID STAI: CPT | Performed by: NURSE PRACTITIONER

## 2024-02-07 PROCEDURE — 87070 CULTURE OTHR SPECIMN AEROBIC: CPT | Performed by: NURSE PRACTITIONER

## 2024-02-07 PROCEDURE — 87118 MYCOBACTERIC IDENTIFICATION: CPT

## 2024-02-07 PROCEDURE — 87118 MYCOBACTERIC IDENTIFICATION: CPT | Performed by: NURSE PRACTITIONER

## 2024-02-07 PROCEDURE — 87205 SMEAR GRAM STAIN: CPT | Performed by: NURSE PRACTITIONER

## 2024-02-07 PROCEDURE — 87186 SC STD MICRODIL/AGAR DIL: CPT

## 2024-02-07 PROCEDURE — 87118 MYCOBACTERIC IDENTIFICATION: CPT | Mod: 59 | Performed by: NURSE PRACTITIONER

## 2024-02-07 PROCEDURE — 87116 MYCOBACTERIA CULTURE: CPT | Performed by: NURSE PRACTITIONER

## 2024-02-07 PROCEDURE — 87015 SPECIMEN INFECT AGNT CONCNTJ: CPT | Performed by: NURSE PRACTITIONER

## 2024-02-09 ENCOUNTER — TELEPHONE (OUTPATIENT)
Dept: PULMONOLOGY | Facility: CLINIC | Age: 69
End: 2024-02-09
Payer: MEDICARE

## 2024-02-09 DIAGNOSIS — J40 BRONCHITIS: Primary | ICD-10-CM

## 2024-02-09 LAB
BACTERIA SPEC AEROBE CULT: NORMAL
BACTERIA SPEC AEROBE CULT: NORMAL
GRAM STN SPEC: NORMAL

## 2024-02-09 RX ORDER — DOXYCYCLINE 100 MG/1
100 CAPSULE ORAL EVERY 12 HOURS
Qty: 20 CAPSULE | Refills: 0 | Status: SHIPPED | OUTPATIENT
Start: 2024-02-09 | End: 2024-02-19

## 2024-02-09 NOTE — TELEPHONE ENCOUNTER
Spoke to patient.  She stated she is still coughing up green sputum.  The nebulizer treatments are helping.  She would like you to call her please.  She stated that she is still smoking.

## 2024-02-12 ENCOUNTER — TELEPHONE (OUTPATIENT)
Dept: PULMONOLOGY | Facility: CLINIC | Age: 69
End: 2024-02-12
Payer: MEDICARE

## 2024-02-12 NOTE — TELEPHONE ENCOUNTER
Call pt an spoke to her she said Doctor in [place] doesn't take PPH, I said you would have to call insurance company to see you takes your insurance out there.     ----- Message from Adolfo Wharton sent at 2/12/2024  2:35 PM CST -----  Contact: Self  Type:  Patient Returning Call    Who Called:  Patient  Who Left Message for Patient:  Aura  Does the patient know what this is regarding?:  Yes  Best Call Back Number:  [phone]    Additional Information:  Calling to follow up on previous conversation. Please call.

## 2024-02-12 NOTE — TELEPHONE ENCOUNTER
Placed a call to the pt in regards to a call back. Notified that the pt needs to get established in [place], with a PCP, and ask the PCP what Pulmonologist she can see. Pt verbalized understanding.

## 2024-02-12 NOTE — TELEPHONE ENCOUNTER
----- Message from Archie Chavira sent at 2/12/2024  8:18 AM CST -----  Contact: Self  Type: Needs Medical Advice  Who Called:  PT    Best Call Back Number: [phone]    Additional Information: PT needs to talk about appt and care.

## 2024-02-12 NOTE — TELEPHONE ENCOUNTER
Placed a call to the pt in regards to changing her original f/u from 3/14/24 to 3/8/24 at 2:30 pm. Pt is moving to [facility]. Pt wanted NP Mitra know that there is no Pulmonologist close to where she will be living.

## 2024-02-29 ENCOUNTER — OFFICE VISIT (OUTPATIENT)
Dept: PSYCHIATRY | Facility: CLINIC | Age: 69
End: 2024-02-29
Payer: COMMERCIAL

## 2024-02-29 VITALS
SYSTOLIC BLOOD PRESSURE: 164 MMHG | DIASTOLIC BLOOD PRESSURE: 98 MMHG | HEIGHT: 62 IN | HEART RATE: 82 BPM | BODY MASS INDEX: 18.93 KG/M2 | WEIGHT: 102.88 LBS

## 2024-02-29 DIAGNOSIS — Z79.899 HIGH RISK MEDICATIONS (NOT ANTICOAGULANTS) LONG-TERM USE: ICD-10-CM

## 2024-02-29 DIAGNOSIS — F41.9 ANXIETY DISORDER, UNSPECIFIED TYPE: ICD-10-CM

## 2024-02-29 DIAGNOSIS — F31.9 BIPOLAR I DISORDER: Primary | ICD-10-CM

## 2024-02-29 PROCEDURE — 1101F PT FALLS ASSESS-DOCD LE1/YR: CPT | Mod: CPTII,S$GLB,,

## 2024-02-29 PROCEDURE — 1125F AMNT PAIN NOTED PAIN PRSNT: CPT | Mod: CPTII,S$GLB,,

## 2024-02-29 PROCEDURE — 99214 OFFICE O/P EST MOD 30 MIN: CPT | Mod: S$GLB,,,

## 2024-02-29 PROCEDURE — 99999 PR PBB SHADOW E&M-EST. PATIENT-LVL III: CPT | Mod: PBBFAC,,,

## 2024-02-29 PROCEDURE — 3288F FALL RISK ASSESSMENT DOCD: CPT | Mod: CPTII,S$GLB,,

## 2024-02-29 PROCEDURE — 1160F RVW MEDS BY RX/DR IN RCRD: CPT | Mod: CPTII,S$GLB,,

## 2024-02-29 PROCEDURE — 1159F MED LIST DOCD IN RCRD: CPT | Mod: CPTII,S$GLB,,

## 2024-02-29 PROCEDURE — 1157F ADVNC CARE PLAN IN RCRD: CPT | Mod: CPTII,S$GLB,,

## 2024-02-29 PROCEDURE — 90833 PSYTX W PT W E/M 30 MIN: CPT | Mod: S$GLB,,,

## 2024-02-29 PROCEDURE — 3077F SYST BP >= 140 MM HG: CPT | Mod: CPTII,S$GLB,,

## 2024-02-29 PROCEDURE — 3008F BODY MASS INDEX DOCD: CPT | Mod: CPTII,S$GLB,,

## 2024-02-29 PROCEDURE — 3080F DIAST BP >= 90 MM HG: CPT | Mod: CPTII,S$GLB,,

## 2024-02-29 RX ORDER — LOXAPINE SUCCINATE 25 MG/1
25 TABLET ORAL NIGHTLY
Qty: 90 CAPSULE | Refills: 1 | Status: SHIPPED | OUTPATIENT
Start: 2024-02-29 | End: 2025-02-28

## 2024-02-29 RX ORDER — HYDROXYZINE HYDROCHLORIDE 10 MG/1
10 TABLET, FILM COATED ORAL 3 TIMES DAILY PRN
Qty: 90 TABLET | Refills: 0 | Status: SHIPPED | OUTPATIENT
Start: 2024-02-29 | End: 2024-03-20
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
## 2024-03-02 LAB — MYCOBACTERIUM SPEC CULT: ABNORMAL

## 2024-03-07 ENCOUNTER — DOCUMENTATION ONLY (OUTPATIENT)
Dept: PULMONOLOGY | Facility: CLINIC | Age: 69
End: 2024-03-07
Payer: MEDICARE

## 2024-03-07 ENCOUNTER — OFFICE VISIT (OUTPATIENT)
Dept: PODIATRY | Facility: CLINIC | Age: 69
End: 2024-03-07
Payer: MEDICARE

## 2024-03-07 VITALS — BODY MASS INDEX: 18.77 KG/M2 | WEIGHT: 102 LBS | HEIGHT: 62 IN

## 2024-03-07 DIAGNOSIS — L84 FOOT CALLUS: ICD-10-CM

## 2024-03-07 DIAGNOSIS — M21.622 TAILOR'S BUNION OF BOTH FEET: Primary | ICD-10-CM

## 2024-03-07 DIAGNOSIS — M21.621 TAILOR'S BUNION OF BOTH FEET: Primary | ICD-10-CM

## 2024-03-07 PROCEDURE — 99999 PR PBB SHADOW E&M-EST. PATIENT-LVL III: CPT | Mod: PBBFAC,,, | Performed by: STUDENT IN AN ORGANIZED HEALTH CARE EDUCATION/TRAINING PROGRAM

## 2024-03-07 PROCEDURE — 99213 OFFICE O/P EST LOW 20 MIN: CPT | Mod: S$GLB,,, | Performed by: STUDENT IN AN ORGANIZED HEALTH CARE EDUCATION/TRAINING PROGRAM

## 2024-03-07 NOTE — PROGRESS NOTES
Subjective:      Patient ID: Viridiana Suresh is a 68 y.o. female.    Chief Complaint: Foot Pain    Viridiana is a 68 y.o. female who presents to the podiatry clinic  with complaint of bilateral bunions and painful calluses. She also complains of swelling to both legs that is unexplained and cramping to the toes. She has tried fluid pills for swelling without much success. US veins and arteries are WNL. Denies compression stocking use.     Ms. Suresh returns for painful calluses.     3/7/24: Ms. Suresh returns for painful sore and to discuss surgery.    Review of Systems   Constitutional: Negative for chills and fever.   Cardiovascular:  Positive for leg swelling. Negative for claudication.   Respiratory:  Negative for shortness of breath.    Skin:  Negative for itching, nail changes and rash.   Musculoskeletal:  Positive for muscle cramps. Negative for muscle weakness and myalgias.        Toe pain   Gastrointestinal:  Negative for nausea and vomiting.   Neurological:  Positive for numbness and paresthesias. Negative for focal weakness and loss of balance.           Objective:      Physical Exam  Constitutional:       General: She is not in acute distress.     Appearance: She is well-developed. She is not diaphoretic.   Cardiovascular:      Pulses:           Dorsalis pedis pulses are 2+ on the right side and 2+ on the left side.        Posterior tibial pulses are 2+ on the right side and 2+ on the left side.      Comments: < 3 sec capillary refill time to toes 1-5 bilateral. Toes and feet are warm to touch proximally with normal distal cooling b/l. There is some hair growth on the feet and toes b/l. There is no edema b/l. No spider veins or varicosities present b/l.     Musculoskeletal:      Right lower le+ Edema present.      Left lower le+ Edema present.   Skin:     General: Skin is warm and dry.      Coloration: Skin is not pale.      Findings: Lesion present. No abrasion, bruising, burn, ecchymosis,  erythema, laceration, petechiae or rash.      Nails: There is no clubbing.      Comments: Skin temperature, texture and turgor within normal limits.    Hyperkeratotic lesion bilateral plantar fifth met head   Neurological:      Mental Status: She is alert and oriented to person, place, and time.      Sensory: Sensory deficit present.      Motor: No tremor, atrophy or abnormal muscle tone.      Comments: Negative tinel sign bilateral. Diminished vibratory and protective sensation bilateral.   Psychiatric:         Behavior: Behavior normal.               Assessment:       Encounter Diagnoses   Name Primary?    Tailor's bunion of both feet Yes    Foot callus              Plan:       Viridiana was seen today for foot pain.    Diagnoses and all orders for this visit:    Reagan's bunion of both feet    Foot callus          I counseled the patient on her conditions, their implications and medical management.    Callus was cored out today. I again discussed surgical options but we can try offloading with metatarsal pads. She would like for me to permanently affix the metatarsal pads to her shoes and has epoxy that I used to do so.    I wrote down the surgeries that I would recommend including a met head resection or a floating metatarsal osteotomy. She will be moving to [place] to continue care.    F/u as needed.    Rasta Marcelo DPM

## 2024-03-07 NOTE — PROGRESS NOTES
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    I discussed case briefly with Dr. Rosangela Aguilar - recommend waiting for other AFB culture to result then likely will require referral to ID.

## 2024-03-15 ENCOUNTER — OFFICE VISIT (OUTPATIENT)
Dept: PULMONOLOGY | Facility: CLINIC | Age: 69
End: 2024-03-15
Payer: MEDICARE

## 2024-03-15 VITALS
SYSTOLIC BLOOD PRESSURE: 154 MMHG | BODY MASS INDEX: 18.89 KG/M2 | HEIGHT: 62 IN | WEIGHT: 102.63 LBS | HEART RATE: 79 BPM | OXYGEN SATURATION: 98 % | DIASTOLIC BLOOD PRESSURE: 87 MMHG

## 2024-03-15 DIAGNOSIS — F17.200 TOBACCO DEPENDENCE: ICD-10-CM

## 2024-03-15 DIAGNOSIS — N18.30 STAGE 3 CHRONIC KIDNEY DISEASE, UNSPECIFIED WHETHER STAGE 3A OR 3B CKD: ICD-10-CM

## 2024-03-15 DIAGNOSIS — F31.70 BIPOLAR DISORDER IN PARTIAL REMISSION, MOST RECENT EPISODE UNSPECIFIED TYPE: ICD-10-CM

## 2024-03-15 DIAGNOSIS — R91.1 LUNG NODULE: ICD-10-CM

## 2024-03-15 DIAGNOSIS — A31.9 INFECTION DUE TO NONTUBERCULOUS MYCOBACTERIA: Primary | ICD-10-CM

## 2024-03-15 DIAGNOSIS — J44.89 COPD WITH ASTHMA: ICD-10-CM

## 2024-03-15 DIAGNOSIS — J43.2 CENTRILOBULAR EMPHYSEMA: ICD-10-CM

## 2024-03-15 PROCEDURE — 99214 OFFICE O/P EST MOD 30 MIN: CPT | Mod: S$GLB,,, | Performed by: NURSE PRACTITIONER

## 2024-03-15 PROCEDURE — 99999 PR PBB SHADOW E&M-EST. PATIENT-LVL IV: CPT | Mod: PBBFAC,,, | Performed by: NURSE PRACTITIONER

## 2024-03-15 RX ORDER — DOXYCYCLINE 100 MG/1
100 CAPSULE ORAL EVERY 12 HOURS
Qty: 20 CAPSULE | Refills: 0 | Status: SHIPPED | OUTPATIENT
Start: 2024-03-15

## 2024-03-15 RX ORDER — IPRATROPIUM BROMIDE AND ALBUTEROL SULFATE 2.5; .5 MG/3ML; MG/3ML
3 SOLUTION RESPIRATORY (INHALATION) EVERY 6 HOURS PRN
Qty: 120 ML | Refills: 5 | Status: SHIPPED | OUTPATIENT
Start: 2024-03-15 | End: 2025-03-15

## 2024-03-15 RX ORDER — FLUTICASONE FUROATE, UMECLIDINIUM BROMIDE AND VILANTEROL TRIFENATATE 200; 62.5; 25 UG/1; UG/1; UG/1
1 POWDER RESPIRATORY (INHALATION) DAILY
Qty: 60 EACH | Refills: 11 | Status: SHIPPED | OUTPATIENT
Start: 2024-03-15

## 2024-03-15 RX ORDER — ALBUTEROL SULFATE 90 UG/1
2 AEROSOL, METERED RESPIRATORY (INHALATION) EVERY 6 HOURS PRN
Qty: 18 G | Refills: 11 | Status: SHIPPED | OUTPATIENT
Start: 2024-03-15 | End: 2024-04-02 | Stop reason: SDUPTHER

## 2024-03-15 NOTE — PATIENT INSTRUCTIONS
Continue Trelegy ONE PUFF ONCE PER DAY. This inhaler contains an inhaled steroid component. Rinse mouth after each use due to risk for thrush development. If mouth or tongue develops white sores please contact the clinic and I will order a prescription mouth wash.     Continue Albuterol as needed.    Continue nebulized treatments as needed to help with mucous expectoration.    CT Chest due in April.    Sputum sample positive for MYCOBACTERIUM NEWORLEANSENSE/PORCINUM - referral for ID placed. Will need treatment.    Doxycycline - only take for change in mucous color.    It would be beneficial to stop tobacco use. Please inform the office if you become interested in the [facility] as discussed in the clinic today.      Continue current prescription medication regiment. Keep follow up appointment as scheduled. Please call the office if you have any questions or concerns.

## 2024-03-15 NOTE — PROGRESS NOTES
3/15/2024    Viridiana Suresh  In office visit    Chief Complaint   Patient presents with    Shortness of Breath       HPI:  03/15/2024: Hx: COPD, Bipolar, Heavy smoking history   Underwent 6MWT revealing no need for supplemental oxygen.  Used 200 Trelegy after last graham daily however now only using approx twice per week. Using neb treatments BID - states suffered with a Bipolar exacerbation - was seen per psych in which medication changes made.   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  Patient states she is moving on March 28 to [place].  Completed ten day regimen of DOXY since prior office visit - states mucous has cleared in color but still with occasional green/gray mucous.   Stopped vaping - states lost VAPE pen approx two months ago.         01/12/2024: Hx: COPD, Bipolar, Heavy smoking history   In office today alone.  Denies being seen by prior Pulmonologist. Denies current use of supplemental oxygen, CPAP use. Denies personal history of cancer, PE, current anticoagulation use.  Patient endorses previous history of COPD.   Shortness of breath:  Gradually worsening over the years.  Shortness of breath occurs at rest with conversation.  Affecting ADLs.  Can not walk throughout her home and ascend staircase without having to stop for rest.  Associated with wheezing and chest tightness.  Cough:  Productive with thick mucous described as black/brown in color.  Cough does not have a time correlation.  Patient states approximately 2 weeks ago she coughed up a 6 legged bug mixed in with her mucus.  Cough occurs daily.  Feels as if she can not catch her breath until she clears the mucus each day.  Currently using Trelegy 1 puff once per day, states she has been on this inhaler for approximately 2 years.  Also use albuterol as needed approximately 2-3 times per day.  Does not appreciate any benefit with inhaler therapy.  Not currently on  nebulized treatments.  Endorses significant tobacco use.  Started smoking at age 14 years old, current tobacco use is at 1 pack per day.  Approximately 8 years ago started vaping.  Also endorses the use of of medical marijuana.  Patient Instructions   Overall based your CT chest from 2022, there was evidence of emphysema to bilateral lungs this is consistent with smoking damage.  This gives you a diagnosis of COPD.  On your lung function tests from 2021, there was evidence of lung obstruction consistent with COPD.  There was also evidence of positive bronchodilator response which would be consistent with COPD-asthma overlap syndrome.  Currently you are using albuterol inhaler with no reported benefit.  I have low suspicion that he may even have a lung capacity to fully inhale the medication.  Will start nebulized treatments with albuterol/ipratropium.  I recommend you do 1-2 treatments per day x7 days then wean down to as needed for shortness of breath, cough, wheezing.  Will increase your Trelegy dose to 200.  This is still 1 puff once per day. This inhaler contains an inhaled steroid component. Rinse mouth after each use due to risk for thrush development. If mouth or tongue develops white sores please contact the clinic and I will order a prescription mouth wash.   Metered Dose Inhaler technique teaching done in office today - patient demonstrated appropriate use with good understanding.   Spacer device technique teaching done in office today.   You are due for a CT chest now.  Will have my staff schedule.  Will repeat PFT in approximately 2 months.  I have ordered sputum cultures - you will leave the office today with sputum specimen cups. Bring to any [facility] within 4 hours of obtaining specimen. Rinse your mouth prior to collecting sample. Please collect sample in the morning by deep coughing prior to eating or drinking.   It would be beneficial to stop tobacco use. Please inform the office if you become  interested in the [facility]ation [facility] as discussed in the clinic today.    Six minute walk test to see if you qualify for oxygen.   Continue current prescription medication regiment. Keep follow up appointment as scheduled. Please call the office if you have any questions or concerns.               Social Hx:  Lives with  - One dog and One cat in the home. Retired Artist. No Asbestosis exposure, Smoking Hx: Current smoker - smoking history as above.  Family Hx: No Lung Cancer, Aunt with COPD, Sister with Asthma  Medical Hx: Previous pneumonia ; No previous shoulder/chest surgery        The Chief Complaint varies with instability at times.       PFSH:  Past Medical History:   Diagnosis Date    Bipolar disorder     Cancer 1995    melanoma rt third toe    CKD (chronic kidney disease)     COPD (chronic obstructive pulmonary disease)     CTS (carpal tunnel syndrome)     Diverticulosis     Hepatomegaly     Presence of dental bridge     UPPER         Past Surgical History:   Procedure Laterality Date    APPENDECTOMY      BREAST SURGERY      Needle and surgical biopsy    COLONOSCOPY N/A 06/12/2018    Procedure: COLONOSCOPY;  Surgeon: Uche Crowell MD;  Location: [facility];  Service: Endoscopy;  Laterality: N/A; repeat in 10 years for screening    COLONOSCOPY N/A 2/23/2023    Procedure: COLONOSCOPY;  Surgeon: Uche Crowell MD;  Location: [facility];  Service: Endoscopy;  Laterality: N/A;    ESOPHAGOGASTRODUODENOSCOPY N/A 2/23/2023    Procedure: EGD (ESOPHAGOGASTRODUODENOSCOPY);  Surgeon: Uche Crowell MD;  Location: [facility];  Service: Endoscopy;  Laterality: N/A;    LUMBAR LAMINECTOMY Right 02/25/2021    Procedure: LAMINECTOMY, SPINE, LUMBAR RIGHT L5-S1 HEMILAMIOTOMY AND RESECTION OF SYNOVIAL CYST;  Surgeon: Ramos Boyd MD;  Location: [facility];  Service: Neurosurgery;  Laterality: Right;    NASAL SEPTUM SURGERY      RHINOPHYMA RESECTION      RHINOPLASTY TIP      SKIN BIOPSY       "TRANSFORAMINAL EPIDURAL INJECTION OF STEROID Right 10/27/2021    Procedure: Injection,steroid,epidural,transforaminal approach L5/S1 and facet cyst aspiration;  Surgeon: Rigo Ca MD;  [facility];  Service: Pain Management;  Laterality: Right;    TUBAL LIGATION       Social History     Tobacco Use    Smoking status: Every Day     Current packs/day: 2.00     Average packs/day: 2.0 packs/day for 43.0 years (86.0 ttl pk-yrs)     Types: Vaping with nicotine, Cigarettes    Smokeless tobacco: Never   Substance Use Topics    Alcohol use: No    Drug use: No     Family History   Problem Relation Age of Onset    Cancer Mother 80        breast cancer    Diabetes Mother     Cancer Father 77        pancreatic cancer    Diabetes Maternal Grandmother     Asthma Sister     Colon cancer Neg Hx     Colon polyps Neg Hx     Crohn's disease Neg Hx     Ulcerative colitis Neg Hx     Celiac disease Neg Hx      Review of patient's allergies indicates:  No Known Allergies      I have reviewed past medical, family, and social history.     Performance Status:The patient's activity level is housebound activities.        Review of Systems:  a review of eleven systems covering constitutional, Eye, HEENT, Psych, Respiratory, Cardiac, GI, , Musculoskeletal, Endocrine, Dermatologic was negative except for pertinent findings as listed ABOVE and below: pertinent positive as above, rest is good        Exam:Comprehensive exam done. BP (!) 154/87 (BP Location: Left arm, Patient Position: Sitting, BP Method: Medium (Automatic))   Pulse 79   Ht 5' 2" (1.575 m)   Wt 46.6 kg (102 lb 10 oz)   SpO2 98%   BMI 18.77 kg/m²   Exam included Vitals as listed  Constitutional: She is oriented to person, place, and time. She appears well-developed. No distress.   Nose: Nose normal.   Mouth/Throat: Uvula is midline, oropharynx is clear and moist and mucous membranes are normal. No dental caries. No oropharyngeal exudate, posterior oropharyngeal " edema, posterior oropharyngeal erythema or tonsillar abscesses.    Eyes: Pupils are equal, round, and reactive to light.   Neck: No JVD present. No thyromegaly present.   Cardiovascular: Normal rate, regular rhythm and normal heart sounds. Exam reveals no gallop and no friction rub.   No murmur heard.  Pulmonary/Chest: Effort normal and breath sounds normal. No accessory muscle usage or stridor. No apnea and no tachypnea. No respiratory distress, decreased breath sounds, wheezes, rhonchi, rales, or tenderness.   Abdominal: Soft. She exhibits no mass. There is no tenderness. No hepatosplenomegaly, hernias and normoactive bowel sounds  Musculoskeletal: Normal range of motion. exhibits no edema.   Neurological:  alert and oriented to person, place, and time. not disoriented.   Skin: Skin is warm and dry. Capillary refill takes less 2 sec. No cyanosis or erythema. No pallor. Nails show no clubbing.   Psychiatric: normal mood and affect. behavior is normal. Judgment and thought content normal.       Radiographs (ct chest and cxr) reviewed: was done by direct view   Patient imaging studies were reviewed and interpreted independently. My personal interpretation of most recent images include:  CXR - 3/10/2023 - No acute process  CT Chest Abdomen Pelvis With Contrast (xpd)11/25/2022 - Pulmonary emphysema bilaterally      Labs Patient's labs were reviewed including CBC and CMP  Lab Results   Component Value Date    WBC 6.86 11/06/2023    RBC 5.01 11/06/2023    HGB 14.9 11/06/2023    HCT 47.3 11/06/2023    MCV 94 11/06/2023    MCH 29.7 11/06/2023    MCHC 31.5 (L) 11/06/2023    RDW 14.4 11/06/2023     11/06/2023    MPV 10.7 11/06/2023    GRAN 3.4 11/06/2023    GRAN 50.0 11/06/2023    LYMPH 2.6 11/06/2023    LYMPH 37.9 11/06/2023    MONO 0.5 11/06/2023    MONO 7.3 11/06/2023    EOS 0.3 11/06/2023    BASO 0.06 11/06/2023    EOSINOPHIL 3.8 11/06/2023    BASOPHIL 0.9 11/06/2023   CMP  Sodium   Date Value Ref Range Status    11/06/2023 146 (H) 136 - 145 mmol/L Final     Potassium   Date Value Ref Range Status   11/06/2023 4.9 3.5 - 5.1 mmol/L Final     Chloride   Date Value Ref Range Status   11/06/2023 110 95 - 110 mmol/L Final     CO2   Date Value Ref Range Status   11/06/2023 28 23 - 29 mmol/L Final     Glucose   Date Value Ref Range Status   11/06/2023 88 70 - 110 mg/dL Final     BUN   Date Value Ref Range Status   11/06/2023 20 8 - 23 mg/dL Final     Creatinine   Date Value Ref Range Status   11/06/2023 1.3 0.5 - 1.4 mg/dL Final   01/14/2013 1.1 0.5 - 1.4 mg/dL Final     Calcium   Date Value Ref Range Status   11/06/2023 10.0 8.7 - 10.5 mg/dL Final   01/14/2013 10.1 8.7 - 10.5 mg/dL Final     Total Protein   Date Value Ref Range Status   11/06/2023 6.5 6.0 - 8.4 g/dL Final     Albumin   Date Value Ref Range Status   11/06/2023 3.5 3.5 - 5.2 g/dL Final     Total Bilirubin   Date Value Ref Range Status   11/06/2023 0.4 0.1 - 1.0 mg/dL Final     Comment:     For infants and newborns, interpretation of results should be based  on gestational age, weight and in agreement with clinical  observations.    Premature Infant recommended reference ranges:  Up to 24 hours.............<8.0 mg/dL  Up to 48 hours............<12.0 mg/dL  3-5 days..................<15.0 mg/dL  6-29 days.................<15.0 mg/dL       Alkaline Phosphatase   Date Value Ref Range Status   11/06/2023 68 55 - 135 U/L Final     AST   Date Value Ref Range Status   11/06/2023 15 10 - 40 U/L Final     ALT   Date Value Ref Range Status   11/06/2023 12 10 - 44 U/L Final     Anion Gap   Date Value Ref Range Status   11/06/2023 8 8 - 16 mmol/L Final   01/14/2013 12 5 - 15 meq/L Final     eGFR   Date Value Ref Range Status   11/06/2023 45.1 (A) >60 mL/min/1.73 m^2 Final         PFT results reviewed  Pulmonary Functions Testing Results:        Plan:  Clinical impression is resonably certain and repeated evaluation prn +/- follow up will be needed as below.    Viridiana was seen  today for shortness of breath.    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  -     albuterol (PROVENTIL/VENTOLIN HFA) 90 mcg/actuation inhaler; Inhale 2 puffs into the lungs every 6 (six) hours as needed for Wheezing or Shortness of Breath. Rescue  -     albuterol-ipratropium (DUO-NEB) 2.5 mg-0.5 mg/3 mL nebulizer solution; Take 3 mLs by nebulization every 6 (six) hours as needed for Wheezing or Shortness of Breath. Rescue    COPD with asthma  -     fluticasone-umeclidin-vilanter (TRELEGY ELLIPTA) 200-62.5-25 mcg inhaler; Inhale 1 puff into the lungs once daily.  -     albuterol (PROVENTIL/VENTOLIN HFA) 90 mcg/actuation inhaler; Inhale 2 puffs into the lungs every 6 (six) hours as needed for Wheezing or Shortness of Breath. Rescue  -     albuterol-ipratropium (DUO-NEB) 2.5 mg-0.5 mg/3 mL nebulizer solution; Take 3 mLs by nebulization every 6 (six) hours as needed for Wheezing or Shortness of Breath. Rescue    Tobacco dependence    Lung nodule    Stage 3 chronic kidney disease, unspecified whether stage 3a or 3b CKD    Bipolar disorder in partial remission, most recent episode unspecified type    Other orders  -     doxycycline (VIBRAMYCIN) 100 MG Cap; Take 1 capsule (100 mg total) by mouth every 12 (twelve) hours.        Follow up in about 3 months (around 6/15/2024), or if symptoms worsen or fail to improve.    Discussed with patient above for education the following:      Patient Instructions   Continue Trelegy ONE PUFF ONCE PER DAY. This inhaler contains an inhaled steroid component. Rinse mouth after each use due to risk for thrush development. If  mouth or tongue develops white sores please contact the clinic and I will order a prescription mouth wash.     Continue Albuterol as needed.    Continue nebulized treatments as needed to help with mucous expectoration.    CT Chest due in April.    Sputum sample positive for MYCOBACTERIUM NEWORLEANSENSE/PORCINUM - referral for ID placed. Will need treatment.    Doxycycline - only take for change in mucous color.    It would be beneficial to stop tobacco use. Please inform the office if you become interested in the [facility] Smoking Cessation Program as discussed in the clinic today.      Continue current prescription medication regiment. Keep follow up appointment as scheduled. Please call the office if you have any questions or concerns.

## 2024-03-19 DIAGNOSIS — F31.9 BIPOLAR I DISORDER: ICD-10-CM

## 2024-03-19 DIAGNOSIS — Z79.899 HIGH RISK MEDICATIONS (NOT ANTICOAGULANTS) LONG-TERM USE: ICD-10-CM

## 2024-03-20 RX ORDER — HYDROXYZINE HYDROCHLORIDE 10 MG/1
TABLET, FILM COATED ORAL
Qty: 90 TABLET | Refills: 0 | Status: SHIPPED | OUTPATIENT
Start: 2024-03-20 | End: 2024-04-22

## 2024-03-21 RX ORDER — LOXAPINE SUCCINATE 10 MG/1
10 TABLET ORAL
Qty: 90 CAPSULE | Refills: 0 | OUTPATIENT
Start: 2024-03-21

## 2024-03-21 RX ORDER — DIVALPROEX SODIUM 250 MG/1
750 TABLET, DELAYED RELEASE ORAL DAILY
Qty: 270 TABLET | Refills: 0 | Status: SHIPPED | OUTPATIENT
Start: 2024-03-21

## 2024-03-28 ENCOUNTER — TELEPHONE (OUTPATIENT)
Dept: PULMONOLOGY | Facility: CLINIC | Age: 69
End: 2024-03-28
Payer: MEDICARE

## 2024-03-28 NOTE — TELEPHONE ENCOUNTER
Placed a call to the pt in regards to the doxy medication, and another medication that her insurance no longer covers. Pt will call back when she gets the information needed.

## 2024-03-28 NOTE — TELEPHONE ENCOUNTER
----- Message from Trena Baig sent at 3/28/2024  1:48 PM CDT -----  Type: Needs Medical Advice  Who Called:  pt  Symptoms (please be specific):    How long has patient had these symptoms:    Pharmacy name and phone #:      [id number] [place] - [address]  [address]  [place] [address]  Phone: [phone] Fax: [phone]      Best Call Back Number: [phone]    Additional Information: medication that she is on will not be covered by her insurance.  She is also questioning why she was on the doxy for.  Please call back to advise

## 2024-04-01 ENCOUNTER — TELEPHONE (OUTPATIENT)
Dept: PULMONOLOGY | Facility: CLINIC | Age: 69
End: 2024-04-01
Payer: MEDICARE

## 2024-04-01 LAB
ACID FAST MOD KINY STN SPEC: ABNORMAL
MYCOBACTERIUM SPEC QL CULT: ABNORMAL
MYCOBACTERIUM SPEC QL CULT: ABNORMAL

## 2024-04-01 NOTE — TELEPHONE ENCOUNTER
Spoke to patient.  The ventolin inhaler isn't covered by her insurance.  She states Albuterol PROAIR HFA and   levalbuterol HFA Drug Tier 3 are covered.  Please send new RX since Mitra Salazar NP is out of the office.

## 2024-04-01 NOTE — TELEPHONE ENCOUNTER
----- Message from Fer Hills sent at 4/1/2024 11:06 AM CDT -----  Contact: pt  Type:  Needs Medical Advice    Who Called: the patient  Symptoms (please be specific):  How long has patient had these symptoms:    Pharmacy name and phone #:    Would the patient rather a call back or a response via [facility]? call back/[facility]  Best Call Back Number:   Additional Information: Pt states her insurance does not cover   albuterol (PROVENTIL/VENTOLIN HFA) 90 mcg/actuation inhaler  Pt states insurance does cover following:  Albuterol PROAIR HFA  Level Albuterol HFA Drug Tier 3  Please advise  Thanks

## 2024-04-02 DIAGNOSIS — J43.2 CENTRILOBULAR EMPHYSEMA: ICD-10-CM

## 2024-04-02 DIAGNOSIS — J44.89 COPD WITH ASTHMA: ICD-10-CM

## 2024-04-02 RX ORDER — ALBUTEROL SULFATE 90 UG/1
2 AEROSOL, METERED RESPIRATORY (INHALATION) EVERY 4 HOURS PRN
Qty: 18 G | Refills: 11 | Status: SHIPPED | OUTPATIENT
Start: 2024-04-02

## 2024-04-08 LAB — MYCOBACTERIUM SPEC CULT: ABNORMAL

## 2024-04-10 ENCOUNTER — TELEPHONE (OUTPATIENT)
Dept: PULMONOLOGY | Facility: CLINIC | Age: 69
End: 2024-04-10
Payer: MEDICARE

## 2024-04-10 NOTE — TELEPHONE ENCOUNTER
----- Message from Sunni Morrow, Patient Care Assistant sent at 4/10/2024  9:35 AM CDT -----  Regarding: returning call  Contact: pt  Type:  Patient Returning Call    Who Called:  pt     Who Left Message for Patient:  not sure     Does the patient know what this is regarding?:  yes     Best Call Back Number:  [phone] (home)     Additional Information:  please call to advise. Thanks!

## 2024-04-11 ENCOUNTER — TELEPHONE (OUTPATIENT)
Dept: PULMONOLOGY | Facility: CLINIC | Age: 69
End: 2024-04-11
Payer: MEDICARE

## 2024-04-11 NOTE — TELEPHONE ENCOUNTER
Placed a call to the pt to give her test results. Informed pt that she needs to see infectious dz doctor ASAP. Notified that she can call her insurance company for provider names in [facility] [place] [facility] where she just moved to. Pt verbalized understanding.

## 2024-04-11 NOTE — TELEPHONE ENCOUNTER
----- Message from Trena Jarrett LPN sent at 4/11/2024 10:42 AM CDT -----  Regarding: FW: rt call  Contact: pt    ----- Message -----  From: Merna Gonsales  Sent: 4/10/2024   3:05 PM CDT  To: Martin Manriquez Staff  Subject: rt call                                          Type:  Patient Returning Call    Who Called:pt  Who Left Message for Patient:Aura LOPEZ  Does the patient know what this is regarding?:yes  Would the patient rather a call back or a response via [facility]? Call back  Best Call Back Number:[phone]    Additional Information: sts she is rt a call

## 2024-04-15 ENCOUNTER — TELEPHONE (OUTPATIENT)
Dept: PULMONOLOGY | Facility: CLINIC | Age: 69
End: 2024-04-15
Payer: MEDICARE

## 2024-04-15 NOTE — TELEPHONE ENCOUNTER
Orders were faxed to [phone]    ----- Message from Penny Nargis sent at 4/15/2024 12:02 PM CDT -----  Contact: Self  Patient is calling in regards to getting in to see an Infectious Disease doctor in [place], [place]. Stated she is needing to have a referral sent in order to get scheduled. There is one clinic near her that she is requesting if we can get one to them. The clinic name is [facility] and their phone # is [phone] and the fax # [phone]. State she is having issues with her Insurance and trying to get that changed as well. Can we please call pt back to assist at [phone].   Thank You.

## 2024-04-16 ENCOUNTER — TELEPHONE (OUTPATIENT)
Dept: PULMONOLOGY | Facility: CLINIC | Age: 69
End: 2024-04-16
Payer: MEDICARE

## 2024-04-16 NOTE — TELEPHONE ENCOUNTER
----- Message from Sasha Carter sent at 4/16/2024  9:10 AM CDT -----  Type: Needs Medical Advice  Who Called:  infectious disease dept     Best Call Back Number:  [phone]   Additional Information: is needing pt urine cultures and scans any imaging office has please advise .

## 2024-04-16 NOTE — TELEPHONE ENCOUNTER
Placed a call to the infectious disease dept. And spoke with Lizzette. Faxed the print out of the Ct of the chest and the AFB culture and smear to [phone].

## 2024-04-16 NOTE — TELEPHONE ENCOUNTER
----- Message from Sasha Carter sent at 4/16/2024  9:10 AM CDT -----  Type: Needs Medical Advice  Who Called:  infectious disease dept     Best Call Back Number:  [phone]   Additional Information: is needing pt urine cultures and scans any imaging office has please advise .

## 2024-04-22 ENCOUNTER — TELEPHONE (OUTPATIENT)
Dept: PSYCHIATRY | Facility: CLINIC | Age: 69
End: 2024-04-22
Payer: MEDICARE

## 2024-04-22 RX ORDER — HYDROXYZINE HYDROCHLORIDE 10 MG/1
TABLET, FILM COATED ORAL
Qty: 270 TABLET | Refills: 1 | Status: SHIPPED | OUTPATIENT
Start: 2024-04-22

## 2024-04-22 NOTE — TELEPHONE ENCOUNTER
Called patient to confirm 4/24 appt with Mary Herrera.   Patient states she needs to cancel the appointment due to now living in [place].        Patient was wondering if Mary Herrera could send her some references for the [facility] [place] [facility] to be able to continue her medications and care.     I told the patient that I would forward the message.

## 2024-04-24 ENCOUNTER — TELEPHONE (OUTPATIENT)
Dept: PULMONOLOGY | Facility: CLINIC | Age: 69
End: 2024-04-24
Payer: MEDICARE

## 2024-04-24 NOTE — TELEPHONE ENCOUNTER
----- Message from Louise Smith sent at 4/24/2024  2:37 PM CDT -----  Regarding: Needs Medical Advice  Contact: patient at [phone]  Type: Needs Medical Advice  Who Called:  patient at [phone]    Additional Information: calling to continue discussing plans for getting an infectious disease doctor in [place]. Please call and advise. Thank you

## 2024-04-24 NOTE — TELEPHONE ENCOUNTER
Spoke to the patient.  She requested referral and recent medical records be sent to the Infectious Disease Dr. Abel.  Fax [phone].  Completed

## 2024-05-10 ENCOUNTER — TELEPHONE (OUTPATIENT)
Dept: PULMONOLOGY | Facility: CLINIC | Age: 69
End: 2024-05-10
Payer: MEDICARE

## 2024-05-10 NOTE — TELEPHONE ENCOUNTER
----- Message from Gabriela Lopez sent at 5/10/2024  2:58 PM CDT -----  Joshua with Dr [facility] is calling to give an update on the referral. PLEase call back to advise, thank you    Joshua - [phone]

## 2024-07-09 ENCOUNTER — PATIENT MESSAGE (OUTPATIENT)
Dept: ADMINISTRATIVE | Facility: HOSPITAL | Age: 69
End: 2024-07-09
Payer: MEDICARE

## 2024-11-22 ENCOUNTER — PATIENT MESSAGE (OUTPATIENT)
Dept: PODIATRY | Facility: CLINIC | Age: 69
End: 2024-11-22
Payer: MEDICARE

## 2025-06-10 NOTE — TELEPHONE ENCOUNTER
----- Message from Lore Rodríguez sent at 1/17/2018  2:02 PM CST -----  Contact: Marshall/son  Marshall called to speak with the nurse to see if this patient can be worked in today for an ear infection. He can be contacted at [phone].    Thanks,  Lore   I have personally evaluated and examined the patient. The Attending was available to me as a supervising provider if needed.

## 2025-08-19 ENCOUNTER — PATIENT MESSAGE (OUTPATIENT)
Dept: ADMINISTRATIVE | Facility: HOSPITAL | Age: 70
End: 2025-08-19
Payer: MEDICARE

## (undated) DEVICE — APPLICATOR CHLORAPREP CLR 10.5

## (undated) DEVICE — SEE MEDLINE ITEM 152622

## (undated) DEVICE — NDL SPINAL SPINOCAN 22GX3.5

## (undated) DEVICE — TRAY NERVE BLOCK

## (undated) DEVICE — SOL SOD CHLORIDE 0.9% 10ML

## (undated) DEVICE — GLOVE 7.5 PROTEXIS PI MICRO